# Patient Record
Sex: FEMALE | Race: WHITE | NOT HISPANIC OR LATINO | Employment: OTHER
[De-identification: names, ages, dates, MRNs, and addresses within clinical notes are randomized per-mention and may not be internally consistent; named-entity substitution may affect disease eponyms.]

---

## 2017-03-03 ENCOUNTER — RECORDS - HEALTHEAST (OUTPATIENT)
Dept: ADMINISTRATIVE | Facility: OTHER | Age: 77
End: 2017-03-03

## 2017-03-04 ENCOUNTER — RECORDS - HEALTHEAST (OUTPATIENT)
Dept: ADMINISTRATIVE | Facility: OTHER | Age: 77
End: 2017-03-04

## 2017-03-05 ENCOUNTER — RECORDS - HEALTHEAST (OUTPATIENT)
Dept: ADMINISTRATIVE | Facility: OTHER | Age: 77
End: 2017-03-05

## 2017-03-10 ENCOUNTER — COMMUNICATION - HEALTHEAST (OUTPATIENT)
Dept: CARDIOLOGY | Facility: CLINIC | Age: 77
End: 2017-03-10

## 2017-03-15 ENCOUNTER — OFFICE VISIT - HEALTHEAST (OUTPATIENT)
Dept: CARDIOLOGY | Facility: CLINIC | Age: 77
End: 2017-03-15

## 2017-03-15 ENCOUNTER — AMBULATORY - HEALTHEAST (OUTPATIENT)
Dept: CARDIOLOGY | Facility: CLINIC | Age: 77
End: 2017-03-15

## 2017-03-15 DIAGNOSIS — R06.09 EXERTIONAL DYSPNEA: ICD-10-CM

## 2017-03-15 DIAGNOSIS — I95.1 ORTHOSTATIC HYPOTENSION: ICD-10-CM

## 2017-03-15 DIAGNOSIS — I51.7 ASYMMETRIC SEPTAL HYPERTROPHY: ICD-10-CM

## 2017-03-15 ASSESSMENT — MIFFLIN-ST. JEOR: SCORE: 1240.63

## 2017-03-21 ENCOUNTER — AMBULATORY - HEALTHEAST (OUTPATIENT)
Dept: HEALTH INFORMATION MANAGEMENT | Facility: CLINIC | Age: 77
End: 2017-03-21

## 2017-03-24 ENCOUNTER — COMMUNICATION - HEALTHEAST (OUTPATIENT)
Dept: FAMILY MEDICINE | Facility: CLINIC | Age: 77
End: 2017-03-24

## 2017-03-24 ENCOUNTER — RECORDS - HEALTHEAST (OUTPATIENT)
Dept: ADMINISTRATIVE | Facility: OTHER | Age: 77
End: 2017-03-24

## 2017-03-24 DIAGNOSIS — F41.9 ANXIETY: ICD-10-CM

## 2017-03-28 ENCOUNTER — OFFICE VISIT - HEALTHEAST (OUTPATIENT)
Dept: FAMILY MEDICINE | Facility: CLINIC | Age: 77
End: 2017-03-28

## 2017-03-28 DIAGNOSIS — J01.90 ACUTE SINUSITIS: ICD-10-CM

## 2017-03-28 DIAGNOSIS — E23.6 PITUITARY MASS (H): ICD-10-CM

## 2017-03-28 DIAGNOSIS — E03.9 HYPOTHYROIDISM, UNSPECIFIED TYPE: ICD-10-CM

## 2017-03-28 DIAGNOSIS — I10 ESSENTIAL HYPERTENSION WITH GOAL BLOOD PRESSURE LESS THAN 140/90: ICD-10-CM

## 2017-03-28 DIAGNOSIS — I95.1 ORTHOSTATIC HYPOTENSION: ICD-10-CM

## 2017-03-28 DIAGNOSIS — G47.33 OBSTRUCTIVE SLEEP APNEA (ADULT) (PEDIATRIC): ICD-10-CM

## 2017-03-28 ASSESSMENT — MIFFLIN-ST. JEOR: SCORE: 1232.47

## 2017-03-31 ENCOUNTER — COMMUNICATION - HEALTHEAST (OUTPATIENT)
Dept: CARDIOLOGY | Facility: CLINIC | Age: 77
End: 2017-03-31

## 2017-04-03 ENCOUNTER — OFFICE VISIT - HEALTHEAST (OUTPATIENT)
Dept: CARDIOLOGY | Facility: CLINIC | Age: 77
End: 2017-04-03

## 2017-04-03 DIAGNOSIS — I95.1 ORTHOSTATIC HYPOTENSION: ICD-10-CM

## 2017-04-03 DIAGNOSIS — I51.7 ASYMMETRIC SEPTAL HYPERTROPHY: ICD-10-CM

## 2017-04-03 DIAGNOSIS — R06.09 EXERTIONAL DYSPNEA: ICD-10-CM

## 2017-04-03 ASSESSMENT — MIFFLIN-ST. JEOR: SCORE: 1235.19

## 2017-04-07 ENCOUNTER — RECORDS - HEALTHEAST (OUTPATIENT)
Dept: ADMINISTRATIVE | Facility: OTHER | Age: 77
End: 2017-04-07

## 2017-04-10 ENCOUNTER — RECORDS - HEALTHEAST (OUTPATIENT)
Dept: ADMINISTRATIVE | Facility: OTHER | Age: 77
End: 2017-04-10

## 2017-04-20 ENCOUNTER — HOSPITAL ENCOUNTER (OUTPATIENT)
Dept: MRI IMAGING | Facility: CLINIC | Age: 77
Discharge: HOME | End: 2017-04-20
Attending: SURGERY

## 2017-04-20 ENCOUNTER — HOSPITAL ENCOUNTER (OUTPATIENT)
Dept: CT IMAGING | Facility: CLINIC | Age: 77
Discharge: HOME | End: 2017-04-20
Attending: NEUROLOGICAL SURGERY

## 2017-04-20 ENCOUNTER — HOSPITAL ENCOUNTER (OUTPATIENT)
Dept: MRI IMAGING | Facility: CLINIC | Age: 77
Discharge: HOME | End: 2017-04-20
Attending: NEUROLOGICAL SURGERY

## 2017-04-20 DIAGNOSIS — D49.7 PITUITARY TUMOR: ICD-10-CM

## 2017-04-20 DIAGNOSIS — D48.19 NEOPLASM OF UNCERTAIN BEHAVIOR OF CONNECTIVE AND OTHER SOFT TISSUE: ICD-10-CM

## 2017-04-24 ENCOUNTER — COMMUNICATION - HEALTHEAST (OUTPATIENT)
Dept: FAMILY MEDICINE | Facility: CLINIC | Age: 77
End: 2017-04-24

## 2017-05-06 ENCOUNTER — COMMUNICATION - HEALTHEAST (OUTPATIENT)
Dept: FAMILY MEDICINE | Facility: CLINIC | Age: 77
End: 2017-05-06

## 2017-05-09 ENCOUNTER — OFFICE VISIT - HEALTHEAST (OUTPATIENT)
Dept: CARDIOLOGY | Facility: CLINIC | Age: 77
End: 2017-05-09

## 2017-05-09 DIAGNOSIS — R55 SYNCOPE, UNSPECIFIED SYNCOPE TYPE: ICD-10-CM

## 2017-05-09 DIAGNOSIS — I95.1 ORTHOSTATIC HYPOTENSION: ICD-10-CM

## 2017-05-09 DIAGNOSIS — Q24.8 LEFT VENTRICULAR OUTFLOW TRACT OBSTRUCTION: ICD-10-CM

## 2017-05-09 DIAGNOSIS — I51.7 ASYMMETRIC SEPTAL HYPERTROPHY: ICD-10-CM

## 2017-05-09 ASSESSMENT — MIFFLIN-ST. JEOR: SCORE: 1219.77

## 2017-05-10 ENCOUNTER — COMMUNICATION - HEALTHEAST (OUTPATIENT)
Dept: FAMILY MEDICINE | Facility: CLINIC | Age: 77
End: 2017-05-10

## 2017-05-12 ENCOUNTER — RECORDS - HEALTHEAST (OUTPATIENT)
Dept: ADMINISTRATIVE | Facility: OTHER | Age: 77
End: 2017-05-12

## 2017-05-19 ENCOUNTER — COMMUNICATION - HEALTHEAST (OUTPATIENT)
Dept: FAMILY MEDICINE | Facility: CLINIC | Age: 77
End: 2017-05-19

## 2017-05-30 ENCOUNTER — COMMUNICATION - HEALTHEAST (OUTPATIENT)
Dept: FAMILY MEDICINE | Facility: CLINIC | Age: 77
End: 2017-05-30

## 2017-06-12 ENCOUNTER — COMMUNICATION - HEALTHEAST (OUTPATIENT)
Dept: FAMILY MEDICINE | Facility: CLINIC | Age: 77
End: 2017-06-12

## 2017-06-12 DIAGNOSIS — E78.5 HYPERLIPIDEMIA: ICD-10-CM

## 2017-06-17 ENCOUNTER — COMMUNICATION - HEALTHEAST (OUTPATIENT)
Dept: FAMILY MEDICINE | Facility: CLINIC | Age: 77
End: 2017-06-17

## 2017-06-17 DIAGNOSIS — G47.00 INSOMNIA, UNSPECIFIED: ICD-10-CM

## 2017-06-17 DIAGNOSIS — E78.5 HYPERLIPIDEMIA: ICD-10-CM

## 2017-06-20 ENCOUNTER — COMMUNICATION - HEALTHEAST (OUTPATIENT)
Dept: FAMILY MEDICINE | Facility: CLINIC | Age: 77
End: 2017-06-20

## 2017-06-20 DIAGNOSIS — G47.00 INSOMNIA, UNSPECIFIED: ICD-10-CM

## 2017-06-21 ENCOUNTER — COMMUNICATION - HEALTHEAST (OUTPATIENT)
Dept: FAMILY MEDICINE | Facility: CLINIC | Age: 77
End: 2017-06-21

## 2017-06-21 ENCOUNTER — COMMUNICATION - HEALTHEAST (OUTPATIENT)
Dept: CARDIOLOGY | Facility: CLINIC | Age: 77
End: 2017-06-21

## 2017-06-26 ENCOUNTER — COMMUNICATION - HEALTHEAST (OUTPATIENT)
Dept: CARDIOLOGY | Facility: CLINIC | Age: 77
End: 2017-06-26

## 2017-06-26 ENCOUNTER — AMBULATORY - HEALTHEAST (OUTPATIENT)
Dept: CARDIOLOGY | Facility: CLINIC | Age: 77
End: 2017-06-26

## 2017-06-26 DIAGNOSIS — R00.0 TACHYCARDIA: ICD-10-CM

## 2017-06-27 ENCOUNTER — HOSPITAL ENCOUNTER (OUTPATIENT)
Dept: CARDIOLOGY | Facility: CLINIC | Age: 77
Discharge: HOME | End: 2017-06-27
Attending: INTERNAL MEDICINE

## 2017-06-27 DIAGNOSIS — R00.0 TACHYCARDIA: ICD-10-CM

## 2017-06-29 ENCOUNTER — COMMUNICATION - HEALTHEAST (OUTPATIENT)
Dept: CARDIOLOGY | Facility: CLINIC | Age: 77
End: 2017-06-29

## 2017-06-29 ENCOUNTER — OFFICE VISIT - HEALTHEAST (OUTPATIENT)
Dept: CARDIOLOGY | Facility: CLINIC | Age: 77
End: 2017-06-29

## 2017-06-29 DIAGNOSIS — I10 ESSENTIAL HYPERTENSION WITH GOAL BLOOD PRESSURE LESS THAN 140/90: ICD-10-CM

## 2017-06-29 DIAGNOSIS — R07.2 PRECORDIAL PAIN: ICD-10-CM

## 2017-06-29 DIAGNOSIS — R00.2 RAPID PALPITATIONS: ICD-10-CM

## 2017-06-29 DIAGNOSIS — G47.33 OBSTRUCTIVE SLEEP APNEA (ADULT) (PEDIATRIC): ICD-10-CM

## 2017-06-29 LAB
ATRIAL RATE - MUSE: 68 BPM
DIASTOLIC BLOOD PRESSURE - MUSE: NORMAL MMHG
INTERPRETATION ECG - MUSE: NORMAL
P AXIS - MUSE: 27 DEGREES
PR INTERVAL - MUSE: 162 MS
QRS DURATION - MUSE: 96 MS
QT - MUSE: 382 MS
QTC - MUSE: 406 MS
R AXIS - MUSE: 7 DEGREES
SYSTOLIC BLOOD PRESSURE - MUSE: NORMAL MMHG
T AXIS - MUSE: 20 DEGREES
VENTRICULAR RATE- MUSE: 68 BPM

## 2017-06-29 ASSESSMENT — MIFFLIN-ST. JEOR: SCORE: 1248.8

## 2017-07-03 ENCOUNTER — COMMUNICATION - HEALTHEAST (OUTPATIENT)
Dept: FAMILY MEDICINE | Facility: CLINIC | Age: 77
End: 2017-07-03

## 2017-07-03 DIAGNOSIS — E03.9 HYPOTHYROID: ICD-10-CM

## 2017-07-06 ENCOUNTER — HOSPITAL ENCOUNTER (OUTPATIENT)
Dept: CARDIOLOGY | Facility: HOSPITAL | Age: 77
Discharge: HOME | End: 2017-07-06
Attending: INTERNAL MEDICINE

## 2017-07-06 ENCOUNTER — HOSPITAL ENCOUNTER (OUTPATIENT)
Dept: NUCLEAR MEDICINE | Facility: HOSPITAL | Age: 77
Discharge: HOME | End: 2017-07-06
Attending: INTERNAL MEDICINE

## 2017-07-06 DIAGNOSIS — R00.2 RAPID PALPITATIONS: ICD-10-CM

## 2017-07-06 DIAGNOSIS — R07.2 PRECORDIAL PAIN: ICD-10-CM

## 2017-07-06 LAB
CV STRESS CURRENT BP HE: NORMAL
CV STRESS CURRENT HR HE: 104
CV STRESS CURRENT HR HE: 105
CV STRESS CURRENT HR HE: 108
CV STRESS CURRENT HR HE: 112
CV STRESS CURRENT HR HE: 118
CV STRESS CURRENT HR HE: 120
CV STRESS CURRENT HR HE: 121
CV STRESS CURRENT HR HE: 121
CV STRESS CURRENT HR HE: 122
CV STRESS CURRENT HR HE: 70
CV STRESS CURRENT HR HE: 70
CV STRESS CURRENT HR HE: 71
CV STRESS CURRENT HR HE: 72
CV STRESS CURRENT HR HE: 74
CV STRESS CURRENT HR HE: 76
CV STRESS CURRENT HR HE: 77
CV STRESS CURRENT HR HE: 77
CV STRESS CURRENT HR HE: 79
CV STRESS CURRENT HR HE: 82
CV STRESS CURRENT HR HE: 96
CV STRESS DEVIATION TIME HE: NORMAL
CV STRESS ECHO PERCENT HR HE: NORMAL
CV STRESS EXERCISE STAGE HE: NORMAL
CV STRESS EXERCISE STAGE REACHED HE: NORMAL
CV STRESS FINAL RESTING BP HE: NORMAL
CV STRESS FINAL RESTING HR HE: 70
CV STRESS MAX HR HE: 122
CV STRESS MAX TREADMILL GRADE HE: 14
CV STRESS MAX TREADMILL SPEED HE: 3.4
CV STRESS PEAK DIA BP HE: NORMAL
CV STRESS PEAK SYS BP HE: NORMAL
CV STRESS PHASE HE: NORMAL
CV STRESS PROTOCOL HE: NORMAL
CV STRESS RESTING PT POSITION HE: NORMAL
CV STRESS ST DEVIATION AMOUNT HE: NORMAL
CV STRESS ST DEVIATION ELEVATION HE: NORMAL
CV STRESS ST EVELATION AMOUNT HE: NORMAL
CV STRESS TEST TYPE HE: NORMAL
CV STRESS TOTAL STAGE TIME MIN 1 HE: NORMAL
NUC STRESS EJECTION FRACTION: 74 %
STRESS ECHO BASELINE BP: NORMAL
STRESS ECHO BASELINE HR: 66
STRESS ECHO CALCULATED PERCENT HR: 85 %
STRESS ECHO LAST STRESS BP: NORMAL
STRESS ECHO LAST STRESS HR: 122
STRESS ECHO POST ESTIMATED WORKLOAD: 7.3
STRESS ECHO POST EXERCISE DUR MIN: 6
STRESS ECHO POST EXERCISE DUR SEC: 0
STRESS ECHO TARGET HR: 122

## 2017-08-03 ENCOUNTER — COMMUNICATION - HEALTHEAST (OUTPATIENT)
Dept: FAMILY MEDICINE | Facility: CLINIC | Age: 77
End: 2017-08-03

## 2017-08-09 ENCOUNTER — COMMUNICATION - HEALTHEAST (OUTPATIENT)
Dept: FAMILY MEDICINE | Facility: CLINIC | Age: 77
End: 2017-08-09

## 2017-08-13 ENCOUNTER — COMMUNICATION - HEALTHEAST (OUTPATIENT)
Dept: FAMILY MEDICINE | Facility: CLINIC | Age: 77
End: 2017-08-13

## 2017-08-13 DIAGNOSIS — R23.2 HOT FLASHES: ICD-10-CM

## 2017-08-13 DIAGNOSIS — K21.9 ESOPHAGEAL REFLUX: ICD-10-CM

## 2017-08-16 ENCOUNTER — OFFICE VISIT - HEALTHEAST (OUTPATIENT)
Dept: FAMILY MEDICINE | Facility: CLINIC | Age: 77
End: 2017-08-16

## 2017-08-16 DIAGNOSIS — F41.1 GENERALIZED ANXIETY DISORDER: ICD-10-CM

## 2017-08-16 DIAGNOSIS — F33.9 MAJOR DEPRESSION, RECURRENT (H): ICD-10-CM

## 2017-08-16 DIAGNOSIS — T14.8XXA HEMATOMA: ICD-10-CM

## 2017-08-16 DIAGNOSIS — E23.6 PITUITARY MASS (H): ICD-10-CM

## 2017-08-17 ENCOUNTER — OFFICE VISIT - HEALTHEAST (OUTPATIENT)
Dept: CARDIOLOGY | Facility: CLINIC | Age: 77
End: 2017-08-17

## 2017-08-17 DIAGNOSIS — I10 ESSENTIAL HYPERTENSION WITH GOAL BLOOD PRESSURE LESS THAN 140/90: ICD-10-CM

## 2017-08-17 DIAGNOSIS — R00.2 RAPID PALPITATIONS: ICD-10-CM

## 2017-08-17 DIAGNOSIS — I95.1 SYNCOPE DUE TO ORTHOSTATIC HYPOTENSION: ICD-10-CM

## 2017-08-17 ASSESSMENT — MIFFLIN-ST. JEOR: SCORE: 1257.87

## 2017-09-14 ENCOUNTER — COMMUNICATION - HEALTHEAST (OUTPATIENT)
Dept: FAMILY MEDICINE | Facility: CLINIC | Age: 77
End: 2017-09-14

## 2017-09-19 ENCOUNTER — OFFICE VISIT - HEALTHEAST (OUTPATIENT)
Dept: FAMILY MEDICINE | Facility: CLINIC | Age: 77
End: 2017-09-19

## 2017-09-19 DIAGNOSIS — R42 DIZZINESS: ICD-10-CM

## 2017-09-19 DIAGNOSIS — R29.6 FREQUENT FALLS: ICD-10-CM

## 2017-09-19 DIAGNOSIS — D49.7 PITUITARY TUMOR: ICD-10-CM

## 2017-09-21 ENCOUNTER — COMMUNICATION - HEALTHEAST (OUTPATIENT)
Dept: CARDIOLOGY | Facility: CLINIC | Age: 77
End: 2017-09-21

## 2017-09-21 DIAGNOSIS — R06.09 EXERTIONAL DYSPNEA: ICD-10-CM

## 2017-09-21 DIAGNOSIS — I95.1 ORTHOSTATIC HYPOTENSION: ICD-10-CM

## 2017-09-21 DIAGNOSIS — I51.7 ASYMMETRIC SEPTAL HYPERTROPHY: ICD-10-CM

## 2017-09-25 ENCOUNTER — RECORDS - HEALTHEAST (OUTPATIENT)
Dept: ADMINISTRATIVE | Facility: OTHER | Age: 77
End: 2017-09-25

## 2017-09-25 ENCOUNTER — COMMUNICATION - HEALTHEAST (OUTPATIENT)
Dept: CARDIOLOGY | Facility: CLINIC | Age: 77
End: 2017-09-25

## 2017-09-25 DIAGNOSIS — R06.09 EXERTIONAL DYSPNEA: ICD-10-CM

## 2017-09-25 DIAGNOSIS — I95.1 ORTHOSTATIC HYPOTENSION: ICD-10-CM

## 2017-09-25 DIAGNOSIS — I51.7 ASYMMETRIC SEPTAL HYPERTROPHY: ICD-10-CM

## 2017-10-03 ENCOUNTER — COMMUNICATION - HEALTHEAST (OUTPATIENT)
Dept: CARDIOLOGY | Facility: CLINIC | Age: 77
End: 2017-10-03

## 2017-10-03 DIAGNOSIS — R00.2 RAPID PALPITATIONS: ICD-10-CM

## 2017-10-09 ENCOUNTER — RECORDS - HEALTHEAST (OUTPATIENT)
Dept: ADMINISTRATIVE | Facility: OTHER | Age: 77
End: 2017-10-09

## 2017-11-08 ENCOUNTER — COMMUNICATION - HEALTHEAST (OUTPATIENT)
Dept: FAMILY MEDICINE | Facility: CLINIC | Age: 77
End: 2017-11-08

## 2017-11-08 DIAGNOSIS — R23.2 HOT FLASHES: ICD-10-CM

## 2017-11-13 ENCOUNTER — COMMUNICATION - HEALTHEAST (OUTPATIENT)
Dept: FAMILY MEDICINE | Facility: CLINIC | Age: 77
End: 2017-11-13

## 2017-11-21 ENCOUNTER — COMMUNICATION - HEALTHEAST (OUTPATIENT)
Dept: SCHEDULING | Facility: CLINIC | Age: 77
End: 2017-11-21

## 2017-11-21 ENCOUNTER — OFFICE VISIT - HEALTHEAST (OUTPATIENT)
Dept: FAMILY MEDICINE | Facility: CLINIC | Age: 77
End: 2017-11-21

## 2017-11-21 DIAGNOSIS — R42 DIZZINESS: ICD-10-CM

## 2017-11-21 DIAGNOSIS — T14.8XXA HEMATOMA: ICD-10-CM

## 2017-11-21 DIAGNOSIS — M54.5 CHRONIC LOW BACK PAIN, UNSPECIFIED BACK PAIN LATERALITY, WITH SCIATICA PRESENCE UNSPECIFIED: ICD-10-CM

## 2017-11-21 DIAGNOSIS — F33.1 MODERATE EPISODE OF RECURRENT MAJOR DEPRESSIVE DISORDER (H): ICD-10-CM

## 2017-11-21 DIAGNOSIS — G89.29 CHRONIC LOW BACK PAIN, UNSPECIFIED BACK PAIN LATERALITY, WITH SCIATICA PRESENCE UNSPECIFIED: ICD-10-CM

## 2017-11-21 DIAGNOSIS — R29.6 FREQUENT FALLS: ICD-10-CM

## 2017-11-24 ENCOUNTER — HOSPITAL ENCOUNTER (OUTPATIENT)
Dept: PHYSICAL MEDICINE AND REHAB | Facility: CLINIC | Age: 77
Discharge: HOME | End: 2017-11-24
Attending: FAMILY MEDICINE

## 2017-11-24 DIAGNOSIS — M54.50 LEFT LOW BACK PAIN: ICD-10-CM

## 2017-11-24 DIAGNOSIS — T88.8XXA FLUID COLLECTION AT SURGICAL SITE: ICD-10-CM

## 2017-11-24 DIAGNOSIS — Z98.1 S/P LUMBAR FUSION: ICD-10-CM

## 2017-11-24 DIAGNOSIS — M79.18 MYOFASCIAL PAIN: ICD-10-CM

## 2017-11-24 ASSESSMENT — MIFFLIN-ST. JEOR: SCORE: 1271.48

## 2017-11-27 ENCOUNTER — RECORDS - HEALTHEAST (OUTPATIENT)
Dept: ADMINISTRATIVE | Facility: OTHER | Age: 77
End: 2017-11-27

## 2017-11-28 ENCOUNTER — COMMUNICATION - HEALTHEAST (OUTPATIENT)
Dept: PHYSICAL MEDICINE AND REHAB | Facility: CLINIC | Age: 77
End: 2017-11-28

## 2017-12-09 ENCOUNTER — COMMUNICATION - HEALTHEAST (OUTPATIENT)
Dept: FAMILY MEDICINE | Facility: CLINIC | Age: 77
End: 2017-12-09

## 2017-12-09 DIAGNOSIS — G47.00 INSOMNIA: ICD-10-CM

## 2017-12-22 ENCOUNTER — COMMUNICATION - HEALTHEAST (OUTPATIENT)
Dept: ADMINISTRATIVE | Facility: CLINIC | Age: 77
End: 2017-12-22

## 2018-01-02 ASSESSMENT — MIFFLIN-ST. JEOR: SCORE: 1307.76

## 2018-01-03 ENCOUNTER — OFFICE VISIT - HEALTHEAST (OUTPATIENT)
Dept: FAMILY MEDICINE | Facility: CLINIC | Age: 78
End: 2018-01-03

## 2018-01-03 DIAGNOSIS — I10 ESSENTIAL HYPERTENSION WITH GOAL BLOOD PRESSURE LESS THAN 140/90: ICD-10-CM

## 2018-01-03 DIAGNOSIS — T14.8XXA HEMATOMA: ICD-10-CM

## 2018-01-03 DIAGNOSIS — R29.6 FREQUENT FALLS: ICD-10-CM

## 2018-01-03 DIAGNOSIS — G89.29 CHRONIC LOW BACK PAIN, UNSPECIFIED BACK PAIN LATERALITY, WITH SCIATICA PRESENCE UNSPECIFIED: ICD-10-CM

## 2018-01-03 DIAGNOSIS — R42 DIZZINESS: ICD-10-CM

## 2018-01-03 DIAGNOSIS — M54.5 CHRONIC LOW BACK PAIN, UNSPECIFIED BACK PAIN LATERALITY, WITH SCIATICA PRESENCE UNSPECIFIED: ICD-10-CM

## 2018-01-03 DIAGNOSIS — F33.1 MODERATE EPISODE OF RECURRENT MAJOR DEPRESSIVE DISORDER (H): ICD-10-CM

## 2018-01-03 DIAGNOSIS — Z01.810 PREOP CARDIOVASCULAR EXAM: ICD-10-CM

## 2018-01-03 LAB
ANION GAP SERPL CALCULATED.3IONS-SCNC: 8 MMOL/L (ref 5–18)
BUN SERPL-MCNC: 19 MG/DL (ref 8–28)
CALCIUM SERPL-MCNC: 9.1 MG/DL (ref 8.5–10.5)
CHLORIDE BLD-SCNC: 104 MMOL/L (ref 98–107)
CO2 SERPL-SCNC: 28 MMOL/L (ref 22–31)
CREAT SERPL-MCNC: 0.86 MG/DL (ref 0.6–1.1)
ERYTHROCYTE [DISTWIDTH] IN BLOOD BY AUTOMATED COUNT: 13.7 % (ref 11–14.5)
GFR SERPL CREATININE-BSD FRML MDRD: >60 ML/MIN/1.73M2
GLUCOSE BLD-MCNC: 80 MG/DL (ref 70–125)
HCT VFR BLD AUTO: 38.6 % (ref 35–47)
HGB BLD-MCNC: 12.7 G/DL (ref 12–16)
MCH RBC QN AUTO: 30.3 PG (ref 27–34)
MCHC RBC AUTO-ENTMCNC: 32.9 G/DL (ref 32–36)
MCV RBC AUTO: 92 FL (ref 80–100)
PLATELET # BLD AUTO: 226 THOU/UL (ref 140–440)
PMV BLD AUTO: 7.7 FL (ref 7–10)
POTASSIUM BLD-SCNC: 5.2 MMOL/L (ref 3.5–5)
RBC # BLD AUTO: 4.19 MILL/UL (ref 3.8–5.4)
SODIUM SERPL-SCNC: 140 MMOL/L (ref 136–145)
WBC: 7 THOU/UL (ref 4–11)

## 2018-01-05 ENCOUNTER — SURGERY - HEALTHEAST (OUTPATIENT)
Dept: SURGERY | Facility: CLINIC | Age: 78
End: 2018-01-05

## 2018-01-05 ENCOUNTER — ANESTHESIA - HEALTHEAST (OUTPATIENT)
Dept: SURGERY | Facility: CLINIC | Age: 78
End: 2018-01-05

## 2018-01-06 ENCOUNTER — COMMUNICATION - HEALTHEAST (OUTPATIENT)
Dept: FAMILY MEDICINE | Facility: CLINIC | Age: 78
End: 2018-01-06

## 2018-01-06 DIAGNOSIS — F33.1 MODERATE EPISODE OF RECURRENT MAJOR DEPRESSIVE DISORDER (H): ICD-10-CM

## 2018-01-06 DIAGNOSIS — K21.9 ESOPHAGEAL REFLUX: ICD-10-CM

## 2018-01-06 DIAGNOSIS — E03.9 HYPOTHYROID: ICD-10-CM

## 2018-01-06 DIAGNOSIS — R23.2 HOT FLASHES: ICD-10-CM

## 2018-01-10 ENCOUNTER — COMMUNICATION - HEALTHEAST (OUTPATIENT)
Dept: FAMILY MEDICINE | Facility: CLINIC | Age: 78
End: 2018-01-10

## 2018-01-11 ENCOUNTER — RECORDS - HEALTHEAST (OUTPATIENT)
Dept: ADMINISTRATIVE | Facility: OTHER | Age: 78
End: 2018-01-11

## 2018-01-24 ENCOUNTER — OFFICE VISIT - HEALTHEAST (OUTPATIENT)
Dept: CARDIOLOGY | Facility: CLINIC | Age: 78
End: 2018-01-24

## 2018-01-24 DIAGNOSIS — R00.2 RAPID PALPITATIONS: ICD-10-CM

## 2018-01-24 DIAGNOSIS — R06.09 EXERTIONAL DYSPNEA: ICD-10-CM

## 2018-01-24 DIAGNOSIS — I95.1 ORTHOSTATIC HYPOTENSION: ICD-10-CM

## 2018-01-24 ASSESSMENT — MIFFLIN-ST. JEOR: SCORE: 1307.76

## 2018-02-06 ENCOUNTER — COMMUNICATION - HEALTHEAST (OUTPATIENT)
Dept: FAMILY MEDICINE | Facility: CLINIC | Age: 78
End: 2018-02-06

## 2018-02-06 DIAGNOSIS — K21.9 ESOPHAGEAL REFLUX: ICD-10-CM

## 2018-03-12 ENCOUNTER — COMMUNICATION - HEALTHEAST (OUTPATIENT)
Dept: FAMILY MEDICINE | Facility: CLINIC | Age: 78
End: 2018-03-12

## 2018-03-13 ENCOUNTER — AMBULATORY - HEALTHEAST (OUTPATIENT)
Dept: FAMILY MEDICINE | Facility: CLINIC | Age: 78
End: 2018-03-13

## 2018-03-13 DIAGNOSIS — F41.1 GENERALIZED ANXIETY DISORDER: ICD-10-CM

## 2018-03-13 DIAGNOSIS — F33.9 RECURRENT MAJOR DEPRESSIVE DISORDER, REMISSION STATUS UNSPECIFIED (H): ICD-10-CM

## 2018-03-13 DIAGNOSIS — R45.89 DEPRESSED MOOD: ICD-10-CM

## 2018-03-30 ENCOUNTER — COMMUNICATION - HEALTHEAST (OUTPATIENT)
Dept: FAMILY MEDICINE | Facility: CLINIC | Age: 78
End: 2018-03-30

## 2018-03-30 DIAGNOSIS — E03.9 HYPOTHYROID: ICD-10-CM

## 2018-04-02 ENCOUNTER — COMMUNICATION - HEALTHEAST (OUTPATIENT)
Dept: FAMILY MEDICINE | Facility: CLINIC | Age: 78
End: 2018-04-02

## 2018-04-02 ENCOUNTER — RECORDS - HEALTHEAST (OUTPATIENT)
Dept: ADMINISTRATIVE | Facility: OTHER | Age: 78
End: 2018-04-02

## 2018-04-03 ENCOUNTER — COMMUNICATION - HEALTHEAST (OUTPATIENT)
Dept: FAMILY MEDICINE | Facility: CLINIC | Age: 78
End: 2018-04-03

## 2018-04-03 DIAGNOSIS — R23.2 HOT FLASHES: ICD-10-CM

## 2018-04-04 ENCOUNTER — RECORDS - HEALTHEAST (OUTPATIENT)
Dept: ADMINISTRATIVE | Facility: OTHER | Age: 78
End: 2018-04-04

## 2018-04-05 ENCOUNTER — COMMUNICATION - HEALTHEAST (OUTPATIENT)
Dept: FAMILY MEDICINE | Facility: CLINIC | Age: 78
End: 2018-04-05

## 2018-04-05 DIAGNOSIS — F33.1 MODERATE EPISODE OF RECURRENT MAJOR DEPRESSIVE DISORDER (H): ICD-10-CM

## 2018-04-12 ENCOUNTER — OFFICE VISIT - HEALTHEAST (OUTPATIENT)
Dept: FAMILY MEDICINE | Facility: CLINIC | Age: 78
End: 2018-04-12

## 2018-04-12 DIAGNOSIS — R32 URINARY INCONTINENCE: ICD-10-CM

## 2018-04-12 DIAGNOSIS — I73.9 CLAUDICATION (H): ICD-10-CM

## 2018-04-12 DIAGNOSIS — R23.2 HOT FLASHES: ICD-10-CM

## 2018-04-12 DIAGNOSIS — E78.5 HYPERLIPIDEMIA: ICD-10-CM

## 2018-04-12 DIAGNOSIS — J30.9 AR (ALLERGIC RHINITIS): ICD-10-CM

## 2018-04-12 DIAGNOSIS — R29.898 BILATERAL LEG WEAKNESS: ICD-10-CM

## 2018-04-12 DIAGNOSIS — H69.92 EUSTACHIAN TUBE DYSFUNCTION, LEFT: ICD-10-CM

## 2018-04-12 LAB
ANION GAP SERPL CALCULATED.3IONS-SCNC: 10 MMOL/L (ref 5–18)
BUN SERPL-MCNC: 21 MG/DL (ref 8–28)
CALCIUM SERPL-MCNC: 9.2 MG/DL (ref 8.5–10.5)
CHLORIDE BLD-SCNC: 103 MMOL/L (ref 98–107)
CK SERPL-CCNC: 87 U/L (ref 30–190)
CO2 SERPL-SCNC: 27 MMOL/L (ref 22–31)
CREAT SERPL-MCNC: 0.8 MG/DL (ref 0.6–1.1)
GFR SERPL CREATININE-BSD FRML MDRD: >60 ML/MIN/1.73M2
GLUCOSE BLD-MCNC: 77 MG/DL (ref 70–125)
POTASSIUM BLD-SCNC: 4.8 MMOL/L (ref 3.5–5)
SODIUM SERPL-SCNC: 140 MMOL/L (ref 136–145)

## 2018-04-13 ENCOUNTER — HOSPITAL ENCOUNTER (OUTPATIENT)
Dept: ULTRASOUND IMAGING | Facility: CLINIC | Age: 78
Discharge: HOME | End: 2018-04-13
Attending: FAMILY MEDICINE

## 2018-04-13 DIAGNOSIS — I73.9 CLAUDICATION (H): ICD-10-CM

## 2018-04-16 ENCOUNTER — OFFICE VISIT - HEALTHEAST (OUTPATIENT)
Dept: VASCULAR SURGERY | Facility: CLINIC | Age: 78
End: 2018-04-16

## 2018-04-16 DIAGNOSIS — R29.898 BILATERAL LEG WEAKNESS: ICD-10-CM

## 2018-04-16 DIAGNOSIS — R60.9 LIPOEDEMA: ICD-10-CM

## 2018-04-16 DIAGNOSIS — I73.9 PAD (PERIPHERAL ARTERY DISEASE) (H): ICD-10-CM

## 2018-04-18 ENCOUNTER — OFFICE VISIT - HEALTHEAST (OUTPATIENT)
Dept: BEHAVIORAL HEALTH | Facility: CLINIC | Age: 78
End: 2018-04-18

## 2018-04-18 DIAGNOSIS — F33.41 RECURRENT MAJOR DEPRESSIVE DISORDER, IN PARTIAL REMISSION (H): ICD-10-CM

## 2018-04-23 ENCOUNTER — OFFICE VISIT - HEALTHEAST (OUTPATIENT)
Dept: FAMILY MEDICINE | Facility: CLINIC | Age: 78
End: 2018-04-23

## 2018-04-23 DIAGNOSIS — N39.0 UTI (URINARY TRACT INFECTION): ICD-10-CM

## 2018-04-23 DIAGNOSIS — R32 URINARY INCONTINENCE: ICD-10-CM

## 2018-04-23 DIAGNOSIS — N20.0 LEFT NEPHROLITHIASIS: ICD-10-CM

## 2018-04-23 LAB
ALBUMIN UR-MCNC: ABNORMAL MG/DL
APPEARANCE UR: ABNORMAL
BACTERIA #/AREA URNS HPF: ABNORMAL HPF
BILIRUB UR QL STRIP: ABNORMAL
COLOR UR AUTO: YELLOW
GLUCOSE UR STRIP-MCNC: NEGATIVE MG/DL
HGB UR QL STRIP: ABNORMAL
KETONES UR STRIP-MCNC: ABNORMAL MG/DL
LEUKOCYTE ESTERASE UR QL STRIP: ABNORMAL
NITRATE UR QL: NEGATIVE
PH UR STRIP: 6 [PH] (ref 5–8)
RBC #/AREA URNS AUTO: ABNORMAL HPF
SP GR UR STRIP: 1.01 (ref 1–1.03)
SQUAMOUS #/AREA URNS AUTO: ABNORMAL LPF
UROBILINOGEN UR STRIP-ACNC: ABNORMAL
WBC #/AREA URNS AUTO: ABNORMAL HPF

## 2018-04-23 ASSESSMENT — MIFFLIN-ST. JEOR: SCORE: 1285.08

## 2018-04-25 ENCOUNTER — COMMUNICATION - HEALTHEAST (OUTPATIENT)
Dept: ADMINISTRATIVE | Facility: CLINIC | Age: 78
End: 2018-04-25

## 2018-04-25 LAB — BACTERIA SPEC CULT: ABNORMAL

## 2018-05-15 ENCOUNTER — COMMUNICATION - HEALTHEAST (OUTPATIENT)
Dept: FAMILY MEDICINE | Facility: CLINIC | Age: 78
End: 2018-05-15

## 2018-05-17 ENCOUNTER — OFFICE VISIT - HEALTHEAST (OUTPATIENT)
Dept: VASCULAR SURGERY | Facility: CLINIC | Age: 78
End: 2018-05-17

## 2018-05-17 DIAGNOSIS — E55.9 VITAMIN D DEFICIENCY: ICD-10-CM

## 2018-05-17 DIAGNOSIS — E65 FAT DEPOSITS: ICD-10-CM

## 2018-05-17 DIAGNOSIS — M79.89 LEG SWELLING: ICD-10-CM

## 2018-05-17 DIAGNOSIS — I87.303 VENOUS HYPERTENSION OF BOTH LOWER EXTREMITIES: ICD-10-CM

## 2018-05-17 DIAGNOSIS — R29.898 BILATERAL LEG WEAKNESS: ICD-10-CM

## 2018-05-17 DIAGNOSIS — M79.605 PAIN IN BOTH LOWER EXTREMITIES: ICD-10-CM

## 2018-05-17 DIAGNOSIS — M79.604 PAIN IN BOTH LOWER EXTREMITIES: ICD-10-CM

## 2018-05-17 DIAGNOSIS — E66.811: ICD-10-CM

## 2018-05-17 DIAGNOSIS — I87.2 VENOUS INSUFFICIENCY OF BOTH LOWER EXTREMITIES: ICD-10-CM

## 2018-05-17 LAB
C REACTIVE PROTEIN LHE: 0.2 MG/DL (ref 0–0.8)
ERYTHROCYTE [SEDIMENTATION RATE] IN BLOOD BY WESTERGREN METHOD: 26 MM/HR (ref 0–20)

## 2018-05-18 LAB — 25(OH)D3 SERPL-MCNC: 45.2 NG/ML (ref 30–80)

## 2018-05-21 ENCOUNTER — COMMUNICATION - HEALTHEAST (OUTPATIENT)
Dept: VASCULAR SURGERY | Facility: CLINIC | Age: 78
End: 2018-05-21

## 2018-05-24 ENCOUNTER — RECORDS - HEALTHEAST (OUTPATIENT)
Dept: VASCULAR ULTRASOUND | Facility: CLINIC | Age: 78
End: 2018-05-24

## 2018-05-24 DIAGNOSIS — E65 LOCALIZED ADIPOSITY: ICD-10-CM

## 2018-05-24 DIAGNOSIS — R29.898 OTHER SYMPTOMS AND SIGNS INVOLVING THE MUSCULOSKELETAL SYSTEM: ICD-10-CM

## 2018-05-24 DIAGNOSIS — M79.605 PAIN IN LEFT LEG: ICD-10-CM

## 2018-05-24 DIAGNOSIS — E55.9 VITAMIN D DEFICIENCY, UNSPECIFIED: ICD-10-CM

## 2018-05-24 DIAGNOSIS — I87.2 VENOUS INSUFFICIENCY (CHRONIC) (PERIPHERAL): ICD-10-CM

## 2018-05-24 DIAGNOSIS — M79.89 OTHER SPECIFIED SOFT TISSUE DISORDERS: ICD-10-CM

## 2018-05-24 DIAGNOSIS — E66.9 OBESITY, UNSPECIFIED: ICD-10-CM

## 2018-05-24 DIAGNOSIS — M79.604 PAIN IN RIGHT LEG: ICD-10-CM

## 2018-05-24 DIAGNOSIS — I87.303 CHRONIC VENOUS HYPERTENSION (IDIOPATHIC) WITHOUT COMPLICATIONS OF BILATERAL LOWER EXTREMITY: ICD-10-CM

## 2018-05-25 ENCOUNTER — OFFICE VISIT - HEALTHEAST (OUTPATIENT)
Dept: CARDIOLOGY | Facility: CLINIC | Age: 78
End: 2018-05-25

## 2018-05-25 ENCOUNTER — COMMUNICATION - HEALTHEAST (OUTPATIENT)
Dept: VASCULAR SURGERY | Facility: CLINIC | Age: 78
End: 2018-05-25

## 2018-05-25 DIAGNOSIS — E03.9 HYPOTHYROIDISM, UNSPECIFIED TYPE: ICD-10-CM

## 2018-05-25 DIAGNOSIS — R06.09 EXERTIONAL DYSPNEA: ICD-10-CM

## 2018-05-25 DIAGNOSIS — I10 ESSENTIAL HYPERTENSION: ICD-10-CM

## 2018-05-25 DIAGNOSIS — M79.89 LEG SWELLING: ICD-10-CM

## 2018-05-25 ASSESSMENT — MIFFLIN-ST. JEOR: SCORE: 1294.16

## 2018-05-29 ENCOUNTER — COMMUNICATION - HEALTHEAST (OUTPATIENT)
Dept: FAMILY MEDICINE | Facility: CLINIC | Age: 78
End: 2018-05-29

## 2018-05-29 ENCOUNTER — OFFICE VISIT - HEALTHEAST (OUTPATIENT)
Dept: PHYSICAL THERAPY | Facility: REHABILITATION | Age: 78
End: 2018-05-29

## 2018-05-29 DIAGNOSIS — M62.81 MUSCLE WEAKNESS: ICD-10-CM

## 2018-05-29 DIAGNOSIS — I87.2 VENOUS INSUFFICIENCY OF BOTH LOWER EXTREMITIES: ICD-10-CM

## 2018-05-29 DIAGNOSIS — I89.0 LYMPHEDEMA: ICD-10-CM

## 2018-05-29 DIAGNOSIS — R29.898 BILATERAL LEG WEAKNESS: ICD-10-CM

## 2018-05-29 DIAGNOSIS — I87.303 VENOUS HYPERTENSION OF BOTH LOWER EXTREMITIES: ICD-10-CM

## 2018-05-29 DIAGNOSIS — M79.89 LEG SWELLING: ICD-10-CM

## 2018-05-30 ENCOUNTER — AMBULATORY - HEALTHEAST (OUTPATIENT)
Dept: CARDIOLOGY | Facility: CLINIC | Age: 78
End: 2018-05-30

## 2018-05-30 ENCOUNTER — AMBULATORY - HEALTHEAST (OUTPATIENT)
Dept: VASCULAR SURGERY | Facility: CLINIC | Age: 78
End: 2018-05-30

## 2018-05-30 ENCOUNTER — HOSPITAL ENCOUNTER (OUTPATIENT)
Dept: CARDIOLOGY | Facility: HOSPITAL | Age: 78
Discharge: HOME | End: 2018-05-30
Attending: INTERNAL MEDICINE

## 2018-05-30 DIAGNOSIS — M79.89 LEG SWELLING: ICD-10-CM

## 2018-05-30 DIAGNOSIS — I87.303 VENOUS HYPERTENSION OF BOTH LOWER EXTREMITIES: ICD-10-CM

## 2018-05-30 DIAGNOSIS — I87.2 VENOUS INSUFFICIENCY OF BOTH LOWER EXTREMITIES: ICD-10-CM

## 2018-05-30 DIAGNOSIS — E55.9 VITAMIN D DEFICIENCY: ICD-10-CM

## 2018-05-30 DIAGNOSIS — R29.898 BILATERAL LEG WEAKNESS: ICD-10-CM

## 2018-05-30 DIAGNOSIS — E65 FAT DEPOSITS: ICD-10-CM

## 2018-05-30 DIAGNOSIS — R06.09 OTHER FORMS OF DYSPNEA: ICD-10-CM

## 2018-05-30 DIAGNOSIS — I10 ESSENTIAL HYPERTENSION: ICD-10-CM

## 2018-05-30 DIAGNOSIS — E03.9 HYPOTHYROIDISM, UNSPECIFIED TYPE: ICD-10-CM

## 2018-05-30 DIAGNOSIS — R06.00 DYSPNEA: ICD-10-CM

## 2018-05-30 DIAGNOSIS — I51.7 RIGHT ATRIAL ENLARGEMENT: ICD-10-CM

## 2018-05-30 DIAGNOSIS — R06.09 EXERTIONAL DYSPNEA: ICD-10-CM

## 2018-05-30 DIAGNOSIS — E66.811: ICD-10-CM

## 2018-05-30 DIAGNOSIS — M79.604 PAIN IN BOTH LOWER EXTREMITIES: ICD-10-CM

## 2018-05-30 DIAGNOSIS — M79.605 PAIN IN BOTH LOWER EXTREMITIES: ICD-10-CM

## 2018-05-30 LAB
AORTIC VALVE MEAN VELOCITY: 122 CM/S
AV DIMENSIONLESS INDEX VTI: 0.7
AV MEAN GRADIENT: 7 MMHG
AV PEAK GRADIENT: 13.4 MMHG
AV VALVE AREA: 2.4 CM2
BSA FOR ECHO PROCEDURE: 1.95 M2
CV BLOOD PRESSURE: NORMAL MMHG
CV ECHO HEIGHT: 62 IN
CV ECHO WEIGHT: 192 LBS
DOP CALC AO PEAK VEL: 183 CM/S
DOP CALC AO VTI: 43.6 CM
DOP CALC LVOT AREA: 3.46 CM2
DOP CALC LVOT DIAMETER: 2.1 CM
DOP CALC LVOT STROKE VOLUME: 105.6 CM3
DOP CALCLVOT PEAK VEL VTI: 30.5 CM
EJECTION FRACTION: 55 % (ref 55–75)
FRACTIONAL SHORTENING: 26.3 % (ref 28–44)
INTERVENTRICULAR SEPTUM IN END DIASTOLE: 0.8 CM (ref 0.6–0.9)
IVS/PW RATIO: 0.9
LA AREA 1: 15.2 CM2
LA AREA 2: 16.2 CM2
LEFT ATRIUM LENGTH: 4.81 CM
LEFT ATRIUM SIZE: 3.6 CM
LEFT ATRIUM VOLUME INDEX: 22.3 ML/M2
LEFT ATRIUM VOLUME: 43.5 ML
LEFT VENTRICLE DIASTOLIC VOLUME INDEX: 28.7 CM3/M2 (ref 34–74)
LEFT VENTRICLE DIASTOLIC VOLUME: 56 CM3 (ref 46–106)
LEFT VENTRICLE MASS INDEX: 47.9 G/M2
LEFT VENTRICLE SYSTOLIC VOLUME INDEX: 12.8 CM3/M2 (ref 11–31)
LEFT VENTRICLE SYSTOLIC VOLUME: 25 CM3 (ref 14–42)
LEFT VENTRICULAR INTERNAL DIMENSION IN DIASTOLE: 3.8 CM (ref 3.8–5.2)
LEFT VENTRICULAR INTERNAL DIMENSION IN SYSTOLE: 2.8 CM (ref 2.2–3.5)
LEFT VENTRICULAR MASS: 93.4 G
LEFT VENTRICULAR OUTFLOW TRACT MEAN GRADIENT: 3 MMHG
LEFT VENTRICULAR OUTFLOW TRACT MEAN VELOCITY: 88.8 CM/S
LEFT VENTRICULAR POSTERIOR WALL IN END DIASTOLE: 0.9 CM (ref 0.6–0.9)
LV STROKE VOLUME INDEX: 54.1 ML/M2
MITRAL VALVE E/A RATIO: 0.8
MV AVERAGE E/E' RATIO: 14.9 CM/S
MV DECELERATION TIME: 264 MS
MV E'TISSUE VEL-LAT: 5.26 CM/S
MV E'TISSUE VEL-MED: 6.24 CM/S
MV LATERAL E/E' RATIO: 16.3
MV MEDIAL E/E' RATIO: 13.8
MV PEAK A VELOCITY: 102 CM/S
MV PEAK E VELOCITY: 85.9 CM/S
NUC REST DIASTOLIC VOLUME INDEX: 3072 LBS
NUC REST SYSTOLIC VOLUME INDEX: 62 IN
PR MAX PG: 4 MMHG
PR PEAK VELOCITY: 103 CM/S
TRICUSPID REGURGITATION PEAK PRESSURE GRADIENT: 28.1 MMHG
TRICUSPID VALVE ANULAR PLANE SYSTOLIC EXCURSION: 2.3 CM
TRICUSPID VALVE PEAK REGURGITANT VELOCITY: 265 CM/S

## 2018-05-30 ASSESSMENT — MIFFLIN-ST. JEOR: SCORE: 1294.16

## 2018-05-31 ENCOUNTER — OFFICE VISIT - HEALTHEAST (OUTPATIENT)
Dept: PHYSICAL THERAPY | Facility: REHABILITATION | Age: 78
End: 2018-05-31

## 2018-05-31 ENCOUNTER — AMBULATORY - HEALTHEAST (OUTPATIENT)
Dept: LAB | Facility: CLINIC | Age: 78
End: 2018-05-31

## 2018-05-31 DIAGNOSIS — R29.898 BILATERAL LEG WEAKNESS: ICD-10-CM

## 2018-05-31 DIAGNOSIS — M79.89 LEG SWELLING: ICD-10-CM

## 2018-05-31 DIAGNOSIS — I89.0 LYMPHEDEMA: ICD-10-CM

## 2018-05-31 DIAGNOSIS — I87.303 VENOUS HYPERTENSION OF BOTH LOWER EXTREMITIES: ICD-10-CM

## 2018-05-31 DIAGNOSIS — I87.2 VENOUS INSUFFICIENCY OF BOTH LOWER EXTREMITIES: ICD-10-CM

## 2018-05-31 DIAGNOSIS — M62.81 MUSCLE WEAKNESS: ICD-10-CM

## 2018-05-31 DIAGNOSIS — R06.00 DYSPNEA: ICD-10-CM

## 2018-05-31 DIAGNOSIS — I51.7 RIGHT ATRIAL ENLARGEMENT: ICD-10-CM

## 2018-05-31 LAB
ALBUMIN SERPL-MCNC: 3.9 G/DL (ref 3.5–5)
ALP SERPL-CCNC: 81 U/L (ref 45–120)
ALT SERPL W P-5'-P-CCNC: 20 U/L (ref 0–45)
ANION GAP SERPL CALCULATED.3IONS-SCNC: 12 MMOL/L (ref 5–18)
AST SERPL W P-5'-P-CCNC: 24 U/L (ref 0–40)
BILIRUB SERPL-MCNC: 0.3 MG/DL (ref 0–1)
BUN SERPL-MCNC: 18 MG/DL (ref 8–28)
CALCIUM SERPL-MCNC: 9.5 MG/DL (ref 8.5–10.5)
CHLORIDE BLD-SCNC: 104 MMOL/L (ref 98–107)
CO2 SERPL-SCNC: 24 MMOL/L (ref 22–31)
CREAT SERPL-MCNC: 0.91 MG/DL (ref 0.6–1.1)
GFR SERPL CREATININE-BSD FRML MDRD: 60 ML/MIN/1.73M2
GLUCOSE BLD-MCNC: 92 MG/DL (ref 70–125)
POTASSIUM BLD-SCNC: 4.4 MMOL/L (ref 3.5–5)
PROT SERPL-MCNC: 6.8 G/DL (ref 6–8)
SODIUM SERPL-SCNC: 140 MMOL/L (ref 136–145)

## 2018-06-01 ENCOUNTER — COMMUNICATION - HEALTHEAST (OUTPATIENT)
Dept: VASCULAR SURGERY | Facility: CLINIC | Age: 78
End: 2018-06-01

## 2018-06-01 ENCOUNTER — AMBULATORY - HEALTHEAST (OUTPATIENT)
Dept: CARDIOLOGY | Facility: CLINIC | Age: 78
End: 2018-06-01

## 2018-06-01 DIAGNOSIS — R06.00 DYSPNEA: ICD-10-CM

## 2018-06-01 DIAGNOSIS — I51.7 RIGHT ATRIAL ENLARGEMENT: ICD-10-CM

## 2018-06-04 ENCOUNTER — OFFICE VISIT - HEALTHEAST (OUTPATIENT)
Dept: PHYSICAL THERAPY | Facility: REHABILITATION | Age: 78
End: 2018-06-04

## 2018-06-04 DIAGNOSIS — M79.89 LEG SWELLING: ICD-10-CM

## 2018-06-04 DIAGNOSIS — I87.2 VENOUS INSUFFICIENCY OF BOTH LOWER EXTREMITIES: ICD-10-CM

## 2018-06-04 DIAGNOSIS — I89.0 LYMPHEDEMA: ICD-10-CM

## 2018-06-04 DIAGNOSIS — I87.303 VENOUS HYPERTENSION OF BOTH LOWER EXTREMITIES: ICD-10-CM

## 2018-06-04 DIAGNOSIS — R29.898 BILATERAL LEG WEAKNESS: ICD-10-CM

## 2018-06-07 ENCOUNTER — OFFICE VISIT - HEALTHEAST (OUTPATIENT)
Dept: FAMILY MEDICINE | Facility: CLINIC | Age: 78
End: 2018-06-07

## 2018-06-07 ENCOUNTER — OFFICE VISIT - HEALTHEAST (OUTPATIENT)
Dept: PHYSICAL THERAPY | Facility: REHABILITATION | Age: 78
End: 2018-06-07

## 2018-06-07 DIAGNOSIS — H02.409 PTOSIS: ICD-10-CM

## 2018-06-07 DIAGNOSIS — R13.10 DYSPHAGIA: ICD-10-CM

## 2018-06-07 DIAGNOSIS — M54.2 NECK PAIN: ICD-10-CM

## 2018-06-07 DIAGNOSIS — R06.02 SOB (SHORTNESS OF BREATH): ICD-10-CM

## 2018-06-07 DIAGNOSIS — I87.303 VENOUS HYPERTENSION OF BOTH LOWER EXTREMITIES: ICD-10-CM

## 2018-06-07 DIAGNOSIS — I89.0 LYMPHEDEMA: ICD-10-CM

## 2018-06-07 DIAGNOSIS — M79.89 LEG SWELLING: ICD-10-CM

## 2018-06-07 DIAGNOSIS — K52.839 MICROSCOPIC COLITIS: ICD-10-CM

## 2018-06-07 DIAGNOSIS — R29.898 LEG WEAKNESS, BILATERAL: ICD-10-CM

## 2018-06-07 DIAGNOSIS — R29.898 BILATERAL LEG WEAKNESS: ICD-10-CM

## 2018-06-07 DIAGNOSIS — E78.5 HYPERLIPIDEMIA: ICD-10-CM

## 2018-06-07 DIAGNOSIS — E27.40 ADRENAL INSUFFICIENCY (H): ICD-10-CM

## 2018-06-07 DIAGNOSIS — I87.2 VENOUS INSUFFICIENCY OF BOTH LOWER EXTREMITIES: ICD-10-CM

## 2018-06-07 LAB
ANION GAP SERPL CALCULATED.3IONS-SCNC: 9 MMOL/L (ref 5–18)
BNP SERPL-MCNC: 116 PG/ML (ref 0–144)
BUN SERPL-MCNC: 22 MG/DL (ref 8–28)
CALCIUM SERPL-MCNC: 9.6 MG/DL (ref 8.5–10.5)
CHLORIDE BLD-SCNC: 103 MMOL/L (ref 98–107)
CO2 SERPL-SCNC: 26 MMOL/L (ref 22–31)
CORTIS SERPL-MCNC: 4.1 UG/DL
CREAT SERPL-MCNC: 0.83 MG/DL (ref 0.6–1.1)
ERYTHROCYTE [DISTWIDTH] IN BLOOD BY AUTOMATED COUNT: 13.6 % (ref 11–14.5)
GFR SERPL CREATININE-BSD FRML MDRD: >60 ML/MIN/1.73M2
GLUCOSE BLD-MCNC: 89 MG/DL (ref 70–125)
HCT VFR BLD AUTO: 36.7 % (ref 35–47)
HGB BLD-MCNC: 12.5 G/DL (ref 12–16)
MCH RBC QN AUTO: 31 PG (ref 27–34)
MCHC RBC AUTO-ENTMCNC: 34.2 G/DL (ref 32–36)
MCV RBC AUTO: 91 FL (ref 80–100)
PLATELET # BLD AUTO: 227 THOU/UL (ref 140–440)
PMV BLD AUTO: 7.7 FL (ref 7–10)
POTASSIUM BLD-SCNC: 4.9 MMOL/L (ref 3.5–5)
RBC # BLD AUTO: 4.04 MILL/UL (ref 3.8–5.4)
SODIUM SERPL-SCNC: 138 MMOL/L (ref 136–145)
WBC: 8.1 THOU/UL (ref 4–11)

## 2018-06-08 ENCOUNTER — COMMUNICATION - HEALTHEAST (OUTPATIENT)
Dept: ADMINISTRATIVE | Facility: CLINIC | Age: 78
End: 2018-06-08

## 2018-06-08 ENCOUNTER — COMMUNICATION - HEALTHEAST (OUTPATIENT)
Dept: FAMILY MEDICINE | Facility: CLINIC | Age: 78
End: 2018-06-08

## 2018-06-08 DIAGNOSIS — E27.40 ADRENAL INSUFFICIENCY (H): ICD-10-CM

## 2018-06-11 ENCOUNTER — OFFICE VISIT - HEALTHEAST (OUTPATIENT)
Dept: PHYSICAL THERAPY | Facility: REHABILITATION | Age: 78
End: 2018-06-11

## 2018-06-11 ENCOUNTER — AMBULATORY - HEALTHEAST (OUTPATIENT)
Dept: LAB | Facility: CLINIC | Age: 78
End: 2018-06-11

## 2018-06-11 DIAGNOSIS — R29.898 BILATERAL LEG WEAKNESS: ICD-10-CM

## 2018-06-11 DIAGNOSIS — R06.00 DYSPNEA: ICD-10-CM

## 2018-06-11 DIAGNOSIS — I89.0 LYMPHEDEMA: ICD-10-CM

## 2018-06-11 DIAGNOSIS — M79.89 LEG SWELLING: ICD-10-CM

## 2018-06-11 DIAGNOSIS — I87.2 VENOUS INSUFFICIENCY OF BOTH LOWER EXTREMITIES: ICD-10-CM

## 2018-06-11 DIAGNOSIS — I87.303 VENOUS HYPERTENSION OF BOTH LOWER EXTREMITIES: ICD-10-CM

## 2018-06-11 DIAGNOSIS — E78.5 HYPERLIPIDEMIA: ICD-10-CM

## 2018-06-11 DIAGNOSIS — I51.7 RIGHT ATRIAL ENLARGEMENT: ICD-10-CM

## 2018-06-11 LAB
ALBUMIN SERPL-MCNC: 3.9 G/DL (ref 3.5–5)
ALP SERPL-CCNC: 78 U/L (ref 45–120)
ALT SERPL W P-5'-P-CCNC: 21 U/L (ref 0–45)
ANION GAP SERPL CALCULATED.3IONS-SCNC: 10 MMOL/L (ref 5–18)
AST SERPL W P-5'-P-CCNC: 26 U/L (ref 0–40)
BILIRUB SERPL-MCNC: 0.3 MG/DL (ref 0–1)
BUN SERPL-MCNC: 24 MG/DL (ref 8–28)
CALCIUM SERPL-MCNC: 9.4 MG/DL (ref 8.5–10.5)
CHLORIDE BLD-SCNC: 104 MMOL/L (ref 98–107)
CHOLEST SERPL-MCNC: 307 MG/DL
CO2 SERPL-SCNC: 24 MMOL/L (ref 22–31)
CREAT SERPL-MCNC: 0.88 MG/DL (ref 0.6–1.1)
FASTING STATUS PATIENT QL REPORTED: NO
GFR SERPL CREATININE-BSD FRML MDRD: >60 ML/MIN/1.73M2
GLUCOSE BLD-MCNC: 121 MG/DL (ref 70–125)
HDLC SERPL-MCNC: 63 MG/DL
LDLC SERPL CALC-MCNC: 195 MG/DL
POTASSIUM BLD-SCNC: 4.5 MMOL/L (ref 3.5–5)
PROT SERPL-MCNC: 6.6 G/DL (ref 6–8)
SODIUM SERPL-SCNC: 138 MMOL/L (ref 136–145)
TRIGL SERPL-MCNC: 246 MG/DL

## 2018-06-12 ENCOUNTER — RECORDS - HEALTHEAST (OUTPATIENT)
Dept: ADMINISTRATIVE | Facility: OTHER | Age: 78
End: 2018-06-12

## 2018-06-12 LAB — ACHR BIND IGG+IGM SER IA-SCNC: NORMAL NMOL/L

## 2018-06-14 ENCOUNTER — OFFICE VISIT - HEALTHEAST (OUTPATIENT)
Dept: PHYSICAL THERAPY | Facility: REHABILITATION | Age: 78
End: 2018-06-14

## 2018-06-14 DIAGNOSIS — I87.303 VENOUS HYPERTENSION OF BOTH LOWER EXTREMITIES: ICD-10-CM

## 2018-06-14 DIAGNOSIS — I89.0 LYMPHEDEMA: ICD-10-CM

## 2018-06-14 DIAGNOSIS — R29.898 BILATERAL LEG WEAKNESS: ICD-10-CM

## 2018-06-14 DIAGNOSIS — M79.89 LEG SWELLING: ICD-10-CM

## 2018-06-14 DIAGNOSIS — I87.2 VENOUS INSUFFICIENCY OF BOTH LOWER EXTREMITIES: ICD-10-CM

## 2018-06-15 ENCOUNTER — AMBULATORY - HEALTHEAST (OUTPATIENT)
Dept: CARDIOLOGY | Facility: CLINIC | Age: 78
End: 2018-06-15

## 2018-06-15 DIAGNOSIS — E78.5 HLD (HYPERLIPIDEMIA): ICD-10-CM

## 2018-06-18 ENCOUNTER — RECORDS - HEALTHEAST (OUTPATIENT)
Dept: ADMINISTRATIVE | Facility: OTHER | Age: 78
End: 2018-06-18

## 2018-06-21 ENCOUNTER — COMMUNICATION - HEALTHEAST (OUTPATIENT)
Dept: CARE COORDINATION | Facility: CLINIC | Age: 78
End: 2018-06-21

## 2018-06-25 ENCOUNTER — OFFICE VISIT - HEALTHEAST (OUTPATIENT)
Dept: FAMILY MEDICINE | Facility: CLINIC | Age: 78
End: 2018-06-25

## 2018-06-25 ENCOUNTER — AMBULATORY - HEALTHEAST (OUTPATIENT)
Dept: LAB | Facility: CLINIC | Age: 78
End: 2018-06-25

## 2018-06-25 ENCOUNTER — AMBULATORY - HEALTHEAST (OUTPATIENT)
Dept: ENDOCRINOLOGY | Facility: CLINIC | Age: 78
End: 2018-06-25

## 2018-06-25 DIAGNOSIS — E03.9 HYPOTHYROIDISM, UNSPECIFIED TYPE: ICD-10-CM

## 2018-06-25 DIAGNOSIS — I89.0 LYMPHEDEMA: ICD-10-CM

## 2018-06-25 DIAGNOSIS — E27.40 ADRENAL INSUFFICIENCY (H): ICD-10-CM

## 2018-06-25 DIAGNOSIS — H02.409 PTOSIS: ICD-10-CM

## 2018-06-25 DIAGNOSIS — R13.10 DYSPHAGIA: ICD-10-CM

## 2018-06-25 DIAGNOSIS — R29.898 LEG WEAKNESS, BILATERAL: ICD-10-CM

## 2018-06-25 LAB
CORTICOSTER 1H P 250 UG ACTH SERPL-SCNC: 25.2 UG/DL
CORTICOSTER 30M P 250 UG ACTH SERPL-SCNC: 22.3 UG/DL
CORTISOL, PRE-DOSE - HISTORICAL: 15.6 UG/DL
TIME OF COSYNTROPIN INJECTION: NORMAL

## 2018-06-29 ENCOUNTER — COMMUNICATION - HEALTHEAST (OUTPATIENT)
Dept: FAMILY MEDICINE | Facility: CLINIC | Age: 78
End: 2018-06-29

## 2018-06-29 DIAGNOSIS — E78.5 HYPERLIPIDEMIA: ICD-10-CM

## 2018-07-02 ENCOUNTER — OFFICE VISIT - HEALTHEAST (OUTPATIENT)
Dept: PHYSICAL THERAPY | Facility: REHABILITATION | Age: 78
End: 2018-07-02

## 2018-07-02 DIAGNOSIS — I87.2 VENOUS INSUFFICIENCY OF BOTH LOWER EXTREMITIES: ICD-10-CM

## 2018-07-02 DIAGNOSIS — I89.0 LYMPHEDEMA: ICD-10-CM

## 2018-07-02 DIAGNOSIS — R29.898 LEG WEAKNESS, BILATERAL: ICD-10-CM

## 2018-07-02 DIAGNOSIS — I87.303 VENOUS HYPERTENSION OF BOTH LOWER EXTREMITIES: ICD-10-CM

## 2018-07-02 DIAGNOSIS — M79.89 LEG SWELLING: ICD-10-CM

## 2018-07-03 ENCOUNTER — RECORDS - HEALTHEAST (OUTPATIENT)
Dept: ADMINISTRATIVE | Facility: OTHER | Age: 78
End: 2018-07-03

## 2018-07-06 ENCOUNTER — RECORDS - HEALTHEAST (OUTPATIENT)
Dept: ADMINISTRATIVE | Facility: OTHER | Age: 78
End: 2018-07-06

## 2018-07-09 ENCOUNTER — AMBULATORY - HEALTHEAST (OUTPATIENT)
Dept: ADMINISTRATIVE | Facility: REHABILITATION | Age: 78
End: 2018-07-09

## 2018-07-09 DIAGNOSIS — I89.0 LYMPHEDEMA: ICD-10-CM

## 2018-07-09 DIAGNOSIS — M54.2 NECK PAIN: ICD-10-CM

## 2018-07-10 ENCOUNTER — RECORDS - HEALTHEAST (OUTPATIENT)
Dept: ADMINISTRATIVE | Facility: OTHER | Age: 78
End: 2018-07-10

## 2018-07-11 ENCOUNTER — OFFICE VISIT - HEALTHEAST (OUTPATIENT)
Dept: BEHAVIORAL HEALTH | Facility: CLINIC | Age: 78
End: 2018-07-11

## 2018-07-11 DIAGNOSIS — F43.23 ADJUSTMENT DISORDER WITH MIXED ANXIETY AND DEPRESSED MOOD: ICD-10-CM

## 2018-07-11 ASSESSMENT — MIFFLIN-ST. JEOR: SCORE: 1303.23

## 2018-07-13 ENCOUNTER — OFFICE VISIT - HEALTHEAST (OUTPATIENT)
Dept: PHYSICAL THERAPY | Facility: REHABILITATION | Age: 78
End: 2018-07-13

## 2018-07-13 DIAGNOSIS — R26.2 DIFFICULTY WALKING: ICD-10-CM

## 2018-07-13 DIAGNOSIS — R29.898 LEG WEAKNESS, BILATERAL: ICD-10-CM

## 2018-07-13 DIAGNOSIS — I89.0 LYMPHEDEMA: ICD-10-CM

## 2018-07-17 ENCOUNTER — RECORDS - HEALTHEAST (OUTPATIENT)
Dept: ADMINISTRATIVE | Facility: OTHER | Age: 78
End: 2018-07-17

## 2018-07-18 ENCOUNTER — OFFICE VISIT - HEALTHEAST (OUTPATIENT)
Dept: PHYSICAL THERAPY | Facility: REHABILITATION | Age: 78
End: 2018-07-18

## 2018-07-18 ENCOUNTER — COMMUNICATION - HEALTHEAST (OUTPATIENT)
Dept: CARDIOLOGY | Facility: CLINIC | Age: 78
End: 2018-07-18

## 2018-07-18 DIAGNOSIS — R29.898 LEG WEAKNESS, BILATERAL: ICD-10-CM

## 2018-07-18 DIAGNOSIS — R26.2 DIFFICULTY WALKING: ICD-10-CM

## 2018-07-18 DIAGNOSIS — I89.0 LYMPHEDEMA: ICD-10-CM

## 2018-07-18 DIAGNOSIS — R00.2 RAPID PALPITATIONS: ICD-10-CM

## 2018-07-19 ENCOUNTER — OFFICE VISIT - HEALTHEAST (OUTPATIENT)
Dept: VASCULAR SURGERY | Facility: CLINIC | Age: 78
End: 2018-07-19

## 2018-07-19 DIAGNOSIS — I87.2 VENOUS INSUFFICIENCY OF BOTH LOWER EXTREMITIES: ICD-10-CM

## 2018-07-19 DIAGNOSIS — I87.303 VENOUS HYPERTENSION OF BOTH LOWER EXTREMITIES: ICD-10-CM

## 2018-07-19 DIAGNOSIS — M79.605 PAIN IN BOTH LOWER EXTREMITIES: ICD-10-CM

## 2018-07-19 DIAGNOSIS — E66.09 CLASS 2 OBESITY DUE TO EXCESS CALORIES IN ADULT: ICD-10-CM

## 2018-07-19 DIAGNOSIS — I89.0 LYMPHEDEMA: ICD-10-CM

## 2018-07-19 DIAGNOSIS — M79.89 LEG SWELLING: ICD-10-CM

## 2018-07-19 DIAGNOSIS — E66.812 CLASS 2 OBESITY DUE TO EXCESS CALORIES IN ADULT: ICD-10-CM

## 2018-07-19 DIAGNOSIS — M79.604 PAIN IN BOTH LOWER EXTREMITIES: ICD-10-CM

## 2018-07-19 ASSESSMENT — MIFFLIN-ST. JEOR: SCORE: 1316.84

## 2018-07-24 ENCOUNTER — COMMUNICATION - HEALTHEAST (OUTPATIENT)
Dept: SURGERY | Facility: CLINIC | Age: 78
End: 2018-07-24

## 2018-07-25 ENCOUNTER — OFFICE VISIT - HEALTHEAST (OUTPATIENT)
Dept: PHYSICAL THERAPY | Facility: REHABILITATION | Age: 78
End: 2018-07-25

## 2018-07-25 DIAGNOSIS — R26.2 DIFFICULTY WALKING: ICD-10-CM

## 2018-07-25 DIAGNOSIS — R29.898 LEG WEAKNESS, BILATERAL: ICD-10-CM

## 2018-07-30 ENCOUNTER — OFFICE VISIT - HEALTHEAST (OUTPATIENT)
Dept: PHYSICAL THERAPY | Facility: REHABILITATION | Age: 78
End: 2018-07-30

## 2018-07-30 DIAGNOSIS — R29.898 LEG WEAKNESS, BILATERAL: ICD-10-CM

## 2018-07-30 DIAGNOSIS — R26.2 DIFFICULTY WALKING: ICD-10-CM

## 2018-08-07 ENCOUNTER — OFFICE VISIT - HEALTHEAST (OUTPATIENT)
Dept: PHYSICAL THERAPY | Facility: REHABILITATION | Age: 78
End: 2018-08-07

## 2018-08-07 DIAGNOSIS — R26.2 DIFFICULTY WALKING: ICD-10-CM

## 2018-08-07 DIAGNOSIS — R29.898 LEG WEAKNESS, BILATERAL: ICD-10-CM

## 2018-08-14 ENCOUNTER — RECORDS - HEALTHEAST (OUTPATIENT)
Dept: ADMINISTRATIVE | Facility: OTHER | Age: 78
End: 2018-08-14

## 2018-08-14 ENCOUNTER — OFFICE VISIT - HEALTHEAST (OUTPATIENT)
Dept: PHYSICAL THERAPY | Facility: REHABILITATION | Age: 78
End: 2018-08-14

## 2018-08-14 DIAGNOSIS — R29.898 LEG WEAKNESS, BILATERAL: ICD-10-CM

## 2018-08-14 DIAGNOSIS — R26.2 DIFFICULTY WALKING: ICD-10-CM

## 2018-08-21 ENCOUNTER — RECORDS - HEALTHEAST (OUTPATIENT)
Dept: ADMINISTRATIVE | Facility: OTHER | Age: 78
End: 2018-08-21

## 2018-08-28 ENCOUNTER — COMMUNICATION - HEALTHEAST (OUTPATIENT)
Dept: PHYSICAL THERAPY | Facility: REHABILITATION | Age: 78
End: 2018-08-28

## 2018-08-30 ENCOUNTER — OFFICE VISIT - HEALTHEAST (OUTPATIENT)
Dept: CARDIOLOGY | Facility: CLINIC | Age: 78
End: 2018-08-30

## 2018-08-30 DIAGNOSIS — E66.01 MORBID OBESITY (H): ICD-10-CM

## 2018-08-30 DIAGNOSIS — I47.19 ATRIAL TACHYCARDIA (H): ICD-10-CM

## 2018-08-30 DIAGNOSIS — I10 ESSENTIAL HYPERTENSION: ICD-10-CM

## 2018-08-30 DIAGNOSIS — I11.9 HYPERTENSIVE LEFT VENTRICULAR HYPERTROPHY, WITHOUT HEART FAILURE: ICD-10-CM

## 2018-08-30 DIAGNOSIS — E78.2 MIXED HYPERLIPIDEMIA: ICD-10-CM

## 2018-08-30 DIAGNOSIS — Q24.8 LEFT VENTRICULAR OUTFLOW OBSTRUCTION: ICD-10-CM

## 2018-08-30 ASSESSMENT — MIFFLIN-ST. JEOR: SCORE: 1321.37

## 2018-09-14 ENCOUNTER — OFFICE VISIT - HEALTHEAST (OUTPATIENT)
Dept: SURGERY | Facility: CLINIC | Age: 78
End: 2018-09-14

## 2018-09-14 DIAGNOSIS — R26.89 IMBALANCE: ICD-10-CM

## 2018-09-14 DIAGNOSIS — R73.02 GLUCOSE INTOLERANCE (IMPAIRED GLUCOSE TOLERANCE): ICD-10-CM

## 2018-09-14 DIAGNOSIS — E03.9 HYPOTHYROIDISM: ICD-10-CM

## 2018-09-14 DIAGNOSIS — E78.2 MIXED HYPERLIPIDEMIA: ICD-10-CM

## 2018-09-14 DIAGNOSIS — I10 HTN (HYPERTENSION): ICD-10-CM

## 2018-09-14 DIAGNOSIS — R79.89 LOW VITAMIN D LEVEL: ICD-10-CM

## 2018-09-14 ASSESSMENT — MIFFLIN-ST. JEOR: SCORE: 1315.7

## 2018-09-20 ENCOUNTER — OFFICE VISIT - HEALTHEAST (OUTPATIENT)
Dept: ENDOCRINOLOGY | Facility: CLINIC | Age: 78
End: 2018-09-20

## 2018-09-20 DIAGNOSIS — E56.9 VITAMIN DEFICIENCY: ICD-10-CM

## 2018-09-20 DIAGNOSIS — E03.9 HYPOTHYROIDISM: ICD-10-CM

## 2018-09-20 DIAGNOSIS — R73.02 GLUCOSE INTOLERANCE (IMPAIRED GLUCOSE TOLERANCE): ICD-10-CM

## 2018-09-20 DIAGNOSIS — E03.9 HYPOTHYROIDISM, UNSPECIFIED TYPE: ICD-10-CM

## 2018-09-20 DIAGNOSIS — E55.9 VITAMIN D DEFICIENCY: ICD-10-CM

## 2018-09-20 LAB
ALBUMIN SERPL-MCNC: 4 G/DL (ref 3.5–5)
ALP SERPL-CCNC: 77 U/L (ref 45–120)
ALT SERPL W P-5'-P-CCNC: 22 U/L (ref 0–45)
ANION GAP SERPL CALCULATED.3IONS-SCNC: 9 MMOL/L (ref 5–18)
AST SERPL W P-5'-P-CCNC: 28 U/L (ref 0–40)
BILIRUB SERPL-MCNC: 0.4 MG/DL (ref 0–1)
BUN SERPL-MCNC: 29 MG/DL (ref 8–28)
CALCIUM SERPL-MCNC: 9.8 MG/DL (ref 8.5–10.5)
CHLORIDE BLD-SCNC: 106 MMOL/L (ref 98–107)
CO2 SERPL-SCNC: 25 MMOL/L (ref 22–31)
CREAT SERPL-MCNC: 0.77 MG/DL (ref 0.6–1.1)
FSH SERPL-ACNC: 35 MIU/ML
GFR SERPL CREATININE-BSD FRML MDRD: >60 ML/MIN/1.73M2
GLUCOSE BLD-MCNC: 92 MG/DL (ref 70–125)
HBA1C MFR BLD: 5.4 % (ref 3.5–6)
LH SERPL-ACNC: 18.8 MIU/ML
POTASSIUM BLD-SCNC: 4.8 MMOL/L (ref 3.5–5)
PROLACTIN SERPL-MCNC: 11.2 NG/ML (ref 0–20)
PROT SERPL-MCNC: 6.8 G/DL (ref 6–8)
PTH-INTACT SERPL-MCNC: 52 PG/ML (ref 10–86)
SODIUM SERPL-SCNC: 140 MMOL/L (ref 136–145)
T3 SERPL-MCNC: 51 NG/DL (ref 45–175)
T4 FREE SERPL-MCNC: 0.9 NG/DL (ref 0.7–1.8)
TSH SERPL DL<=0.005 MIU/L-ACNC: 1.93 UIU/ML (ref 0.3–5)
VIT B12 SERPL-MCNC: 816 PG/ML (ref 213–816)

## 2018-09-20 ASSESSMENT — MIFFLIN-ST. JEOR: SCORE: 1315.7

## 2018-09-21 LAB
25(OH)D3 SERPL-MCNC: 43.1 NG/ML (ref 30–80)
25(OH)D3 SERPL-MCNC: 43.1 NG/ML (ref 30–80)
ACTH PLAS-MCNC: 47 PG/ML

## 2018-09-23 ENCOUNTER — COMMUNICATION - HEALTHEAST (OUTPATIENT)
Dept: SURGERY | Facility: CLINIC | Age: 78
End: 2018-09-23

## 2018-09-25 LAB — IGF-I BLD-MCNC: 86 NG/ML (ref 20–214)

## 2018-09-26 ENCOUNTER — COMMUNICATION - HEALTHEAST (OUTPATIENT)
Dept: ENDOCRINOLOGY | Facility: CLINIC | Age: 78
End: 2018-09-26

## 2018-10-01 ENCOUNTER — OFFICE VISIT - HEALTHEAST (OUTPATIENT)
Dept: PHYSICAL THERAPY | Facility: REHABILITATION | Age: 78
End: 2018-10-01

## 2018-10-01 DIAGNOSIS — E66.01 MORBID OBESITY (H): ICD-10-CM

## 2018-10-01 DIAGNOSIS — R29.898 LEG WEAKNESS, BILATERAL: ICD-10-CM

## 2018-10-01 DIAGNOSIS — R26.2 DIFFICULTY WALKING: ICD-10-CM

## 2018-10-02 ENCOUNTER — OFFICE VISIT - HEALTHEAST (OUTPATIENT)
Dept: FAMILY MEDICINE | Facility: CLINIC | Age: 78
End: 2018-10-02

## 2018-10-02 DIAGNOSIS — R25.2 LEG CRAMPING: ICD-10-CM

## 2018-10-02 DIAGNOSIS — M89.9 DISORDER OF BONE: ICD-10-CM

## 2018-10-02 DIAGNOSIS — F33.9 RECURRENT MAJOR DEPRESSIVE DISORDER, REMISSION STATUS UNSPECIFIED (H): ICD-10-CM

## 2018-10-02 DIAGNOSIS — I89.0 LYMPHEDEMA: ICD-10-CM

## 2018-10-02 DIAGNOSIS — R29.898 LEG WEAKNESS, BILATERAL: ICD-10-CM

## 2018-10-02 DIAGNOSIS — E66.811 CLASS 1 OBESITY DUE TO EXCESS CALORIES WITHOUT SERIOUS COMORBIDITY WITH BODY MASS INDEX (BMI) OF 34.0 TO 34.9 IN ADULT: ICD-10-CM

## 2018-10-02 DIAGNOSIS — N39.41 URGE INCONTINENCE OF URINE: ICD-10-CM

## 2018-10-02 DIAGNOSIS — E66.09 CLASS 1 OBESITY DUE TO EXCESS CALORIES WITHOUT SERIOUS COMORBIDITY WITH BODY MASS INDEX (BMI) OF 34.0 TO 34.9 IN ADULT: ICD-10-CM

## 2018-10-02 DIAGNOSIS — Z12.31 ENCOUNTER FOR SCREENING MAMMOGRAM FOR BREAST CANCER: ICD-10-CM

## 2018-10-02 DIAGNOSIS — R23.2 HOT FLASHES: ICD-10-CM

## 2018-10-02 DIAGNOSIS — N95.1 MENOPAUSAL SYNDROME (HOT FLASHES): ICD-10-CM

## 2018-10-05 ENCOUNTER — OFFICE VISIT - HEALTHEAST (OUTPATIENT)
Dept: PHYSICAL THERAPY | Facility: REHABILITATION | Age: 78
End: 2018-10-05

## 2018-10-05 DIAGNOSIS — R29.898 LEG WEAKNESS, BILATERAL: ICD-10-CM

## 2018-10-05 DIAGNOSIS — E66.01 MORBID OBESITY (H): ICD-10-CM

## 2018-10-05 DIAGNOSIS — R26.2 DIFFICULTY WALKING: ICD-10-CM

## 2018-10-09 ENCOUNTER — OFFICE VISIT - HEALTHEAST (OUTPATIENT)
Dept: PHYSICAL THERAPY | Facility: REHABILITATION | Age: 78
End: 2018-10-09

## 2018-10-09 DIAGNOSIS — R26.2 DIFFICULTY WALKING: ICD-10-CM

## 2018-10-09 DIAGNOSIS — R29.898 LEG WEAKNESS, BILATERAL: ICD-10-CM

## 2018-10-09 DIAGNOSIS — E66.01 MORBID OBESITY (H): ICD-10-CM

## 2018-10-12 ENCOUNTER — OFFICE VISIT - HEALTHEAST (OUTPATIENT)
Dept: PHYSICAL THERAPY | Facility: REHABILITATION | Age: 78
End: 2018-10-12

## 2018-10-12 DIAGNOSIS — R29.898 LEG WEAKNESS, BILATERAL: ICD-10-CM

## 2018-10-12 DIAGNOSIS — E66.01 MORBID OBESITY (H): ICD-10-CM

## 2018-10-12 DIAGNOSIS — R26.2 DIFFICULTY WALKING: ICD-10-CM

## 2018-10-15 ENCOUNTER — OFFICE VISIT - HEALTHEAST (OUTPATIENT)
Dept: SURGERY | Facility: CLINIC | Age: 78
End: 2018-10-15

## 2018-10-15 ENCOUNTER — AMBULATORY - HEALTHEAST (OUTPATIENT)
Dept: SURGERY | Facility: CLINIC | Age: 78
End: 2018-10-15

## 2018-10-15 DIAGNOSIS — R29.6 RECURRENT FALLS: ICD-10-CM

## 2018-10-15 DIAGNOSIS — Z71.3 DIETARY COUNSELING: ICD-10-CM

## 2018-10-15 DIAGNOSIS — E55.9 VITAMIN D DEFICIENCY: ICD-10-CM

## 2018-10-15 DIAGNOSIS — R26.89 IMBALANCE: ICD-10-CM

## 2018-10-15 DIAGNOSIS — E66.9 OBESITY (BMI 30-39.9): ICD-10-CM

## 2018-10-15 DIAGNOSIS — G47.33 OBSTRUCTIVE SLEEP APNEA (ADULT) (PEDIATRIC): ICD-10-CM

## 2018-10-15 DIAGNOSIS — E78.5 HYPERLIPIDEMIA, UNSPECIFIED HYPERLIPIDEMIA TYPE: ICD-10-CM

## 2018-10-15 ASSESSMENT — MIFFLIN-ST. JEOR: SCORE: 1293.02

## 2018-10-16 ENCOUNTER — RECORDS - HEALTHEAST (OUTPATIENT)
Dept: BONE DENSITY | Facility: CLINIC | Age: 78
End: 2018-10-16

## 2018-10-16 ENCOUNTER — OFFICE VISIT - HEALTHEAST (OUTPATIENT)
Dept: PHYSICAL THERAPY | Facility: REHABILITATION | Age: 78
End: 2018-10-16

## 2018-10-16 ENCOUNTER — HOSPITAL ENCOUNTER (OUTPATIENT)
Dept: MAMMOGRAPHY | Facility: CLINIC | Age: 78
Discharge: HOME | End: 2018-10-16
Attending: FAMILY MEDICINE

## 2018-10-16 ENCOUNTER — RECORDS - HEALTHEAST (OUTPATIENT)
Dept: ADMINISTRATIVE | Facility: OTHER | Age: 78
End: 2018-10-16

## 2018-10-16 DIAGNOSIS — R29.898 LEG WEAKNESS, BILATERAL: ICD-10-CM

## 2018-10-16 DIAGNOSIS — E66.01 MORBID OBESITY (H): ICD-10-CM

## 2018-10-16 DIAGNOSIS — Z12.31 ENCOUNTER FOR SCREENING MAMMOGRAM FOR BREAST CANCER: ICD-10-CM

## 2018-10-16 DIAGNOSIS — M89.9 DISORDER OF BONE, UNSPECIFIED: ICD-10-CM

## 2018-10-16 DIAGNOSIS — R26.2 DIFFICULTY WALKING: ICD-10-CM

## 2018-11-05 ENCOUNTER — COMMUNICATION - HEALTHEAST (OUTPATIENT)
Dept: SURGERY | Facility: CLINIC | Age: 78
End: 2018-11-05

## 2018-11-13 ENCOUNTER — COMMUNICATION - HEALTHEAST (OUTPATIENT)
Dept: PHYSICAL THERAPY | Facility: REHABILITATION | Age: 78
End: 2018-11-13

## 2018-11-19 ENCOUNTER — COMMUNICATION - HEALTHEAST (OUTPATIENT)
Dept: FAMILY MEDICINE | Facility: CLINIC | Age: 78
End: 2018-11-19

## 2018-11-24 ENCOUNTER — RECORDS - HEALTHEAST (OUTPATIENT)
Dept: ADMINISTRATIVE | Facility: OTHER | Age: 78
End: 2018-11-24

## 2018-11-26 ENCOUNTER — OFFICE VISIT - HEALTHEAST (OUTPATIENT)
Dept: VASCULAR SURGERY | Facility: CLINIC | Age: 78
End: 2018-11-26

## 2018-11-26 ENCOUNTER — COMMUNICATION - HEALTHEAST (OUTPATIENT)
Dept: VASCULAR SURGERY | Facility: CLINIC | Age: 78
End: 2018-11-26

## 2018-11-26 DIAGNOSIS — E66.01 MORBID OBESITY (H): ICD-10-CM

## 2018-11-26 DIAGNOSIS — M79.604 PAIN IN BOTH LOWER EXTREMITIES: ICD-10-CM

## 2018-11-26 DIAGNOSIS — E66.09 CLASS 2 OBESITY DUE TO EXCESS CALORIES WITHOUT SERIOUS COMORBIDITY IN ADULT, UNSPECIFIED BMI: ICD-10-CM

## 2018-11-26 DIAGNOSIS — M79.7 FIBROMYALGIA: ICD-10-CM

## 2018-11-26 DIAGNOSIS — M79.89 LEG SWELLING: ICD-10-CM

## 2018-11-26 DIAGNOSIS — M79.605 PAIN IN BOTH LOWER EXTREMITIES: ICD-10-CM

## 2018-11-26 DIAGNOSIS — E66.812 CLASS 2 OBESITY DUE TO EXCESS CALORIES WITHOUT SERIOUS COMORBIDITY IN ADULT, UNSPECIFIED BMI: ICD-10-CM

## 2018-11-26 ASSESSMENT — MIFFLIN-ST. JEOR: SCORE: 1266.49

## 2018-11-29 ENCOUNTER — COMMUNICATION - HEALTHEAST (OUTPATIENT)
Dept: SURGERY | Facility: CLINIC | Age: 78
End: 2018-11-29

## 2018-12-03 ENCOUNTER — RECORDS - HEALTHEAST (OUTPATIENT)
Dept: ADMINISTRATIVE | Facility: OTHER | Age: 78
End: 2018-12-03

## 2018-12-04 ENCOUNTER — RECORDS - HEALTHEAST (OUTPATIENT)
Dept: ADMINISTRATIVE | Facility: OTHER | Age: 78
End: 2018-12-04

## 2018-12-04 ENCOUNTER — OFFICE VISIT - HEALTHEAST (OUTPATIENT)
Dept: FAMILY MEDICINE | Facility: CLINIC | Age: 78
End: 2018-12-04

## 2018-12-04 DIAGNOSIS — N39.41 URGE INCONTINENCE OF URINE: ICD-10-CM

## 2018-12-04 DIAGNOSIS — R23.2 HOT FLASHES: ICD-10-CM

## 2018-12-04 DIAGNOSIS — S65.514D: ICD-10-CM

## 2018-12-05 ENCOUNTER — RECORDS - HEALTHEAST (OUTPATIENT)
Dept: ADMINISTRATIVE | Facility: OTHER | Age: 78
End: 2018-12-05

## 2018-12-10 ENCOUNTER — COMMUNICATION - HEALTHEAST (OUTPATIENT)
Dept: VASCULAR SURGERY | Facility: CLINIC | Age: 78
End: 2018-12-10

## 2018-12-10 ENCOUNTER — COMMUNICATION - HEALTHEAST (OUTPATIENT)
Dept: FAMILY MEDICINE | Facility: CLINIC | Age: 78
End: 2018-12-10

## 2018-12-10 DIAGNOSIS — G47.00 INSOMNIA: ICD-10-CM

## 2018-12-12 ENCOUNTER — COMMUNICATION - HEALTHEAST (OUTPATIENT)
Dept: CARDIOLOGY | Facility: CLINIC | Age: 78
End: 2018-12-12

## 2018-12-12 DIAGNOSIS — R06.00 DYSPNEA: ICD-10-CM

## 2018-12-12 DIAGNOSIS — I51.7 RIGHT ATRIAL ENLARGEMENT: ICD-10-CM

## 2018-12-17 ENCOUNTER — COMMUNICATION - HEALTHEAST (OUTPATIENT)
Dept: CARDIOLOGY | Facility: CLINIC | Age: 78
End: 2018-12-17

## 2018-12-18 ENCOUNTER — RECORDS - HEALTHEAST (OUTPATIENT)
Dept: ADMINISTRATIVE | Facility: OTHER | Age: 78
End: 2018-12-18

## 2018-12-21 ENCOUNTER — RECORDS - HEALTHEAST (OUTPATIENT)
Dept: ADMINISTRATIVE | Facility: OTHER | Age: 78
End: 2018-12-21

## 2018-12-27 ENCOUNTER — COMMUNICATION - HEALTHEAST (OUTPATIENT)
Dept: ADMINISTRATIVE | Facility: CLINIC | Age: 78
End: 2018-12-27

## 2018-12-30 ENCOUNTER — COMMUNICATION - HEALTHEAST (OUTPATIENT)
Dept: FAMILY MEDICINE | Facility: CLINIC | Age: 78
End: 2018-12-30

## 2018-12-30 DIAGNOSIS — R09.02 OXYGEN DESATURATION: ICD-10-CM

## 2018-12-31 ENCOUNTER — COMMUNICATION - HEALTHEAST (OUTPATIENT)
Dept: FAMILY MEDICINE | Facility: CLINIC | Age: 78
End: 2018-12-31

## 2018-12-31 ENCOUNTER — SURGERY - HEALTHEAST (OUTPATIENT)
Dept: CARDIOLOGY | Facility: CLINIC | Age: 78
End: 2018-12-31

## 2018-12-31 ENCOUNTER — COMMUNICATION - HEALTHEAST (OUTPATIENT)
Dept: CARDIOLOGY | Facility: CLINIC | Age: 78
End: 2018-12-31

## 2018-12-31 ENCOUNTER — COMMUNICATION - HEALTHEAST (OUTPATIENT)
Dept: SCHEDULING | Facility: CLINIC | Age: 78
End: 2018-12-31

## 2019-01-02 ENCOUNTER — COMMUNICATION - HEALTHEAST (OUTPATIENT)
Dept: CARE COORDINATION | Facility: CLINIC | Age: 79
End: 2019-01-02

## 2019-01-03 ENCOUNTER — COMMUNICATION - HEALTHEAST (OUTPATIENT)
Dept: SLEEP MEDICINE | Facility: CLINIC | Age: 79
End: 2019-01-03

## 2019-01-03 ENCOUNTER — OFFICE VISIT - HEALTHEAST (OUTPATIENT)
Dept: SLEEP MEDICINE | Facility: CLINIC | Age: 79
End: 2019-01-03

## 2019-01-03 DIAGNOSIS — G47.8 SLEEP DYSFUNCTION WITH SLEEP STAGE DISTURBANCE: ICD-10-CM

## 2019-01-03 DIAGNOSIS — G47.33 OSA (OBSTRUCTIVE SLEEP APNEA): ICD-10-CM

## 2019-01-03 DIAGNOSIS — R09.02 HYPOXIA: ICD-10-CM

## 2019-01-03 ASSESSMENT — MIFFLIN-ST. JEOR: SCORE: 1271.48

## 2019-01-04 ENCOUNTER — RECORDS - HEALTHEAST (OUTPATIENT)
Dept: ADMINISTRATIVE | Facility: OTHER | Age: 79
End: 2019-01-04

## 2019-01-04 ENCOUNTER — RECORDS - HEALTHEAST (OUTPATIENT)
Dept: SLEEP MEDICINE | Facility: CLINIC | Age: 79
End: 2019-01-04

## 2019-01-04 ENCOUNTER — OFFICE VISIT - HEALTHEAST (OUTPATIENT)
Dept: FAMILY MEDICINE | Facility: CLINIC | Age: 79
End: 2019-01-04

## 2019-01-04 DIAGNOSIS — G47.33 OBSTRUCTIVE SLEEP APNEA (ADULT) (PEDIATRIC): ICD-10-CM

## 2019-01-04 DIAGNOSIS — K52.839 MICROSCOPIC COLITIS, UNSPECIFIED MICROSCOPIC COLITIS TYPE: ICD-10-CM

## 2019-01-04 DIAGNOSIS — N30.00 ACUTE CYSTITIS WITHOUT HEMATURIA: ICD-10-CM

## 2019-01-04 DIAGNOSIS — R09.02 HYPOXEMIA: ICD-10-CM

## 2019-01-04 DIAGNOSIS — R06.83 SNORING: ICD-10-CM

## 2019-01-04 DIAGNOSIS — D64.9 ANEMIA, UNSPECIFIED: ICD-10-CM

## 2019-01-04 DIAGNOSIS — K92.2 LOWER GI BLEED: ICD-10-CM

## 2019-01-04 DIAGNOSIS — F33.9 RECURRENT MAJOR DEPRESSIVE DISORDER, REMISSION STATUS UNSPECIFIED (H): ICD-10-CM

## 2019-01-04 DIAGNOSIS — G47.33 OSA ON CPAP: ICD-10-CM

## 2019-01-04 LAB
ERYTHROCYTE [DISTWIDTH] IN BLOOD BY AUTOMATED COUNT: 13.7 % (ref 11–14.5)
HCT VFR BLD AUTO: 23.8 % (ref 35–47)
HGB BLD-MCNC: 8.1 G/DL (ref 12–16)
MCH RBC QN AUTO: 30.5 PG (ref 27–34)
MCHC RBC AUTO-ENTMCNC: 34.2 G/DL (ref 32–36)
MCV RBC AUTO: 89 FL (ref 80–100)
PLATELET # BLD AUTO: 288 THOU/UL (ref 140–440)
PMV BLD AUTO: 7.4 FL (ref 7–10)
RBC # BLD AUTO: 2.67 MILL/UL (ref 3.8–5.4)
WBC: 6.1 THOU/UL (ref 4–11)

## 2019-01-07 ENCOUNTER — COMMUNICATION - HEALTHEAST (OUTPATIENT)
Dept: SLEEP MEDICINE | Facility: CLINIC | Age: 79
End: 2019-01-07

## 2019-01-08 ENCOUNTER — COMMUNICATION - HEALTHEAST (OUTPATIENT)
Dept: SLEEP MEDICINE | Facility: CLINIC | Age: 79
End: 2019-01-08

## 2019-01-09 ENCOUNTER — COMMUNICATION - HEALTHEAST (OUTPATIENT)
Dept: CARDIOLOGY | Facility: CLINIC | Age: 79
End: 2019-01-09

## 2019-01-10 ENCOUNTER — AMBULATORY - HEALTHEAST (OUTPATIENT)
Dept: SLEEP MEDICINE | Facility: CLINIC | Age: 79
End: 2019-01-10

## 2019-01-10 ENCOUNTER — COMMUNICATION - HEALTHEAST (OUTPATIENT)
Dept: SLEEP MEDICINE | Facility: CLINIC | Age: 79
End: 2019-01-10

## 2019-01-10 DIAGNOSIS — G47.33 OSA (OBSTRUCTIVE SLEEP APNEA): ICD-10-CM

## 2019-01-16 ENCOUNTER — AMBULATORY - HEALTHEAST (OUTPATIENT)
Dept: SLEEP MEDICINE | Facility: CLINIC | Age: 79
End: 2019-01-16

## 2019-01-18 ENCOUNTER — COMMUNICATION - HEALTHEAST (OUTPATIENT)
Dept: ADMINISTRATIVE | Facility: CLINIC | Age: 79
End: 2019-01-18

## 2019-02-25 ENCOUNTER — COMMUNICATION - HEALTHEAST (OUTPATIENT)
Dept: FAMILY MEDICINE | Facility: CLINIC | Age: 79
End: 2019-02-25

## 2019-02-28 ENCOUNTER — AMBULATORY - HEALTHEAST (OUTPATIENT)
Dept: FAMILY MEDICINE | Facility: CLINIC | Age: 79
End: 2019-02-28

## 2019-02-28 DIAGNOSIS — M25.50 ARTHRALGIA OF MULTIPLE JOINTS: ICD-10-CM

## 2019-03-19 ENCOUNTER — COMMUNICATION - HEALTHEAST (OUTPATIENT)
Dept: CARDIOLOGY | Facility: CLINIC | Age: 79
End: 2019-03-19

## 2019-04-03 ENCOUNTER — COMMUNICATION - HEALTHEAST (OUTPATIENT)
Dept: FAMILY MEDICINE | Facility: CLINIC | Age: 79
End: 2019-04-03

## 2019-04-03 DIAGNOSIS — K21.9 ESOPHAGEAL REFLUX: ICD-10-CM

## 2019-04-04 ENCOUNTER — AMBULATORY - HEALTHEAST (OUTPATIENT)
Dept: ENDOCRINOLOGY | Facility: CLINIC | Age: 79
End: 2019-04-04

## 2019-04-04 DIAGNOSIS — E03.9 ACQUIRED HYPOTHYROIDISM: ICD-10-CM

## 2019-04-13 ENCOUNTER — COMMUNICATION - HEALTHEAST (OUTPATIENT)
Dept: FAMILY MEDICINE | Facility: CLINIC | Age: 79
End: 2019-04-13

## 2019-04-13 DIAGNOSIS — K21.9 ESOPHAGEAL REFLUX: ICD-10-CM

## 2019-04-16 ENCOUNTER — COMMUNICATION - HEALTHEAST (OUTPATIENT)
Dept: FAMILY MEDICINE | Facility: CLINIC | Age: 79
End: 2019-04-16

## 2019-04-16 ENCOUNTER — AMBULATORY - HEALTHEAST (OUTPATIENT)
Dept: ADMINISTRATIVE | Facility: REHABILITATION | Age: 79
End: 2019-04-16

## 2019-04-16 ENCOUNTER — COMMUNICATION - HEALTHEAST (OUTPATIENT)
Dept: SCHEDULING | Facility: CLINIC | Age: 79
End: 2019-04-16

## 2019-04-16 DIAGNOSIS — R51.9 HA (HEADACHE): ICD-10-CM

## 2019-04-16 DIAGNOSIS — E03.9 ACQUIRED HYPOTHYROIDISM: ICD-10-CM

## 2019-04-16 DIAGNOSIS — N39.41 URGE INCONTINENCE OF URINE: ICD-10-CM

## 2019-04-16 DIAGNOSIS — E03.9 HYPOTHYROIDISM, UNSPECIFIED TYPE: ICD-10-CM

## 2019-04-16 DIAGNOSIS — K21.9 ESOPHAGEAL REFLUX: ICD-10-CM

## 2019-04-25 ENCOUNTER — COMMUNICATION - HEALTHEAST (OUTPATIENT)
Dept: SLEEP MEDICINE | Facility: CLINIC | Age: 79
End: 2019-04-25

## 2019-04-25 DIAGNOSIS — G47.33 OSA (OBSTRUCTIVE SLEEP APNEA): ICD-10-CM

## 2019-04-29 ENCOUNTER — OFFICE VISIT - HEALTHEAST (OUTPATIENT)
Dept: PHYSICAL THERAPY | Facility: REHABILITATION | Age: 79
End: 2019-04-29

## 2019-04-29 DIAGNOSIS — G44.86 CERVICOGENIC HEADACHE: ICD-10-CM

## 2019-04-29 DIAGNOSIS — M54.2 CERVICALGIA: ICD-10-CM

## 2019-05-03 ENCOUNTER — OFFICE VISIT - HEALTHEAST (OUTPATIENT)
Dept: FAMILY MEDICINE | Facility: CLINIC | Age: 79
End: 2019-05-03

## 2019-05-03 ENCOUNTER — OFFICE VISIT - HEALTHEAST (OUTPATIENT)
Dept: PHYSICAL THERAPY | Facility: REHABILITATION | Age: 79
End: 2019-05-03

## 2019-05-03 DIAGNOSIS — D64.9 ANEMIA, UNSPECIFIED TYPE: ICD-10-CM

## 2019-05-03 DIAGNOSIS — K92.2 LOWER GI BLEED: ICD-10-CM

## 2019-05-03 DIAGNOSIS — G44.86 CERVICOGENIC HEADACHE: ICD-10-CM

## 2019-05-03 DIAGNOSIS — Z87.440 HISTORY OF CYSTITIS: ICD-10-CM

## 2019-05-03 DIAGNOSIS — R41.3 MEMORY LOSS: ICD-10-CM

## 2019-05-03 DIAGNOSIS — E03.9 HYPOTHYROIDISM, UNSPECIFIED TYPE: ICD-10-CM

## 2019-05-03 DIAGNOSIS — M54.2 CERVICALGIA: ICD-10-CM

## 2019-05-03 LAB
ALBUMIN SERPL-MCNC: 4 G/DL (ref 3.5–5)
ALP SERPL-CCNC: 79 U/L (ref 45–120)
ALT SERPL W P-5'-P-CCNC: 14 U/L (ref 0–45)
ANION GAP SERPL CALCULATED.3IONS-SCNC: 11 MMOL/L (ref 5–18)
AST SERPL W P-5'-P-CCNC: 21 U/L (ref 0–40)
BILIRUB SERPL-MCNC: 0.3 MG/DL (ref 0–1)
BUN SERPL-MCNC: 23 MG/DL (ref 8–28)
CALCIUM SERPL-MCNC: 9.6 MG/DL (ref 8.5–10.5)
CHLORIDE BLD-SCNC: 104 MMOL/L (ref 98–107)
CO2 SERPL-SCNC: 25 MMOL/L (ref 22–31)
CREAT SERPL-MCNC: 0.81 MG/DL (ref 0.6–1.1)
ERYTHROCYTE [DISTWIDTH] IN BLOOD BY AUTOMATED COUNT: 13 % (ref 11–14.5)
GFR SERPL CREATININE-BSD FRML MDRD: >60 ML/MIN/1.73M2
GLUCOSE BLD-MCNC: 86 MG/DL (ref 70–125)
HCT VFR BLD AUTO: 41 % (ref 35–47)
HGB BLD-MCNC: 13.3 G/DL (ref 12–16)
MCH RBC QN AUTO: 30.1 PG (ref 27–34)
MCHC RBC AUTO-ENTMCNC: 32.5 G/DL (ref 32–36)
MCV RBC AUTO: 93 FL (ref 80–100)
PLATELET # BLD AUTO: 220 THOU/UL (ref 140–440)
PMV BLD AUTO: 7.4 FL (ref 7–10)
POTASSIUM BLD-SCNC: 4.1 MMOL/L (ref 3.5–5)
PROT SERPL-MCNC: 6.9 G/DL (ref 6–8)
RBC # BLD AUTO: 4.42 MILL/UL (ref 3.8–5.4)
SODIUM SERPL-SCNC: 140 MMOL/L (ref 136–145)
TSH SERPL DL<=0.005 MIU/L-ACNC: 1.69 UIU/ML (ref 0.3–5)
VIT B12 SERPL-MCNC: 640 PG/ML (ref 213–816)
WBC: 6.1 THOU/UL (ref 4–11)

## 2019-05-04 LAB — T PALLIDUM AB SER QL: NEGATIVE

## 2019-05-06 ENCOUNTER — COMMUNICATION - HEALTHEAST (OUTPATIENT)
Dept: FAMILY MEDICINE | Facility: CLINIC | Age: 79
End: 2019-05-06

## 2019-05-06 ENCOUNTER — COMMUNICATION - HEALTHEAST (OUTPATIENT)
Dept: SLEEP MEDICINE | Facility: CLINIC | Age: 79
End: 2019-05-06

## 2019-05-06 LAB
ALBUMIN UR-MCNC: NEGATIVE MG/DL
APPEARANCE UR: CLEAR
BILIRUB UR QL STRIP: NEGATIVE
COLOR UR AUTO: YELLOW
GLUCOSE UR STRIP-MCNC: NEGATIVE MG/DL
HGB UR QL STRIP: NEGATIVE
KETONES UR STRIP-MCNC: NEGATIVE MG/DL
LEUKOCYTE ESTERASE UR QL STRIP: NEGATIVE
NITRATE UR QL: NEGATIVE
PH UR STRIP: 6 [PH] (ref 5–8)
SP GR UR STRIP: 1.01 (ref 1–1.03)
UROBILINOGEN UR STRIP-ACNC: NORMAL

## 2019-05-07 ENCOUNTER — OFFICE VISIT - HEALTHEAST (OUTPATIENT)
Dept: PHYSICAL THERAPY | Facility: REHABILITATION | Age: 79
End: 2019-05-07

## 2019-05-07 DIAGNOSIS — G44.86 CERVICOGENIC HEADACHE: ICD-10-CM

## 2019-05-07 DIAGNOSIS — M54.2 CERVICALGIA: ICD-10-CM

## 2019-05-08 ENCOUNTER — AMBULATORY - HEALTHEAST (OUTPATIENT)
Dept: SLEEP MEDICINE | Facility: CLINIC | Age: 79
End: 2019-05-08

## 2019-05-09 ENCOUNTER — OFFICE VISIT - HEALTHEAST (OUTPATIENT)
Dept: PHYSICAL THERAPY | Facility: REHABILITATION | Age: 79
End: 2019-05-09

## 2019-05-09 DIAGNOSIS — G44.86 CERVICOGENIC HEADACHE: ICD-10-CM

## 2019-05-09 DIAGNOSIS — M54.2 CERVICALGIA: ICD-10-CM

## 2019-05-14 ENCOUNTER — OFFICE VISIT - HEALTHEAST (OUTPATIENT)
Dept: PHYSICAL THERAPY | Facility: REHABILITATION | Age: 79
End: 2019-05-14

## 2019-05-14 DIAGNOSIS — G44.86 CERVICOGENIC HEADACHE: ICD-10-CM

## 2019-05-14 DIAGNOSIS — M54.2 CERVICALGIA: ICD-10-CM

## 2019-05-16 ENCOUNTER — COMMUNICATION - HEALTHEAST (OUTPATIENT)
Dept: FAMILY MEDICINE | Facility: CLINIC | Age: 79
End: 2019-05-16

## 2019-05-16 ENCOUNTER — RECORDS - HEALTHEAST (OUTPATIENT)
Dept: ADMINISTRATIVE | Facility: OTHER | Age: 79
End: 2019-05-16

## 2019-05-16 ENCOUNTER — OFFICE VISIT - HEALTHEAST (OUTPATIENT)
Dept: PHYSICAL THERAPY | Facility: REHABILITATION | Age: 79
End: 2019-05-16

## 2019-05-16 DIAGNOSIS — G44.86 CERVICOGENIC HEADACHE: ICD-10-CM

## 2019-05-16 DIAGNOSIS — M54.2 CERVICALGIA: ICD-10-CM

## 2019-05-16 DIAGNOSIS — F33.1 MODERATE EPISODE OF RECURRENT MAJOR DEPRESSIVE DISORDER (H): ICD-10-CM

## 2019-05-16 DIAGNOSIS — J30.9 AR (ALLERGIC RHINITIS): ICD-10-CM

## 2019-05-21 ENCOUNTER — OFFICE VISIT - HEALTHEAST (OUTPATIENT)
Dept: PHYSICAL THERAPY | Facility: REHABILITATION | Age: 79
End: 2019-05-21

## 2019-05-21 DIAGNOSIS — M54.2 CERVICALGIA: ICD-10-CM

## 2019-05-21 DIAGNOSIS — G44.86 CERVICOGENIC HEADACHE: ICD-10-CM

## 2019-05-23 ENCOUNTER — OFFICE VISIT - HEALTHEAST (OUTPATIENT)
Dept: PHYSICAL THERAPY | Facility: REHABILITATION | Age: 79
End: 2019-05-23

## 2019-05-23 DIAGNOSIS — G44.86 CERVICOGENIC HEADACHE: ICD-10-CM

## 2019-05-23 DIAGNOSIS — M54.2 CERVICALGIA: ICD-10-CM

## 2019-05-30 ENCOUNTER — OFFICE VISIT - HEALTHEAST (OUTPATIENT)
Dept: PHYSICAL THERAPY | Facility: REHABILITATION | Age: 79
End: 2019-05-30

## 2019-05-30 DIAGNOSIS — M54.2 CERVICALGIA: ICD-10-CM

## 2019-05-30 DIAGNOSIS — G44.86 CERVICOGENIC HEADACHE: ICD-10-CM

## 2019-06-06 ENCOUNTER — OFFICE VISIT - HEALTHEAST (OUTPATIENT)
Dept: PHYSICAL THERAPY | Facility: REHABILITATION | Age: 79
End: 2019-06-06

## 2019-06-06 DIAGNOSIS — M54.2 CERVICALGIA: ICD-10-CM

## 2019-06-06 DIAGNOSIS — G44.86 CERVICOGENIC HEADACHE: ICD-10-CM

## 2019-06-07 ENCOUNTER — RECORDS - HEALTHEAST (OUTPATIENT)
Dept: ADMINISTRATIVE | Facility: OTHER | Age: 79
End: 2019-06-07

## 2019-06-13 ENCOUNTER — RECORDS - HEALTHEAST (OUTPATIENT)
Dept: ADMINISTRATIVE | Facility: OTHER | Age: 79
End: 2019-06-13

## 2019-06-13 ENCOUNTER — OFFICE VISIT - HEALTHEAST (OUTPATIENT)
Dept: PHYSICAL THERAPY | Facility: REHABILITATION | Age: 79
End: 2019-06-13

## 2019-06-13 DIAGNOSIS — G44.86 CERVICOGENIC HEADACHE: ICD-10-CM

## 2019-06-13 DIAGNOSIS — M54.2 CERVICALGIA: ICD-10-CM

## 2019-06-17 ENCOUNTER — COMMUNICATION - HEALTHEAST (OUTPATIENT)
Dept: FAMILY MEDICINE | Facility: CLINIC | Age: 79
End: 2019-06-17

## 2019-06-18 ENCOUNTER — AMBULATORY - HEALTHEAST (OUTPATIENT)
Dept: NEUROLOGY | Facility: CLINIC | Age: 79
End: 2019-06-18

## 2019-06-18 DIAGNOSIS — R41.3 MEMORY IMPAIRMENT: ICD-10-CM

## 2019-06-19 ENCOUNTER — AMBULATORY - HEALTHEAST (OUTPATIENT)
Dept: FAMILY MEDICINE | Facility: CLINIC | Age: 79
End: 2019-06-19

## 2019-06-19 DIAGNOSIS — M25.50 ARTHRALGIA OF MULTIPLE JOINTS: ICD-10-CM

## 2019-06-20 ENCOUNTER — COMMUNICATION - HEALTHEAST (OUTPATIENT)
Dept: FAMILY MEDICINE | Facility: CLINIC | Age: 79
End: 2019-06-20

## 2019-06-21 ENCOUNTER — AMBULATORY - HEALTHEAST (OUTPATIENT)
Dept: FAMILY MEDICINE | Facility: CLINIC | Age: 79
End: 2019-06-21

## 2019-06-21 DIAGNOSIS — M19.90 OSTEOARTHRITIS, UNSPECIFIED OSTEOARTHRITIS TYPE, UNSPECIFIED SITE: ICD-10-CM

## 2019-06-25 ENCOUNTER — COMMUNICATION - HEALTHEAST (OUTPATIENT)
Dept: FAMILY MEDICINE | Facility: CLINIC | Age: 79
End: 2019-06-25

## 2019-06-26 ENCOUNTER — OFFICE VISIT - HEALTHEAST (OUTPATIENT)
Dept: CARDIOLOGY | Facility: CLINIC | Age: 79
End: 2019-06-26

## 2019-06-26 DIAGNOSIS — R06.09 EXERTIONAL DYSPNEA: ICD-10-CM

## 2019-06-26 DIAGNOSIS — I11.9 HYPERTENSIVE LEFT VENTRICULAR HYPERTROPHY, WITHOUT HEART FAILURE: ICD-10-CM

## 2019-06-26 DIAGNOSIS — R00.2 RAPID PALPITATIONS: ICD-10-CM

## 2019-06-26 DIAGNOSIS — Q24.8 LEFT VENTRICULAR OUTFLOW OBSTRUCTION: ICD-10-CM

## 2019-06-26 ASSESSMENT — MIFFLIN-ST. JEOR: SCORE: 1307.76

## 2019-07-11 ENCOUNTER — COMMUNICATION - HEALTHEAST (OUTPATIENT)
Dept: FAMILY MEDICINE | Facility: CLINIC | Age: 79
End: 2019-07-11

## 2019-07-11 DIAGNOSIS — I51.7 RIGHT ATRIAL ENLARGEMENT: ICD-10-CM

## 2019-07-11 DIAGNOSIS — R06.00 DYSPNEA: ICD-10-CM

## 2019-07-11 DIAGNOSIS — E03.9 ACQUIRED HYPOTHYROIDISM: ICD-10-CM

## 2019-07-15 ENCOUNTER — RECORDS - HEALTHEAST (OUTPATIENT)
Dept: ADMINISTRATIVE | Facility: OTHER | Age: 79
End: 2019-07-15

## 2019-07-18 ENCOUNTER — COMMUNICATION - HEALTHEAST (OUTPATIENT)
Dept: FAMILY MEDICINE | Facility: CLINIC | Age: 79
End: 2019-07-18

## 2019-07-18 ENCOUNTER — HOSPITAL ENCOUNTER (OUTPATIENT)
Dept: NEUROLOGY | Facility: CLINIC | Age: 79
Setting detail: THERAPIES SERIES
Discharge: STILL ADMITTED OR ADMINISTRATIVE | End: 2019-07-18
Attending: PSYCHOLOGIST

## 2019-07-18 DIAGNOSIS — G47.33 OSA ON CPAP: ICD-10-CM

## 2019-07-18 DIAGNOSIS — M79.7 FIBROMYALGIA: ICD-10-CM

## 2019-07-18 DIAGNOSIS — E78.5 HYPERLIPIDEMIA, UNSPECIFIED HYPERLIPIDEMIA TYPE: ICD-10-CM

## 2019-07-18 DIAGNOSIS — F41.1 GAD (GENERALIZED ANXIETY DISORDER): ICD-10-CM

## 2019-07-18 DIAGNOSIS — G47.00 INSOMNIA, UNSPECIFIED TYPE: ICD-10-CM

## 2019-07-18 DIAGNOSIS — F33.9 RECURRENT MAJOR DEPRESSIVE DISORDER, REMISSION STATUS UNSPECIFIED (H): ICD-10-CM

## 2019-07-18 DIAGNOSIS — E66.01 MORBID OBESITY (H): ICD-10-CM

## 2019-07-18 DIAGNOSIS — F33.9 EPISODE OF RECURRENT MAJOR DEPRESSIVE DISORDER, UNSPECIFIED DEPRESSION EPISODE SEVERITY (H): ICD-10-CM

## 2019-07-18 DIAGNOSIS — E55.9 VITAMIN D DEFICIENCY: ICD-10-CM

## 2019-07-18 DIAGNOSIS — F41.1 GENERALIZED ANXIETY DISORDER: ICD-10-CM

## 2019-07-18 DIAGNOSIS — R41.3 MEMORY IMPAIRMENT: ICD-10-CM

## 2019-07-19 ENCOUNTER — COMMUNICATION - HEALTHEAST (OUTPATIENT)
Dept: FAMILY MEDICINE | Facility: CLINIC | Age: 79
End: 2019-07-19

## 2019-07-19 DIAGNOSIS — M25.50 ARTHRALGIA OF MULTIPLE JOINTS: ICD-10-CM

## 2019-07-22 ENCOUNTER — COMMUNICATION - HEALTHEAST (OUTPATIENT)
Dept: FAMILY MEDICINE | Facility: CLINIC | Age: 79
End: 2019-07-22

## 2019-08-09 ENCOUNTER — HOSPITAL ENCOUNTER (OUTPATIENT)
Dept: NEUROLOGY | Facility: CLINIC | Age: 79
Setting detail: THERAPIES SERIES
Discharge: STILL ADMITTED OR ADMINISTRATIVE | End: 2019-08-09
Attending: PSYCHOLOGIST

## 2019-08-09 DIAGNOSIS — F41.1 GENERALIZED ANXIETY DISORDER: ICD-10-CM

## 2019-08-09 DIAGNOSIS — G31.84 MILD COGNITIVE IMPAIRMENT, SO STATED: ICD-10-CM

## 2019-08-09 DIAGNOSIS — G47.33 OSA ON CPAP: ICD-10-CM

## 2019-08-09 DIAGNOSIS — E66.01 MORBID OBESITY (H): ICD-10-CM

## 2019-08-09 DIAGNOSIS — I10 ESSENTIAL HYPERTENSION: ICD-10-CM

## 2019-08-09 DIAGNOSIS — M79.7 FIBROMYALGIA: ICD-10-CM

## 2019-08-09 DIAGNOSIS — F33.9 EPISODE OF RECURRENT MAJOR DEPRESSIVE DISORDER, UNSPECIFIED DEPRESSION EPISODE SEVERITY (H): ICD-10-CM

## 2019-08-09 DIAGNOSIS — F41.1 GAD (GENERALIZED ANXIETY DISORDER): ICD-10-CM

## 2019-09-02 ENCOUNTER — COMMUNICATION - HEALTHEAST (OUTPATIENT)
Dept: FAMILY MEDICINE | Facility: CLINIC | Age: 79
End: 2019-09-02

## 2019-09-02 DIAGNOSIS — E78.5 HYPERLIPIDEMIA: ICD-10-CM

## 2019-09-13 ENCOUNTER — OFFICE VISIT - HEALTHEAST (OUTPATIENT)
Dept: FAMILY MEDICINE | Facility: CLINIC | Age: 79
End: 2019-09-13

## 2019-09-13 DIAGNOSIS — E03.9 HYPOTHYROIDISM, UNSPECIFIED TYPE: ICD-10-CM

## 2019-09-13 DIAGNOSIS — F33.9 RECURRENT MAJOR DEPRESSIVE DISORDER, REMISSION STATUS UNSPECIFIED (H): ICD-10-CM

## 2019-09-13 DIAGNOSIS — Z00.01 ENCOUNTER FOR GENERAL ADULT MEDICAL EXAMINATION WITH ABNORMAL FINDINGS: ICD-10-CM

## 2019-09-13 DIAGNOSIS — G60.9 HEREDITARY AND IDIOPATHIC PERIPHERAL NEUROPATHY: ICD-10-CM

## 2019-09-13 DIAGNOSIS — B35.1 ONYCHOMYCOSIS: ICD-10-CM

## 2019-09-13 DIAGNOSIS — E66.812 CLASS 2 SEVERE OBESITY DUE TO EXCESS CALORIES WITH SERIOUS COMORBIDITY AND BODY MASS INDEX (BMI) OF 36.0 TO 36.9 IN ADULT (H): ICD-10-CM

## 2019-09-13 DIAGNOSIS — E78.5 HYPERLIPIDEMIA, UNSPECIFIED HYPERLIPIDEMIA TYPE: ICD-10-CM

## 2019-09-13 DIAGNOSIS — E66.01 CLASS 2 SEVERE OBESITY DUE TO EXCESS CALORIES WITH SERIOUS COMORBIDITY AND BODY MASS INDEX (BMI) OF 36.0 TO 36.9 IN ADULT (H): ICD-10-CM

## 2019-09-13 DIAGNOSIS — I10 ESSENTIAL HYPERTENSION WITH GOAL BLOOD PRESSURE LESS THAN 140/90: ICD-10-CM

## 2019-09-13 DIAGNOSIS — R26.9 ABNORMALITY OF GAIT: ICD-10-CM

## 2019-09-13 DIAGNOSIS — I89.0 LYMPHEDEMA: ICD-10-CM

## 2019-09-13 ASSESSMENT — ANXIETY QUESTIONNAIRES
6. BECOMING EASILY ANNOYED OR IRRITABLE: SEVERAL DAYS
7. FEELING AFRAID AS IF SOMETHING AWFUL MIGHT HAPPEN: NOT AT ALL
2. NOT BEING ABLE TO STOP OR CONTROL WORRYING: NOT AT ALL
IF YOU CHECKED OFF ANY PROBLEMS ON THIS QUESTIONNAIRE, HOW DIFFICULT HAVE THESE PROBLEMS MADE IT FOR YOU TO DO YOUR WORK, TAKE CARE OF THINGS AT HOME, OR GET ALONG WITH OTHER PEOPLE: SOMEWHAT DIFFICULT
1. FEELING NERVOUS, ANXIOUS, OR ON EDGE: NOT AT ALL
5. BEING SO RESTLESS THAT IT IS HARD TO SIT STILL: NOT AT ALL
4. TROUBLE RELAXING: NOT AT ALL
3. WORRYING TOO MUCH ABOUT DIFFERENT THINGS: NOT AT ALL
GAD7 TOTAL SCORE: 1

## 2019-09-13 ASSESSMENT — MIFFLIN-ST. JEOR: SCORE: 1317.97

## 2019-09-13 ASSESSMENT — PATIENT HEALTH QUESTIONNAIRE - PHQ9: SUM OF ALL RESPONSES TO PHQ QUESTIONS 1-9: 5

## 2019-09-17 ENCOUNTER — AMBULATORY - HEALTHEAST (OUTPATIENT)
Dept: LAB | Facility: CLINIC | Age: 79
End: 2019-09-17

## 2019-09-17 DIAGNOSIS — I10 ESSENTIAL HYPERTENSION WITH GOAL BLOOD PRESSURE LESS THAN 140/90: ICD-10-CM

## 2019-09-17 DIAGNOSIS — E78.5 HYPERLIPIDEMIA, UNSPECIFIED HYPERLIPIDEMIA TYPE: ICD-10-CM

## 2019-09-17 DIAGNOSIS — E03.9 HYPOTHYROIDISM, UNSPECIFIED TYPE: ICD-10-CM

## 2019-09-17 LAB
ANION GAP SERPL CALCULATED.3IONS-SCNC: 8 MMOL/L (ref 5–18)
BUN SERPL-MCNC: 18 MG/DL (ref 8–28)
CALCIUM SERPL-MCNC: 9.7 MG/DL (ref 8.5–10.5)
CHLORIDE BLD-SCNC: 106 MMOL/L (ref 98–107)
CHOLEST SERPL-MCNC: 223 MG/DL
CO2 SERPL-SCNC: 29 MMOL/L (ref 22–31)
CREAT SERPL-MCNC: 0.76 MG/DL (ref 0.6–1.1)
FASTING STATUS PATIENT QL REPORTED: YES
GFR SERPL CREATININE-BSD FRML MDRD: >60 ML/MIN/1.73M2
GLUCOSE BLD-MCNC: 76 MG/DL (ref 70–125)
HDLC SERPL-MCNC: 85 MG/DL
LDLC SERPL CALC-MCNC: 118 MG/DL
POTASSIUM BLD-SCNC: 4.6 MMOL/L (ref 3.5–5)
SODIUM SERPL-SCNC: 143 MMOL/L (ref 136–145)
TRIGL SERPL-MCNC: 99 MG/DL
TSH SERPL DL<=0.005 MIU/L-ACNC: 2.92 UIU/ML (ref 0.3–5)

## 2019-09-18 ENCOUNTER — AMBULATORY - HEALTHEAST (OUTPATIENT)
Dept: FAMILY MEDICINE | Facility: CLINIC | Age: 79
End: 2019-09-18

## 2019-09-19 ENCOUNTER — COMMUNICATION - HEALTHEAST (OUTPATIENT)
Dept: FAMILY MEDICINE | Facility: CLINIC | Age: 79
End: 2019-09-19

## 2019-09-20 ENCOUNTER — RECORDS - HEALTHEAST (OUTPATIENT)
Dept: ADMINISTRATIVE | Facility: OTHER | Age: 79
End: 2019-09-20

## 2019-09-30 ENCOUNTER — COMMUNICATION - HEALTHEAST (OUTPATIENT)
Dept: SCHEDULING | Facility: CLINIC | Age: 79
End: 2019-09-30

## 2019-09-30 ENCOUNTER — OFFICE VISIT - HEALTHEAST (OUTPATIENT)
Dept: FAMILY MEDICINE | Facility: CLINIC | Age: 79
End: 2019-09-30

## 2019-09-30 ENCOUNTER — HOSPITAL ENCOUNTER (OUTPATIENT)
Dept: ULTRASOUND IMAGING | Facility: CLINIC | Age: 79
Discharge: HOME | End: 2019-09-30

## 2019-09-30 DIAGNOSIS — M79.662 PAIN OF LEFT LOWER LEG: ICD-10-CM

## 2019-09-30 DIAGNOSIS — S80.12XA TRAUMATIC HEMATOMA OF LEFT LOWER LEG, INITIAL ENCOUNTER: ICD-10-CM

## 2019-10-03 ENCOUNTER — COMMUNICATION - HEALTHEAST (OUTPATIENT)
Dept: FAMILY MEDICINE | Facility: CLINIC | Age: 79
End: 2019-10-03

## 2019-10-04 ENCOUNTER — COMMUNICATION - HEALTHEAST (OUTPATIENT)
Dept: FAMILY MEDICINE | Facility: CLINIC | Age: 79
End: 2019-10-04

## 2019-10-08 ENCOUNTER — OFFICE VISIT - HEALTHEAST (OUTPATIENT)
Dept: FAMILY MEDICINE | Facility: CLINIC | Age: 79
End: 2019-10-08

## 2019-10-08 DIAGNOSIS — R05.9 COUGH: ICD-10-CM

## 2019-10-08 DIAGNOSIS — F33.9 RECURRENT MAJOR DEPRESSIVE DISORDER, REMISSION STATUS UNSPECIFIED (H): ICD-10-CM

## 2019-10-08 DIAGNOSIS — J01.00 ACUTE NON-RECURRENT MAXILLARY SINUSITIS: ICD-10-CM

## 2019-10-08 DIAGNOSIS — R23.2 HOT FLASHES: ICD-10-CM

## 2019-10-11 ENCOUNTER — COMMUNICATION - HEALTHEAST (OUTPATIENT)
Dept: SCHEDULING | Facility: CLINIC | Age: 79
End: 2019-10-11

## 2019-10-11 DIAGNOSIS — R05.9 COUGH: ICD-10-CM

## 2019-11-22 ENCOUNTER — RECORDS - HEALTHEAST (OUTPATIENT)
Dept: ADMINISTRATIVE | Facility: OTHER | Age: 79
End: 2019-11-22

## 2019-12-03 ENCOUNTER — OFFICE VISIT - HEALTHEAST (OUTPATIENT)
Dept: FAMILY MEDICINE | Facility: CLINIC | Age: 79
End: 2019-12-03

## 2019-12-03 DIAGNOSIS — R23.2 HOT FLASHES: ICD-10-CM

## 2019-12-03 DIAGNOSIS — F41.9 ANXIETY: ICD-10-CM

## 2019-12-03 DIAGNOSIS — F33.9 RECURRENT MAJOR DEPRESSIVE DISORDER, REMISSION STATUS UNSPECIFIED (H): ICD-10-CM

## 2019-12-31 ENCOUNTER — COMMUNICATION - HEALTHEAST (OUTPATIENT)
Dept: CARDIOLOGY | Facility: CLINIC | Age: 79
End: 2019-12-31

## 2019-12-31 DIAGNOSIS — R06.09 EXERTIONAL DYSPNEA: ICD-10-CM

## 2019-12-31 DIAGNOSIS — I11.9 HYPERTENSIVE LEFT VENTRICULAR HYPERTROPHY, WITHOUT HEART FAILURE: ICD-10-CM

## 2019-12-31 DIAGNOSIS — Q24.8 LEFT VENTRICULAR OUTFLOW OBSTRUCTION: ICD-10-CM

## 2019-12-31 DIAGNOSIS — R00.2 RAPID PALPITATIONS: ICD-10-CM

## 2020-01-06 ENCOUNTER — COMMUNICATION - HEALTHEAST (OUTPATIENT)
Dept: FAMILY MEDICINE | Facility: CLINIC | Age: 80
End: 2020-01-06

## 2020-01-06 DIAGNOSIS — M25.50 ARTHRALGIA OF MULTIPLE JOINTS: ICD-10-CM

## 2020-01-08 ENCOUNTER — COMMUNICATION - HEALTHEAST (OUTPATIENT)
Dept: FAMILY MEDICINE | Facility: CLINIC | Age: 80
End: 2020-01-08

## 2020-01-08 DIAGNOSIS — K21.9 ESOPHAGEAL REFLUX: ICD-10-CM

## 2020-04-14 ENCOUNTER — OFFICE VISIT - HEALTHEAST (OUTPATIENT)
Dept: CARDIOLOGY | Facility: CLINIC | Age: 80
End: 2020-04-14

## 2020-04-14 DIAGNOSIS — E78.2 MIXED HYPERLIPIDEMIA: ICD-10-CM

## 2020-04-14 DIAGNOSIS — R00.2 PALPITATIONS: ICD-10-CM

## 2020-04-14 DIAGNOSIS — Q24.8 LEFT VENTRICULAR OUTFLOW TRACT OBSTRUCTION: ICD-10-CM

## 2020-04-14 DIAGNOSIS — I10 ESSENTIAL HYPERTENSION: ICD-10-CM

## 2020-04-14 DIAGNOSIS — I49.1 PREMATURE ATRIAL CONTRACTIONS: ICD-10-CM

## 2020-04-24 ENCOUNTER — COMMUNICATION - HEALTHEAST (OUTPATIENT)
Dept: SLEEP MEDICINE | Facility: CLINIC | Age: 80
End: 2020-04-24

## 2020-04-24 DIAGNOSIS — G47.33 OSA (OBSTRUCTIVE SLEEP APNEA): ICD-10-CM

## 2020-05-11 ENCOUNTER — COMMUNICATION - HEALTHEAST (OUTPATIENT)
Dept: FAMILY MEDICINE | Facility: CLINIC | Age: 80
End: 2020-05-11

## 2020-05-13 ENCOUNTER — AMBULATORY - HEALTHEAST (OUTPATIENT)
Dept: FAMILY MEDICINE | Facility: CLINIC | Age: 80
End: 2020-05-13

## 2020-05-13 DIAGNOSIS — R20.2 FACIAL PARESTHESIA: ICD-10-CM

## 2020-05-16 ENCOUNTER — VIRTUAL VISIT (OUTPATIENT)
Dept: FAMILY MEDICINE | Facility: OTHER | Age: 80
End: 2020-05-16

## 2020-05-16 ENCOUNTER — AMBULATORY - HEALTHEAST (OUTPATIENT)
Dept: FAMILY MEDICINE | Facility: CLINIC | Age: 80
End: 2020-05-16

## 2020-05-16 DIAGNOSIS — Z20.822 SUSPECTED COVID-19 VIRUS INFECTION: ICD-10-CM

## 2020-05-16 NOTE — PROGRESS NOTES
"Date: 2020 11:50:36  Clinician: Michelle Sanchez  Clinician NPI: [id number]  Patient: Dyana Nicole  Patient : 1940  Patient Address: [facility] [place] [address]  Patient Phone: [phone]  Visit Protocol: URI  Patient Summary:  Dyana is a 80 year old ( : 1940 ) female who initiated a Visit for COVID-19 (Coronavirus) evaluation and screening. When asked the question \"Please sign me up to receive news, health information and promotions. \", Dyana responded \"No\".    Dyana states her symptoms started gradually 5-6 days ago. After her symptoms started, they improved and then got worse again.   Her symptoms consist of nausea, diarrhea, facial pain or pressure, a cough, nasal congestion, rhinitis, ear pain, a headache, and malaise. She is experiencing mild difficulty breathing with activities but can speak normally in full sentences.   Symptom details     Nasal secretions: The color of her mucus is clear.    Cough: Dyana coughs a few times an hour and her cough is more bothersome at night. Phlegm comes into her throat when she coughs. She does not believe her cough is caused by post-nasal drip. The color of the phlegm is clear and blood-tinged.     Facial pain or pressure: The facial pain or pressure does not feel worse when bending or leaning forward.     Headache: She states the headache is moderate (4-6 on a 10 point pain scale).      Dyana denies having teeth pain, ageusia, chills, sore throat, wheezing, fever, vomiting, myalgias, and anosmia. She also denies having recent facial or sinus surgery in the past 60 days, taking antibiotic medication for the symptoms, and having a sinus infection within the past year.   Precipitating events  She has not recently been exposed to someone with influenza. Dyana has been in close contact with the following high risk individuals: people with asthma, heart disease or diabetes, adults 65 or older, and immunocompromised people.   " Pertinent COVID-19 (Coronavirus) information  In the past 14 days, Dyana has not worked in a congregate living setting.   She does not work or volunteer as healthcare worker or a  and does not work or volunteer in a healthcare facility.   Dyana has lived in a congregate living setting in the past 14 days. She does not live with a healthcare worker.   Dyana has not had a close contact with a laboratory-confirmed COVID-19 patient within 14 days of symptom onset.   Pertinent medical history  Dyana does not get yeast infections when she takes antibiotics.   Dyana does not need a return to work/school note.   Weight: 182 lbs   Dyana does not smoke or use smokeless tobacco.   Weight: 182 lbs    MEDICATIONS: Effexor XR oral, celecoxib oral, fluticasone-emollient combination no.65 topical, furosemide oral, levothyroxine oral, metoprolol tartrate-hydrochlorothiazide oral, omeprazole-sodium bicarbonate oral, Premarin oral, ezetimibe-simvastatin oral, trazodone oral, ALLERGIES: Penicillins, oxycodone  Clinician Response:  Dear Dyana,   Dear Dyana  Your symptoms show that you may have coronavirus (COVID-19). This illness can cause fever, cough and trouble breathing. Many people get a mild case and get better on their own. Some people can get very sick.  What should I do?  We would like to test you for this virus. This will be a curbside test done outside the clinic.  Please call [phone] to schedule your visit. Explain that you were referred by [facility] to have a COVID-19 test. Be ready to share your [facility] visit ID number.  Starting now:  Stay at least 6 feet away from others. (If someone will drive you to your test, stay in the backseat, as far away from the  as you can.)   Don't go to work, school or anywhere else. When it's time for your test, go straight to the testing site. Don't make any stops on the way there or back.   Wash your hands and face often. Use soap and water.   Cover  "your mouth and nose with a mask, tissue or washcloth.   Don't touch anyone. No hugging, kissing or handshakes.  While at home   Stay home and away from others (self-isolate) until:  You've had no fever---and no medicine that reduces fever---for 3 full days (72 hours). And...  Your other symptoms have gotten better. For example, your cough or breathing has improved. And...  At least 10 days have passed since your symptoms started.  During this time:  Stay in your own room (and use your own bathroom), if you can.  Don't go to work, school or anywhere else.  Stay away from others in your home. No hugging, kissing or shaking hands.  Don't let anyone visit.  Cover your mouth and nose with a mask, tissue or washcloth to avoid spreading germs.  Clean \"high touch\" surfaces often (doorknobs, counters, handles, etc.). Use a household cleaning spray or wipes.  Wash your hands and face often. Use soap and water.  How can I take care of myself?  1. Get lots of rest. Drink extra fluids (unless your doctor has told you not to).  2. Take Tylenol (acetaminophen) for fever or pain. If you have liver or kidney problems, ask your family doctor if it's okay to take Tylenol.  Adults can take either:   650 mg (two 325 mg pills) every 4 to 6 hours, or...  1,000 mg (two 500 mg pills) every 8 hours as needed.   Note: Don't take more than 3,000 mg in one day.   Acetaminophen is found in many medicines (both prescribed and over-the-counter medicines). Read all labels to be sure you don't take too much.   For children, check the Tylenol bottle for the right dose. The dose is based on the child's age or weight.  3. If you have other health problems (like cancer, heart failure, an organ transplant or severe kidney disease): Call your specialty clinic if you don't feel better in the next 2 days.  4. Know when to call 911: If your breathing is so bad that it keeps you from doing normal activities, call 911 or go to the emergency room. Tell them " that you've been staying home and may have COVID-19.  5. Sign up for [employer]. We know it's scary to hear that you might have COVID-19. We want to track your symptoms to make sure you're okay over the next 2 weeks. Please look for an email from [employer]---this is a free, online program that we'll use to keep in touch. To sign up, follow the link in the email. Learn more at [email or web address].  6. The following will serve as your written order for this Covid Test ordered by me for the indication of suspected Covid [Z20.828]: The test will be ordered in [facility], our electronic health record after you are scheduled and will show as ordered and authorized by Marcus Mcfarland MD   Order: Covid-19 (Coronavirus) PCR [facility]TOMATIC [facility]  Where can I get more information?  To learn more about COVID-19 and how to care for yourself at home, please visit the CDC website at https://www.cdc.gov/coronavirus/2019-ncov/about/steps-when-sick.html.  For more about your care at [facility], please visit [email or web address]  If you'd like to be part of a COVID-19 clinical trial (research study) at the [facility], go to [email or web address] for details.    Diagnosis: Cough  Diagnosis ICD: R05

## 2020-05-17 ENCOUNTER — OFFICE VISIT - HEALTHEAST (OUTPATIENT)
Dept: FAMILY MEDICINE | Facility: CLINIC | Age: 80
End: 2020-05-17

## 2020-05-17 DIAGNOSIS — Z20.822 SUSPECTED COVID-19 VIRUS INFECTION: ICD-10-CM

## 2020-05-18 ENCOUNTER — COMMUNICATION - HEALTHEAST (OUTPATIENT)
Dept: FAMILY MEDICINE | Facility: CLINIC | Age: 80
End: 2020-05-18

## 2020-05-18 LAB
SARS-COV-2 PCR COMMENT: NORMAL
SARS-COV-2 RNA SPEC QL NAA+PROBE: NEGATIVE
SARS-COV-2 VIRUS SPECIMEN SOURCE: NORMAL

## 2020-05-19 ENCOUNTER — OFFICE VISIT - HEALTHEAST (OUTPATIENT)
Dept: FAMILY MEDICINE | Facility: CLINIC | Age: 80
End: 2020-05-19

## 2020-05-19 DIAGNOSIS — E23.6 PITUITARY MASS (H): ICD-10-CM

## 2020-05-19 DIAGNOSIS — I10 ESSENTIAL HYPERTENSION WITH GOAL BLOOD PRESSURE LESS THAN 140/90: ICD-10-CM

## 2020-05-19 DIAGNOSIS — R51.9 NONINTRACTABLE EPISODIC HEADACHE, UNSPECIFIED HEADACHE TYPE: ICD-10-CM

## 2020-05-19 DIAGNOSIS — R42 LIGHTHEADEDNESS: ICD-10-CM

## 2020-05-19 RX ORDER — VENLAFAXINE 75 MG/1
75 TABLET ORAL DAILY
Status: SHIPPED | COMMUNITY
Start: 2020-05-19 | End: 2021-10-06 | Stop reason: DRUGHIGH

## 2020-05-29 ENCOUNTER — RECORDS - HEALTHEAST (OUTPATIENT)
Dept: ADMINISTRATIVE | Facility: OTHER | Age: 80
End: 2020-05-29

## 2020-06-01 ENCOUNTER — COMMUNICATION - HEALTHEAST (OUTPATIENT)
Dept: FAMILY MEDICINE | Facility: CLINIC | Age: 80
End: 2020-06-01

## 2020-06-06 ENCOUNTER — COMMUNICATION - HEALTHEAST (OUTPATIENT)
Dept: FAMILY MEDICINE | Facility: CLINIC | Age: 80
End: 2020-06-06

## 2020-06-06 DIAGNOSIS — E78.5 HYPERLIPIDEMIA: ICD-10-CM

## 2020-06-10 ENCOUNTER — RECORDS - HEALTHEAST (OUTPATIENT)
Dept: ADMINISTRATIVE | Facility: OTHER | Age: 80
End: 2020-06-10

## 2020-06-10 ENCOUNTER — HOSPITAL ENCOUNTER (OUTPATIENT)
Dept: NEUROLOGY | Facility: CLINIC | Age: 80
Setting detail: THERAPIES SERIES
Discharge: STILL ADMITTED OR ADMINISTRATIVE | End: 2020-06-10
Attending: FAMILY MEDICINE

## 2020-06-10 DIAGNOSIS — H83.3X3 SOUND SENSITIVITY IN BOTH EARS: ICD-10-CM

## 2020-06-10 DIAGNOSIS — R45.4 IRRITABILITY: ICD-10-CM

## 2020-06-10 DIAGNOSIS — G47.00 INSOMNIA, UNSPECIFIED TYPE: ICD-10-CM

## 2020-06-10 DIAGNOSIS — R11.0 NAUSEA: ICD-10-CM

## 2020-06-10 DIAGNOSIS — R41.840 ATTENTION AND CONCENTRATION DEFICIT: ICD-10-CM

## 2020-06-10 DIAGNOSIS — G44.309 POST-CONCUSSION HEADACHE: ICD-10-CM

## 2020-06-10 DIAGNOSIS — R68.89 SENSITIVITY TO LIGHT: ICD-10-CM

## 2020-06-10 DIAGNOSIS — R51.9 NONINTRACTABLE EPISODIC HEADACHE, UNSPECIFIED HEADACHE TYPE: ICD-10-CM

## 2020-06-10 DIAGNOSIS — R42 LIGHTHEADEDNESS: ICD-10-CM

## 2020-06-10 DIAGNOSIS — R53.83 FATIGUE, UNSPECIFIED TYPE: ICD-10-CM

## 2020-06-10 DIAGNOSIS — R41.3 MEMORY DIFFICULTIES: ICD-10-CM

## 2020-06-10 DIAGNOSIS — F06.4 ANXIETY DISORDER DUE TO MEDICAL CONDITION: ICD-10-CM

## 2020-06-10 DIAGNOSIS — F07.81 POSTCONCUSSIVE SYNDROME: ICD-10-CM

## 2020-06-10 DIAGNOSIS — R42 DIZZINESS: ICD-10-CM

## 2020-06-17 ENCOUNTER — OFFICE VISIT - HEALTHEAST (OUTPATIENT)
Dept: OTOLARYNGOLOGY | Facility: CLINIC | Age: 80
End: 2020-06-17

## 2020-06-17 DIAGNOSIS — H61.22 IMPACTED CERUMEN OF LEFT EAR: ICD-10-CM

## 2020-06-17 DIAGNOSIS — M26.629 TMJ PAIN DYSFUNCTION SYNDROME: ICD-10-CM

## 2020-07-04 ENCOUNTER — COMMUNICATION - HEALTHEAST (OUTPATIENT)
Dept: FAMILY MEDICINE | Facility: CLINIC | Age: 80
End: 2020-07-04

## 2020-07-04 DIAGNOSIS — E03.9 ACQUIRED HYPOTHYROIDISM: ICD-10-CM

## 2020-07-04 DIAGNOSIS — K21.9 ESOPHAGEAL REFLUX: ICD-10-CM

## 2020-07-08 ENCOUNTER — HOSPITAL ENCOUNTER (OUTPATIENT)
Dept: NEUROLOGY | Facility: CLINIC | Age: 80
Setting detail: THERAPIES SERIES
Discharge: STILL ADMITTED OR ADMINISTRATIVE | End: 2020-07-08
Attending: FAMILY MEDICINE

## 2020-07-08 DIAGNOSIS — M54.50 LOW BACK PAIN, UNSPECIFIED BACK PAIN LATERALITY, UNSPECIFIED CHRONICITY, UNSPECIFIED WHETHER SCIATICA PRESENT: ICD-10-CM

## 2020-07-08 DIAGNOSIS — H83.3X3 SOUND SENSITIVITY IN BOTH EARS: ICD-10-CM

## 2020-07-08 DIAGNOSIS — F07.81 POSTCONCUSSION SYNDROME: ICD-10-CM

## 2020-07-08 DIAGNOSIS — R42 DIZZINESS: ICD-10-CM

## 2020-07-08 DIAGNOSIS — R68.89 SENSITIVITY TO LIGHT: ICD-10-CM

## 2020-07-08 DIAGNOSIS — R41.840 ATTENTION AND CONCENTRATION DEFICIT: ICD-10-CM

## 2020-07-08 DIAGNOSIS — R41.3 MEMORY DIFFICULTIES: ICD-10-CM

## 2020-07-08 DIAGNOSIS — G44.309 POST-CONCUSSION HEADACHE: ICD-10-CM

## 2020-07-08 DIAGNOSIS — F06.4 ANXIETY DISORDER DUE TO MEDICAL CONDITION: ICD-10-CM

## 2020-07-08 DIAGNOSIS — R53.83 FATIGUE, UNSPECIFIED TYPE: ICD-10-CM

## 2020-07-08 DIAGNOSIS — G47.00 INSOMNIA, UNSPECIFIED TYPE: ICD-10-CM

## 2020-07-08 DIAGNOSIS — F07.81 POST CONCUSSION SYNDROME: ICD-10-CM

## 2020-07-08 DIAGNOSIS — R45.4 IRRITABILITY: ICD-10-CM

## 2020-07-08 DIAGNOSIS — M54.2 NECK PAIN: ICD-10-CM

## 2020-07-21 ENCOUNTER — OFFICE VISIT - HEALTHEAST (OUTPATIENT)
Dept: PHYSICAL THERAPY | Facility: REHABILITATION | Age: 80
End: 2020-07-21

## 2020-07-21 DIAGNOSIS — M54.2 CERVICALGIA: ICD-10-CM

## 2020-07-28 ENCOUNTER — OFFICE VISIT - HEALTHEAST (OUTPATIENT)
Dept: PHYSICAL THERAPY | Facility: REHABILITATION | Age: 80
End: 2020-07-28

## 2020-07-28 DIAGNOSIS — G44.86 CERVICOGENIC HEADACHE: ICD-10-CM

## 2020-07-28 DIAGNOSIS — M54.50 LOW BACK PAIN, UNSPECIFIED BACK PAIN LATERALITY, UNSPECIFIED CHRONICITY, UNSPECIFIED WHETHER SCIATICA PRESENT: ICD-10-CM

## 2020-07-28 DIAGNOSIS — F07.81 POST CONCUSSION SYNDROME: ICD-10-CM

## 2020-07-28 DIAGNOSIS — M54.2 CERVICALGIA: ICD-10-CM

## 2020-07-28 DIAGNOSIS — M54.2 NECK PAIN: ICD-10-CM

## 2020-07-30 ENCOUNTER — OFFICE VISIT - HEALTHEAST (OUTPATIENT)
Dept: PHYSICAL THERAPY | Facility: REHABILITATION | Age: 80
End: 2020-07-30

## 2020-07-30 DIAGNOSIS — M54.2 CERVICALGIA: ICD-10-CM

## 2020-07-30 DIAGNOSIS — M47.812 CERVICAL SPONDYLOSIS WITHOUT MYELOPATHY: ICD-10-CM

## 2020-07-30 DIAGNOSIS — F07.81 POST CONCUSSION SYNDROME: ICD-10-CM

## 2020-07-30 DIAGNOSIS — G44.86 CERVICOGENIC HEADACHE: ICD-10-CM

## 2020-07-30 DIAGNOSIS — M54.2 NECK PAIN: ICD-10-CM

## 2020-08-01 ENCOUNTER — COMMUNICATION - HEALTHEAST (OUTPATIENT)
Dept: FAMILY MEDICINE | Facility: CLINIC | Age: 80
End: 2020-08-01

## 2020-08-01 DIAGNOSIS — M25.50 ARTHRALGIA OF MULTIPLE JOINTS: ICD-10-CM

## 2020-08-02 RX ORDER — CELECOXIB 200 MG/1
CAPSULE ORAL
Qty: 90 CAPSULE | Refills: 3 | Status: SHIPPED | OUTPATIENT
Start: 2020-08-02 | End: 2021-08-19

## 2020-08-03 ENCOUNTER — COMMUNICATION - HEALTHEAST (OUTPATIENT)
Dept: CARDIOLOGY | Facility: CLINIC | Age: 80
End: 2020-08-03

## 2020-08-03 DIAGNOSIS — Q24.8 LEFT VENTRICULAR OUTFLOW OBSTRUCTION: ICD-10-CM

## 2020-08-03 DIAGNOSIS — I11.9 HYPERTENSIVE LEFT VENTRICULAR HYPERTROPHY, WITHOUT HEART FAILURE: ICD-10-CM

## 2020-08-03 DIAGNOSIS — R00.2 RAPID PALPITATIONS: ICD-10-CM

## 2020-08-03 DIAGNOSIS — R06.09 EXERTIONAL DYSPNEA: ICD-10-CM

## 2020-08-05 ENCOUNTER — OFFICE VISIT - HEALTHEAST (OUTPATIENT)
Dept: PHYSICAL THERAPY | Facility: REHABILITATION | Age: 80
End: 2020-08-05

## 2020-08-05 DIAGNOSIS — G44.86 CERVICOGENIC HEADACHE: ICD-10-CM

## 2020-08-05 DIAGNOSIS — M54.2 NECK PAIN: ICD-10-CM

## 2020-08-05 DIAGNOSIS — M54.2 CERVICALGIA: ICD-10-CM

## 2020-08-05 DIAGNOSIS — F07.81 POST CONCUSSION SYNDROME: ICD-10-CM

## 2020-08-05 DIAGNOSIS — M47.812 CERVICAL SPONDYLOSIS WITHOUT MYELOPATHY: ICD-10-CM

## 2020-08-07 ENCOUNTER — OFFICE VISIT - HEALTHEAST (OUTPATIENT)
Dept: PHYSICAL THERAPY | Facility: REHABILITATION | Age: 80
End: 2020-08-07

## 2020-08-07 DIAGNOSIS — M54.2 NECK PAIN: ICD-10-CM

## 2020-08-07 DIAGNOSIS — F07.81 POST CONCUSSION SYNDROME: ICD-10-CM

## 2020-08-07 DIAGNOSIS — M47.812 CERVICAL SPONDYLOSIS WITHOUT MYELOPATHY: ICD-10-CM

## 2020-08-07 DIAGNOSIS — M54.2 CERVICALGIA: ICD-10-CM

## 2020-08-07 DIAGNOSIS — G44.86 CERVICOGENIC HEADACHE: ICD-10-CM

## 2020-08-12 ENCOUNTER — OFFICE VISIT - HEALTHEAST (OUTPATIENT)
Dept: PHYSICAL THERAPY | Facility: REHABILITATION | Age: 80
End: 2020-08-12

## 2020-08-12 DIAGNOSIS — M54.2 CERVICALGIA: ICD-10-CM

## 2020-08-12 DIAGNOSIS — M47.812 CERVICAL SPONDYLOSIS WITHOUT MYELOPATHY: ICD-10-CM

## 2020-08-12 DIAGNOSIS — F07.81 POST CONCUSSION SYNDROME: ICD-10-CM

## 2020-08-12 DIAGNOSIS — M54.50 LOW BACK PAIN, UNSPECIFIED BACK PAIN LATERALITY, UNSPECIFIED CHRONICITY, UNSPECIFIED WHETHER SCIATICA PRESENT: ICD-10-CM

## 2020-08-12 DIAGNOSIS — M54.2 NECK PAIN: ICD-10-CM

## 2020-08-12 DIAGNOSIS — G44.86 CERVICOGENIC HEADACHE: ICD-10-CM

## 2020-08-12 DIAGNOSIS — R29.898 LEG WEAKNESS, BILATERAL: ICD-10-CM

## 2020-08-18 ENCOUNTER — OFFICE VISIT - HEALTHEAST (OUTPATIENT)
Dept: PHYSICAL THERAPY | Facility: REHABILITATION | Age: 80
End: 2020-08-18

## 2020-08-18 DIAGNOSIS — G44.86 CERVICOGENIC HEADACHE: ICD-10-CM

## 2020-08-18 DIAGNOSIS — M54.2 CERVICALGIA: ICD-10-CM

## 2020-08-18 DIAGNOSIS — R29.898 LEG WEAKNESS, BILATERAL: ICD-10-CM

## 2020-08-18 DIAGNOSIS — M54.2 NECK PAIN: ICD-10-CM

## 2020-08-18 DIAGNOSIS — M54.50 LOW BACK PAIN, UNSPECIFIED BACK PAIN LATERALITY, UNSPECIFIED CHRONICITY, UNSPECIFIED WHETHER SCIATICA PRESENT: ICD-10-CM

## 2020-08-18 DIAGNOSIS — F07.81 POST CONCUSSION SYNDROME: ICD-10-CM

## 2020-08-18 DIAGNOSIS — M47.812 CERVICAL SPONDYLOSIS WITHOUT MYELOPATHY: ICD-10-CM

## 2020-08-21 ENCOUNTER — OFFICE VISIT - HEALTHEAST (OUTPATIENT)
Dept: PHYSICAL THERAPY | Facility: REHABILITATION | Age: 80
End: 2020-08-21

## 2020-08-21 DIAGNOSIS — M54.2 CERVICALGIA: ICD-10-CM

## 2020-08-21 DIAGNOSIS — M54.2 NECK PAIN: ICD-10-CM

## 2020-08-21 DIAGNOSIS — F07.81 POST CONCUSSION SYNDROME: ICD-10-CM

## 2020-08-21 DIAGNOSIS — M47.812 CERVICAL SPONDYLOSIS WITHOUT MYELOPATHY: ICD-10-CM

## 2020-08-21 DIAGNOSIS — G44.86 CERVICOGENIC HEADACHE: ICD-10-CM

## 2020-08-25 ENCOUNTER — OFFICE VISIT - HEALTHEAST (OUTPATIENT)
Dept: PHYSICAL THERAPY | Facility: REHABILITATION | Age: 80
End: 2020-08-25

## 2020-08-25 DIAGNOSIS — M54.2 CERVICALGIA: ICD-10-CM

## 2020-08-25 DIAGNOSIS — F07.81 POST CONCUSSION SYNDROME: ICD-10-CM

## 2020-08-25 DIAGNOSIS — M54.2 NECK PAIN: ICD-10-CM

## 2020-09-01 ENCOUNTER — OFFICE VISIT - HEALTHEAST (OUTPATIENT)
Dept: PHYSICAL THERAPY | Facility: REHABILITATION | Age: 80
End: 2020-09-01

## 2020-09-01 DIAGNOSIS — G44.86 CERVICOGENIC HEADACHE: ICD-10-CM

## 2020-09-01 DIAGNOSIS — M47.812 CERVICAL SPONDYLOSIS WITHOUT MYELOPATHY: ICD-10-CM

## 2020-09-01 DIAGNOSIS — M54.2 CERVICALGIA: ICD-10-CM

## 2020-09-01 DIAGNOSIS — M54.2 NECK PAIN: ICD-10-CM

## 2020-09-01 DIAGNOSIS — F07.81 POST CONCUSSION SYNDROME: ICD-10-CM

## 2020-09-02 ENCOUNTER — HOSPITAL ENCOUNTER (OUTPATIENT)
Dept: NEUROLOGY | Facility: CLINIC | Age: 80
Setting detail: THERAPIES SERIES
Discharge: STILL ADMITTED OR ADMINISTRATIVE | End: 2020-09-02
Attending: FAMILY MEDICINE

## 2020-09-02 DIAGNOSIS — G44.309 POST-CONCUSSION HEADACHE: ICD-10-CM

## 2020-09-02 DIAGNOSIS — R68.89 SENSITIVITY TO LIGHT: ICD-10-CM

## 2020-09-02 DIAGNOSIS — R41.3 MEMORY DIFFICULTIES: ICD-10-CM

## 2020-09-02 DIAGNOSIS — R41.840 ATTENTION AND CONCENTRATION DEFICIT: ICD-10-CM

## 2020-09-02 DIAGNOSIS — R42 DIZZINESS: ICD-10-CM

## 2020-09-02 DIAGNOSIS — F06.4 ANXIETY DISORDER DUE TO MEDICAL CONDITION: ICD-10-CM

## 2020-09-02 DIAGNOSIS — R45.4 IRRITABILITY: ICD-10-CM

## 2020-09-02 DIAGNOSIS — H83.3X3 SOUND SENSITIVITY IN BOTH EARS: ICD-10-CM

## 2020-09-02 DIAGNOSIS — G47.00 INSOMNIA, UNSPECIFIED TYPE: ICD-10-CM

## 2020-09-02 DIAGNOSIS — R53.83 FATIGUE, UNSPECIFIED TYPE: ICD-10-CM

## 2020-09-02 DIAGNOSIS — F07.81 POSTCONCUSSION SYNDROME: ICD-10-CM

## 2020-09-08 ENCOUNTER — OFFICE VISIT - HEALTHEAST (OUTPATIENT)
Dept: PHYSICAL THERAPY | Facility: REHABILITATION | Age: 80
End: 2020-09-08

## 2020-09-08 DIAGNOSIS — M47.812 CERVICAL SPONDYLOSIS WITHOUT MYELOPATHY: ICD-10-CM

## 2020-09-08 DIAGNOSIS — R29.898 LEG WEAKNESS, BILATERAL: ICD-10-CM

## 2020-09-08 DIAGNOSIS — M54.2 CERVICALGIA: ICD-10-CM

## 2020-09-08 DIAGNOSIS — M54.2 NECK PAIN: ICD-10-CM

## 2020-09-08 DIAGNOSIS — M54.50 LOW BACK PAIN, UNSPECIFIED BACK PAIN LATERALITY, UNSPECIFIED CHRONICITY, UNSPECIFIED WHETHER SCIATICA PRESENT: ICD-10-CM

## 2020-09-08 DIAGNOSIS — F07.81 POST CONCUSSION SYNDROME: ICD-10-CM

## 2020-09-08 DIAGNOSIS — G44.86 CERVICOGENIC HEADACHE: ICD-10-CM

## 2020-09-10 ENCOUNTER — COMMUNICATION - HEALTHEAST (OUTPATIENT)
Dept: FAMILY MEDICINE | Facility: CLINIC | Age: 80
End: 2020-09-10

## 2020-09-15 ENCOUNTER — OFFICE VISIT - HEALTHEAST (OUTPATIENT)
Dept: PHYSICAL THERAPY | Facility: REHABILITATION | Age: 80
End: 2020-09-15

## 2020-09-15 DIAGNOSIS — G44.86 CERVICOGENIC HEADACHE: ICD-10-CM

## 2020-09-15 DIAGNOSIS — M54.2 NECK PAIN: ICD-10-CM

## 2020-09-15 DIAGNOSIS — M54.2 CERVICALGIA: ICD-10-CM

## 2020-09-15 DIAGNOSIS — M47.812 CERVICAL SPONDYLOSIS WITHOUT MYELOPATHY: ICD-10-CM

## 2020-09-15 DIAGNOSIS — F07.81 POST CONCUSSION SYNDROME: ICD-10-CM

## 2020-09-16 ENCOUNTER — OFFICE VISIT - HEALTHEAST (OUTPATIENT)
Dept: FAMILY MEDICINE | Facility: CLINIC | Age: 80
End: 2020-09-16

## 2020-09-16 ENCOUNTER — COMMUNICATION - HEALTHEAST (OUTPATIENT)
Dept: FAMILY MEDICINE | Facility: CLINIC | Age: 80
End: 2020-09-16

## 2020-09-16 DIAGNOSIS — I11.9 HYPERTENSIVE LEFT VENTRICULAR HYPERTROPHY, WITHOUT HEART FAILURE: ICD-10-CM

## 2020-09-16 DIAGNOSIS — R06.09 EXERTIONAL DYSPNEA: ICD-10-CM

## 2020-09-16 DIAGNOSIS — R06.00 DYSPNEA: ICD-10-CM

## 2020-09-16 DIAGNOSIS — M25.511 ACUTE PAIN OF RIGHT SHOULDER: ICD-10-CM

## 2020-09-16 DIAGNOSIS — I51.7 RIGHT ATRIAL ENLARGEMENT: ICD-10-CM

## 2020-09-16 DIAGNOSIS — I10 ESSENTIAL HYPERTENSION WITH GOAL BLOOD PRESSURE LESS THAN 140/90: ICD-10-CM

## 2020-09-16 DIAGNOSIS — E03.9 HYPOTHYROIDISM, UNSPECIFIED TYPE: ICD-10-CM

## 2020-09-16 DIAGNOSIS — Q24.8 LEFT VENTRICULAR OUTFLOW OBSTRUCTION: ICD-10-CM

## 2020-09-16 DIAGNOSIS — Z23 ENCOUNTER FOR IMMUNIZATION: ICD-10-CM

## 2020-09-16 DIAGNOSIS — R00.2 RAPID PALPITATIONS: ICD-10-CM

## 2020-09-16 LAB
ANION GAP SERPL CALCULATED.3IONS-SCNC: 10 MMOL/L (ref 5–18)
BUN SERPL-MCNC: 21 MG/DL (ref 8–28)
CALCIUM SERPL-MCNC: 9.2 MG/DL (ref 8.5–10.5)
CHLORIDE BLD-SCNC: 101 MMOL/L (ref 98–107)
CO2 SERPL-SCNC: 29 MMOL/L (ref 22–31)
CREAT SERPL-MCNC: 0.81 MG/DL (ref 0.6–1.1)
GFR SERPL CREATININE-BSD FRML MDRD: >60 ML/MIN/1.73M2
GLUCOSE BLD-MCNC: 85 MG/DL (ref 70–125)
POTASSIUM BLD-SCNC: 4.7 MMOL/L (ref 3.5–5)
SODIUM SERPL-SCNC: 140 MMOL/L (ref 136–145)
TSH SERPL DL<=0.005 MIU/L-ACNC: 2.18 UIU/ML (ref 0.3–5)

## 2020-09-16 ASSESSMENT — ANXIETY QUESTIONNAIRES
6. BECOMING EASILY ANNOYED OR IRRITABLE: SEVERAL DAYS
1. FEELING NERVOUS, ANXIOUS, OR ON EDGE: NOT AT ALL
4. TROUBLE RELAXING: MORE THAN HALF THE DAYS
5. BEING SO RESTLESS THAT IT IS HARD TO SIT STILL: SEVERAL DAYS
2. NOT BEING ABLE TO STOP OR CONTROL WORRYING: SEVERAL DAYS
GAD7 TOTAL SCORE: 6
7. FEELING AFRAID AS IF SOMETHING AWFUL MIGHT HAPPEN: NOT AT ALL
IF YOU CHECKED OFF ANY PROBLEMS ON THIS QUESTIONNAIRE, HOW DIFFICULT HAVE THESE PROBLEMS MADE IT FOR YOU TO DO YOUR WORK, TAKE CARE OF THINGS AT HOME, OR GET ALONG WITH OTHER PEOPLE: VERY DIFFICULT
3. WORRYING TOO MUCH ABOUT DIFFERENT THINGS: SEVERAL DAYS

## 2020-09-16 ASSESSMENT — PATIENT HEALTH QUESTIONNAIRE - PHQ9: SUM OF ALL RESPONSES TO PHQ QUESTIONS 1-9: 9

## 2020-09-17 RX ORDER — FUROSEMIDE 20 MG
TABLET ORAL
Qty: 90 TABLET | Refills: 3 | Status: SHIPPED | OUTPATIENT
Start: 2020-09-17 | End: 2021-09-24

## 2020-09-22 ENCOUNTER — OFFICE VISIT - HEALTHEAST (OUTPATIENT)
Dept: PHYSICAL THERAPY | Facility: REHABILITATION | Age: 80
End: 2020-09-22

## 2020-09-22 DIAGNOSIS — G44.86 CERVICOGENIC HEADACHE: ICD-10-CM

## 2020-09-22 DIAGNOSIS — M47.812 CERVICAL SPONDYLOSIS WITHOUT MYELOPATHY: ICD-10-CM

## 2020-09-22 DIAGNOSIS — M54.2 NECK PAIN: ICD-10-CM

## 2020-09-22 DIAGNOSIS — F07.81 POST CONCUSSION SYNDROME: ICD-10-CM

## 2020-09-22 DIAGNOSIS — M54.2 CERVICALGIA: ICD-10-CM

## 2020-09-29 ENCOUNTER — OFFICE VISIT - HEALTHEAST (OUTPATIENT)
Dept: PHYSICAL THERAPY | Facility: REHABILITATION | Age: 80
End: 2020-09-29

## 2020-09-29 DIAGNOSIS — M47.812 CERVICAL SPONDYLOSIS WITHOUT MYELOPATHY: ICD-10-CM

## 2020-09-29 DIAGNOSIS — M54.50 LOW BACK PAIN, UNSPECIFIED BACK PAIN LATERALITY, UNSPECIFIED CHRONICITY, UNSPECIFIED WHETHER SCIATICA PRESENT: ICD-10-CM

## 2020-09-29 DIAGNOSIS — M54.2 NECK PAIN: ICD-10-CM

## 2020-09-29 DIAGNOSIS — G44.86 CERVICOGENIC HEADACHE: ICD-10-CM

## 2020-09-29 DIAGNOSIS — M54.2 CERVICALGIA: ICD-10-CM

## 2020-09-29 DIAGNOSIS — F07.81 POST CONCUSSION SYNDROME: ICD-10-CM

## 2020-09-30 ENCOUNTER — HOSPITAL ENCOUNTER (OUTPATIENT)
Dept: NEUROLOGY | Facility: CLINIC | Age: 80
Setting detail: THERAPIES SERIES
Discharge: STILL ADMITTED OR ADMINISTRATIVE | End: 2020-09-30
Attending: FAMILY MEDICINE

## 2020-09-30 DIAGNOSIS — R45.4 IRRITABILITY: ICD-10-CM

## 2020-09-30 DIAGNOSIS — F07.81 POSTCONCUSSION SYNDROME: ICD-10-CM

## 2020-09-30 DIAGNOSIS — G44.309 POST-CONCUSSION HEADACHE: ICD-10-CM

## 2020-09-30 DIAGNOSIS — R41.3 MEMORY DIFFICULTIES: ICD-10-CM

## 2020-09-30 DIAGNOSIS — R53.83 FATIGUE, UNSPECIFIED TYPE: ICD-10-CM

## 2020-09-30 DIAGNOSIS — G47.00 INSOMNIA, UNSPECIFIED TYPE: ICD-10-CM

## 2020-09-30 DIAGNOSIS — R42 DIZZINESS: ICD-10-CM

## 2020-09-30 DIAGNOSIS — F06.4 ANXIETY DISORDER DUE TO MEDICAL CONDITION: ICD-10-CM

## 2020-09-30 DIAGNOSIS — R68.89 SENSITIVITY TO LIGHT: ICD-10-CM

## 2020-09-30 DIAGNOSIS — H83.3X3 SOUND SENSITIVITY IN BOTH EARS: ICD-10-CM

## 2020-10-02 ENCOUNTER — COMMUNICATION - HEALTHEAST (OUTPATIENT)
Dept: FAMILY MEDICINE | Facility: CLINIC | Age: 80
End: 2020-10-02

## 2020-10-02 ENCOUNTER — AMBULATORY - HEALTHEAST (OUTPATIENT)
Dept: FAMILY MEDICINE | Facility: CLINIC | Age: 80
End: 2020-10-02

## 2020-10-02 DIAGNOSIS — E03.9 ACQUIRED HYPOTHYROIDISM: ICD-10-CM

## 2020-10-02 DIAGNOSIS — M79.603 PAIN OF UPPER EXTREMITY, UNSPECIFIED LATERALITY: ICD-10-CM

## 2020-10-05 RX ORDER — LEVOTHYROXINE SODIUM 50 UG/1
TABLET ORAL
Qty: 90 TABLET | Refills: 3 | Status: SHIPPED | OUTPATIENT
Start: 2020-10-05 | End: 2021-09-24

## 2020-10-07 ENCOUNTER — COMMUNICATION - HEALTHEAST (OUTPATIENT)
Dept: FAMILY MEDICINE | Facility: CLINIC | Age: 80
End: 2020-10-07

## 2020-10-07 DIAGNOSIS — K21.9 ESOPHAGEAL REFLUX: ICD-10-CM

## 2020-10-09 ENCOUNTER — RECORDS - HEALTHEAST (OUTPATIENT)
Dept: ADMINISTRATIVE | Facility: OTHER | Age: 80
End: 2020-10-09

## 2020-10-13 ENCOUNTER — OFFICE VISIT - HEALTHEAST (OUTPATIENT)
Dept: PHYSICAL THERAPY | Facility: REHABILITATION | Age: 80
End: 2020-10-13

## 2020-10-13 DIAGNOSIS — G44.86 CERVICOGENIC HEADACHE: ICD-10-CM

## 2020-10-13 DIAGNOSIS — M47.812 CERVICAL SPONDYLOSIS WITHOUT MYELOPATHY: ICD-10-CM

## 2020-10-13 DIAGNOSIS — M54.2 CERVICALGIA: ICD-10-CM

## 2020-10-13 DIAGNOSIS — M54.2 NECK PAIN: ICD-10-CM

## 2020-10-13 DIAGNOSIS — F07.81 POST CONCUSSION SYNDROME: ICD-10-CM

## 2020-10-25 ENCOUNTER — COMMUNICATION - HEALTHEAST (OUTPATIENT)
Dept: CARDIOLOGY | Facility: CLINIC | Age: 80
End: 2020-10-25

## 2020-10-27 ENCOUNTER — OFFICE VISIT - HEALTHEAST (OUTPATIENT)
Dept: PHYSICAL THERAPY | Facility: REHABILITATION | Age: 80
End: 2020-10-27

## 2020-10-27 DIAGNOSIS — F07.81 POST CONCUSSION SYNDROME: ICD-10-CM

## 2020-10-27 DIAGNOSIS — M47.812 CERVICAL SPONDYLOSIS WITHOUT MYELOPATHY: ICD-10-CM

## 2020-10-27 DIAGNOSIS — M54.2 CERVICALGIA: ICD-10-CM

## 2020-10-27 DIAGNOSIS — M54.2 NECK PAIN: ICD-10-CM

## 2020-10-27 DIAGNOSIS — M54.50 LOW BACK PAIN, UNSPECIFIED BACK PAIN LATERALITY, UNSPECIFIED CHRONICITY, UNSPECIFIED WHETHER SCIATICA PRESENT: ICD-10-CM

## 2020-10-27 DIAGNOSIS — G44.86 CERVICOGENIC HEADACHE: ICD-10-CM

## 2020-10-28 ENCOUNTER — COMMUNICATION - HEALTHEAST (OUTPATIENT)
Dept: CARDIOLOGY | Facility: CLINIC | Age: 80
End: 2020-10-28

## 2020-10-28 DIAGNOSIS — R00.2 PALPITATIONS: ICD-10-CM

## 2020-11-02 ENCOUNTER — HOSPITAL ENCOUNTER (OUTPATIENT)
Dept: NEUROLOGY | Facility: CLINIC | Age: 80
Setting detail: THERAPIES SERIES
Discharge: STILL ADMITTED OR ADMINISTRATIVE | End: 2020-11-02
Attending: FAMILY MEDICINE

## 2020-11-02 DIAGNOSIS — H83.3X3 SOUND SENSITIVITY IN BOTH EARS: ICD-10-CM

## 2020-11-02 DIAGNOSIS — F06.4 ANXIETY DISORDER DUE TO MEDICAL CONDITION: ICD-10-CM

## 2020-11-02 DIAGNOSIS — G47.00 INSOMNIA, UNSPECIFIED TYPE: ICD-10-CM

## 2020-11-02 DIAGNOSIS — R41.3 MEMORY DIFFICULTIES: ICD-10-CM

## 2020-11-02 DIAGNOSIS — R53.83 FATIGUE, UNSPECIFIED TYPE: ICD-10-CM

## 2020-11-02 DIAGNOSIS — G44.309 POST-CONCUSSION HEADACHE: ICD-10-CM

## 2020-11-02 DIAGNOSIS — R41.840 ATTENTION AND CONCENTRATION DEFICIT: ICD-10-CM

## 2020-11-02 DIAGNOSIS — R68.89 SENSITIVITY TO LIGHT: ICD-10-CM

## 2020-11-02 DIAGNOSIS — F07.81 POSTCONCUSSION SYNDROME: ICD-10-CM

## 2020-11-02 DIAGNOSIS — R45.4 IRRITABILITY: ICD-10-CM

## 2020-11-02 ASSESSMENT — MIFFLIN-ST. JEOR: SCORE: 1276.01

## 2020-11-03 ENCOUNTER — HOSPITAL ENCOUNTER (OUTPATIENT)
Dept: CARDIOLOGY | Facility: CLINIC | Age: 80
Discharge: HOME | End: 2020-11-03
Attending: INTERNAL MEDICINE

## 2020-11-03 DIAGNOSIS — R00.2 PALPITATIONS: ICD-10-CM

## 2020-11-05 ENCOUNTER — RECORDS - HEALTHEAST (OUTPATIENT)
Dept: ADMINISTRATIVE | Facility: OTHER | Age: 80
End: 2020-11-05

## 2020-11-09 ENCOUNTER — COMMUNICATION - HEALTHEAST (OUTPATIENT)
Dept: FAMILY MEDICINE | Facility: CLINIC | Age: 80
End: 2020-11-09

## 2020-11-11 ENCOUNTER — OFFICE VISIT - HEALTHEAST (OUTPATIENT)
Dept: FAMILY MEDICINE | Facility: CLINIC | Age: 80
End: 2020-11-11

## 2020-11-11 DIAGNOSIS — I10 ESSENTIAL HYPERTENSION WITH GOAL BLOOD PRESSURE LESS THAN 140/90: ICD-10-CM

## 2020-11-11 DIAGNOSIS — G93.89 SUPRASELLAR MASS: ICD-10-CM

## 2020-11-11 DIAGNOSIS — R42 DIZZINESS: ICD-10-CM

## 2020-11-11 DIAGNOSIS — F07.81 POSTCONCUSSION SYNDROME: ICD-10-CM

## 2020-11-11 DIAGNOSIS — E78.5 HYPERLIPIDEMIA, UNSPECIFIED HYPERLIPIDEMIA TYPE: ICD-10-CM

## 2020-11-17 ENCOUNTER — AMBULATORY - HEALTHEAST (OUTPATIENT)
Dept: LAB | Facility: CLINIC | Age: 80
End: 2020-11-17

## 2020-11-17 DIAGNOSIS — R42 DIZZINESS: ICD-10-CM

## 2020-11-17 DIAGNOSIS — E78.5 HYPERLIPIDEMIA, UNSPECIFIED HYPERLIPIDEMIA TYPE: ICD-10-CM

## 2020-11-17 LAB
ANION GAP SERPL CALCULATED.3IONS-SCNC: 7 MMOL/L (ref 5–18)
BUN SERPL-MCNC: 19 MG/DL (ref 8–28)
CALCIUM SERPL-MCNC: 9.2 MG/DL (ref 8.5–10.5)
CHLORIDE BLD-SCNC: 104 MMOL/L (ref 98–107)
CHOLEST SERPL-MCNC: 220 MG/DL
CO2 SERPL-SCNC: 31 MMOL/L (ref 22–31)
CREAT SERPL-MCNC: 0.76 MG/DL (ref 0.6–1.1)
FASTING STATUS PATIENT QL REPORTED: YES
GFR SERPL CREATININE-BSD FRML MDRD: >60 ML/MIN/1.73M2
GLUCOSE BLD-MCNC: 80 MG/DL (ref 70–125)
HDLC SERPL-MCNC: 78 MG/DL
LDLC SERPL CALC-MCNC: 121 MG/DL
MAGNESIUM SERPL-MCNC: 2.3 MG/DL (ref 1.8–2.6)
POTASSIUM BLD-SCNC: 4.5 MMOL/L (ref 3.5–5)
SODIUM SERPL-SCNC: 142 MMOL/L (ref 136–145)
TRIGL SERPL-MCNC: 105 MG/DL

## 2020-11-18 ENCOUNTER — HOSPITAL ENCOUNTER (OUTPATIENT)
Dept: CARDIOLOGY | Facility: CLINIC | Age: 80
Discharge: HOME | End: 2020-11-18
Attending: FAMILY MEDICINE

## 2020-11-18 DIAGNOSIS — R42 DIZZINESS: ICD-10-CM

## 2020-11-21 ENCOUNTER — RECORDS - HEALTHEAST (OUTPATIENT)
Dept: ADMINISTRATIVE | Facility: OTHER | Age: 80
End: 2020-11-21

## 2020-11-30 ENCOUNTER — HOSPITAL ENCOUNTER (OUTPATIENT)
Dept: NEUROLOGY | Facility: CLINIC | Age: 80
Setting detail: THERAPIES SERIES
Discharge: STILL ADMITTED OR ADMINISTRATIVE | End: 2020-11-30
Attending: FAMILY MEDICINE

## 2020-11-30 DIAGNOSIS — G47.00 INSOMNIA, UNSPECIFIED TYPE: ICD-10-CM

## 2020-11-30 DIAGNOSIS — R45.4 IRRITABILITY: ICD-10-CM

## 2020-11-30 DIAGNOSIS — R53.83 FATIGUE, UNSPECIFIED TYPE: ICD-10-CM

## 2020-11-30 DIAGNOSIS — G44.309 POST-CONCUSSION HEADACHE: ICD-10-CM

## 2020-11-30 DIAGNOSIS — R41.840 ATTENTION AND CONCENTRATION DEFICIT: ICD-10-CM

## 2020-11-30 DIAGNOSIS — R68.89 SENSITIVITY TO LIGHT: ICD-10-CM

## 2020-11-30 DIAGNOSIS — R41.3 MEMORY DIFFICULTIES: ICD-10-CM

## 2020-11-30 DIAGNOSIS — F06.4 ANXIETY DISORDER DUE TO MEDICAL CONDITION: ICD-10-CM

## 2020-11-30 DIAGNOSIS — R42 DIZZINESS: ICD-10-CM

## 2020-11-30 DIAGNOSIS — H83.3X3 SOUND SENSITIVITY IN BOTH EARS: ICD-10-CM

## 2020-11-30 DIAGNOSIS — F07.81 POSTCONCUSSION SYNDROME: ICD-10-CM

## 2020-11-30 ASSESSMENT — MIFFLIN-ST. JEOR: SCORE: 1262.4

## 2020-12-04 ENCOUNTER — COMMUNICATION - HEALTHEAST (OUTPATIENT)
Dept: FAMILY MEDICINE | Facility: CLINIC | Age: 80
End: 2020-12-04

## 2020-12-04 ENCOUNTER — OFFICE VISIT - HEALTHEAST (OUTPATIENT)
Dept: FAMILY MEDICINE | Facility: CLINIC | Age: 80
End: 2020-12-04

## 2020-12-04 DIAGNOSIS — E78.5 HYPERLIPIDEMIA, UNSPECIFIED HYPERLIPIDEMIA TYPE: ICD-10-CM

## 2020-12-04 DIAGNOSIS — R42 LIGHTHEADEDNESS: ICD-10-CM

## 2020-12-04 DIAGNOSIS — I10 ESSENTIAL HYPERTENSION WITH GOAL BLOOD PRESSURE LESS THAN 140/90: ICD-10-CM

## 2020-12-04 DIAGNOSIS — R00.2 PALPITATIONS: ICD-10-CM

## 2020-12-04 DIAGNOSIS — E03.9 HYPOTHYROIDISM, UNSPECIFIED TYPE: ICD-10-CM

## 2020-12-04 DIAGNOSIS — R06.02 SHORTNESS OF BREATH: ICD-10-CM

## 2020-12-15 ENCOUNTER — COMMUNICATION - HEALTHEAST (OUTPATIENT)
Dept: FAMILY MEDICINE | Facility: CLINIC | Age: 80
End: 2020-12-15

## 2020-12-15 DIAGNOSIS — R23.2 HOT FLASHES: ICD-10-CM

## 2020-12-19 ENCOUNTER — AMBULATORY - HEALTHEAST (OUTPATIENT)
Dept: FAMILY MEDICINE | Facility: CLINIC | Age: 80
End: 2020-12-19

## 2020-12-19 DIAGNOSIS — I47.10 SUPRAVENTRICULAR TACHYCARDIA (H): ICD-10-CM

## 2020-12-28 ENCOUNTER — COMMUNICATION - HEALTHEAST (OUTPATIENT)
Dept: FAMILY MEDICINE | Facility: CLINIC | Age: 80
End: 2020-12-28

## 2020-12-28 DIAGNOSIS — R23.2 HOT FLASHES: ICD-10-CM

## 2021-04-13 ENCOUNTER — OFFICE VISIT - HEALTHEAST (OUTPATIENT)
Dept: FAMILY MEDICINE | Facility: CLINIC | Age: 81
End: 2021-04-13

## 2021-04-13 DIAGNOSIS — M54.16 LEFT LUMBAR RADICULOPATHY: ICD-10-CM

## 2021-04-13 DIAGNOSIS — M70.72 BURSITIS OF LEFT HIP, UNSPECIFIED BURSA: ICD-10-CM

## 2021-04-16 ENCOUNTER — HOSPITAL ENCOUNTER (OUTPATIENT)
Dept: PHYSICAL MEDICINE AND REHAB | Facility: CLINIC | Age: 81
Discharge: HOME | End: 2021-04-16
Attending: FAMILY MEDICINE

## 2021-04-16 DIAGNOSIS — M54.42 ACUTE LEFT-SIDED LOW BACK PAIN WITH LEFT-SIDED SCIATICA: ICD-10-CM

## 2021-04-16 DIAGNOSIS — M16.12 PRIMARY OSTEOARTHRITIS OF LEFT HIP: ICD-10-CM

## 2021-04-16 DIAGNOSIS — Z98.1 HISTORY OF LUMBAR FUSION: ICD-10-CM

## 2021-04-16 DIAGNOSIS — R29.898 LEFT LEG WEAKNESS: ICD-10-CM

## 2021-04-16 ASSESSMENT — MIFFLIN-ST. JEOR: SCORE: 1285.08

## 2021-04-21 ENCOUNTER — COMMUNICATION - HEALTHEAST (OUTPATIENT)
Dept: PHYSICAL MEDICINE AND REHAB | Facility: CLINIC | Age: 81
End: 2021-04-21

## 2021-04-24 ENCOUNTER — HOSPITAL ENCOUNTER (OUTPATIENT)
Dept: MRI IMAGING | Facility: CLINIC | Age: 81
Discharge: HOME | End: 2021-04-24
Attending: PHYSICAL MEDICINE & REHABILITATION
Payer: MEDICARE

## 2021-04-24 ENCOUNTER — HOSPITAL ENCOUNTER (OUTPATIENT)
Dept: RADIOLOGY | Facility: CLINIC | Age: 81
Discharge: HOME | End: 2021-04-24
Attending: PHYSICAL MEDICINE & REHABILITATION
Payer: MEDICARE

## 2021-04-24 DIAGNOSIS — M54.42 ACUTE LEFT-SIDED LOW BACK PAIN WITH LEFT-SIDED SCIATICA: ICD-10-CM

## 2021-04-24 DIAGNOSIS — R29.898 LEFT LEG WEAKNESS: ICD-10-CM

## 2021-04-24 DIAGNOSIS — M16.12 PRIMARY OSTEOARTHRITIS OF LEFT HIP: ICD-10-CM

## 2021-04-24 DIAGNOSIS — Z98.1 HISTORY OF LUMBAR FUSION: ICD-10-CM

## 2021-04-24 LAB
CREAT BLD-MCNC: 0.9 MG/DL (ref 0.6–1.1)
GFR SERPL CREATININE-BSD FRML MDRD: 60 ML/MIN/1.73M2

## 2021-04-29 ENCOUNTER — OFFICE VISIT - HEALTHEAST (OUTPATIENT)
Dept: CARDIOLOGY | Facility: CLINIC | Age: 81
End: 2021-04-29

## 2021-04-29 DIAGNOSIS — R00.2 PALPITATIONS: ICD-10-CM

## 2021-04-29 DIAGNOSIS — R06.00 DYSPNEA: ICD-10-CM

## 2021-04-29 DIAGNOSIS — I47.10 SVT (SUPRAVENTRICULAR TACHYCARDIA) (H): ICD-10-CM

## 2021-04-29 DIAGNOSIS — I47.19 ATRIAL TACHYCARDIA (H): ICD-10-CM

## 2021-04-29 DIAGNOSIS — I10 ESSENTIAL HYPERTENSION WITH GOAL BLOOD PRESSURE LESS THAN 140/90: ICD-10-CM

## 2021-04-29 RX ORDER — METOPROLOL TARTRATE 50 MG
TABLET ORAL
Qty: 225 TABLET | Refills: 3 | Status: SHIPPED | OUTPATIENT
Start: 2021-04-29 | End: 2022-05-11

## 2021-04-29 ASSESSMENT — MIFFLIN-ST. JEOR: SCORE: 1294.16

## 2021-05-04 ENCOUNTER — HOSPITAL ENCOUNTER (OUTPATIENT)
Dept: PHYSICAL MEDICINE AND REHAB | Facility: CLINIC | Age: 81
Discharge: HOME | End: 2021-05-04
Attending: PHYSICAL MEDICINE & REHABILITATION
Payer: MEDICARE

## 2021-05-04 DIAGNOSIS — M79.18 LEFT BUTTOCK PAIN: ICD-10-CM

## 2021-05-04 DIAGNOSIS — G89.29 CHRONIC LEFT SI JOINT PAIN: ICD-10-CM

## 2021-05-04 DIAGNOSIS — Z98.1 HISTORY OF LUMBAR FUSION: ICD-10-CM

## 2021-05-04 DIAGNOSIS — M16.12 PRIMARY OSTEOARTHRITIS OF LEFT HIP: ICD-10-CM

## 2021-05-04 DIAGNOSIS — M53.3 CHRONIC LEFT SI JOINT PAIN: ICD-10-CM

## 2021-05-05 ENCOUNTER — AMBULATORY - HEALTHEAST (OUTPATIENT)
Dept: PHYSICAL MEDICINE AND REHAB | Facility: CLINIC | Age: 81
End: 2021-05-05

## 2021-05-05 ENCOUNTER — COMMUNICATION - HEALTHEAST (OUTPATIENT)
Dept: PHYSICAL MEDICINE AND REHAB | Facility: CLINIC | Age: 81
End: 2021-05-05

## 2021-05-05 DIAGNOSIS — M25.552 HIP PAIN, LEFT: ICD-10-CM

## 2021-05-07 ENCOUNTER — COMMUNICATION - HEALTHEAST (OUTPATIENT)
Dept: PHYSICAL MEDICINE AND REHAB | Facility: CLINIC | Age: 81
End: 2021-05-07

## 2021-05-13 ENCOUNTER — HOSPITAL ENCOUNTER (OUTPATIENT)
Dept: PHYSICAL MEDICINE AND REHAB | Facility: CLINIC | Age: 81
Discharge: HOME | End: 2021-05-13
Attending: PHYSICAL MEDICINE & REHABILITATION
Payer: MEDICARE

## 2021-05-13 DIAGNOSIS — M25.552 HIP PAIN, LEFT: ICD-10-CM

## 2021-05-21 ENCOUNTER — RECORDS - HEALTHEAST (OUTPATIENT)
Dept: ADMINISTRATIVE | Facility: OTHER | Age: 81
End: 2021-05-21

## 2021-05-26 ENCOUNTER — HOSPITAL ENCOUNTER (OUTPATIENT)
Dept: CARDIOLOGY | Facility: CLINIC | Age: 81
Discharge: HOME | End: 2021-05-26
Attending: INTERNAL MEDICINE
Payer: MEDICARE

## 2021-05-26 DIAGNOSIS — I47.10 SVT (SUPRAVENTRICULAR TACHYCARDIA) (H): ICD-10-CM

## 2021-05-26 DIAGNOSIS — R06.00 DYSPNEA: ICD-10-CM

## 2021-05-26 DIAGNOSIS — R00.2 PALPITATIONS: ICD-10-CM

## 2021-05-26 DIAGNOSIS — I10 ESSENTIAL HYPERTENSION WITH GOAL BLOOD PRESSURE LESS THAN 140/90: ICD-10-CM

## 2021-05-26 LAB
AORTIC ROOT: 3 CM
AORTIC VALVE MEAN VELOCITY: 136 CM/S
ASCENDING AORTA: 3 CM
AV DIMENSIONLESS INDEX VTI: 0.7
AV MEAN GRADIENT: 8 MMHG
AV PEAK GRADIENT: 12.1 MMHG
AV VALVE AREA: 2 CM2
AV VELOCITY RATIO: 0.7
BSA FOR ECHO PROCEDURE: 1.95 M2
CV BLOOD PRESSURE: NORMAL MMHG
CV ECHO HEIGHT: 62 IN
CV ECHO WEIGHT: 192 LBS
DOP CALC AO PEAK VEL: 174 CM/S
DOP CALC AO VTI: 43.4 CM
DOP CALC LVOT AREA: 2.83 CM2
DOP CALC LVOT DIAMETER: 1.9 CM
DOP CALC LVOT PEAK VEL: 123 CM/S
DOP CALC LVOT STROKE VOLUME: 87.6 CM3
DOP CALCLVOT PEAK VEL VTI: 30.9 CM
EJECTION FRACTION: 67 % (ref 55–75)
FRACTIONAL SHORTENING: 32.1 % (ref 28–44)
INTERVENTRICULAR SEPTUM IN END DIASTOLE: 0.81 CM (ref 0.6–0.9)
IVS/PW RATIO: 0.9
LA AREA 1: 17 CM2
LA AREA 2: 18 CM2
LEFT ATRIUM LENGTH: 4.8 CM
LEFT ATRIUM SIZE: 3.4 CM
LEFT ATRIUM VOLUME INDEX: 27.8 ML/M2
LEFT ATRIUM VOLUME: 54.2 ML
LEFT VENTRICLE CARDIAC INDEX: 3.1 L/MIN/M2
LEFT VENTRICLE CARDIAC OUTPUT: 6 L/MIN
LEFT VENTRICLE DIASTOLIC VOLUME INDEX: 36.8 CM3/M2 (ref 29–61)
LEFT VENTRICLE DIASTOLIC VOLUME: 71.8 CM3 (ref 46–106)
LEFT VENTRICLE HEART RATE: 69 BPM
LEFT VENTRICLE MASS INDEX: 60.6 G/M2
LEFT VENTRICLE SYSTOLIC VOLUME INDEX: 12 CM3/M2 (ref 8–24)
LEFT VENTRICLE SYSTOLIC VOLUME: 23.4 CM3 (ref 14–42)
LEFT VENTRICULAR INTERNAL DIMENSION IN DIASTOLE: 4.39 CM (ref 3.8–5.2)
LEFT VENTRICULAR INTERNAL DIMENSION IN SYSTOLE: 2.98 CM (ref 2.2–3.5)
LEFT VENTRICULAR MASS: 118.1 G
LEFT VENTRICULAR OUTFLOW TRACT MEAN GRADIENT: 3 MMHG
LEFT VENTRICULAR OUTFLOW TRACT MEAN VELOCITY: 79.1 CM/S
LEFT VENTRICULAR OUTFLOW TRACT PEAK GRADIENT: 6 MMHG
LEFT VENTRICULAR POSTERIOR WALL IN END DIASTOLE: 0.89 CM (ref 0.6–0.9)
LV STROKE VOLUME INDEX: 44.9 ML/M2
MITRAL VALVE DECELERATION SLOPE: 3440 MM/S2
MITRAL VALVE E/A RATIO: 0.8
MITRAL VALVE PRESSURE HALF-TIME: 78 MS
MV AVERAGE E/E' RATIO: 12.3 CM/S
MV DECELERATION TIME: 268 MS
MV E'TISSUE VEL-LAT: 7.83 CM/S
MV E'TISSUE VEL-MED: 7.16 CM/S
MV LATERAL E/E' RATIO: 11.8
MV MEDIAL E/E' RATIO: 12.9
MV PEAK A VELOCITY: 115 CM/S
MV PEAK E VELOCITY: 92.2 CM/S
MV VALVE AREA PRESSURE 1/2 METHOD: 2.8 CM2
NUC REST DIASTOLIC VOLUME INDEX: 3072 LBS
NUC REST SYSTOLIC VOLUME INDEX: 62 IN
PR MAX PG: 4 MMHG
PR PEAK VELOCITY: 100 CM/S
PV ACCELERATION TIME: 201 MS
TRICUSPID REGURGITATION PEAK PRESSURE GRADIENT: 41 MMHG
TRICUSPID VALVE ANULAR PLANE SYSTOLIC EXCURSION: 2.3 CM
TRICUSPID VALVE PEAK REGURGITANT VELOCITY: 320 CM/S

## 2021-05-26 ASSESSMENT — PATIENT HEALTH QUESTIONNAIRE - PHQ9
SUM OF ALL RESPONSES TO PHQ QUESTIONS 1-9: 5
SUM OF ALL RESPONSES TO PHQ QUESTIONS 1-9: 9

## 2021-05-26 ASSESSMENT — MIFFLIN-ST. JEOR: SCORE: 1294.16

## 2021-05-27 NOTE — TELEPHONE ENCOUNTER
Pt calling.  She is going to [place] tomorrow and requesting Rx sent to [facility] in [place] today.  Requesting a refill for omeprazole and levothyroxine.  Pt states Rx for omeprazole was sent to [facility] in [place] - she is in [place] and never picked up prescription.    Writer will send Rx for omeprazole to [facility] in [place].  We do need PCP signature for levothyroxine d/t it being signed by a historical Provider.  Sunita Stewart, RN, [facility] RN Triage/Med Refills

## 2021-05-27 NOTE — TELEPHONE ENCOUNTER
RN cannot approve Refill Request    RN can NOT refill this medication patient is requesting prescription to be sent to an out-of-state pharmacy     Lg Peña, [facility] Triage/Med Refill 4/4/2019    Requested Prescriptions   Pending Prescriptions Disp Refills     omeprazole (PRILOSEC) 20 MG capsule 180 capsule 2     Sig: Take 1 capsule (20 mg total) by mouth 2 (two) times a day.    GI Medications Refill Protocol Passed - 4/3/2019  2:39 PM       Passed - PCP or prescribing provider visit in last 12 or next 3 months.    Last office visit with prescriber/PCP: 1/4/2019 True Pugh MD OR same dept: 1/4/2019 True Pugh MD OR same specialty: 1/4/2019 True Pugh MD  Last physical: 10/2/2018 Last MTM visit: Visit date not found   Next visit within 3 mo: Visit date not found  Next physical within 3 mo: Visit date not found  Prescriber OR PCP: True Pugh MD  Last diagnosis associated with med order: 1. Esophageal reflux  - omeprazole (PRILOSEC) 20 MG capsule; Take 1 capsule (20 mg total) by mouth 2 (two) times a day.  Dispense: 180 capsule; Refill: 2    If protocol passes may refill for 12 months if within 3 months of last provider visit (or a total of 15 months).

## 2021-05-27 NOTE — TELEPHONE ENCOUNTER
RN cannot approve Refill Request    RN can NOT refill this medication historical medication requested.       Kandis Conway, [facility] Triage/Med Refill 4/16/2019    Requested Prescriptions   Pending Prescriptions Disp Refills     levothyroxine (SYNTHROID, LEVOTHROID) 50 MCG tablet 90 tablet 0     Sig: Take 1 tablet (50 mcg total) by mouth Daily at 6:00 am.       Thyroid Hormones Protocol Passed - 4/16/2019  2:55 PM        Passed - Provider visit in past 12 months or next 3 months     Last office visit with prescriber/PCP: Visit date not found OR same dept: Visit date not found OR same specialty: Visit date not found  Last physical: Visit date not found Last MTM visit: Visit date not found   Next visit within 3 mo: Visit date not found  Next physical within 3 mo: Visit date not found  Prescriber OR PCP: Sunita Stewart RN  Last diagnosis associated with med order: 1. Esophageal reflux  - omeprazole (PRILOSEC) 20 MG capsule; Take 1 capsule (20 mg total) by mouth 2 (two) times a day.  Dispense: 180 capsule; Refill: 2    If protocol passes may refill for 12 months if within 3 months of last provider visit (or a total of 15 months).             Passed - TSH on file in past 12 months for patient age 12 & older     TSH   Date Value Ref Range Status   09/20/2018 1.93 0.30 - 5.00 uIU/mL Final                 Signed Prescriptions Disp Refills    omeprazole (PRILOSEC) 20 MG capsule 180 capsule 2     Sig: Take 1 capsule (20 mg total) by mouth 2 (two) times a day.       There is no refill protocol information for this order

## 2021-05-27 NOTE — TELEPHONE ENCOUNTER
Medication Request  Medication name: Vesicare 5 mg - 1 tab by mouth daily at bedtime    Medication name: levothyroxine 50 mcg tab - take 1 tab by mouth daily in the morning    Pharmacy Name and Location: [facility]  Reason for request: Refill. Patient is transferring care to True Pguh MD and needs these refilled.  When did you use medication last?:  Today  Patient offered appointment:  n/a  Okay to leave a detailed message: yes  [phone]

## 2021-05-28 ASSESSMENT — ANXIETY QUESTIONNAIRES
GAD7 TOTAL SCORE: 6
GAD7 TOTAL SCORE: 1

## 2021-05-28 NOTE — PROGRESS NOTES
"[facility] Daily Progress     Patient Name: Dyana Nicole  Date: 5/7/2019  Visit #: 3/12  Authorization dates:  4/29/19-7/28/19  Referral Diagnosis: HA (headache)  Referring provider: Anderson Sutherland MD  Visit Diagnosis:     ICD-10-CM    1. Cervicalgia M54.2    2. Cervicogenic headache R51        No data recorded     Assessment:     Response to Intervention:  Tolerated treatment well with report of decreased pain post manual therapy.    Symptoms are consistent with:  Headache.  Patient is appropriate to continue with skilled physical therapy intervention, as indicated by initial plan of care.    Goal Status:  Pt. will demonstrate/verbalize independence in self-management of condition in : 12 weeks  Pt. will be independent with home exercise program in : 12 weeks  Pt. will report decreased intensity, frequency of : Pain;Headache;in 6 weeks  Pt. will have improved quality of sleep: with less pain;waking less times/night;in 6 weeks  Pt. will improve posture : and demonstrate posture with minimal to no cuing;in standing;in sitting;in walking;in 6 weeks  Patient Turn Head: for driving;for conversation;with less pain;with less difficulty;in 6 weeks  Patient will look up / down: with less pain;with less difficulty;in 6 weeks  Patient will : yawn;with no pain;in 6 weeks    No data recorded  Other functional progress:           Plan / Patient Education:     Continue with initial plan of care.  Progress with home program as tolerated.       Subjective:     Pain Rating:  Resting 4  Activity:  5    Response to last treatment: sore for 1 day as expected.  HEP- Frequency: 1x/day, Questions or difficulties:  \"my life is too busy right now\".    Patient reports:      She is having fewer HA.    The HA are less severe.      Objective:             Treatment Today   Manual Therapy  Manual therapy:  Neck    MFR layers 1-3 bilateral:  Suboccipitals, cervical extensors, cervical paraspinal rotators, scalenes.    Manual " therapy:  Cervical longitudinal mobilization    Rate/grade Target  Direction  Relative movement Location in range Patient position   2 Cervical vertebrae Superior Distraction of facet joints Head in neutral Supine          Exercises:  Exercise #1:  cervical retraction  Comment #1: 10  Exercise #2: scapular depression and retraction  Comment #2: 10  Exercise #3: cervical sidebending  Comment #3: 10  Exercise #4: cervical rotation  Comment #4: 10   Exercise #5: patient instruction on effective use of hot pack/cold pack            TREATMENT MINUTES COMMENTS   Evaluation     Self-care/ Home management     Manual therapy 25 See above.   Neuromuscular Re-education     Therapeutic Activity     Therapeutic Exercises  See exercise flow-sheet for details.    Gait training     Modality__________________                Total 25    Blank areas are intentional and mean the treatment did not include these items.       Lg Jarrett, PT  5/7/2019

## 2021-05-28 NOTE — TELEPHONE ENCOUNTER
Dr. Noonan    Please fax this patient's CPAP Rx to the DME below:  DME: [facility]    Thank you.

## 2021-05-28 NOTE — TELEPHONE ENCOUNTER
Refill Approved    Rx renewed per Medication Renewal Policy. Medication was last renewed on 4/7/18.    Kandis Conway, [facility] Triage/Med Refill 5/16/2019     Requested Prescriptions   Pending Prescriptions Disp Refills     fluticasone propionate (FLONASE) 50 mcg/actuation nasal spray [Pharmacy Med Name: FLUTICASONE 50 MCG SPY 16 GM] 16 g 11     Sig: INSTILL 2 SPRAYS IN EACH NOSTRIL DAILY AS NEEDED       Nasal Steroid Refill Protocol Passed - 5/16/2019  9:44 AM        Passed - Patient has had office visit/physical in last 2 years     Last office visit with prescriber/PCP: 5/3/2019 OR same dept: 5/3/2019 True Pugh MD OR same specialty: 5/3/2019 True Pugh MD Last physical: 10/2/2018 Last MTM visit: Visit date not found    Next appt within 3 mo: Visit date not found  Next physical within 3 mo: Visit date not found  Prescriber OR PCP: True Pugh MD  Last diagnosis associated with med order: 1. AR (allergic rhinitis)  - fluticasone propionate (FLONASE) 50 mcg/actuation nasal spray [Pharmacy Med Name: FLUTICASONE 50 MCG SPY 16 GM]; INSTILL 2 SPRAYS IN EACH NOSTRIL DAILY AS NEEDED  Dispense: 16 g; Refill: 11    2. Moderate episode of recurrent major depressive disorder (H)  - sertraline (ZOLOFT) 100 MG tablet [Pharmacy Med Name: SERTRALINE HCL 100MG TABS]; TAKE ONE TABLET BY MOUTH EVERY DAY  Dispense: 90 tablet; Refill: 3     If protocol passes may refill for 12 months if within 3 months of last provider visit (or a total of 15 months).              sertraline (ZOLOFT) 100 MG tablet [Pharmacy Med Name: SERTRALINE HCL 100MG TABS] 90 tablet 3     Sig: TAKE ONE TABLET BY MOUTH EVERY DAY       [facility] Refill Protocol  Failed - 5/16/2019  9:44 AM        Failed - Age 21 and younger route to prescribing provider     Last office visit with prescriber/PCP: 5/3/2019 True Pugh MD OR same dept: 5/3/2019 True Pugh MD OR same specialty: 5/3/2019 True Pugh MD  Last physical: 10/2/2018  Last MTM visit: Visit date not found   Next visit within 3 mo: Visit date not found  Next physical within 3 mo: Visit date not found  Prescriber OR PCP: True Pugh MD  Last diagnosis associated with med order: 1. AR (allergic rhinitis)  - fluticasone propionate (FLONASE) 50 mcg/actuation nasal spray [Pharmacy Med Name: FLUTICASONE 50 MCG SPY 16 GM]; INSTILL 2 SPRAYS IN EACH NOSTRIL DAILY AS NEEDED  Dispense: 16 g; Refill: 11    2. Moderate episode of recurrent major depressive disorder (H)  - sertraline (ZOLOFT) 100 MG tablet [Pharmacy Med Name: SERTRALINE HCL 100MG TABS]; TAKE ONE TABLET BY MOUTH EVERY DAY  Dispense: 90 tablet; Refill: 3    If protocol passes may refill for 12 months if within 3 months of last provider visit (or a total of 15 months).             Passed - PCP or prescribing provider visit in last year     Last office visit with prescriber/PCP: 5/3/2019 True Pugh MD OR same dept: 5/3/2019 True Pugh MD OR same specialty: 5/3/2019 True Pugh MD  Last physical: 10/2/2018 Last MTM visit: Visit date not found   Next visit within 3 mo: Visit date not found  Next physical within 3 mo: Visit date not found  Prescriber OR PCP: True Pugh MD  Last diagnosis associated with med order: 1. AR (allergic rhinitis)  - fluticasone propionate (FLONASE) 50 mcg/actuation nasal spray [Pharmacy Med Name: FLUTICASONE 50 MCG SPY 16 GM]; INSTILL 2 SPRAYS IN EACH NOSTRIL DAILY AS NEEDED  Dispense: 16 g; Refill: 11    2. Moderate episode of recurrent major depressive disorder (H)  - sertraline (ZOLOFT) 100 MG tablet [Pharmacy Med Name: SERTRALINE HCL 100MG TABS]; TAKE ONE TABLET BY MOUTH EVERY DAY  Dispense: 90 tablet; Refill: 3    If protocol passes may refill for 12 months if within 3 months of last provider visit (or a total of 15 months).

## 2021-05-28 NOTE — PROGRESS NOTES
Assessment/Plan:    1. Memory loss  Poor memory states worse since hospitalization December for rectal bleeding with noted anemia.  Question anoxic injury versus other.  Had been noted to have Klebsiella pneumoniae UTI at that time treated with levofloxacin.  Repeat urinalysis today.  Check vitamin B12, CBC, comprehensive metabolic panel and TSH.  Referred to neurology.  Recent MRI through [facility] April 2019 without obvious concern for CVA.  This was a follow-up for stable suprasellar lesion question Rathke's cleft cyst versus craniopharyngioma etc.  - Comprehensive Metabolic Panel  - Vitamin B12  - Syphilis Screen, Todd  - Ambulatory referral to Neurology    2. Lower GI bleed  History of lower GI bleed.  States has had post hospitalization follow-up labs in [place] with hemoglobin improvement from 8.1 up to 11.0 however has not had a check since that time.  Repeat CBC today.    3. Anemia, unspecified type  As above, no episode of recurrent rectal bleed.  - HM2(CBC w/o Differential)    4. History of cystitis  History of cystitis.  Urinalysis to be obtained and repeated to ensure no evidence for recurrent UTI with history of Klebsiella pneumonia UTI December 2018 treated with levofloxacin.  - Urinalysis-UC if Indicated    5. Hypothyroidism, unspecified type  Check TSH and ensure stable replacement with levofloxacin 50 mcg daily.  - Thyroid Stimulating Hormone (TSH)          Subjective:    Dyana Nicole is seen today for evaluation of poor memory.  Worse since December.  Hospitalization as noted below following rectal bleeding December 26, 2018.  Hemoglobin cristel 7.8 with improvement to 8.4 at time of discharge.  Hemoglobin is 8.1 at hospital follow-up appointment on January 4, 2019.  Has had a checked twice while in [place] with most recent hemoglobin 11.0 apparently.  No dysuria urgency or frequency.  Did have UTI with Klebsiella pneumonia noted.  Hypothyroidism continue levothyroxine  50 mcg daily replacement.  No fevers or chills.  Somewhat poor energy.  History of hyperlipidemia.  Prior A1c of 5.4% September 20, 2018.  YEE 7 questionnaire 1 out of 21 with PHQ 9 questionnaire 9 out of 27 today.  Past medical social family history reviewed and updated as noted below.      Reviewed office note from 1/4/19:  Dyana Nicole is seen today for hospital follow-up.  Transitional care management.  Medication reconciliation as noted below.  Noted rectal bleeding December 26, 2018.  Painless.  No history of similar concern.  Had 2 significant bloody bowel movements with presyncopal type symptoms associated with this.  Presented to [facility] emergency department December 26, 2018 for further evaluation.  Lower GI bleed.  Question of diverticular etiology.  Subsequently transferred to [facility] December 27, 2018 through December 30, 2018 where she completed tagged red blood cell study..  Described vasovagal syncopal episode while in bathroom at [facility] before transfer to [facility].  Prior hemoglobin closer to 12 with hemoglobin cristel 7.8 on December 29, 2018 then 8.4 at time of discharge December 30, 2018.  take red blood cell study did not show any evidence for active bleeding.  Patient had recent colonoscopy December 18, 2018 with described small polyp biopsy x2 without evidence for ongoing microscopic colitis.  Since hospital discharge has not had any recurrent bright red blood per rectum.  States bowel movement this morning was normal and color and consistency.  No longer on budesonide since hospitalization.  Celebrex was held initially due to gastrointestinal bleed.  Has been using Tylenol arthritis recently.  This seems to be helping manage pain involving knees, hands and hip.  Does not feel that she will need to restart Celebrex.  Was noted also to have urinary tract infection with Klebsiella pneumoniae.  Treated with Levaquin.  Antibiotic sensitivities confirm appropriate  antibiotic choice.  No dysuria urgency or frequency.  No back pain.  No abdominal distention.  Due to desaturations during hospitalization was recommended to have follow-up sleep study.  History of obstructive sleep apnea with prior CPAP but due to mild symptoms discontinued subsequently.  Did have consult yesterday and has sleep study scheduled for this evening.  Not using iron supplement at this time.       - Ray  3 daughters - Genesis ( at [facility])  Smoke - quit in 60s after 1 year  EtOH: sober x 33 years  Surgeries: gallbladder, appy, hysterectomy and ? 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; left CTS release; bladder lift; right TKA; left rotator cuff  Hospitalizations:      Past Surgical History:   Procedure Laterality Date     Bladder lift       CARPAL TUNNEL RELEASE       CATARACT EXTRACTION, BILATERAL       CHOLECYSTECTOMY       HYSTERECTOMY       JOINT REPLACEMENT Bilateral     knees     MOHS SURGERY      Nose     OOPHORECTOMY Left     1 removed, 1 remains     GA ARTHROPLASTY TIBIAL PLATEAU      Description: Knee Replacement;  Recorded: 05/16/2013;     GA INJECT NERV BLCK,OTHR PERIPH NERV      Description: Peripheral Nerve Block Wrist Median Right;  Recorded: 05/16/2013;     REPLACEMENT TOTAL KNEE      L     REPLACEMENT TOTAL KNEE BILATERAL       ROTATOR CUFF REPAIR       TRUNK SKIN LESION EXCISIONAL BIOPSY Left 1/5/2018    Procedure: LEFT BUTTOCK EXPLORATION, EXCISION BUTTOCK MASS;  Surgeon: Lg Jameson MD;  Location: [facility] Main OR;  Service:         Family History   Problem Relation Age of Onset     Aneurysm Father         AAA     Cancer Mother         pancrease        Past Medical History:   Diagnosis Date     Anemia     Created by Conversion      Arrhythmia      Cervical Spondylosis     Created by Conversion      Depression      Disease of thyroid gland     hypo     Fibromyalgia     Created by  Conversion      GERD (gastroesophageal reflux disease)      High cholesterol      History of transfusion      Hyperlipidemia     Created by Conversion      Hypertension     Created by Conversion      Near syncope      Peripheral Neuropathy     Created by Conversion      Skin cancer, basal cell      Sleep apnea     mild     Tachycardia         Social History     Tobacco Use     Smoking status: Former Smoker     Packs/day: 0.25     Years: 1.00     Pack years: 0.25     Last attempt to quit: 1961     Years since quittin.3     Smokeless tobacco: Never Used   Substance Use Topics     Alcohol use: No     Drug use: No        Current Outpatient Medications   Medication Sig Dispense Refill     aspirin 81 MG EC tablet Take 81 mg by mouth at bedtime.        biotin 5 mg Tab Take 5,000 mcg by mouth daily.       calcium carbonate-vitamin D3 (CALCIUM 600 + D,3,) 600 mg calcium- 200 unit cap Take 1 tablet by mouth 2 (two) times a day.              estrogens, conjugated, (PREMARIN) 0.3 MG tablet Take 1 tablet (0.3 mg total) by mouth daily. 90 tablet 3     fluticasone (FLONASE) 50 mcg/actuation nasal spray 2 sprays into each nostril daily as needed. 16 g 11     furosemide (LASIX) 20 MG tablet Take 1 tablet (20 mg total) by mouth daily. 90 tablet 2     KRILL OIL ORAL Take 300 mg by mouth at bedtime.              LACTOBAC CMB #3/FOS/PANTETHINE (PROBIOTIC & ACIDOPHILUS ORAL) Take 1 capsule by mouth once daily. 1.5 billion units             levothyroxine (SYNTHROID, LEVOTHROID) 50 MCG tablet Take 1 tablet (50 mcg total) by mouth Daily at 6:00 am. 90 tablet 0     metoprolol tartrate (LOPRESSOR) 50 MG tablet Take 1 tablet (50 mg total) by mouth 2 (two) times a day. 180 tablet 1     multivitamin with minerals (THERA-M) 9 mg iron-400 mcg Tab tablet Take 1 tablet by mouth every evening.       omeprazole (PRILOSEC) 20 MG capsule Take 1 capsule (20 mg total) by mouth 2 (two) times a day. 180 capsule 2     sertraline (ZOLOFT) 100 MG  tablet Take 1 tablet (100 mg total) by mouth daily. 90 tablet 3     simvastatin (ZOCOR) 20 MG tablet TAKE ONE TABLET BY MOUTH AT BEDTIME 90 tablet 3     solifenacin (VESICARE) 5 MG tablet Take 1 tablet (5 mg total) by mouth every evening. 7PM 90 tablet 0     traMADol (ULTRAM) 50 mg tablet Take 1 tablet (50 mg total) by mouth 3 (three) times a day as needed for pain. 60 tablet 0     traZODone (DESYREL) 50 MG tablet Take 25 mg by mouth at bedtime.       levothyroxine (SYNTHROID, LEVOTHROID) 50 MCG tablet Take 1 tablet (50 mcg total) by mouth Daily at 6:00 am. 90 tablet 0     No current facility-administered medications for this visit.           Objective:    Vitals:    05/03/19 1342   BP: 110/60   Pulse: 73   SpO2: 94%   Weight: 189 lb (85.7 kg)      Body mass index is 34.57 kg/m .    Alert.  No apparent distress.  Quiet affect.  Pleasant.  Forthcoming and cooperative.  No significant memory deficit demonstrated.  No other focal neurologic concern.      This note has been dictated using voice recognition software and as a result may contain minor grammatical errors and unintended word substitutions.

## 2021-05-28 NOTE — TELEPHONE ENCOUNTER
I spoke with Park today 5/3/2019. She would like to use [facility] in [place] as her DME. I let her know that I will fax it over. , can you place order?

## 2021-05-28 NOTE — PROGRESS NOTES
[facility] Certification Request    April 29, 2019      Patient: Dyana Nicole  MR Number: [id number]  YOB: 1940  Date of Visit: 4/29/2019      Dear Anderson Schaefer MD:    Thank you for this referral.   We are seeing Dyana Nicole in Physical Therapy for HA (headache).    Medicare and/or Medicaid requires physician review and approval of the treatment plan. Please review the plan of care and verify that you agree with the therapy plan of care by co-signing this note.      Plan of Care  Authorization / Certification Start Date: 04/29/19  Authorization / Certification End Date: 07/28/19  Authorization / Certification Number of Visits: 12  Communication with: Referral Source  Patient Related Instruction: Nature of Condition;Treatment plan and rationale;Basis of treatment;Self Care instruction;Body mechanics;Expected outcome;Next steps;Precautions;Posture  Times per Week: 2-1/week, to 1x/2 weeks  Number of Weeks: 12  Number of Visits: 12  Discharge Planning: to include HEP and self care.  Therapeutic Exercise: ROM;Stretching;Strengthening  Neuromuscular Reeducation: posture;core;balance/proprioception  Manual Therapy: soft tissue mobilization;myofascial release;joint mobilization  Modalities: cold pack;hot pack      Goals:  Pt. will demonstrate/verbalize independence in self-management of condition in : 12 weeks  Pt. will be independent with home exercise program in : 12 weeks  Pt. will report decreased intensity, frequency of : Pain;Headache;in 6 weeks  Pt. will have improved quality of sleep: with less pain;waking less times/night;in 6 weeks  Pt. will improve posture : and demonstrate posture with minimal to no cuing;in standing;in sitting;in walking;in 6 weeks  Patient Turn Head: for driving;for conversation;with less pain;with less difficulty;in 6 weeks  Patient will look up / down: with less pain;with less difficulty;in 6 weeks  Patient will : yawn;with no pain;in 6 weeks    No  data recorded      If you have any questions or concerns, please don't hesitate to call.    Sincerely,      Lg Jarrett, PT      Physician:    For Medicare/MA patients:      Physician recommendation:     ___ Follow therapist's recommendation        ___ Modify therapy    ________________________________________________  *Physician co-signature indicates they certify the need for these services furnished within this plan and while under their care.         [facility]   Cervical Thoracic Initial Evaluation    Patient Name: Dyana Nicole  Date of evaluation: 4/29/2019  Referral Diagnosis: HA (headache)  Referring provider: Anderson Sutherland MD  Visit Diagnosis:     ICD-10-CM    1. Cervicalgia M54.2    2. Cervicogenic headache R51        No data recorded     Assessment:      Impairments in  pain, posture, ROM, joint mobility, strength, gait/locomotion and balance  Patient's signs and symptoms are consistent with cervicogenic headache with limited cervical AROM and severe neck pain.  Patient responded well to manual therapy.  Prognosis to achieve goals is  good   Pt. is appropriate for skilled PT intervention as outlined in the Plan of Care (POC).    Goals:  Pt. will demonstrate/verbalize independence in self-management of condition in : 12 weeks  Pt. will be independent with home exercise program in : 12 weeks  Pt. will report decreased intensity, frequency of : Pain;Headache;in 6 weeks  Pt. will have improved quality of sleep: with less pain;waking less times/night;in 6 weeks  Pt. will improve posture : and demonstrate posture with minimal to no cuing;in standing;in sitting;in walking;in 6 weeks  Patient Turn Head: for driving;for conversation;with less pain;with less difficulty;in 6 weeks  Patient will look up / down: with less pain;with less difficulty;in 6 weeks  Patient will : yawn;with no pain;in 6 weeks    No data recorded    Patient's expectations/goals are realistic.    Barriers to  Learning or Achieving Goals:  No Barriers.       Plan / Patient Instructions:        Plan of Care:   Authorization / Certification Start Date: 19  Authorization / Certification End Date: 19  Authorization / Certification Number of Visits: 12  Communication with: Referral Source  Patient Related Instruction: Nature of Condition;Treatment plan and rationale;Basis of treatment;Self Care instruction;Body mechanics;Expected outcome;Next steps;Precautions;Posture  Times per Week: 2-1/week, to 1x/2 weeks  Number of Weeks: 12  Number of Visits: 12  Discharge Planning: to include HEP and self care.  Therapeutic Exercise: ROM;Stretching;Strengthening  Neuromuscular Reeducation: posture;core;balance/proprioception  Manual Therapy: soft tissue mobilization;myofascial release;joint mobilization  Modalities: cold pack;hot pack      POC and pathology of condition were reviewed with patient.  Pt. is in agreement with the Plan of Care  A Home Exercise Program (HEP) was initiated today.  Pt. was instructed in exercises by PT and patient was given a handout with detailed instructions.    Plan for next visit: review HEP, continue Manual therapy     Subjective:           History of Present Illness:    Dyana is a 79 y.o. female who presents to therapy today with complaints of neck pain and HA. Date of onset:  2018. Onset was gradual. Symptoms are constant. She denies history of similar symptoms. She describes their previous level of function as not limited.    Pain Ratin  Pain rating at best: 6  Pain rating at worst: 1  Pain description: aching, pain, sharp, shooting and soreness    Functional limitations are described as occurring with:   balance  lifting  looking up, looking down and turning head         Objective:      Note: Items left blank indicates the item was not performed or not indicated at the time of the evaluation.    Patient Outcome Measures :    No data recorded   Scores range from 0-100%, where a  score of 0% represents minimal pain and maximal function. The minmal clinically important difference is a score reduction of 10%.    Cervical Thoracic Examination  1. Cervicalgia     2. Cervicogenic headache       No data recorded   Involved side: Bilateral and left>right  Posture Observation:      Cervical:  Severe forward head  Shoulder/Thoracic complex: Severe bilateral scapular protraction     Cervical ROM:    Date: 04/29/19      *Indicate scale AROM AROM AROM   Cervical Flexion Moderate motion loss with significant.     Cervical Extension Severe motion loss with significant pain      Right Left Right Left Right Left   Cervical Sidebending 15 10       Cervical Rotation 30 20       Cervical Protraction nil     Cervical Retraction Moderate movement loss     Thoracic Flexion      Thoracic Extension      Thoracic Sidebending         Thoracic Rotation           Strength     Date: 04/29/19      Cervical Myotomes/5 Right Left Right Left Right Left   Cervical Flexion (C1-2) NT NT       Cervical Sidebending (C3) NT NT       Shoulder Elevation (C4) 5 5       Shoulder Abduction (C5) 5 5       Elbow Flexion (C6) 5 5       Elbow Extension (C7) 5 5       Wrist Flexion (C7) 5 5       Wrist Extension (C6) 5 5       Thumb abduction (C8) 5 5       Finger Abduction (T1) 5 5         Sensation   NT      Reflex Testing  NT  Cervical Dermatomes Right Left UE Reflexes Right Left   Back of the Head (C2)   Biceps (C5-6)     Supraclavicular Fossa (C3)   Brachioradialis (C5-6)     AC Joint (C4)   Triceps (C7-8)     Lateral Biceps (C5)   Emilia s test     Palmar Thumb (C6)   LE Reflexes     Palmar 3rd Finger (C7)   Patellar (L3-4)     Palmar 5th Finger (C8)   Achilles (S1-2)     Ulnar Forearm (T1)   Babinski Response         Flexibility & Palpation:  Scalenes, suboccipitals, SCM    Passive Mobility-Joint Integrity: Hypomobile.    Cervical Special Tests      Cervical Special Tests Right Left UE Nerve Mobility Right Left   Cervical  compression   Median nerve     Cervical distraction - - Ulnar nerve     Spurling s test   Radial nerve     Shoulder abduction sign - - Thoracic outlet     Deep neck flexor endurance test   Edith     Upper cervical rotation   Adson s     Sharper-Evin   Cervical rotation lateral flexion     Alar ligament test   Other:     Other:   Other:            Treatment Today     Therapeutic Exercises:  Exercise #1:  cervical retraction  Comment #1: 10  Exercise #2: scapular depression and retraction  Comment #2: 10  Exercise #3: cervical sidebending  Comment #3: 10  Exercise #4: cervical rotation  Comment #4: 10   Exercise #5: patient instruction on effective use of hot pack/cold pack       Manual therapy:  Manual therapy:  Neck    MFR layers 1-3 bilateral:  Suboccipitals, cervical extensors, cervical paraspinal rotators, scalenes.    Manual therapy:  Cervical longitudinal mobilization    Rate/grade Target  Direction  Relative movement Location in range Patient position   2 Cervical vertebrae Superior Distraction of facet joints Head in neutral Supine        Grade 1-2 cervical rotation mobilization supine bilateral           TREATMENT MINUTES COMMENTS   Evaluation 20    Self-care/ Home management     Manual therapy 30    Neuromuscular Re-education     Therapeutic Activity     Therapeutic Exercises 10    Gait training     Modality__________________                Total 60    Blank areas are intentional and mean the treatment did not include these items.     PT Evaluation Code: (Please list factors)  Patient History/Comorbidities:   Patient Active Problem List   Diagnosis     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     Insomnia     Heartburn     Arthritis     Cervical Spondylosis     Generalized Osteoarthritis Of The Hand     Lumbar Spondylosis     Trochanteric Bursitis     Lower Back Pain     Osteopenia     Microscopic colitis, unspecified microscopic colitis type     Fibromyalgia     Anemia     Hypertension      Hyperlipidemia     Peripheral Neuropathy     Walk Is Wobbly Or Unsteady (Ataxia)     Hypothyroidism     JANICE on CPAP     Arthralgia     Generalized anxiety disorder     Recurrent major depressive disorder, remission status unspecified (H)     Exertional dyspnea     Pituitary mass (H)     Essential hypertension with goal blood pressure less than 140/90     Pain in both lower extremities     Venous insufficiency of both lower extremities     Venous hypertension of both lower extremities     Fat deposits     Class 2 obesity due to excess calories in adult     Vitamin D deficiency     Fatigue due to excessive exertion     Adrenal insufficiency (H)     Leg weakness, bilateral     Lymphedema     Obesity (BMI 35.0-39.9) with comorbidity (H)     Atrial tachycardia (H)     Left ventricular outflow obstruction     Hypertensive left ventricular hypertrophy, without heart failure     Lower GI bleed     BRBPR (bright red blood per rectum)     Anemia due to blood loss, acute     Acute cystitis without hematuria     Snoring      Past Medical History:   Diagnosis Date     Anemia     Created by Conversion      Arrhythmia      Cervical Spondylosis     Created by Conversion      Depression      Disease of thyroid gland     hypo     Fibromyalgia     Created by Conversion      GERD (gastroesophageal reflux disease)      High cholesterol      History of transfusion      Hyperlipidemia     Created by Conversion      Hypertension     Created by Conversion      Near syncope      Peripheral Neuropathy     Created by Conversion      Skin cancer, basal cell      Sleep apnea     mild     Tachycardia       Examination: as above  Clinical Presentation: stable  Clinical Decision Making: low complexity        Patient History/  Comorbidities Examination  (body structures and functions, activity limitations, and/or participation restrictions) Clinical Presentation Clinical Decision Making (Complexity)   No documented Comorbidities or personal factors  1-2 Elements Stable and/or uncomplicated Low   1-2 documented comorbidities or personal factor 3 Elements Evolving clinical presentation with changing characteristics Moderate   3-4 documented comorbidities or personal factors 4 or more Unstable and unpredictable High               Lg Jarrett, PT  4/29/2019  2:18 PM

## 2021-05-28 NOTE — PROGRESS NOTES
"[facility] Daily Progress     Patient Name: Dyana Nicole  Date: 5/16/2019  Visit #: 6/12  Authorization dates:  4/29/19-7/28/19  Referral Diagnosis: HA (headache)  Referring provider: Anderson Sutherland MD  Visit Diagnosis:     ICD-10-CM    1. Cervicalgia M54.2    2. Cervicogenic headache R51        No data recorded     Assessment:     Response to Intervention:  Tolerated treatment well with report of decreased pain post manual therapy.    Symptoms are consistent with:  Headache.  Patient is appropriate to continue with skilled physical therapy intervention, as indicated by initial plan of care.    Goal Status:  Pt. will demonstrate/verbalize independence in self-management of condition in : 12 weeks  Pt. will be independent with home exercise program in : 12 weeks  Pt. will report decreased intensity, frequency of : Pain;Headache;in 6 weeks  Pt. will have improved quality of sleep: with less pain;waking less times/night;in 6 weeks  Pt. will improve posture : and demonstrate posture with minimal to no cuing;in standing;in sitting;in walking;in 6 weeks  Patient Turn Head: for driving;for conversation;with less pain;with less difficulty;in 6 weeks  Patient will look up / down: with less pain;with less difficulty;in 6 weeks  Patient will : yawn;with no pain;in 6 weeks    No data recorded  Other functional progress:           Plan / Patient Education:     Continue with initial plan of care.  Progress with home program as tolerated.       Subjective:     Pain Rating:  Resting 0-1  Activity:  2    Response to last treatment: sore for 1 day as expected.  HEP- Frequency: 1x/day, Questions or difficulties:  \"my life is too busy right now\".    Patient reports:    Increased neck pain and HA since sitting for a prolonged period of time on a stool.    Objective:             Treatment Today   Manual Therapy  Manual therapy:  Neck    MFR layers 1-3 bilateral:  Suboccipitals, cervical extensors, cervical paraspinal " landon titus.    Manual therapy:  Cervical longitudinal mobilization    Rate/grade Target  Direction  Relative movement Location in range Patient position   2 Cervical vertebrae Superior Distraction of facet joints Head in neutral Supine          Exercises:  Exercise #1:  cervical retraction  Comment #1: 10  Exercise #2: scapular depression and retraction  Comment #2: 10  Exercise #3: cervical sidebending  Comment #3: 10  Exercise #4: cervical rotation  Comment #4: 10   Exercise #5: patient instruction on effective use of hot pack/cold pack            TREATMENT MINUTES COMMENTS   Evaluation     Self-care/ Home management     Manual therapy 25 See above.   Neuromuscular Re-education     Therapeutic Activity     Therapeutic Exercises  See exercise flow-sheet for details.    Gait training     Modality__________________                Total 25    Blank areas are intentional and mean the treatment did not include these items.       Lg Jarrett, PT  5/16/2019

## 2021-05-28 NOTE — TELEPHONE ENCOUNTER
I spoke to [facility] today: I wanted to let her know that  is out of the office until Monday 4/29/19 and that we will make sure to send order at this time. She then stated that she would like to use a DME in [place]. She is going to call her insurance to find out which DME in [place] is covered under her insurance. [facility], [facility], or [facility].

## 2021-05-28 NOTE — PROGRESS NOTES
Order for Durable Medical Equipment was processed and equipment ordered.     DME provider: [facility]    Date Faxed: 5/8/2019    Ordering Provider: Markos Noonan DO    PAP Order Type: New Device Order    Fax Number: [phone]

## 2021-05-28 NOTE — PROGRESS NOTES
"[facility] Daily Progress     Patient Name: Dyana Nicole  Date: 5/14/2019  Visit #: 5/12  Authorization dates:  4/29/19-7/28/19  Referral Diagnosis: HA (headache)  Referring provider: Anderson Sutherland MD  Visit Diagnosis:     ICD-10-CM    1. Cervicalgia M54.2    2. Cervicogenic headache R51        No data recorded     Assessment:     Response to Intervention:  Tolerated treatment well with report of decreased pain post manual therapy.    Symptoms are consistent with:  Headache.  Patient is appropriate to continue with skilled physical therapy intervention, as indicated by initial plan of care.    Goal Status:  Pt. will demonstrate/verbalize independence in self-management of condition in : 12 weeks  Pt. will be independent with home exercise program in : 12 weeks  Pt. will report decreased intensity, frequency of : Pain;Headache;in 6 weeks  Pt. will have improved quality of sleep: with less pain;waking less times/night;in 6 weeks  Pt. will improve posture : and demonstrate posture with minimal to no cuing;in standing;in sitting;in walking;in 6 weeks  Patient Turn Head: for driving;for conversation;with less pain;with less difficulty;in 6 weeks  Patient will look up / down: with less pain;with less difficulty;in 6 weeks  Patient will : yawn;with no pain;in 6 weeks    No data recorded  Other functional progress:           Plan / Patient Education:     Continue with initial plan of care.  Progress with home program as tolerated.       Subjective:     Pain Rating:  Resting 0-1  Activity:  2    Response to last treatment: sore for 1 day as expected.  HEP- Frequency: 1x/day, Questions or difficulties:  \"my life is too busy right now\".    Patient reports:    She has not had any headaches since last visit.  Neck discomfort with cervical rotation    Objective:             Treatment Today   Manual Therapy  Manual therapy:  Neck    MFR layers 1-3 bilateral:  Suboccipitals, cervical extensors, cervical " paraspinal rotators, scalenes.    Manual therapy:  Cervical longitudinal mobilization    Rate/grade Target  Direction  Relative movement Location in range Patient position   2 Cervical vertebrae Superior Distraction of facet joints Head in neutral Supine          Exercises:  Exercise #1:  cervical retraction  Comment #1: 10  Exercise #2: scapular depression and retraction  Comment #2: 10  Exercise #3: cervical sidebending  Comment #3: 10  Exercise #4: cervical rotation  Comment #4: 10   Exercise #5: patient instruction on effective use of hot pack/cold pack            TREATMENT MINUTES COMMENTS   Evaluation     Self-care/ Home management     Manual therapy 25 See above.   Neuromuscular Re-education     Therapeutic Activity     Therapeutic Exercises  See exercise flow-sheet for details.    Gait training     Modality__________________                Total 25    Blank areas are intentional and mean the treatment did not include these items.       Lg Jarrett, PT  5/14/2019

## 2021-05-28 NOTE — TELEPHONE ENCOUNTER
Dr Noonan,  Patient in network for CPAP is [facility].  Patient would like call back from [facility] prior to ordering.    Thank you

## 2021-05-28 NOTE — PROGRESS NOTES
"[facility] Daily Progress     Patient Name: Dyana Nicole  Date: 5/3/2019  Visit #: 2/12  Authorization dates:  4/29/19-7/28/19  Referral Diagnosis: HA (headache)  Referring provider: Anderson Sutherland MD  Visit Diagnosis:     ICD-10-CM    1. Cervicalgia M54.2    2. Cervicogenic headache R51        No data recorded     Assessment:     Response to Intervention:  Tolerated treatment well with report of decreased pain post manual therapy.    Symptoms are consistent with:  Headache.  Patient is appropriate to continue with skilled physical therapy intervention, as indicated by initial plan of care.    Goal Status:  Pt. will demonstrate/verbalize independence in self-management of condition in : 12 weeks  Pt. will be independent with home exercise program in : 12 weeks  Pt. will report decreased intensity, frequency of : Pain;Headache;in 6 weeks  Pt. will have improved quality of sleep: with less pain;waking less times/night;in 6 weeks  Pt. will improve posture : and demonstrate posture with minimal to no cuing;in standing;in sitting;in walking;in 6 weeks  Patient Turn Head: for driving;for conversation;with less pain;with less difficulty;in 6 weeks  Patient will look up / down: with less pain;with less difficulty;in 6 weeks  Patient will : yawn;with no pain;in 6 weeks    No data recorded  Other functional progress:           Plan / Patient Education:     Continue with initial plan of care.  Progress with home program as tolerated.       Subjective:     Pain Rating:  Resting 4  Activity:  5    Response to last treatment: sore for 1 day as expected.  HEP- Frequency: 1x/day, Questions or difficulties:  \"my life is too busy right now\".    Patient reports:      GRIFFIN seemingly all the time.  Mostly in the forehead.  The ones shooting up the back of the head have been better.    Her  has macular degeneration and beginning signs of dementia and she spends much of her time taking care of her.      Objective: "             Treatment Today   Manual Therapy  Manual therapy:  Neck    MFR layers 1-3 bilateral:  Suboccipitals, cervical extensors, cervical paraspinal rotators, scalenes.    Manual therapy:  Cervical longitudinal mobilization    Rate/grade Target  Direction  Relative movement Location in range Patient position   2 Cervical vertebrae Superior Distraction of facet joints Head in neutral Supine          Exercises:  Exercise #1:  cervical retraction  Comment #1: 10  Exercise #2: scapular depression and retraction  Comment #2: 10  Exercise #3: cervical sidebending  Comment #3: 10  Exercise #4: cervical rotation  Comment #4: 10   Exercise #5: patient instruction on effective use of hot pack/cold pack            TREATMENT MINUTES COMMENTS   Evaluation     Self-care/ Home management     Manual therapy 25 See above.   Neuromuscular Re-education     Therapeutic Activity     Therapeutic Exercises  See exercise flow-sheet for details.    Gait training     Modality__________________                Total 25    Blank areas are intentional and mean the treatment did not include these items.       Lg Jarrett, PT  5/3/2019

## 2021-05-28 NOTE — PROGRESS NOTES
"[facility] Daily Progress     Patient Name: Dyana Nicole  Date: 5/9/2019  Visit #: 4/12  Authorization dates:  4/29/19-7/28/19  Referral Diagnosis: HA (headache)  Referring provider: Anderson Sutherland MD  Visit Diagnosis:     ICD-10-CM    1. Cervicalgia M54.2    2. Cervicogenic headache R51        No data recorded     Assessment:     Response to Intervention:  Tolerated treatment well with report of decreased pain post manual therapy.    Symptoms are consistent with:  Headache.  Patient is appropriate to continue with skilled physical therapy intervention, as indicated by initial plan of care.    Goal Status:  Pt. will demonstrate/verbalize independence in self-management of condition in : 12 weeks  Pt. will be independent with home exercise program in : 12 weeks  Pt. will report decreased intensity, frequency of : Pain;Headache;in 6 weeks  Pt. will have improved quality of sleep: with less pain;waking less times/night;in 6 weeks  Pt. will improve posture : and demonstrate posture with minimal to no cuing;in standing;in sitting;in walking;in 6 weeks  Patient Turn Head: for driving;for conversation;with less pain;with less difficulty;in 6 weeks  Patient will look up / down: with less pain;with less difficulty;in 6 weeks  Patient will : yawn;with no pain;in 6 weeks    No data recorded  Other functional progress:           Plan / Patient Education:     Continue with initial plan of care.  Progress with home program as tolerated.       Subjective:     Pain Rating:  Resting 4  Activity:  5    Response to last treatment: sore for 1 day as expected.  HEP- Frequency: 1x/day, Questions or difficulties:  \"my life is too busy right now\".    Patient reports:      She continues to improve.    Less neck pain and fewer HAs.      Objective:             Treatment Today   Manual Therapy  Manual therapy:  Neck    MFR layers 1-3 bilateral:  Suboccipitals, cervical extensors, cervical paraspinal rotators, " landon.    Manual therapy:  Cervical longitudinal mobilization    Rate/grade Target  Direction  Relative movement Location in range Patient position   2 Cervical vertebrae Superior Distraction of facet joints Head in neutral Supine          Exercises:  Exercise #1:  cervical retraction  Comment #1: 10  Exercise #2: scapular depression and retraction  Comment #2: 10  Exercise #3: cervical sidebending  Comment #3: 10  Exercise #4: cervical rotation  Comment #4: 10   Exercise #5: patient instruction on effective use of hot pack/cold pack            TREATMENT MINUTES COMMENTS   Evaluation     Self-care/ Home management     Manual therapy 25 See above.   Neuromuscular Re-education     Therapeutic Activity     Therapeutic Exercises  See exercise flow-sheet for details.    Gait training     Modality__________________                Total 25    Blank areas are intentional and mean the treatment did not include these items.       Lg Jarrett, PT  5/9/2019

## 2021-05-29 NOTE — TELEPHONE ENCOUNTER
Fax received from [facility], they have started the Prior Authorization Process via Cover My Meds    CoverMyMeds Key: [id number]    Medication Name:   celecoxib (CELEBREX) 200 MG capsule 90 capsule 1 6/19/2019     Sig - Route: Take 1 capsule (200 mg total) by mouth daily. - Oral    Sent to pharmacy as: celecoxib (CELEBREX) 200 MG capsule    E-Prescribing Status: Receipt confirmed by pharmacy (6/19/2019  6:05 PM CDT)          Insurance Plan:   PBM: [employer]  Patient ID: not provided on fax    Please complete the PA process

## 2021-05-29 NOTE — PROGRESS NOTES
"[facility] Daily Progress     Patient Name: Dyana Nicole  Date: 5/30/2019  Visit #: 8/12  Authorization dates:  4/29/19-7/28/19  Referral Diagnosis: HA (headache)  Referring provider: Anderson Sutherland MD  Visit Diagnosis:     ICD-10-CM    1. Cervicalgia M54.2    2. Cervicogenic headache R51        No data recorded     Assessment:     Response to Intervention:  Tolerated treatment well with report of decreased pain post manual therapy.    Symptoms are consistent with:  Headache.  Patient is appropriate to continue with skilled physical therapy intervention, as indicated by initial plan of care.    Goal Status:  Pt. will demonstrate/verbalize independence in self-management of condition in : 12 weeks  Pt. will be independent with home exercise program in : 12 weeks  Pt. will report decreased intensity, frequency of : Pain;Headache;in 6 weeks  Pt. will have improved quality of sleep: with less pain;waking less times/night;in 6 weeks  Pt. will improve posture : and demonstrate posture with minimal to no cuing;in standing;in sitting;in walking;in 6 weeks  Patient Turn Head: for driving;for conversation;with less pain;with less difficulty;in 6 weeks  Patient will look up / down: with less pain;with less difficulty;in 6 weeks  Patient will : yawn;with no pain;in 6 weeks    No data recorded  Other functional progress:           Plan / Patient Education:     Continue with initial plan of care.  Progress with home program as tolerated.       Subjective:     Pain Rating:  Resting 0-1  Activity:  2    Response to last treatment: sore for 1 day as expected.  HEP- Frequency: 1x/day, Questions or difficulties:  \"my life is too busy right now\".    Patient reports:      Less pain.    Decreased frequency HA.    Objective:             Treatment Today   Manual Therapy  Manual therapy:  Neck    MFR layers 1-3 bilateral:  Suboccipitals, cervical extensors, cervical paraspinal rotators, scalenes.    Manual therapy:  " Cervical longitudinal mobilization    Rate/grade Target  Direction  Relative movement Location in range Patient position   2 Cervical vertebrae Superior Distraction of facet joints Head in neutral Supine          Exercises:  Exercise #1:  cervical retraction  Comment #1: 10  Exercise #2: scapular depression and retraction  Comment #2: 10  Exercise #3: cervical sidebending  Comment #3: 10  Exercise #4: cervical rotation  Comment #4: 10   Exercise #5: patient instruction on effective use of hot pack/cold pack            TREATMENT MINUTES COMMENTS   Evaluation     Self-care/ Home management     Manual therapy 25 See above.   Neuromuscular Re-education     Therapeutic Activity     Therapeutic Exercises  See exercise flow-sheet for details.    Gait training     Modality__________________                Total 25    Blank areas are intentional and mean the treatment did not include these items.       Lg Jarrett, PT  5/30/2019

## 2021-05-29 NOTE — TELEPHONE ENCOUNTER
Central PA team  [phone]  Pool: SH[facility] PA MED ([id number])          PA has been initiated.       PA form completed and faxed insurance via Cover My Meds     Bond:  RIKKI GUEVARA Bond: [id number] - PA Case ID: [id number] - Rx #: [id number]       Medication:  Celecoxib 200MG capsules      Insurance:  [employer] Medicare Part D          Response will be received via fax and may take up to 5-10 business days depending on plan

## 2021-05-29 NOTE — PROGRESS NOTES
"[facility] Daily Progress     Patient Name: Dyana Nicole  Date: 5/21/2019  Visit #: 7/12  Authorization dates:  4/29/19-7/28/19  Referral Diagnosis: HA (headache)  Referring provider: Anderson Sutherland MD  Visit Diagnosis:     ICD-10-CM    1. Cervicalgia M54.2    2. Cervicogenic headache R51        No data recorded     Assessment:     Response to Intervention:  Tolerated treatment well with report of decreased pain post manual therapy.    Symptoms are consistent with:  Headache.  Patient is appropriate to continue with skilled physical therapy intervention, as indicated by initial plan of care.    Goal Status:  Pt. will demonstrate/verbalize independence in self-management of condition in : 12 weeks  Pt. will be independent with home exercise program in : 12 weeks  Pt. will report decreased intensity, frequency of : Pain;Headache;in 6 weeks  Pt. will have improved quality of sleep: with less pain;waking less times/night;in 6 weeks  Pt. will improve posture : and demonstrate posture with minimal to no cuing;in standing;in sitting;in walking;in 6 weeks  Patient Turn Head: for driving;for conversation;with less pain;with less difficulty;in 6 weeks  Patient will look up / down: with less pain;with less difficulty;in 6 weeks  Patient will : yawn;with no pain;in 6 weeks    No data recorded  Other functional progress:           Plan / Patient Education:     Continue with initial plan of care.  Progress with home program as tolerated.       Subjective:     Pain Rating:  Resting 0-1  Activity:  2    Response to last treatment: sore for 1 day as expected.  HEP- Frequency: 1x/day, Questions or difficulties:  \"my life is too busy right now\".    Patient reports:      She is trying to use her CPAP.    It feels like it plugs her ears.    The mask is also leaking and she is not sleeping well.    She is having more difficulty tipping her head to the side.    Objective:             Treatment Today   Manual " Therapy  Manual therapy:  Neck    MFR layers 1-3 bilateral:  Suboccipitals, cervical extensors, cervical paraspinal rotators, scalenes.    Manual therapy:  Cervical longitudinal mobilization    Rate/grade Target  Direction  Relative movement Location in range Patient position   2 Cervical vertebrae Superior Distraction of facet joints Head in neutral Supine          Exercises:  Exercise #1:  cervical retraction  Comment #1: 10  Exercise #2: scapular depression and retraction  Comment #2: 10  Exercise #3: cervical sidebending  Comment #3: 10  Exercise #4: cervical rotation  Comment #4: 10   Exercise #5: patient instruction on effective use of hot pack/cold pack            TREATMENT MINUTES COMMENTS   Evaluation     Self-care/ Home management     Manual therapy 30 See above.   Neuromuscular Re-education     Therapeutic Activity     Therapeutic Exercises  See exercise flow-sheet for details.    Gait training     Modality__________________                Total 30    Blank areas are intentional and mean the treatment did not include these items.       Lg Jarrett, PT  5/21/2019

## 2021-05-29 NOTE — PROGRESS NOTES
"[facility] Daily Progress     Patient Name: Dyana Nicole  Date: 6/6/2019  Visit #: 9/12  Authorization dates:  4/29/19-7/28/19  Referral Diagnosis: HA (headache)  Referring provider: Anderson Sutherland MD  Visit Diagnosis:     ICD-10-CM    1. Cervicalgia M54.2    2. Cervicogenic headache R51        No data recorded     Assessment:     Response to Intervention:  Tolerated treatment well with report of decreased pain post manual therapy.    Symptoms are consistent with:  Headache.  Patient is appropriate to continue with skilled physical therapy intervention, as indicated by initial plan of care.    Goal Status:  Pt. will demonstrate/verbalize independence in self-management of condition in : 12 weeks  Pt. will be independent with home exercise program in : 12 weeks  Pt. will report decreased intensity, frequency of : Pain;Headache;in 6 weeks  Pt. will have improved quality of sleep: with less pain;waking less times/night;in 6 weeks  Pt. will improve posture : and demonstrate posture with minimal to no cuing;in standing;in sitting;in walking;in 6 weeks  Patient Turn Head: for driving;for conversation;with less pain;with less difficulty;in 6 weeks  Patient will look up / down: with less pain;with less difficulty;in 6 weeks  Patient will : yawn;with no pain;in 6 weeks    No data recorded  Other functional progress:           Plan / Patient Education:     Continue with initial plan of care.  Progress with home program as tolerated.       Subjective:     Pain Rating:  Resting 0-1  Activity:  2    Response to last treatment: sore for 1 day as expected.  HEP- Frequency: 1x/day, Questions or difficulties:  \"my life is too busy right now\".    Patient reports:      Chair exercises.  Therapist advised not to do head circles.  Also advised not to hold neck stretches for more than 1-2 seconds.    GRIFFIN with playing [employer].       Objective:             Treatment Today   Manual Therapy  Manual therapy:  Neck    MFR " layers 1-3 bilateral:  Suboccipitals, cervical extensors, cervical paraspinal rotators, scalenes.    Manual therapy:  Cervical longitudinal mobilization    Rate/grade Target  Direction  Relative movement Location in range Patient position   2 Cervical vertebrae Superior Distraction of facet joints Head in neutral Supine          Exercises:  Exercise #1:  cervical retraction  Comment #1: 10  Exercise #2: scapular depression and retraction  Comment #2: 10  Exercise #3: cervical sidebending  Comment #3: 10  Exercise #4: cervical rotation  Comment #4: 10   Exercise #5: patient instruction on effective use of hot pack/cold pack            TREATMENT MINUTES COMMENTS   Evaluation     Self-care/ Home management     Manual therapy 45 See above.   Neuromuscular Re-education     Therapeutic Activity     Therapeutic Exercises  See exercise flow-sheet for details.    Gait training     Modality__________________                Total 45    Blank areas are intentional and mean the treatment did not include these items.       Lg Jarrett, PT  6/6/2019

## 2021-05-29 NOTE — PROGRESS NOTES
"[facility] Daily Progress     Patient Name: Dyana Nicole  Date: 5/23/2019  Visit #: 8/12  Authorization dates:  4/29/19-7/28/19  Referral Diagnosis: HA (headache)  Referring provider: Anderson Sutherland MD  Visit Diagnosis:     ICD-10-CM    1. Cervicalgia M54.2    2. Cervicogenic headache R51        No data recorded     Assessment:     Response to Intervention:  Tolerated treatment well with report of decreased pain post manual therapy.    Symptoms are consistent with:  Headache.  Patient is appropriate to continue with skilled physical therapy intervention, as indicated by initial plan of care.    Goal Status:  Pt. will demonstrate/verbalize independence in self-management of condition in : 12 weeks  Pt. will be independent with home exercise program in : 12 weeks  Pt. will report decreased intensity, frequency of : Pain;Headache;in 6 weeks  Pt. will have improved quality of sleep: with less pain;waking less times/night;in 6 weeks  Pt. will improve posture : and demonstrate posture with minimal to no cuing;in standing;in sitting;in walking;in 6 weeks  Patient Turn Head: for driving;for conversation;with less pain;with less difficulty;in 6 weeks  Patient will look up / down: with less pain;with less difficulty;in 6 weeks  Patient will : yawn;with no pain;in 6 weeks    No data recorded  Other functional progress:           Plan / Patient Education:     Continue with initial plan of care.  Progress with home program as tolerated.       Subjective:     Pain Rating:  Resting 0-1  Activity:  2    Response to last treatment: sore for 1 day as expected.  HEP- Frequency: 1x/day, Questions or difficulties:  \"my life is too busy right now\".    Patient reports:      She is trying to use her CPAP.    It feels like it plugs her ears.    The mask is also leaking and she is not sleeping well.    She is having more difficulty tipping her head to the side.    Objective:             Treatment Today   Manual " Therapy  Manual therapy:  Neck    MFR layers 1-3 bilateral:  Suboccipitals, cervical extensors, cervical paraspinal rotators, scalenes.    Manual therapy:  Cervical longitudinal mobilization    Rate/grade Target  Direction  Relative movement Location in range Patient position   2 Cervical vertebrae Superior Distraction of facet joints Head in neutral Supine          Exercises:  Exercise #1:  cervical retraction  Comment #1: 10  Exercise #2: scapular depression and retraction  Comment #2: 10  Exercise #3: cervical sidebending  Comment #3: 10  Exercise #4: cervical rotation  Comment #4: 10   Exercise #5: patient instruction on effective use of hot pack/cold pack            TREATMENT MINUTES COMMENTS   Evaluation     Self-care/ Home management     Manual therapy 30 See above.   Neuromuscular Re-education     Therapeutic Activity     Therapeutic Exercises  See exercise flow-sheet for details.    Gait training     Modality__________________                Total 30    Blank areas are intentional and mean the treatment did not include these items.       Lg Jarrett, PT  5/23/2019

## 2021-05-29 NOTE — PROGRESS NOTES
[facility] Discharge Summary  Patient Name: Dyana Nicole  Date: 2/4/2020  Referral Diagnosis: HA (headache)  Referring provider: Anderson Sutherland MD  Visit Diagnosis:   1. Cervicalgia     2. Cervicogenic headache         Goals:  No data recorded  No data recorded    Patient was seen for 11 visits ending on 6/13/19.  A trial of independent self management was initiated.  The patient did not return to continue any physical therapy.  Please see attached note for patient status.    Therapy will be discontinued at this time.     Thank you for your referral.  Lg Jarrett  2/4/2020  12:19 PM   [facility] Daily Progress     Patient Name: Dyana Nicole  Date: 6/13/2019  Visit #: 11/12  Authorization dates:  4/29/19-7/28/19  Referral Diagnosis: HA (headache)  Referring provider: Anderson Sutherland MD  Visit Diagnosis:     ICD-10-CM    1. Cervicalgia M54.2    2. Cervicogenic headache R51        No data recorded     Assessment:     Response to Intervention:  Tolerated treatment well with report of decreased pain post manual therapy.    Symptoms are consistent with:  Headache.  Patient is appropriate to continue with skilled physical therapy intervention, as indicated by initial plan of care.    Goal Status:  Pt. will demonstrate/verbalize independence in self-management of condition in : 12 weeks  Pt. will be independent with home exercise program in : 12 weeks  Pt. will report decreased intensity, frequency of : Pain;Headache;in 6 weeks  Pt. will have improved quality of sleep: with less pain;waking less times/night;in 6 weeks  Pt. will improve posture : and demonstrate posture with minimal to no cuing;in standing;in sitting;in walking;in 6 weeks  Patient Turn Head: for driving;for conversation;with less pain;with less difficulty;in 6 weeks  Patient will look up / down: with less pain;with less difficulty;in 6 weeks  Patient will : yawn;with no pain;in 6 weeks    No data recorded  Other  functional progress:           Plan / Patient Education:     Trial of independent self-management of condition initiated.  Patient to contact PT by phone or schedule an appointment as needed if symptoms increase or progress stops.  If patient has not returned to continue therapy in 30 days then physical therapy will be discharged.     Subjective:     Pain Rating:  Resting 0-1  Activity:  2    Response to last treatment: sore for 1 day as expected.  HEP- Frequency: 1x/day, Questions or difficulties:  none    Patient reports:      80% overall improvement since starting physical therapy.       Objective:             Treatment Today   Manual Therapy  Manual therapy:  Neck    MFR layers 1-3 bilateral:  Suboccipitals, cervical extensors, cervical paraspinal rotators, scalenes.    Manual therapy:  Cervical longitudinal mobilization    Rate/grade Target  Direction  Relative movement Location in range Patient position   2 Cervical vertebrae Superior Distraction of facet joints Head in neutral Supine          Exercises:  Exercise #1:  cervical retraction  Comment #1: 10  Exercise #2: scapular depression and retraction  Comment #2: 10  Exercise #3: cervical sidebending  Comment #3: 10  Exercise #4: cervical rotation  Comment #4: 10   Exercise #5: patient instruction on effective use of hot pack/cold pack            TREATMENT MINUTES COMMENTS   Evaluation     Self-care/ Home management     Manual therapy 25 See above.   Neuromuscular Re-education     Therapeutic Activity     Therapeutic Exercises  See exercise flow-sheet for details.    Gait training     Modality__________________                Total 25    Blank areas are intentional and mean the treatment did not include these items.       Lg Jarrett, PT  6/13/2019

## 2021-05-30 VITALS — WEIGHT: 175.6 LBS | BODY MASS INDEX: 32.31 KG/M2 | HEIGHT: 62 IN

## 2021-05-30 VITALS — HEIGHT: 62 IN | BODY MASS INDEX: 32.83 KG/M2 | WEIGHT: 178.4 LBS

## 2021-05-30 VITALS — BODY MASS INDEX: 32.94 KG/M2 | HEIGHT: 62 IN | WEIGHT: 179 LBS

## 2021-05-30 VITALS — BODY MASS INDEX: 33.16 KG/M2 | WEIGHT: 180.2 LBS | HEIGHT: 62 IN

## 2021-05-30 NOTE — TELEPHONE ENCOUNTER
Fax received from [facility], they have started the Prior Authorization Process via Cover My Meds    CoverMyMeds Key: [id number]    Medication Name:   celecoxib (CELEBREX) 200 MG capsule 90 capsule 1 7/19/2019     Sig - Route: Take 1 capsule (200 mg total) by mouth daily. - Oral    Sent to pharmacy as: celecoxib (CELEBREX) 200 MG capsule    E-Prescribing Status: Receipt confirmed by pharmacy (7/19/2019  1:22 PM CDT)          Insurance Plan: Medicare Part D  PBM: [employer]  Patient ID: not provided on fax    Please complete the PA process

## 2021-05-30 NOTE — TELEPHONE ENCOUNTER
Left message to call back for: medication problem  Information to relay to patient:  Below message

## 2021-05-30 NOTE — TELEPHONE ENCOUNTER
Medication Request  Medication name:   - Levothyroxine 50 mcg  - Furosemide 20 mg  Pharmacy Name and Location: [facility]  Reason for request: Pharmacy comments:  Patient is new to our pharmacy.  When did you use medication last?:  Information not provided on fax from the pharmacy.  Patient offered appointment:  N/a  Okay to leave a detailed message: no

## 2021-05-30 NOTE — PROGRESS NOTES
The patient was seen for a neuropsychological evaluation for the purposes of diagnostic clarification and treatment planning. 123 minutes of face-to-face testing were provided by this writer. An additional 60 minutes were spent scoring and compiling test results.The patient was cooperative with testing. No concerns were brought to my attention. Please see Dr. Shay's report for a detailed description of the charges and interpretation and integration of the findings.    This note was written by psychometrist, Sharon Bateman, but had to be completed/closed when the report was finished on 7/30/19 so was finalized/signed by the supervising psychologist.

## 2021-05-30 NOTE — PROGRESS NOTES
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 may reflect the effects of her cerebrovascular risk factors (i.e., hypertension, hyperlipidemia, sleep apnea, etc.) and associated small vessel ischemic disease.     RECOMMENDATIONS:  1) It will be important for Ms. Nicole to continue to focus on the management of her depression and anxiety, both pharmacologically and through participation in outpatient counseling. She continues to attend regular AA meetings, but otherwise has been feeling more withdrawn and less participatory in social gatherings. She is encouraged to consider returning to outpatient therapy, either with the provider who she worked with last year or additional referrals (which I will offer her during the feedback session).    2)  Despite her concerns, Ms. Nicole exhibits average cognition and intact memory abilities.  Slight inattentive errors that she experiences in daily life may be contributing to her sense that she is more \"forgetful\" and she is encouraged to continue to use a calendar, as well as lists and routine to facilitate her recall of important information. Using a filing system for important paperwork and documents should also prove useful.      3) Wellness programming: Given her cerebrovascular risk factors an their potential effects on her " cognition and functioning in daily life, Ms. Nicole is encouraged to adopt a wellness program focused on good dietary choices, engaging in regular exercise, and closely monitoring her cerebrovascular risk factors (including CPAP compliance). Implementing good sleep hygiene, which will be discussed during feedback, may also prove useful in improving her overall health and wellness.     Ms. Nicole has requested to receive the results of this evaluation via a formal feedback appointment with me which will be scheduled at the patient's convenience, typically within two weeks of today's date.     Thank you for allowing me to participate in Ms. Nicole's care.  Please contact me with any questions regarding the content of this report.      Janet Shay, PhD, LP, ABPP  Clinical Neuropsychologist, LP#[id number]  Board Certified in Clinical Neuropsychology    [facility]  Neuropsychology Section   Phone:  [phone]

## 2021-05-30 NOTE — TELEPHONE ENCOUNTER
My chart message requesting for a refill on this medication. States that naproxen is not helping her pain. Please refill if ok.   Thanks

## 2021-05-30 NOTE — TELEPHONE ENCOUNTER
Naproxen 500 mg twice daily was sent to the pharmacy to use in place of Celebrex as the PA was denied     Please notify patient of this upon return phone call

## 2021-05-30 NOTE — TELEPHONE ENCOUNTER
Who is calling:  Patient   Reason for Call:  Calling back to state she obtained the medication CELEBREX Through [facility] .   Date of last appointment with primary care: na   Okay to leave a detailed message: Yes

## 2021-05-31 VITALS — BODY MASS INDEX: 34.41 KG/M2 | WEIGHT: 187 LBS | HEIGHT: 62 IN

## 2021-05-31 VITALS — BODY MASS INDEX: 33.86 KG/M2 | WEIGHT: 184 LBS | HEIGHT: 62 IN

## 2021-05-31 VITALS — BODY MASS INDEX: 34.2 KG/M2 | WEIGHT: 187 LBS

## 2021-05-31 VITALS — HEIGHT: 62 IN | BODY MASS INDEX: 35.88 KG/M2 | WEIGHT: 195 LBS

## 2021-05-31 VITALS — BODY MASS INDEX: 36.21 KG/M2 | WEIGHT: 198 LBS

## 2021-05-31 VITALS — BODY MASS INDEX: 33.65 KG/M2 | WEIGHT: 184 LBS

## 2021-05-31 VITALS — HEIGHT: 62 IN | BODY MASS INDEX: 33.49 KG/M2 | WEIGHT: 182 LBS

## 2021-05-31 VITALS — WEIGHT: 187 LBS | BODY MASS INDEX: 34.2 KG/M2

## 2021-05-31 VITALS — HEIGHT: 62 IN | WEIGHT: 195 LBS | BODY MASS INDEX: 35.88 KG/M2

## 2021-05-31 NOTE — PROGRESS NOTES
" NEUROPSYCHOLOGY FEEDBACK NOTE    NAME: Dyana Nicole  YOB: 1940     DATE OF EVALUATION: 8/9/2019      SUMMARY OF SESSION:  Ms. Turpin \"Park\" Corey is a 79 y.o., right-handed,  female with a history of depression, sleep apnea, hypertension and hyperlipidemia, who was referred for a neurocognitive evaluation by Kandi Jesus MD ([facility]) to assist with differential diagnosis and care planning.  Ms. Nicole arrived on time and accompanied by her , Nguyễn, and daughter, Genesis. We began the session by discussing her experience during the neuropsychometric evaluation and her family member's perception of her cognitive difficulties.  Her spouse indicated that while the patient has been worried about her memory, and developing Alzheimer's disease (like her father), family members haven't appreciated a significant change in the last year.  I provided Ms. Nicole with detailed feedback regarding her performance on cognitive testing and her pattern of cognitive strengths and weaknesses.  I discussed my overall impressions and recommendations and provided the opportunity for Ms. Nicole to ask any questions that she had about the evaluation.  We discussed the role of her depression and anxiety on her cognition, as well as her chronic head/neck pain (she discontinued the injections for occipital neuralgia recently).  We also discussed her 'multifactorial' issues with dizziness, balance, and gait stability and she was encouraged to consider a return to PT.  We reviewed my other recommendations in detail and I provided some education about how cerebrovascular disease can contribute to cognitive inefficiencies and reinforced a wellness program and compliance with her CPAP machine. At the end of the session, she indicated that she understood the results and that I had answered all of her questions.  She was provided with my contact information, should any further questions or concerns " arise in the future.    Please contact me with any questions regarding the content of this note.     Janet Shay, PhD, LP, ABPP  Board Certified in [place] [address]  Phone: [phone]    For diagnostic and coding purposes, Dyana Nicole has a history of  Depression, sleep apnea, cerebrovascular disease and was referred for an evaluation of mild neurocognitive disorder, unspecified.  Today's session consisted of a total of 67 minutes of interactive feedback and related activities  (1 x 96133 ) charged on  today's date.

## 2021-05-31 NOTE — TELEPHONE ENCOUNTER
Refill Approved    Rx renewed per Medication Renewal Policy. Medication was last renewed on 6/30/18, last OV 5/30/19.    Afua Goodman, [facility] Triage/Med Refill 9/2/2019     Requested Prescriptions   Pending Prescriptions Disp Refills     simvastatin (ZOCOR) 20 MG tablet 90 tablet 3     Sig: Take 1 tablet (20 mg total) by mouth at bedtime.       Statins Refill Protocol (Hmg CoA Reductase Inhibitors) Passed - 9/2/2019 10:16 AM        Passed - PCP or prescribing provider visit in past 12 months      Last office visit with prescriber/PCP: 5/3/2019 True Pugh MD OR same dept: 5/3/2019 True Pugh MD OR same specialty: 5/3/2019 True Pugh MD  Last physical: 10/2/2018 Last MTM visit: Visit date not found   Next visit within 3 mo: Visit date not found  Next physical within 3 mo: Visit date not found  Prescriber OR PCP: True Pugh MD  Last diagnosis associated with med order: 1. Hyperlipidemia  - simvastatin (ZOCOR) 20 MG tablet; Take 1 tablet (20 mg total) by mouth at bedtime.  Dispense: 90 tablet; Refill: 3    If protocol passes may refill for 12 months if within 3 months of last provider visit (or a total of 15 months).

## 2021-06-01 ENCOUNTER — OFFICE VISIT - HEALTHEAST (OUTPATIENT)
Dept: FAMILY MEDICINE | Facility: CLINIC | Age: 81
End: 2021-06-01

## 2021-06-01 ENCOUNTER — HOSPITAL ENCOUNTER (OUTPATIENT)
Dept: PHYSICAL MEDICINE AND REHAB | Facility: CLINIC | Age: 81
Discharge: HOME | End: 2021-06-01
Attending: PHYSICAL MEDICINE & REHABILITATION
Payer: MEDICARE

## 2021-06-01 VITALS — HEIGHT: 62 IN | BODY MASS INDEX: 35.7 KG/M2 | WEIGHT: 194 LBS

## 2021-06-01 VITALS — WEIGHT: 197 LBS | BODY MASS INDEX: 36.25 KG/M2 | HEIGHT: 62 IN

## 2021-06-01 VITALS — BODY MASS INDEX: 35.85 KG/M2 | WEIGHT: 196 LBS

## 2021-06-01 VITALS — WEIGHT: 190 LBS | HEIGHT: 62 IN | BODY MASS INDEX: 34.96 KG/M2

## 2021-06-01 VITALS — WEIGHT: 194 LBS | BODY MASS INDEX: 35.48 KG/M2

## 2021-06-01 VITALS — WEIGHT: 198 LBS | HEIGHT: 62 IN | BODY MASS INDEX: 36.44 KG/M2

## 2021-06-01 VITALS — BODY MASS INDEX: 36 KG/M2 | WEIGHT: 196.8 LBS

## 2021-06-01 VITALS — BODY MASS INDEX: 35.33 KG/M2 | HEIGHT: 62 IN | WEIGHT: 192 LBS

## 2021-06-01 VITALS — WEIGHT: 192 LBS | HEIGHT: 62 IN | BODY MASS INDEX: 35.33 KG/M2

## 2021-06-01 VITALS — WEIGHT: 195 LBS | BODY MASS INDEX: 35.67 KG/M2

## 2021-06-01 VITALS — WEIGHT: 190.2 LBS | BODY MASS INDEX: 34.79 KG/M2

## 2021-06-01 DIAGNOSIS — M16.12 PRIMARY OSTEOARTHRITIS OF LEFT HIP: ICD-10-CM

## 2021-06-01 DIAGNOSIS — G89.29 CHRONIC LEFT SI JOINT PAIN: ICD-10-CM

## 2021-06-01 DIAGNOSIS — M70.72 BURSITIS OF LEFT HIP, UNSPECIFIED BURSA: ICD-10-CM

## 2021-06-01 DIAGNOSIS — E03.9 HYPOTHYROIDISM, UNSPECIFIED TYPE: ICD-10-CM

## 2021-06-01 DIAGNOSIS — M53.3 CHRONIC LEFT SI JOINT PAIN: ICD-10-CM

## 2021-06-01 DIAGNOSIS — I10 ESSENTIAL HYPERTENSION WITH GOAL BLOOD PRESSURE LESS THAN 140/90: ICD-10-CM

## 2021-06-01 DIAGNOSIS — M54.16 LEFT LUMBAR RADICULOPATHY: ICD-10-CM

## 2021-06-01 DIAGNOSIS — E78.5 HYPERLIPIDEMIA, UNSPECIFIED HYPERLIPIDEMIA TYPE: ICD-10-CM

## 2021-06-01 DIAGNOSIS — M79.18 LEFT BUTTOCK PAIN: ICD-10-CM

## 2021-06-01 DIAGNOSIS — Z98.1 HISTORY OF LUMBAR FUSION: ICD-10-CM

## 2021-06-01 NOTE — TELEPHONE ENCOUNTER
Patient Returning Call  Reason for call:  Patient is returning call  Information relayed to patient:  Message below from True Pugh MD regarding recommendation of decreasing the Premarin by half for 2-4 weeks and then discontinuing the medication was relayed to the patient.    Patient stated she agreed with True Pugh MD recommendation and stated she will try to do this.    Patient was encouraged to call back at any time with questions or concerns.  Patient has additional questions:  No  If YES, what are your questions/concerns:  N/a  Okay to leave a detailed message?: No call back needed     ----- Message from True Pugh MD sent at 9/18/2019  5:49 AM CDT -----  Regarding: contact patient  Can you please see if patient would be willing to decrease Premarin 0.3 mg daily down to 0.15 mg daily (by cutting tablet in half) and use new dose x 2-4 weeks, then try to discontinue Premarin completely due to increased risk for breast cancer, coronary heart disease, stroke, venous thromboembolism, and dementia.  Thank you.

## 2021-06-01 NOTE — TELEPHONE ENCOUNTER
Please relay message below           ----- Message from True Pugh MD sent at 9/18/2019  5:49 AM CDT -----  Regarding: contact patient  Can you please see if patient would be willing to decrease Premarin 0.3 mg daily down to 0.15 mg daily (by cutting tablet in half) and use new dose x 2-4 weeks, then try to discontinue Premarin completely due to increased risk for breast cancer, coronary heart disease, stroke, venous thromboembolism, and dementia.  Thank you.

## 2021-06-01 NOTE — TELEPHONE ENCOUNTER
RN Triage:    Spoke with 79 yr old Park who c/o:    Hit her L calf when opening a car door 7-10 days ago.    Large bruise initially which is lightening but now is more painful.    tender to the touch.    Small lumps felt under the skin.    Hurts down in ankle when walking for the past few days.    Rates pain a 7-8 when touched.    Scratch and small scab present.    Denies redness, warmth, or drainage.    Denies cough or difficulty breathing.    PLAN:  Advised OV today per protocol.  Pt intends to go to [facility] today.    Carlita Schmitz RN   [facility] RN Triage      Reason for Disposition    Patient wants to be seen    Protocols used: LEG INJURY-A-OH

## 2021-06-01 NOTE — PROGRESS NOTES
Assessment and Plan:       1. Encounter for general adult medical examination with abnormal findings  Annual wellness visit completed.  Risks associated with suboptimal diet, need for assistance with activities of daily living and urinary leakage.  High-dose flu shot provided otherwise immunizations up-to-date.  Prior colonoscopy December 18, 2018.  - Influenza High Dose, Seasonal 65+ yrs    2. Hypothyroidism, unspecified type  Hypothyroidism.  Continues levothyroxine 50 mcg daily.  - Thyroid Stimulating Hormone (TSH); Future    3. Recurrent major depressive disorder, remission status unspecified (H)  Due to concerns with weight gain with sertraline 100 mg daily will wean from medication and initiate bupropion extended release 150 mg daily in place.  Anticipate reassessment in 3 months while returning from [place] for [facility].  PHQ 9 questionnaire 5 out of 27 with YEE 7 questionnaire 1 out of 21.  - buPROPion (WELLBUTRIN XL) 150 MG 24 hr tablet; Take 1 tablet (150 mg total) by mouth daily.  Dispense: 30 tablet; Refill: 5    4. Class 2 severe obesity due to excess calories with serious comorbidity and body mass index (BMI) of 36.0 to 36.9 in adult (H)  Dietary and exercise modification for weight goal less than 190 pounds initially, less than 180 pounds ideally through 10% calorie restriction and limitation of calories preferably to 6 hours daily.    5. Walk Is Wobbly Or Unsteady (Ataxia)  Ataxic gait with history of peripheral neuropathy described.  Mild neuropathy on monofilament testing today.  Stable.    6. Peripheral Neuropathy  As above.    7. Hyperlipidemia, unspecified hyperlipidemia type  Hyperlipidemia.  Check lipid cascade.  Simvastatin 20 mg at bedtime has been resumed recently.  - Lipid Cascade; Future    8. Lymphedema  Lymphedema appears relatively stable.  Compression stockings recommended to continue.    9. Essential hypertension with goal blood pressure less than 140/90  Metoprolol tartrate  50 mg using it twice daily.  Ensure stable renal function.  - Basic Metabolic Panel; Future    10. Onychomycosis  Mild onychomycosis of great toe nail right foot.  Will avoid systemic treatment options at this time.        The patient's current medical problems were reviewed.    I have had an Advance Directives discussion with the patient.  The following high BMI interventions were performed this visit: encouragement to exercise, weight monitoring, weight loss from baseline weight and lifestyle education regarding diet.  Ensure ongoing efforts to achieve weight goal < 190 pounds initially, < 180 pounds ideally.     The following health maintenance schedule was reviewed with the patient and provided in printed form in the after visit summary:   Health Maintenance   Topic Date Due     MEDICARE ANNUAL WELLNESS VISIT  03/06/2006     INFLUENZA VACCINE RULE BASED (1) 08/01/2019     ZOSTER VACCINES (3 of 3) 11/23/2018     FALL RISK ASSESSMENT  10/02/2019     ADVANCE CARE PLANNING  11/03/2019     DEPRESSION FOLLOW UP  03/13/2020     DXA SCAN  10/16/2020     TD 18+ HE  11/24/2028     PNEUMOCOCCAL POLYSACCHARIDE VACCINE AGE 65 AND OVER  Completed     PNEUMOCOCCAL CONJUGATE VACCINE FOR ADULTS (PCV13 OR PREVNAR)  Completed        Subjective:     Chief Complaint: Dyana Nicole is an 79 y.o. female here for an Annual Wellness visit.     HPI: Has been somewhat tearful more recently.  Using sertraline 100 mg daily.  This is caused perhaps increased weight gain.  Associated sweet tooth and the more she consumes the more she wants.  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  Does attend AA meetings on a regular basis in [place] however not consistent around here.  Is not been having concerns however with alcohol desire per se.  Has had some ataxia associate with peripheral neuropathy.  Needs help with activities of daily living.  Patient's  as well as their daughter Ivy are with today.  Ivy talks about consideration of  palliative treatment options if not interested in ongoing aggressive treatment for healthcare issues.  Remains on metoprolol tartrate 50 mg twice daily.  Resume simvastatin 20 mg at bedtime for lipid management.  Reviewed labs from May 3, 2019 including urinalysis, TSH, comprehensive metabolic panel, CBC etc. that appeared normal.    Review of Systems:  Please see above.  The rest of the review of systems are negative for all systems.     - Ray  3 daughters - Genesis ( at [facility])  Smoke - quit in 60s after 1 year  EtOH: sober x 36 years  Surgeries: gallbladder, appy, hysterectomy and ? 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; left CTS release; bladder lift; right TKA; left rotator cuff  Hospitalizations:    Patient Care Team:  True Pugh MD as PCP - General (Family Medicine)  True Pugh MD as Assigned PCP     Patient Active Problem List   Diagnosis     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     Insomnia     Heartburn     Arthritis     Cervical Spondylosis     Generalized Osteoarthritis Of The Hand     Lumbar Spondylosis     Trochanteric Bursitis     Lower Back Pain     Osteopenia     Microscopic colitis, unspecified microscopic colitis type     Fibromyalgia     Anemia     Hypertension     Hyperlipidemia     Peripheral Neuropathy     Walk Is Wobbly Or Unsteady (Ataxia)     Hypothyroidism     JANICE on CPAP     Arthralgia     Generalized anxiety disorder     Recurrent major depressive disorder, remission status unspecified (H)     Exertional dyspnea     Pituitary mass (H)     Essential hypertension with goal blood pressure less than 140/90     Pain in both lower extremities     Venous insufficiency of both lower extremities     Venous hypertension of both lower extremities     Fat deposits     Class 2 obesity due to excess calories in adult     Vitamin D deficiency     Fatigue due to excessive exertion      Adrenal insufficiency (H)     Leg weakness, bilateral     Lymphedema     Obesity (BMI 35.0-39.9) with comorbidity (H)     Atrial tachycardia (H)     Left ventricular outflow obstruction     Hypertensive left ventricular hypertrophy, without heart failure     Lower GI bleed     BRBPR (bright red blood per rectum)     Anemia due to blood loss, acute     Acute cystitis without hematuria     Snoring     Past Medical History:   Diagnosis Date     Anemia     Created by Conversion      Arrhythmia      Cervical Spondylosis     Created by Conversion      Depression      Disease of thyroid gland     hypo     Fibromyalgia     Created by Conversion      GERD (gastroesophageal reflux disease)      High cholesterol      History of transfusion      Hyperlipidemia     Created by Conversion      Hypertension     Created by Conversion      Near syncope      Peripheral Neuropathy     Created by Conversion      Skin cancer, basal cell      Sleep apnea     mild     Tachycardia       Past Surgical History:   Procedure Laterality Date     Bladder lift       CARPAL TUNNEL RELEASE       CATARACT EXTRACTION, BILATERAL       CHOLECYSTECTOMY       HYSTERECTOMY       JOINT REPLACEMENT Bilateral     knees     MOHS SURGERY      Nose     OOPHORECTOMY Left     1 removed, 1 remains     [place] ARTHROPLASTY TIBIAL PLATEAU      Description: Knee Replacement;  Recorded: 05/16/2013;     [place] INJECT NERV NOLVIACK,JOE PERIPH NERV      Description: Peripheral Nerve Block Wrist Median Right;  Recorded: 05/16/2013;     REPLACEMENT TOTAL KNEE      L     REPLACEMENT TOTAL KNEE BILATERAL       ROTATOR CUFF REPAIR       TRUNK SKIN LESION EXCISIONAL BIOPSY Left 1/5/2018    Procedure: LEFT BUTTOCK EXPLORATION, EXCISION BUTTOCK MASS;  Surgeon: Lg Jameson MD;  Location: [facility];  Service:       Family History   Problem Relation Age of Onset     Aneurysm Father         AAA     Cancer Mother         pancrease      Social History     Socioeconomic History      Marital status:      Spouse name: Not on file     Number of children: Not on file     Years of education: Not on file     Highest education level: Not on file   Occupational History     Not on file   Social Needs     Financial resource strain: Not on file     Food insecurity:     Worry: Not on file     Inability: Not on file     Transportation needs:     Medical: Not on file     Non-medical: Not on file   Tobacco Use     Smoking status: Former Smoker     Packs/day: 0.25     Years: 1.00     Pack years: 0.25     Last attempt to quit: 1961     Years since quittin.7     Smokeless tobacco: Never Used   Substance and Sexual Activity     Alcohol use: No     Drug use: No     Sexual activity: Never   Lifestyle     Physical activity:     Days per week: Not on file     Minutes per session: Not on file     Stress: Not on file   Relationships     Social connections:     Talks on phone: Not on file     Gets together: Not on file     Attends L[employer]-day service: Not on file     Active member of club or organization: Not on file     Attends meetings of clubs or organizations: Not on file     Relationship status: Not on file     Intimate partner violence:     Fear of current or ex partner: Not on file     Emotionally abused: Not on file     Physically abused: Not on file     Forced sexual activity: Not on file   Other Topics Concern     Not on file   Social History Narrative    Lives in senior living with  of over 50 years. 3 daughters      Current Outpatient Medications   Medication Sig Dispense Refill     aspirin 81 MG EC tablet Take 81 mg by mouth at bedtime.        biotin 5 mg Tab Take 5,000 mcg by mouth daily.       calcium carbonate-vitamin D3 (CALCIUM 600 + D,3,) 600 mg calcium- 200 unit cap Take 1 tablet by mouth 2 (two) times a day.              celecoxib (CELEBREX) 200 MG capsule Take 1 capsule (200 mg total) by mouth daily. 90 capsule 1     estrogens, conjugated, (PREMARIN) 0.3 MG tablet Take 1 tablet  "(0.3 mg total) by mouth daily. 90 tablet 3     fluticasone propionate (FLONASE) 50 mcg/actuation nasal spray INSTILL 2 SPRAYS IN EACH NOSTRIL DAILY AS NEEDED 48 g 3     furosemide (LASIX) 20 MG tablet Take 1 tablet (20 mg total) by mouth daily. 90 tablet 3     KRILL OIL ORAL Take 300 mg by mouth at bedtime.              LACTOBAC CMB #3/FOS/PANTETHINE (PROBIOTIC & ACIDOPHILUS ORAL) Take 1 capsule by mouth once daily. 1.5 billion units             levothyroxine (SYNTHROID, LEVOTHROID) 50 MCG tablet Take 1 tablet (50 mcg total) by mouth Daily at 6:00 am. 90 tablet 3     metoprolol tartrate (LOPRESSOR) 50 MG tablet Take 1 tablet (50 mg total) by mouth 2 (two) times a day. 180 tablet 1     multivitamin with minerals (THERA-M) 9 mg iron-400 mcg Tab tablet Take 1 tablet by mouth every evening.       naproxen (NAPROSYN) 500 MG tablet Take 1 tablet (500 mg total) by mouth 2 (two) times a day with meals. 60 tablet 2     omeprazole (PRILOSEC) 20 MG capsule Take 1 capsule (20 mg total) by mouth 2 (two) times a day. 180 capsule 2     sertraline (ZOLOFT) 100 MG tablet TAKE ONE TABLET BY MOUTH EVERY DAY 90 tablet 3     simvastatin (ZOCOR) 20 MG tablet Take 1 tablet (20 mg total) by mouth at bedtime. 90 tablet 2     traZODone (DESYREL) 50 MG tablet Take 25 mg by mouth at bedtime.       buPROPion (WELLBUTRIN XL) 150 MG 24 hr tablet Take 1 tablet (150 mg total) by mouth daily. 30 tablet 5     solifenacin (VESICARE) 5 MG tablet Take 1 tablet (5 mg total) by mouth every evening. 7PM 90 tablet 0     traMADol (ULTRAM) 50 mg tablet Take 1 tablet (50 mg total) by mouth 3 (three) times a day as needed for pain. 60 tablet 0     No current facility-administered medications for this visit.       Objective:   Vital Signs:   Visit Vitals  /60   Pulse (!) 59   Ht 5' 1.5\" (1.562 m)   Wt 199 lb (90.3 kg)   SpO2 98%   BMI 36.99 kg/m         VisionScreening:  No exam data present     PHYSICAL EXAM  General Appearance: Alert, pleasant, appears " stated age.  Obesity with BMI - 36.99.  Head: Normocephalic, without obvious abnormality  Eyes: PERRL, conjunctiva/corneas clear, EOM's intact  Ears: Normal TM's and external ear canals, both ears  Nose: Nares normal, septum midline,mucosa normal, no drainage  Throat: Lips, mucosa, and tongue normal; teeth and gums normal; oropharynx is clear  Neck: Supple,without lymphadenopathy or thyromegally  Lungs: Clear to auscultation bilaterally, respirations unlabored  Breasts: deferred  Heart: Regular rate and rhythm, no murmur   Abdomen: Soft, non-tender, no masses, no organomegaly  Pelvic:deferred  Extremities: Extremities with strong and symmetric pulses, no cyanosis or edema  Skin: Skin color, texture normal, no rashes or lesions  Neurologic: Normal      Assessment Results 9/13/2019   Activities of Daily Living 1 - Full function   Instrumental Activities of Daily Living 1 - Full function   Get Up and Go Score Less than 12 seconds   Mini Cog Total Score 3   Some recent data might be hidden     A Mini-Cog score of 0-2 suggests the possibility of dementia, score of 3-5 suggests no dementia    Identified Health Risks:     The patient was counseled and encouraged to consider modifying their diet and eating habits. She was provided with information on recommended healthy diet options.  The patient reports that she has difficulty with activities of daily living. I have asked that the patient make a follow up appointment in 12 weeks where this issue will be further evaluated and addressed.  The patient reports that she has difficulty with instrumental activities of daily living.  She was provided with a list of local organizations that provide support services and advised to make a follow up appointment in 12 weeks to address this further.   Information on urinary incontinence and treatment options given to patient.  The patient's PHQ-9 score is consistent with mild depression. She was provided with information regarding  depression and was advised to schedule a follow up appointment in 12 weeks to further address this issue.  Patient's advanced directive was discussed and I am comfortable with the patient's wishes.

## 2021-06-02 VITALS — HEIGHT: 63 IN | WEIGHT: 195 LBS | BODY MASS INDEX: 34.55 KG/M2

## 2021-06-02 VITALS — HEIGHT: 62 IN | WEIGHT: 185.9 LBS | BODY MASS INDEX: 34.21 KG/M2

## 2021-06-02 VITALS — BODY MASS INDEX: 34.38 KG/M2 | WEIGHT: 191 LBS

## 2021-06-02 VITALS — WEIGHT: 185 LBS | BODY MASS INDEX: 33.84 KG/M2

## 2021-06-02 VITALS — BODY MASS INDEX: 34.02 KG/M2 | WEIGHT: 186 LBS

## 2021-06-02 VITALS — BODY MASS INDEX: 33.66 KG/M2 | WEIGHT: 190 LBS | HEIGHT: 63 IN

## 2021-06-02 VITALS — BODY MASS INDEX: 34.41 KG/M2 | HEIGHT: 62 IN | WEIGHT: 187 LBS

## 2021-06-02 VITALS — WEIGHT: 195 LBS | BODY MASS INDEX: 34.55 KG/M2 | HEIGHT: 63 IN

## 2021-06-02 NOTE — TELEPHONE ENCOUNTER
Who is calling:  DOMITILA Whipple from [facility]  Reason for Call:  Caller stated that she is concerned about the patient. Caller stated that the patient is having major side effects due to medication changes in September. Caller stated that she want True Pugh MD to be aware that the patient is emotional, sleep greatly disruptive, mood is dramatic and bladder symptoms has increased. Caller stated that much has to do with removal of hormones primarily. Caller stated that has True Pugh MD consider other progesterone hormone for the patient such as estrace cream. Caller stated that likely an oral Estradial. Caller stated that if True Pugh MD have considered a second opinion for of estrogen. Caller stated that she would appreciate if True Pugh MD can call her back to further discuss this.  Date of last appointment with primary care: 9/13/19  Okay to leave a detailed message: Yes  [phone]

## 2021-06-02 NOTE — TELEPHONE ENCOUNTER
Codeine-guaifenesin pended for approval if appropriate  Looks like patient has used this in the past for a cough.

## 2021-06-02 NOTE — TELEPHONE ENCOUNTER
"RN Triage:     Patient was in on 10/8/19 for cough. Leaving for [place] on Tuesday. She was given antibiotic for sinusitis and taking the doxycycline.   She stated that she is coughing so hard she started gagging last night. She is taking the mucinex every 4 hours and stated \"this is not helping\". She was given home care suggestions per RN protocol.     Patient asking for a prescription for her cough. Please advise.     Priscila Shaver RN, BSN [facility] Triage Nurse    Reason for Disposition    Continuous (nonstop) coughing interferes with work or school and no improvement using cough treatment per Care Advice    Protocols used: COUGH-A-OH      "

## 2021-06-02 NOTE — PROGRESS NOTES
Assessment/Plan:    1. Acute non-recurrent maxillary sinusitis  Acute maxillary sinusitis.  Doxycycline 100 mg twice daily x10 days.  Notify of persistent concerns.  Associated left otalgia with anticipated improvement with antibiotic therapy.  - doxycycline (MONODOX) 100 MG capsule; Take 1 capsule (100 mg total) by mouth 2 (two) times a day for 10 days.  Dispense: 20 capsule; Refill: 0    2. Cough  Using Mucinex OTC with benefits.  Lungs otherwise clear currently without respiratory distress, hypoxia etc.  Notify of worsening.    3. Hot flashes  History of hot flashes with hormone replacement therapy.  Has decreased estradiol to 0.5 mg taking half tablet daily currently and will further wean once symptomatic stabilization noted.  - estradiol (ESTRACE) 0.5 MG tablet; use 1/2 tablet by mouth every other day x 7 days, then every 3rd day x 7 days then stop  Dispense: 90 tablet; Refill: 3    4. Recurrent major depressive disorder, remission status unspecified (H)  Mood lability.  Switch from sertraline to bupropion extended release 150 mg a prior office visit September 13, 2019.  Will increase to 300 mg daily dose and reassess at follow-up in early December 2019 once returns from [place].  - buPROPion (WELLBUTRIN XL) 300 MG 24 hr tablet; Take 1 tablet (300 mg total) by mouth daily.  Dispense: 90 tablet; Refill: 3      I have had an Advance Directives discussion with the patient.  The following high BMI interventions were performed this visit: encouragement to exercise, weight monitoring, weight loss from baseline weight and lifestyle education regarding diet .  Ensure ongoing efforts to achieve weight goal < 190 pounds initially, < 180 pounds ideally.         Subjective:    Dyana Nicole is seen today for several concerns.  Recent illness.  Symptoms of a cold since Saturday.  Using Mucinex for cough with benefit.  Left facial discomfort and left ear pain.  No ear drainage.  Had been switched from sertraline to  bupropion due to complaints of weight gain with sertraline.  Using bupropion extended release 150 mg daily.  Has had disruptive mood as well as more emotional.  Was told also to wean from estradiol 0.5 mg and wondering if correlation with mood lability with decreased dosing.  Continues metoprolol tartrate 50 mg twice daily for hypertension, levothyroxine 50 mcg daily for thyroid replacement and simvastatin 20 mg at bedtime for lipid treatment.  Comprehensive review of systems as above otherwise all negative.  No chest pain.  One episode this morning of described palpitation short-lived nonsustained without presyncopal symptoms etc.  No peripheral edema.     - Ray  3 daughters - Genesis ( at [facility])  Smoke - quit in 60s after 1 year  EtOH: sober x 36 years  Surgeries: gallbladder, appy, hysterectomy and ? 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; left CTS release; bladder lift; right TKA; left rotator cuff  Hospitalizations:    Past Surgical History:   Procedure Laterality Date     Bladder lift       CARPAL TUNNEL RELEASE       CATARACT EXTRACTION, BILATERAL       CHOLECYSTECTOMY       HYSTERECTOMY       JOINT REPLACEMENT Bilateral     knees     MOHS SURGERY      Nose     OOPHORECTOMY Left     1 removed, 1 remains     FL ARTHROPLASTY TIBIAL PLATEAU      Description: Knee Replacement;  Recorded: 05/16/2013;     FL INJECT NERV BLCK,OTHR PERIPH NERV      Description: Peripheral Nerve Block Wrist Median Right;  Recorded: 05/16/2013;     REPLACEMENT TOTAL KNEE      L     REPLACEMENT TOTAL KNEE BILATERAL       ROTATOR CUFF REPAIR       TRUNK SKIN LESION EXCISIONAL BIOPSY Left 1/5/2018    Procedure: LEFT BUTTOCK EXPLORATION, EXCISION BUTTOCK MASS;  Surgeon: Lg Jameson MD;  Location: [facility] Main OR;  Service:         Family History   Problem Relation Age of Onset     Aneurysm Father         AAA     Cancer Mother          pancrease        Past Medical History:   Diagnosis Date     Anemia     Created by Conversion      Arrhythmia      Cervical Spondylosis     Created by Conversion      Depression      Disease of thyroid gland     hypo     Fibromyalgia     Created by Conversion      GERD (gastroesophageal reflux disease)      High cholesterol      History of transfusion      Hyperlipidemia     Created by Conversion      Hypertension     Created by Conversion      Near syncope      Peripheral Neuropathy     Created by Conversion      Skin cancer, basal cell      Sleep apnea     mild     Tachycardia         Social History     Tobacco Use     Smoking status: Former Smoker     Packs/day: 0.25     Years: 1.00     Pack years: 0.25     Last attempt to quit: 1961     Years since quittin.8     Smokeless tobacco: Never Used   Substance Use Topics     Alcohol use: No     Drug use: No        Current Outpatient Medications   Medication Sig Dispense Refill     aspirin 81 MG EC tablet Take 81 mg by mouth at bedtime.        biotin 5 mg Tab Take 5,000 mcg by mouth daily.       buPROPion (WELLBUTRIN XL) 300 MG 24 hr tablet Take 1 tablet (300 mg total) by mouth daily. 90 tablet 3     calcium carbonate-vitamin D3 (CALCIUM 600 + D,3,) 600 mg calcium- 200 unit cap Take 1 tablet by mouth 2 (two) times a day.              celecoxib (CELEBREX) 200 MG capsule Take 1 capsule (200 mg total) by mouth daily. 90 capsule 1     fluticasone propionate (FLONASE) 50 mcg/actuation nasal spray INSTILL 2 SPRAYS IN EACH NOSTRIL DAILY AS NEEDED 48 g 3     furosemide (LASIX) 20 MG tablet Take 1 tablet (20 mg total) by mouth daily. 90 tablet 3     KRILL OIL ORAL Take 300 mg by mouth at bedtime.              LACTOBAC CMB #3/FOS/PANTETHINE (PROBIOTIC & ACIDOPHILUS ORAL) Take 1 capsule by mouth once daily. 1.5 billion units             levothyroxine (SYNTHROID, LEVOTHROID) 50 MCG tablet Take 1 tablet (50 mcg total) by mouth Daily at 6:00 am. 90 tablet 3     metoprolol  tartrate (LOPRESSOR) 50 MG tablet Take 1 tablet (50 mg total) by mouth 2 (two) times a day. 180 tablet 1     multivitamin with minerals (THERA-M) 9 mg iron-400 mcg Tab tablet Take 1 tablet by mouth every evening.       naproxen (NAPROSYN) 500 MG tablet Take 1 tablet (500 mg total) by mouth 2 (two) times a day with meals. 60 tablet 2     omeprazole (PRILOSEC) 20 MG capsule Take 1 capsule (20 mg total) by mouth 2 (two) times a day. 180 capsule 2     simvastatin (ZOCOR) 20 MG tablet Take 1 tablet (20 mg total) by mouth at bedtime. 90 tablet 2     traZODone (DESYREL) 50 MG tablet Take 25 mg by mouth at bedtime.       doxycycline (MONODOX) 100 MG capsule Take 1 capsule (100 mg total) by mouth 2 (two) times a day for 10 days. 20 capsule 0     estradiol (ESTRACE) 0.5 MG tablet use 1/2 tablet by mouth every other day x 7 days, then every 3rd day x 7 days then stop 90 tablet 3     No current facility-administered medications for this visit.           Objective:    Vitals:    10/08/19 0902   BP: 110/60   Pulse: 69   Temp: 98.2  F (36.8  C)   SpO2: 95%   Weight: 196 lb (88.9 kg)      Body mass index is 36.43 kg/m .    Alert.  Hoarse voice.  HEENT exam with TMs normal bilaterally.  Mild nasal congestion.  Oropharynx with scant postnasal drainage otherwise moist mucous membranes.  Neck supple.  Chest appears clear currently without inspiratory crackle or expiratory wheeze.  Symmetric expansion.  No tachypnea.  Cardiac exam regular without cardiac ectopy or murmur.  Extremities warm and dry.      This note has been dictated using voice recognition software and as a result may contain minor grammatical errors and unintended word substitutions.

## 2021-06-03 ENCOUNTER — RECORDS - HEALTHEAST (OUTPATIENT)
Dept: ADMINISTRATIVE | Facility: CLINIC | Age: 81
End: 2021-06-03

## 2021-06-03 VITALS
BODY MASS INDEX: 36.28 KG/M2 | WEIGHT: 195.19 LBS | SYSTOLIC BLOOD PRESSURE: 122 MMHG | RESPIRATION RATE: 20 BRPM | OXYGEN SATURATION: 93 % | TEMPERATURE: 97.7 F | DIASTOLIC BLOOD PRESSURE: 80 MMHG | HEART RATE: 67 BPM

## 2021-06-03 VITALS — BODY MASS INDEX: 35.88 KG/M2 | HEIGHT: 62 IN | WEIGHT: 195 LBS

## 2021-06-03 VITALS
HEART RATE: 59 BPM | BODY MASS INDEX: 36.62 KG/M2 | WEIGHT: 199 LBS | SYSTOLIC BLOOD PRESSURE: 110 MMHG | DIASTOLIC BLOOD PRESSURE: 60 MMHG | OXYGEN SATURATION: 98 % | HEIGHT: 62 IN

## 2021-06-03 VITALS — WEIGHT: 189 LBS | BODY MASS INDEX: 34.57 KG/M2

## 2021-06-03 VITALS
WEIGHT: 196 LBS | HEART RATE: 69 BPM | OXYGEN SATURATION: 95 % | SYSTOLIC BLOOD PRESSURE: 110 MMHG | DIASTOLIC BLOOD PRESSURE: 60 MMHG | BODY MASS INDEX: 36.43 KG/M2 | TEMPERATURE: 98.2 F

## 2021-06-03 NOTE — PROGRESS NOTES
Assessment/Plan:    1. Recurrent major depressive disorder, remission status unspecified (H)  PHQ 9 questionnaire 12 out of 27 with YEE 7 questionnaire 7 out of 21.  Will continue bupropion extended release 300 mg daily.  Has appointment with psychiatrist at [facility] on December 17, 2019.  Anticipate improvement as well with resumption of Premarin 0.3 mg daily.    2. Hot flashes  Hormone replacement.  Has done very poorly off estrogen and wants to resume.  Hot flashes.  Emotional lability etc.  Did restart Premarin 0.3 mg daily per patient request and will follow closely with risks associate with long-term hormone replacement reviewed.  - estrogens, conjugated, (PREMARIN) 0.3 MG tablet; Take 1 tablet (0.3 mg total) by mouth daily.  Dispense: 90 tablet; Refill: 3    3. Anxiety  YEE questionnaire 7 out of 21.  Continue bupropion extended release 300 mg daily.  Reassess at follow-up 6 months once returns from [place].  Leaves December 27, 2019 and will return sometime in April 2020.            Subjective:    Dyana Nicole is seen today for follow-up evaluation.  Worsening depression and anxiety.  Hormone replacement previously with Premarin 0.3 mg daily.  Had been on this for perhaps 30 years.  Wondering why she had to come off it.  Wants to resume.  Worsening hot flashes.  Emotional lability.  Anger in her hard to times.  Frustrated often in her marriage.  Has a difficult relationship with 1 of her daughters.  Feels that she loses her temper more easily and also has had memory issues along with her .  Cries easily.  Leaving for [place] December 27, 2019 until April 2020.  Scheduled to see a psychiatrist through [facility] December 17, 2019.  Continues levothyroxine 50 mcg daily for thyroid replacement.  Comprehensive review of systems as above otherwise all negative.    Past Surgical History:   Procedure Laterality Date     Bladder lift       CARPAL TUNNEL RELEASE       CATARACT  EXTRACTION, BILATERAL       CHOLECYSTECTOMY       HYSTERECTOMY       JOINT REPLACEMENT Bilateral     knees     MOHS SURGERY      Nose     OOPHORECTOMY Left     1 removed, 1 remains     FL ARTHROPLASTY TIBIAL PLATEAU      Description: Knee Replacement;  Recorded: 2013;     FL INJECT NERV NOLVIACK,JOE PERIPH NERV      Description: Peripheral Nerve Block Wrist Median Right;  Recorded: 2013;     REPLACEMENT TOTAL KNEE      L     REPLACEMENT TOTAL KNEE BILATERAL       ROTATOR CUFF REPAIR       TRUNK SKIN LESION EXCISIONAL BIOPSY Left 2018    Procedure: LEFT BUTTOCK EXPLORATION, EXCISION BUTTOCK MASS;  Surgeon: Lg Jameson MD;  Location: [facility] OR;  Service:         Family History   Problem Relation Age of Onset     Aneurysm Father         AAA     Cancer Mother         pancrease        Past Medical History:   Diagnosis Date     Anemia     Created by Conversion      Arrhythmia      Cervical Spondylosis     Created by Conversion      Depression      Disease of thyroid gland     hypo     Fibromyalgia     Created by Conversion      GERD (gastroesophageal reflux disease)      High cholesterol      History of transfusion      Hyperlipidemia     Created by Conversion      Hypertension     Created by Conversion      Near syncope      Peripheral Neuropathy     Created by Conversion      Skin cancer, basal cell      Sleep apnea     mild     Tachycardia         Social History     Tobacco Use     Smoking status: Former Smoker     Packs/day: 0.25     Years: 1.00     Pack years: 0.25     Last attempt to quit: 1961     Years since quittin.9     Smokeless tobacco: Never Used   Substance Use Topics     Alcohol use: No     Drug use: No        Current Outpatient Medications   Medication Sig Dispense Refill     aspirin 81 MG EC tablet Take 81 mg by mouth at bedtime.        biotin 5 mg Tab Take 5,000 mcg by mouth daily.       budesonide (ENTOCORT EC) 3 mg 24 hr capsule        buPROPion (WELLBUTRIN XL) 300  MG 24 hr tablet Take 1 tablet (300 mg total) by mouth daily. 90 tablet 3     calcium carbonate-vitamin D3 (CALCIUM 600 + D,3,) 600 mg calcium- 200 unit cap Take 1 tablet by mouth 2 (two) times a day.              celecoxib (CELEBREX) 200 MG capsule Take 1 capsule (200 mg total) by mouth daily. 90 capsule 1     fluticasone propionate (FLONASE) 50 mcg/actuation nasal spray INSTILL 2 SPRAYS IN EACH NOSTRIL DAILY AS NEEDED 48 g 3     furosemide (LASIX) 20 MG tablet Take 1 tablet (20 mg total) by mouth daily. 90 tablet 3     LACTOBAC CMB #3/FOS/PANTETHINE (PROBIOTIC & ACIDOPHILUS ORAL) Take 1 capsule by mouth once daily. 1.5 billion units             levothyroxine (SYNTHROID, LEVOTHROID) 50 MCG tablet Take 1 tablet (50 mcg total) by mouth Daily at 6:00 am. 90 tablet 3     metoprolol tartrate (LOPRESSOR) 50 MG tablet Take 1 tablet (50 mg total) by mouth 2 (two) times a day. 180 tablet 1     multivitamin with minerals (THERA-M) 9 mg iron-400 mcg Tab tablet Take 1 tablet by mouth every evening.       omeprazole (PRILOSEC) 20 MG capsule Take 1 capsule (20 mg total) by mouth 2 (two) times a day. 180 capsule 2     simvastatin (ZOCOR) 20 MG tablet Take 1 tablet (20 mg total) by mouth at bedtime. 90 tablet 2     traZODone (DESYREL) 50 MG tablet Take 25 mg by mouth at bedtime.       estradiol (ESTRACE) 0.5 MG tablet use 1/2 tablet by mouth every other day x 7 days, then every 3rd day x 7 days then stop 90 tablet 3     estrogens, conjugated, (PREMARIN) 0.3 MG tablet Take 1 tablet (0.3 mg total) by mouth daily. 90 tablet 3     No current facility-administered medications for this visit.           Objective:    Vitals:    12/03/19 1035   BP: 120/80   Pulse: 69   SpO2: 97%   Weight: 195 lb (88.5 kg)      Body mass index is 36.25 kg/m .    Alert.  No apparent distress.  Tearful.  Transfers slowly.  Mild psychomotor retardation.  No active psychoses.  Otherwise cooperative and forthcoming.      This note has been dictated using voice  recognition software and as a result may contain minor grammatical errors and unintended word substitutions.

## 2021-06-04 VITALS
WEIGHT: 195 LBS | SYSTOLIC BLOOD PRESSURE: 120 MMHG | DIASTOLIC BLOOD PRESSURE: 80 MMHG | HEART RATE: 69 BPM | OXYGEN SATURATION: 97 % | BODY MASS INDEX: 36.25 KG/M2

## 2021-06-05 VITALS
DIASTOLIC BLOOD PRESSURE: 70 MMHG | WEIGHT: 191 LBS | OXYGEN SATURATION: 97 % | BODY MASS INDEX: 34.93 KG/M2 | HEART RATE: 70 BPM | SYSTOLIC BLOOD PRESSURE: 120 MMHG

## 2021-06-05 VITALS
BODY MASS INDEX: 35.33 KG/M2 | RESPIRATION RATE: 16 BRPM | HEART RATE: 68 BPM | HEIGHT: 62 IN | SYSTOLIC BLOOD PRESSURE: 120 MMHG | WEIGHT: 192 LBS | DIASTOLIC BLOOD PRESSURE: 76 MMHG

## 2021-06-05 VITALS — WEIGHT: 185 LBS | HEIGHT: 62 IN | BODY MASS INDEX: 34.04 KG/M2

## 2021-06-05 VITALS
DIASTOLIC BLOOD PRESSURE: 70 MMHG | TEMPERATURE: 98.4 F | WEIGHT: 190 LBS | OXYGEN SATURATION: 97 % | SYSTOLIC BLOOD PRESSURE: 120 MMHG | BODY MASS INDEX: 34.75 KG/M2 | HEART RATE: 77 BPM

## 2021-06-05 VITALS
HEART RATE: 85 BPM | SYSTOLIC BLOOD PRESSURE: 134 MMHG | DIASTOLIC BLOOD PRESSURE: 80 MMHG | OXYGEN SATURATION: 96 % | TEMPERATURE: 98.4 F

## 2021-06-05 VITALS — BODY MASS INDEX: 34.6 KG/M2 | HEIGHT: 62 IN | WEIGHT: 188 LBS

## 2021-06-05 VITALS — WEIGHT: 190 LBS | BODY MASS INDEX: 34.96 KG/M2 | HEIGHT: 62 IN

## 2021-06-05 NOTE — TELEPHONE ENCOUNTER
Refill Approved    Rx renewed per Medication Renewal Policy. Medication was last renewed on 12.3.19.    Vanessa Gamino, [facility] Triage/Med Refill 1/8/2020     Requested Prescriptions   Pending Prescriptions Disp Refills     omeprazole (PRILOSEC) 20 MG capsule 180 capsule 2     Sig: Take 1 capsule (20 mg total) by mouth 2 (two) times a day.       GI Medications Refill Protocol Passed - 1/8/2020 11:20 AM        Passed - PCP or prescribing provider visit in last 12 or next 3 months.     Last office visit with prescriber/PCP: 12/3/2019 True Pugh MD OR same dept: 12/3/2019 True Pugh MD OR same specialty: 12/3/2019 True Pugh MD  Last physical: 9/13/2019 Last MTM visit: Visit date not found   Next visit within 3 mo: Visit date not found  Next physical within 3 mo: Visit date not found  Prescriber OR PCP: True Pugh MD  Last diagnosis associated with med order: 1. Esophageal reflux  - omeprazole (PRILOSEC) 20 MG capsule; Take 1 capsule (20 mg total) by mouth 2 (two) times a day.  Dispense: 180 capsule; Refill: 2    If protocol passes may refill for 12 months if within 3 months of last provider visit (or a total of 15 months).

## 2021-06-05 NOTE — TELEPHONE ENCOUNTER
RN cannot approve Refill Request    RN can NOT refill this medication med is not covered by policy/route to provider. Last office visit: 12/3/2019 True Pugh MD Last Physical: 9/13/2019 Last MTM visit: Visit date not found Last visit same specialty: 12/3/2019 True Pugh MD.  Next visit within 3 mo: Visit date not found  Next physical within 3 mo: Visit date not found      Michelle Cabral, [facility] Triage/Med Refill 1/6/2020    Requested Prescriptions   Pending Prescriptions Disp Refills     celecoxib (CELEBREX) 200 MG capsule 90 capsule 1     Sig: Take 1 capsule (200 mg total) by mouth daily.       There is no refill protocol information for this order

## 2021-06-07 NOTE — TELEPHONE ENCOUNTER
"Patient call to request that a new Rx for the following CPAP supplies be sent to [facility]:    Per patient use \"code 99\" when completing the Rx     Full Face Mask  Tubing  Water Chamber  Filters    Per patient all supplies are to be for \"continuous pressure\"    Fax to [phone] - [facility]     Attn Joann   "

## 2021-06-08 ENCOUNTER — COMMUNICATION - HEALTHEAST (OUTPATIENT)
Dept: FAMILY MEDICINE | Facility: CLINIC | Age: 81
End: 2021-06-08

## 2021-06-08 DIAGNOSIS — M54.16 LEFT LUMBAR RADICULOPATHY: ICD-10-CM

## 2021-06-08 DIAGNOSIS — E78.5 HYPERLIPIDEMIA: ICD-10-CM

## 2021-06-08 RX ORDER — GABAPENTIN 100 MG/1
200 CAPSULE ORAL 3 TIMES DAILY
Qty: 180 CAPSULE | Refills: 2 | Status: SHIPPED | OUTPATIENT
Start: 2021-06-08 | End: 2021-07-28 | Stop reason: SINTOL

## 2021-06-08 NOTE — TELEPHONE ENCOUNTER
Refill Approved    Rx renewed per Medication Renewal Policy. Medication was last renewed on 9/2/19.    Kandis Conway, [facility] Triage/Med Refill 6/9/2020     Requested Prescriptions   Pending Prescriptions Disp Refills     simvastatin (ZOCOR) 20 MG tablet [Pharmacy Med Name: Simvastatin Oral Tablet 20 MG] 90 tablet 0     Sig: TAKE 1 TABLET BY MOUTH AT BEDTIME       Statins Refill Protocol (Hmg CoA Reductase Inhibitors) Passed - 6/6/2020 12:17 PM        Passed - PCP or prescribing provider visit in past 12 months      Last office visit with prescriber/PCP: 12/3/2019 True Pugh MD OR same dept: 12/3/2019 True Pugh MD OR same specialty: 12/3/2019 True Pugh MD  Last physical: 9/13/2019 Last MTM visit: Visit date not found   Next visit within 3 mo: Visit date not found  Next physical within 3 mo: Visit date not found  Prescriber OR PCP: True Pugh MD  Last diagnosis associated with med order: 1. Hyperlipidemia  - simvastatin (ZOCOR) 20 MG tablet [Pharmacy Med Name: Simvastatin Oral Tablet 20 MG]; TAKE 1 TABLET BY MOUTH AT BEDTIME  Dispense: 90 tablet; Refill: 0    If protocol passes may refill for 12 months if within 3 months of last provider visit (or a total of 15 months).
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
## 2021-06-08 NOTE — PROGRESS NOTES
HISTORY OF PRESENT ILLNESS  Asked to see by Dr. Pugh for evaluation of ear fullness. Patient reports that she has plugging of the left ear on occasion. She also gets tingly feeling around the outside of the ear that radiates to her head. She also gets an earache that radiates down the neck. Symptoms going on for a few months. No problems with swallowing or chewing. Patient reports that she does have arthritis of the spine.     REVIEW OF SYSTEMS  Review of Systems: a 10-system review was performed. Pertinent positives are noted in the HPI and on a separate scanned document in the chart.    PMH, PSH, FH and SH has documented in the EHR.      EXAM    CONSTITUTIONAL  General Appearance:  Normal, well developed, well nourished, no obvious distress  Ability to Communicate:  communicates appropriately.    HEAD AND FACE  Appearance and Symmetry:  Normal, no scalp or facial scarring or suspicious lesions.  TMJ: Pain to palpation of the left TMJ that is similar to the pain that she describes. She also has pain in the region of the temporalis and masseter muscles.    EARS  Clinical speech reception threshold:  Normal, able to hear normal speech.  Auricle:  Normal, Auricles without scars, lesions, masses.  External auditory canal:  Cerumen impaction left ear debrided with forceps.  Tympanic membrane:  Normal, Tympanic membranes normal without swelling or erythema.    NOSE (speculum or scope)  Architecture:  Normal, Grossly normal external nasal architecture with no masses or lesions.  Mucosa:  Normal mucosa, No polyps or masses.  Septum:  Normal, Septum non-obstructing.  Turbinates:  Normal, No turbinate abnormalities    ORAL CAVITY AND OROPHARYNX  Lips:  Normal.  Dental and gingiva:  Normal, No obvious dental or gingival disease.  Mucosa:  Normal, Moist mucous membranes.  Tongue:  Normal, Tongue mobile with no mucosal abnormalities  Hard and soft palate:  Normal, Hard and soft palate without cleft or mucosal lesions.  Oral  pharynx:  Normal, Posterior pharynx without lesions or remarkable asymmetry.  Saliva:  Normal, Clear saliva.  Masses:  Normal, No palpable masses or pathologically enlarged lymph nodes.    NECK  Masses/lymph nodes:  Normal, No worrisome neck masses or lymph nodes.  Salivary glands:  Normal, Parotid and submandibular glands.  Trachea and larynx position:  Normal, Trachea and larynx midline.  Thyroid:  Normal, No thyroid abnormality.  Tenderness:  Normal, No cervical tenderness.  Suppleness:  Normal, Neck supple    NEUROLOGICAL  Speech pattern:  Normal, Proasaic    RESPIRATORY  Symmetry and Respiratory effort:  Normal, Symmetric chest movement and expansion with no increased intercostal retractions or use of accessory muscles.     IMPRESSION  Cerumen impaction.  TMJ pain arthralgia with pain radiation to left temple and jaw.     RECOMMENDATION  I discussed conservative management using heat, massage and NSAIDS. Patient expressed understanding. Follow up as needed.    Julian Wynn MD

## 2021-06-08 NOTE — PROGRESS NOTES
"Dyana Nicole is a 80 y.o. female who is being evaluated via a billable telephone visit.      The patient has been notified of following:     \"This telephone visit will be conducted via a call between you and your physician/provider. We have found that certain health care needs can be provided without the need for a physical exam.  This service lets us provide the care you need with a short phone conversation.  If a prescription is necessary we can send it directly to your pharmacy.  If lab work is needed we can place an order for that and you can then stop by our lab to have the test done at a later time.    Telephone visits are billed at different rates depending on your insurance coverage. During this emergency period, for some insurers they may be billed the same as an in-person visit.  Please reach out to your insurance provider with any questions.    If during the course of the call the physician/provider feels a telephone visit is not appropriate, you will not be charged for this service.\"    Patient has given verbal consent to a Telephone visit? Yes    What phone number would you like to be contacted at? [phone]    Patient would like to receive their AVS by AVS Preference: Naomi.        Additional provider notes: Virtual visit completed.  Multiple concerns regarding lightheadedness and subsequent headaches.  Feels fatigued at times and describes herself as listless.  Does not require refills.  Had been without her CPAP for about a month and now has been using it again in the last week.  Blood pressures range between 115 and 133 systolic over 73-82 diastolic.  Was in the ER May 11, 2020.  CT head without obvious findings.  Is followed for pituitary mass and [facility] with [facility] scheduled May 28, 2020.  Atrial tachycardia history.  Followed by Dr. Garcia.  Using metoprolol tartrate 50 mg twice daily.  Can get lightheadedness when she stands up or moves around.  Orthostatic " vital signs in ER described as normal.  Does have headache off and more posterior left side.  No vision change.  Prickly sensation around left ear that can go to her jaw however this is transient and short-lived.  Previously on bupropion however switch to Effexor 75 mg twice daily by psychiatrist.  Comprehensive review of systems as above otherwise all negative.        Assessment/Plan:    1. Lightheadedness  Lightheadedness question associated with metoprolol tartrate 50 mg twice daily.  Also question of postconcussion syndrome following head injury 4 to 5 years ago described and will refer to compliant to 25 mg twice daily and call with update regarding blood pressure etc. in 2 weeks.  Recent Covid-19 testing negative.  - [facility]ulatory referral to [facility]    2. Essential hypertension with goal blood pressure less than 140/90  As above.    3. Nonintractable episodic headache, unspecified headache type  Question migraine equivalent however postconcussion syndrome possible as well from head injury 4 to 5 years ago and patient is interested in assessment your concussion clinic.  - Ambulatory referral to [facility]    4. Pituitary mass (H)  Does have scheduled follow-up MRI on 8/28/2020 and will notify with these results.          EXAM: CT HEAD WO CONTRAST  LOCATION: [facility]  DATE/TIME: 5/11/2020 2:24 PM     INDICATION: Lightheadedness.  COMPARISON: 04/12/2019 brain MRI.  TECHNIQUE: Routine without IV contrast. Multiplanar reformats. Dose reduction techniques were used.     FINDINGS:  INTRACRANIAL CONTENTS: No intracranial hemorrhage, extraaxial collection, or mass effect.  No CT evidence of acute infarct. Mild presumed chronic small vessel ischemic changes. Stable ventricular size and configuration. No significant intraparenchymal   volume loss. Calcification of the distal internal carotid arteries bilaterally. Small suprasellar lesion is redemonstrated but evaluated to much better effect on  prior sella protocol brain MRI. Of note, there is no associated calcification.     VISUALIZED ORBITS/SINUSES/MASTOIDS: Prior bilateral cataract surgery. Visualized portions of the orbits are otherwise unremarkable. No paranasal sinus mucosal disease. No middle ear or mastoid effusion.     BONES/SOFT TISSUES: No acute abnormality.     IMPRESSION:   1.  Mild age-related changes with no acute intracranial abnormality.     2.  The small suprasellar lesion previously described is evaluated to much better effect on prior brain MRI. Of note, there is no associated calcification.    Phone call duration:  23 minutes    True Pugh MD

## 2021-06-08 NOTE — PATIENT INSTRUCTIONS - HE
Your symptoms show that you may have coronavirus (COVID-19). This illness can cause fever, cough and trouble breathing. Many people get a mild case and get better on their own. Some people can get very sick.     Not all patients are tested for COVID-19. If you need to be tested, your care team will let you know.     How can I protect others?    Without a test, we can't know for sure that you have COVID-19. For safety, it's very important to follow these rules.    Stay home and away from others (self-isolate) until:    At least 10 days have passed since your symptoms started. And     You've had no fever--and no medicine that reduces fever--for 3 full days (72 hours). And      Your other symptoms have resolved (gotten better).     During this time:    Stay in your own room (and use your own bathroom), if you can.    Stay away from others in your home. No hugging, kissing or shaking hands.    No visitors.    Don't go to work, school or anywhere else.     Clean  high touch  surfaces often (doorknobs, counters, handles, etc.). Use a household cleaning spray or wipes.    Cover your mouth and nose with a mask, tissue or wash cloth to avoid spreading germs.    Wash your hands and face often. Use soap and water.    For more tips, go to https://www.cdc.gov/coronavirus/2019-ncov/downloads/10Things.pdf.    How can I take care of myself?    1. Get lots of rest. Drink extra fluids (unless a doctor has told you not to).     2. Take Tylenol (acetaminophen) for fever or pain. If you have liver or kidney problems, ask your family doctor if it's okay to take Tylenol.     Adults can take either:     650 mg (two 325 mg pills) every 4 to 6 hours, or     1,000 mg (two 500 mg pills) every 8 hours as needed.     Note: Don't take more than 3,000 mg in one day.   Acetaminophen is found in many medicines (both prescribed and over-the-counter medicines). Read all labels to be sure you don't take too much.   For children, check the Tylenol  bottle for the right dose. The dose is based on the child's age or weight.    3. If you have other health problems (like cancer, heart failure, an organ transplant or severe kidney disease): Call your specialty clinic if you don't feel better in the next 2 days.    4. Know when to call 911: If your breathing is so bad that it keeps you from doing normal activities, call 911 or go to the emergency room. Tell them that you've been staying home and may have COVID-19.      What are the symptoms of COVID-19?     The most common symptoms are cough, fever and trouble breathing.     Less common symptoms include body aches, chills, diarrhea (loose, watery poops), fatigue (feeling very tired), headache, runny nose, sore throat and loss of smell.     COVID-19 can cause severe coughing (bronchitis) and lung infection (pneumonia).    How does it spread?     The virus may spread when a person coughs or sneezes into the air. The virus can travel about 6 feet this way, and it can live on surfaces.      Common  (household disinfectants) will kill the virus.    Who is at risk?  Anyone can catch COVID-19 if they're around someone who has the virus.    How can others protect themselves?     Stay away from people who have COVID-19 (or symptoms of COVID-19).    Wash hands often with soap and water. Or, use hand  with at least 60% alcohol.    Avoid touching the eyes, nose or mouth.     Wear a face mask when you go out in public, when sick or when caring for a sick person.      For more about COVID-19 and caring for yourself at home, please visit the CDC website at https://www.cdc.gov/coronavirus/2019-ncov/about/steps-when-sick.html.     To learn about care at [facility], go to [email or web address].    Below are the COVID-19 hotlines at the [place] [facility] ([facility]). Interpreters are available.     For health questions: Call 294-245-1031 or 1-396.489.5919 (7 a.m. to 7  p.m.)    For questions about schools and childcare: Call 789-035-1827 or 1-508.300.3182 (7 a.m. to 7 p.m.)

## 2021-06-09 ENCOUNTER — AMBULATORY - HEALTHEAST (OUTPATIENT)
Dept: PHYSICAL MEDICINE AND REHAB | Facility: CLINIC | Age: 81
End: 2021-06-09

## 2021-06-09 ENCOUNTER — COMMUNICATION - HEALTHEAST (OUTPATIENT)
Dept: PHYSICAL MEDICINE AND REHAB | Facility: CLINIC | Age: 81
End: 2021-06-09

## 2021-06-09 DIAGNOSIS — M79.18 LEFT BUTTOCK PAIN: ICD-10-CM

## 2021-06-09 RX ORDER — SIMVASTATIN 20 MG
TABLET ORAL
Qty: 90 TABLET | Refills: 3 | Status: SHIPPED | OUTPATIENT
Start: 2021-06-09 | End: 2022-05-19

## 2021-06-09 NOTE — PROGRESS NOTES
Patient was not able to get her video visit to work through [employer] or [employer] so she will come in person next week.    Janet Barry, PT

## 2021-06-09 NOTE — TELEPHONE ENCOUNTER
Refill Approved    Rx renewed per Medication Renewal Policy. Medication was last renewed on   Omeprazole 1/8/2020  Levothyroxine 7/11/19    Grazyna Wolff, [facility] Connection Triage/Med Refill 7/4/2020     Requested Prescriptions   Pending Prescriptions Disp Refills     levothyroxine (SYNTHROID, LEVOTHROID) 50 MCG tablet [Pharmacy Med Name: Levothyroxine Sodium Oral Tablet 50 MCG] 90 tablet 0     Sig: TAKE 1 TABLET BY MOUTH DAILY AT 6AM       Thyroid Hormones Protocol Passed - 7/4/2020 10:25 AM        Passed - Provider visit in past 12 months or next 3 months     Last office visit with prescriber/PCP: 12/3/2019 True Pugh MD OR same dept: 12/3/2019 True Pugh MD OR same specialty: 12/3/2019 True Pugh MD  Last physical: 9/13/2019 Last MTM visit: Visit date not found   Next visit within 3 mo: Visit date not found  Next physical within 3 mo: Visit date not found  Prescriber OR PCP: True Pugh MD  Last diagnosis associated with med order: 1. Acquired hypothyroidism  - levothyroxine (SYNTHROID, LEVOTHROID) 50 MCG tablet [Pharmacy Med Name: Levothyroxine Sodium Oral Tablet 50 MCG]; TAKE 1 TABLET BY MOUTH DAILY AT 6AM  Dispense: 90 tablet; Refill: 0    2. Esophageal reflux  - omeprazole (PRILOSEC) 20 MG capsule [Pharmacy Med Name: Omeprazole Oral Capsule Delayed Release 20 MG]; TAKE ONE CAPSULE BY MOUTH TWICE DAILY   Dispense: 180 capsule; Refill: 0    If protocol passes may refill for 12 months if within 3 months of last provider visit (or a total of 15 months).             Passed - TSH on file in past 12 months for patient age 12 & older     TSH   Date Value Ref Range Status   09/17/2019 2.92 0.30 - 5.00 uIU/mL Final                      omeprazole (PRILOSEC) 20 MG capsule [Pharmacy Med Name: Omeprazole Oral Capsule Delayed Release 20 MG] 180 capsule 0     Sig: TAKE ONE CAPSULE BY MOUTH TWICE DAILY       GI Medications Refill Protocol Passed - 7/4/2020 10:25 AM        Passed - PCP or prescribing  provider visit in last 12 or next 3 months.     Last office visit with prescriber/PCP: 12/3/2019 True Pugh MD OR same dept: 12/3/2019 True Pugh MD OR same specialty: 12/3/2019 True Pugh MD  Last physical: 9/13/2019 Last MTM visit: Visit date not found   Next visit within 3 mo: Visit date not found  Next physical within 3 mo: Visit date not found  Prescriber OR PCP: True Pugh MD  Last diagnosis associated with med order: 1. Acquired hypothyroidism  - levothyroxine (SYNTHROID, LEVOTHROID) 50 MCG tablet [Pharmacy Med Name: Levothyroxine Sodium Oral Tablet 50 MCG]; TAKE 1 TABLET BY MOUTH DAILY AT 6AM  Dispense: 90 tablet; Refill: 0    2. Esophageal reflux  - omeprazole (PRILOSEC) 20 MG capsule [Pharmacy Med Name: Omeprazole Oral Capsule Delayed Release 20 MG]; TAKE ONE CAPSULE BY MOUTH TWICE DAILY   Dispense: 180 capsule; Refill: 0    If protocol passes may refill for 12 months if within 3 months of last provider visit (or a total of 15 months).

## 2021-06-09 NOTE — PROGRESS NOTES
"Video Visit  Dyana Nicole is a 80 y.o. female who is being evaluated via a billable video visit in light of the ongoing global health crisis (COVID-19) that requires us to abide by social distancing mandates in order to reduce the risk of COVID-19 exposure.       The patient has been notified of following:     \"This video visit will be conducted via a video call between you and your physician/provider. We have found that certain health care needs can be provided without the need for a physical exam.  This service lets us provide the care you need with a short phone/video conversation.  If a prescription is necessary we can send it directly to your pharmacy.  If lab work is needed we can place an order for that and you can then stop by our lab to have the test done at a later time.    If during the course of the call the physician/provider feels a telephone visit is not appropriate, you will not be charged for this service.\"     Patient has given verbal consent to a video visit? 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     ALLERGIES  Oxycodone; Penicillins; Pollen; and Venom-honey bee    Date of accident : June 2017    Orders from previous visit:   Neuropsychological assessment completed     Currently doing PT  No   Completed    Currently doing OT  No   Completed     Currently doing ST   No   Completed No   Psychology  Yes    Done where:     Any new medication (other provider):   No   Meds started at last appointment  No   Meds increased at last appointment    No   Currently on medication to help with sleep    Yes    Trazodone     Currently on any mental health medications     Yes   Venlafaxine       Currently on medication for attention, ADD/ADHD    No       Is patient on a controlled substance   No     Any concerns would like to be addressed at this appointment?    No concerns, mentions a soft spot on head and wondering if it could be arthritis                                            " "           Workman's Comp   No     Start Time: 11:40am    End Time:  11:48am    Total time of phone call: 8 minutes    Patient would like the video invitation sent by: Linda Cole/e-mail address: [email or web address]  Leann Damian CMA     Outpatient Follow up Mild TBI (Concussion)  Evaluation     Pertinent History:  patient has been having concussive symptoms since an injury and June 2017.  The patient was fixing a float for a parade.  The arie was not properly placed underneath the float. When another person stood up at the same time as the patient the arie came loose and the patient slid down hitting her head on the cement. Patient's main complaint is still having headaches and cognitive issues since injury.    Date of accident : June 2017      Subjective:          HPI    The patient returns to the concussion clinic for a follow up visit, She was last seen by me on 6/10/2020, where no medication changes were made.  The patient reports that she is finally sleeping through the night.  Her daughter also is really overwhelming with her control over the patient's care.  That relationship has stopped and the patient's other daughter is now the person doing with the patient's medical concerns.  Patient reports this was a great improvement in the patient.  Overall the patient is reporting improvement in physical, cognitive, and emotional symptoms.  She is doing PT eval at [facility].  Patient is more optimistic about her recovery.  And is finally seeing improvement in most symptoms.    We discussed some treatment options and have elected to continue with current therapies, patient will return in 8 weeks and will discuss but other symptoms have not resolved..                                                      Headaches:  Significant ongoing headaches Yes  Headaches: Intermittently  Improvement :Yes   Current Headache No   Wake with HA  No     Worse Headache    4/10           How often: \"not very often\"    Average " Headache 2/10.    Best Headache 2/10.  Brings on HA:   Doing too much  Makes symptoms worse  Some activity  Makes symptoms better. rest  Taking  acetaminophen (Tylenol)        Helpful:  Yes     Physical Symptoms:  Headache-Yes       Since last visit  Improved     Nausea-No        Balance problems - No       Dizziness - Yes          Since last visit  Improved     Visual problems - Yes    Since last visit  Improved     Fatigue - Yes             Since last visit  Improved     Sensitivity to light - Yes       Since last visit  Improved     Sensitivity to sound - Yes         Since last visit  Improved     Numbness/tingling - Yes     Since last visit  Improved         Cognitive Symptoms  Feeling mentally foggy -Yes       Since last visit  Improved     Feeling slowed down -Yes       Since last visit  Improved     Difficulty Concentrating- Yes     Since last visit  Same     Difficulty remembering - Yes        Since last visit  Same       Emotional Symptoms  Irritability - Yes         Since last visit  Improved     Sadness-  Yes      Since last visit  Improved     More emotional - Yes      Since last visit  Improved     Nervousness/anxiety -Yes       Since last visit  Improved       Mental Health History:  Anxiety - Yes  Depression - No  Sleep Disorders - Yes, OCA she is compliant with CPAP  Any thought of hurting self or others currently?   No  Any history of hurting self or others?            No    Sleep History:  Drowsiness- Yes    Since last visit  Improved     Sleep less than usual - No  Sleep more than usual - No  Trouble falling asleep - No      Does the patient wake feeling rested - some times       Since last visit  Improved        Migraine Headaches      Patient history of migraines.    No      Exertion:         Do the above stated symptoms worsen with physical activity? Yes       Since last visit  Improved           Do the above stated symptoms worsen with cognitive activity? Yes      Since last visit  Improved             Work/School            Have your returned to work/school? No          Patient Active Problem List    Diagnosis Date Noted     Snoring 01/04/2019     Acute cystitis without hematuria      Anemia due to blood loss, acute 12/27/2018     Lower GI bleed 12/26/2018     BRBPR (bright red blood per rectum) 12/26/2018     Obesity (BMI 35.0-39.9) with comorbidity (H) 08/30/2018     Atrial tachycardia (H) 08/30/2018     Left ventricular outflow obstruction 08/30/2018     Hypertensive left ventricular hypertrophy, without heart failure 08/30/2018     Leg weakness, bilateral 06/25/2018     Lymphedema 06/25/2018     Adrenal insufficiency (H) 06/08/2018     Fatigue due to excessive exertion 05/25/2018     Pain in both lower extremities 05/17/2018     Venous insufficiency of both lower extremities 05/17/2018     Venous hypertension of both lower extremities 05/17/2018     Fat deposits 05/17/2018     Class 2 obesity due to excess calories in adult 05/17/2018     Vitamin D deficiency 05/17/2018     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     Essential hypertension with goal blood pressure less than 140/90 03/28/2017     Exertional dyspnea 09/15/2016     Generalized anxiety disorder 09/30/2015     Recurrent major depressive disorder, remission status unspecified (H) 09/30/2015     Arthralgia 09/29/2015     Hypothyroidism      JANICE on CPAP      Peripheral Neuropathy      Walk Is Wobbly Or Unsteady (Ataxia)      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      Insomnia      Heartburn      Arthritis      Cervical Spondylosis      Generalized Osteoarthritis Of The Hand      Lumbar Spondylosis      Trochanteric Bursitis      Lower Back Pain      Osteopenia      Microscopic colitis, unspecified microscopic colitis type      Fibromyalgia      Anemia      Hypertension      Hyperlipidemia      Past Medical History:   Diagnosis Date     Anemia     Created by Conversion      Arrhythmia      Cervical Spondylosis     Created by Conversion       Depression      Disease of thyroid gland     hypo     Fibromyalgia     Created by Conversion      GERD (gastroesophageal reflux disease)      High cholesterol      History of transfusion      Hyperlipidemia     Created by Conversion      Hypertension     Created by Conversion      Near syncope      Peripheral Neuropathy     Created by Conversion      Skin cancer, basal cell      Sleep apnea     mild     Tachycardia      Past Surgical History:   Procedure Laterality Date     Bladder lift       CARPAL TUNNEL RELEASE       CATARACT EXTRACTION, BILATERAL       CHOLECYSTECTOMY       HYSTERECTOMY       JOINT REPLACEMENT Bilateral     knees     MOHS SURGERY      Nose     OOPHORECTOMY Left     1 removed, 1 remains     AK ARTHROPLASTY TIBIAL PLATEAU      Description: Knee Replacement;  Recorded: 05/16/2013;     AK INJECT NERV BLCK,OTHR PERIPH NERV      Description: Peripheral Nerve Block Wrist Median Right;  Recorded: 05/16/2013;     REPLACEMENT TOTAL KNEE      L     REPLACEMENT TOTAL KNEE BILATERAL       ROTATOR CUFF REPAIR       TRUNK SKIN LESION EXCISIONAL BIOPSY Left 1/5/2018    Procedure: LEFT BUTTOCK EXPLORATION, EXCISION BUTTOCK MASS;  Surgeon: Lg Jameson MD;  Location: [facility] Main OR;  Service:      Family History   Problem Relation Age of Onset     Aneurysm Father         AAA     Cancer Mother         pancrease     Current Outpatient Medications   Medication Sig Dispense Refill     aspirin 81 MG EC tablet Take 81 mg by mouth at bedtime.        biotin 5 mg Tab Take 5,000 mcg by mouth daily.       calcium carbonate-vitamin D3 (CALCIUM 600 + D,3,) 600 mg calcium- 200 unit cap Take 1 tablet by mouth 2 (two) times a day.              celecoxib (CELEBREX) 200 MG capsule Take 1 capsule (200 mg total) by mouth daily. 90 capsule 1     estrogens, conjugated, (PREMARIN) 0.3 MG tablet Take 1 tablet (0.3 mg total) by mouth daily. 90 tablet 3     fluticasone propionate (FLONASE) 50 mcg/actuation nasal spray INSTILL 2  SPRAYS IN EACH NOSTRIL DAILY AS NEEDED 48 g 3     furosemide (LASIX) 20 MG tablet Take 1 tablet (20 mg total) by mouth daily. 90 tablet 3     LACTOBAC CMB #3/FOS/PANTETHINE (PROBIOTIC & ACIDOPHILUS ORAL) Take 1 capsule by mouth once daily. 1.5 billion units             levothyroxine (SYNTHROID, LEVOTHROID) 50 MCG tablet TAKE 1 TABLET BY MOUTH DAILY AT 6AM 90 tablet 0     metoprolol tartrate (LOPRESSOR) 50 MG tablet Take 1 tablet (50 mg total) by mouth 2 (two) times a day. 180 tablet 1     multivitamin with minerals (THERA-M) 9 mg iron-400 mcg Tab tablet Take 1 tablet by mouth every evening.       omeprazole (PRILOSEC) 20 MG capsule TAKE ONE CAPSULE BY MOUTH TWICE DAILY  180 capsule 0     simvastatin (ZOCOR) 20 MG tablet TAKE 1 TABLET BY MOUTH AT BEDTIME 90 tablet 3     traZODone (DESYREL) 50 MG tablet Take 25 mg by mouth at bedtime.       venlafaxine (EFFEXOR) 75 MG tablet Take 75 mg by mouth 2 (two) times a day.       No current facility-administered medications for this encounter.      Social History     Socioeconomic History     Marital status:      Spouse name: Not on file     Number of children: Not on file     Years of education: Not on file     Highest education level: Not on file   Occupational History     Not on file   Social Needs     Financial resource strain: Not on file     Food insecurity     Worry: Not on file     Inability: Not on file     Transportation needs     Medical: Not on file     Non-medical: Not on file   Tobacco Use     Smoking status: Former Smoker     Packs/day: 0.25     Years: 1.00     Pack years: 0.25     Last attempt to quit: 1961     Years since quittin.5     Smokeless tobacco: Never Used   Substance and Sexual Activity     Alcohol use: No     Drug use: No     Sexual activity: Never   Lifestyle     Physical activity     Days per week: Not on file     Minutes per session: Not on file     Stress: Not on file   Relationships     Social connections     Talks on phone: Not  on file     Gets together: Not on file     Attends Confucianist service: Not on file     Active member of club or organization: Not on file     Attends meetings of clubs or organizations: Not on file     Relationship status: Not on file     Intimate partner violence     Fear of current or ex partner: Not on file     Emotionally abused: Not on file     Physically abused: Not on file     Forced sexual activity: Not on file   Other Topics Concern     Not on file   Social History Narrative    Lives in senior living with  of over 50 years. 3 daughters       The following portions of the patient's history were reviewed and updated as appropriate: allergies, current medications, past family history, past medical history, past social history, past surgical history and problem list.    Review of Systems  A comprehensive review of systems was negative except for: What is noted above    Objective:       Discussion was held with the patient today regarding concussion in general including types of injury, symptoms that are common, treatment and variability in time to recover. Education about concussion symptoms and length of time it would take the patient to recover was also given to the patient.  I have reassured the patient her symptoms are very common when a concussion is present and will improve with time. We discussed the risks and benefits of the medication including risk of worsening depression with medication adjustments and even the possibility of emergence of suicidal ideations.       Total time spent with the patient today was 40 minutes with greater than 50% of the time spent in counseling and care coordination. The patient agrees to call before then with any questions, concerns or problems. We will assess for the appropriateness of possible psychotropic medication trials/changes. The patient will seek out appropriate emergency services should that become necessary.    Diagnosis managed and treated at today's  visit :  Post concussion syndrome  Post concussion headache  Dizziness  Fatigue  Insomnia  Sensitivity to light  Sound sensitivity  Concentration and Attention deficit  Memory difficulties  Anxiety d/t a medical condition  Irritability   Neck pain     Plan:  Medication Adjustment:  No medication changes    Other:   Patient will return to clinic in  8 weeks. They agree to call or return sooner with any questions or concerns.  Risks and benefits were discussed.  Continue with individual therapist.     Continue with the support of the clinic, reassurance, and redirection. Staff monitoring and ongoing assessments per team plan. Current psychotropic medication appears to represent the minimum effective dosage and appears medically necessary. We will continue to monitor and reassess. This team will utilize appropriate emergency services if necessary. I will make myself available if concerns or problems arise.     Mental Status Examination  She is cooperative with questioning. She is fully engaged in conversation today. Speech is normal. Thought processes normal with normal prehension and expression. Thoughts are organized and linear. Content is pertinent to the conversation and without evidence of auditory or visual hallucinations. No delusional ideation. Gen. fund of knowledge, insight and memory are normal       Video Visit Details    Type of service: Video Visit    Video Start Time: 1300    Video End Time: 1340    Total time of video visit: 40 minutes    Originating Location: Patient's home    Distant Location:  [facility] [place]    Mode of Communication: Video Conference via [facility]    General Information:  Today you had your appointment with Alida Camarillo CNP     If lab work was done today as part of your evaluation you will generally be contacted via My Chart, mail, or phone with the results within 1-5 days. If there is an alarming result we will contact you by phone. Lab  results come back at varying times, I generally wait until all labs are resulted before making comments on results. Please note labs are automatically released to My Chart once available.     If you need refills please contact your pharmacist. They will send a refill request to me to review. Please allow 3 business days for us to process all refill requests.     Please call or send a medical message through My Chart, with any questions or concerns    If you need any paperwork completed please fax forms to [phone]. Please state if you would like a copy of the completed paperwork, mailed or faxed back to the patient and a fax number to fax the paperwork to. Please allow up to 10 days for paperwork to be completed.    Alida Camarillo, CNP

## 2021-06-09 NOTE — PROGRESS NOTES
Subjective:    Dyana Nicole is seen today for hospital follow-up.  Recent admission while in [place] following syncopal type episode while in her kitchen at home.   helped her to the ground laid her flat.  No seizure activity described.  No history of seizure.  Had felt somewhat poorly prior and described nonspecific abdominal discomfort perhaps.  Was hospitalized ×3 days.  Question of atrial fibrillation demonstrated on heart monitor.  CT head did show evidence of a 4.7 x 4.1 mm midline mass distal pituitary stock patient does have appointment neurosurgeon through [facility] group on April 6, 2017 Dr. Sutherland.  History of chronic diastolic dysfunction.  Followed by Dr. Garcia.  Event monitor last fall failed to show demonstrated atrial fibrillation.  Was started on Toprol extended release 25 mg daily.  Has had 2 episodes lasting perhaps 1 minute initially and 2-1/2 minutes subsequently which she felt irregular heartbeat which then resolved.  Continues levothyroxine 50  g daily.  Describes being prescribed Z-Clay and the February until early March for upper respiratory infection.  Still has significant postnasal and chest congestion.  Remains on aspirin 81 mg daily for anticoagulation.  Has left gluteal mass which has been there for the last couple years and was apparently nicked by Dr. Cox however was to have definitive excision apparently by Dr. Boyce.  Comprehensive review of systems as above otherwise all negative.  Past medical social and family history reviewed and updated as noted below.     - Ray  3 daughters - Genesis ( at [facility])  Smoke - quit in 60s after 1 year  EtOH: sober x 33 years  Surgeries: gallbladder, appy, hysterectomy and ? 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; left CTS release; bladder lift; right TKA; left rotator cuff  Hospitalizations:    [facility] off  [facility]  emergency/ Nguyễn Nicole , cell  [facility] pharmacy [place]  [facility] pharmacy [facility]  non smoker lives in [place], [place] October-April vacation in [place] sees Dr. Eaton    Reviewed cardiology note with Dr. Garcia from 3/15/17:  Assessment:   1. Recurrent syncope related to orthostatic hypotension. Orthostatic hypotension was clearly documented on vital at OSH. It is unclear why the patient's Lasix were not stopped at the outside hospital. Recommended that she stop her Lasix at the last follow-up. Patient has sigmoid septum. She has a dynamic outflow murmur today  2.  Questionable history of paroxysmal atrial fibrillation. On review of outside records there is a possibility that the patient had a paroxysmal event of atrial fibrillation. There is no twelve-lead or telemetry strip documenting this. There is no duration of the event. She was not started on beta-blocker or calcium channel blocker and was also not started on anticoagulation.   3. Exertional dyspnea. Improving with exercise routine. PFT normal. E/A reversal on echo but no clinical signs of CHF.   4. Hypertension  5. Hyperlipidemia     Plan:  1. Discontinue lasix . If patient had significant dehydration sigmoid septum could result worsening LVOT gradient  2. Continue adequate hydration  3. Continue exercise.   4. Start Toprol XL 25 mg daily to decrease LVOT gradient.   5.  Discussed the fact that she should avoid driving until 6 months free of episodes of syncope.  6.  Recommend Sleep study. History of Sleep apnea in past. STOP:3. [facility]: 11  7. Long discussion regarding anticoagulation with patient. Given questionable episode of atrial fibrillation with no clear documentation of this event at this point I would not recommend starting anticoagulation also given recurrent syncopal episodes. If we do document further  episodes of atrial fibrillation will recommend starting anticoagulation. If she continues to have syncope would recommend watchman device.  She understands stroke risk associated with atrial fibrillation.  Follow-up in 4 weeks.       Past Surgical History:   Procedure Laterality Date     CHOLECYSTECTOMY       HYSTERECTOMY       JOINT REPLACEMENT Bilateral     knees     CA ARTHROPLASTY TIBIAL PLATEAU      Description: Knee Replacement;  Recorded: 05/16/2013;     CA INJECT NERV BLCK,OTHR PERIPH NERV      Description: Peripheral Nerve Block Wrist Median Right;  Recorded: 05/16/2013;        Family History   Problem Relation Age of Onset     Aneurysm Father         Past Medical History:   Diagnosis Date     Anemia     Created by Conversion      Arrhythmia      Cervical Spondylosis     Created by Conversion      Depression      Disease of thyroid gland     hypo     Fibromyalgia     Created by Conversion      Hyperlipidemia     Created by Conversion      Hypertension     Created by Conversion      Near syncope      Peripheral Neuropathy     Created by Conversion      Sleep apnea     mild     Tachycardia         Social History   Substance Use Topics     Smoking status: Former Smoker     Quit date: 1/1/1961     Smokeless tobacco: Never Used     Alcohol use No        Current Outpatient Prescriptions   Medication Sig Dispense Refill     aspirin 81 MG EC tablet Take 81 mg by mouth daily.       BIOTIN ORAL Take 5,000 mcg by mouth once daily.        calcium carbonate-vitamin D3 (CALCIUM 600 + D,3,) 600 mg calcium- 200 unit cap Take 1 tablet by mouth daily.       celecoxib (CELEBREX) 200 MG capsule TAKE ONE CAPSULE BY MOUTH EVERY DAY 90 capsule 1     estradiol (ESTRACE) 0.5 MG tablet Take 1 tablet (0.5 mg total) by mouth daily. 30 tablet 11     fluticasone (FLONASE) 50 mcg/actuation nasal spray 2 sprays into each nostril daily as needed.        KRILL OIL ORAL Take 1,000 mg by mouth bedtime.        LACTOBAC CMB #3/FOS/PANTETHINE  "(PROBIOTIC & ACIDOPHILUS ORAL) Take 1 capsule by mouth once daily.        levothyroxine (SYNTHROID, LEVOTHROID) 50 MCG tablet TAKE ONE TABLET BY MOUTH EVERY DAY 90 tablet 1     metoprolol succinate (TOPROL XL) 25 MG Take 1 tablet (25 mg total) by mouth daily. 30 tablet 5     MV-MINERALS/FA/OMEGA 3,6,9 #3 (WOMEN'S 50+ ADVANCED ORAL) Take 1 tablet by mouth daily.       omeprazole (PRILOSEC) 20 MG capsule TAKE ONE CAPSULE BY MOUTH TWICE A  capsule 2     simvastatin (ZOCOR) 20 MG tablet Take 1 tablet (20 mg total) by mouth bedtime. 90 tablet 3     traZODone (DESYREL) 50 MG tablet TAKE 1/2 TABLET BY MOUTH AT BEDTIME 45 tablet 1     venlafaxine (EFFEXOR-XR) 75 MG 24 hr capsule TAKE ONE CAPSULE BY MOUTH THREE TIMES A  capsule 0     doxycycline (MONODOX) 100 MG capsule Take 1 capsule (100 mg total) by mouth 2 (two) times a day for 10 days. 20 capsule 0     No current facility-administered medications for this visit.           Objective:    Vitals:    03/28/17 1226   BP: 132/60   Pulse: 76   Resp: 14   Weight: 178 lb 6.4 oz (80.9 kg)   Height: 5' 2\" (1.575 m)      Body mass index is 32.63 kg/(m^2).    Alert.  No apparent distress.  HEENT exam with conjunctiva clear.  TMs normal.  Nasopharynx with increased congestion and scant postnasal drainage.  Oropharynx otherwise with moist mucous membranes.  Neck supple.  No JVD.  Chest clear.  Cardiac exam regular.  2/6 systolic murmur at apex and right upper sternal border.  Abdomen benign without guarding or rebound.  Positive bowel sounds.  No evidence of abdominal mass.  Extremities warm and dry.  Left gluteal mass present with mild tenderness.  No overlying skin changes.  Extremities warm and dry.  Neurologic exam appears nonfocal.      US ABDOMEN LIMITED  8/31/2016 9:19 AM     INDICATION: LEFT LOWER BUTTOCK MASS  COMPARISON: CT abdomen and pelvis 4/28/2016. Ultrasound 7/8/2015.     FINDINGS:  Targeted grayscale and Doppler ultrasound evaluation was performed of the " left buttock. In the subcutaneous fat there is a lobulated hypoechoic collection with internal echogenic components measuring 5.1 x 5.0 x 1.0 cm which is similar size to CT abdomen  4/28/2016 and slightly smaller compared to ultrasound of 7/8/2015. As on the prior ultrasound, Doppler blood flow demonstrated within a vessel along the margin of the collection.     IMPRESSION:   CONCLUSION:  Stable collection in the left buttock, probably hematoma.        Assessment:    1. Orthostatic hypotension     2. Essential hypertension with goal blood pressure less than 140/90     3. Obstructive Sleep Apnea     4. Hypothyroidism, unspecified type     5. Pituitary mass     6. Mass  Ambulatory referral to General Surgery    CANCELED: Ambulatory referral to General Surgery   7. Acute sinusitis  doxycycline (MONODOX) 100 MG capsule         Plan:    40 minutes total time with patient, > 50% with counseling and coordination of cares.  Reviewed recent hospitalization March 3, 2017 while in [place] near [place].  Reviewed recent cardiology follow-up March 15, 2017 with Dr. Garcia with evidence of diastolic dysfunction, chronic.  Patient will continue Toprol XL 25 mg daily.  Monitor for palpitations or irregular heart rate.  Will follow up with Dr. Garcia of schedule April 20, 2017.  Has appointment neurosurgeon Dr. Sutherland April 6, 2017 through [facility].  Referral placed to see Dr. Clay regarding the left gluteal mass for likely definitive excision.  Doxycycline 100 mg twice daily ×10 days as well as Mucinex as needed for sinusitis concerns with postnasal drainage.  Continued monitoring regarding chronic health concerns including hypothyroidism, JANICE, etc.  Patient states recent lab evaluation through hospitalization etc. normal.  Did review documents regarding echocardiogram March 4 with EF 60-65% and left ventricular systolic function normal with grade 1 out of 4 diastolic dysfunction present.  Stress nuclear  study described as normal March 5, 2017.

## 2021-06-10 NOTE — PROGRESS NOTES
[facility] Daily Progress     Patient Name: Dyana Nicole  Date: 2020  Visit # 7  PTA visit #: 0  Referral Diagnosis: neck pain/HA's  Referring provider: True Pugh MD  Visit Diagnosis:     ICD-10-CM    1. Neck pain  M54.2    2. Post concussion syndrome  F07.81    3. Cervicalgia  M54.2    4. Cervicogenic headache  R51    5. Cervical spondylosis without myelopathy  M47.812          Assessment:     Overall pt feels therapy is helping with the pain levels but continues to have some sore spots and pain.  She felt the addition of the exercises today was helpful but were limited in what we could add as her right arm is sore from crocheting.      Patient is appropriate to continue with skilled physical therapy intervention, as indicated by initial plan of care.    Goal Status:  Pt. will report decreased intensity, frequency of : in 6 weeks;Pain;Headache    Pt will: Able to turn head easier to talk to people or for driving with more ease as AROM improves within 8-12 visits  Pt will: Able to look to read or joan 45 minutes with proper posture and pain levels 2/10 within 8-12 visits as neck strength improves      Plan / Patient Education:     Continue with initial plan of care.    Subjective:     Pain Ratin/10       My knees are still sore from this weekend.  We did a lot of sitting and there was a long car ride.  I still have the soreness in the back of my head but overall think I am feeling better.    Objective:     LV, neural fascial tensions.     Knee: pain to palpation over left lateral hamstring near insertion.  Advised pt to ice  20    Exercises:  Exercise #1: cervical AROm:  flex, ext and rotation   hold 10-15 seconds X 3-5 reps  Comment #1: chin tuck and scapular retraction   hold 10 seconds x 10 reps  Exercise #2: reverse wall push-up  hold 10 seconds x 6 reps  Comment #2: cervical isometrics:  extension and rotation    hold 10 seconds X 6 reps.  Exercise #3: may need to be  supine pushing into pillow  for extension as it hurts her head to push into hands      Treatment Today     TREATMENT MINUTES COMMENTS   Evaluation     Self-care/ Home management 8 edu on heat versus ice    Manual therapy  Fascial release using Strain-Counterstrain of B cranial/cervical/sternal-LV, B cerivcal gray rami-N, B cranial dura-N   Neuromuscular Re-education  Recip inhib of neural, LV fascia   Therapeutic Activity     Therapeutic Exercises 20   [id number]   Gait training     Modality__________________                Total 28    Blank areas are intentional and mean the treatment did not include these items.       Janet Barry, PT  8/21/2020

## 2021-06-10 NOTE — TELEPHONE ENCOUNTER
RN cannot approve Refill Request    RN can NOT refill this medication med is not covered by policy/route to provider. Last office visit: 12/3/2019 True Pugh MD Last Physical: 9/13/2019 Last MTM visit: Visit date not found Last visit same specialty: 12/3/2019 True Pugh MD.  Next visit within 3 mo: Visit date not found  Next physical within 3 mo: Visit date not found      Celia Correia, [facility] Triage/Med Refill 8/1/2020    Requested Prescriptions   Pending Prescriptions Disp Refills     celecoxib (CELEBREX) 200 MG capsule [Pharmacy Med Name: Celecoxib Oral Capsule 200 MG] 90 capsule 0     Sig: TAKE ONE CAPSULE BY MOUTH ONE TIME DAILY       There is no refill protocol information for this order

## 2021-06-10 NOTE — PROGRESS NOTES
[facility] Daily Progress     Patient Name: Dyana Nicole  Date: 2020  Visit #:2  PTA visit #: 0  Referral Diagnosis: [unfilled]  Referring provider: True Pugh MD  Visit Diagnosis:     ICD-10-CM    1. Neck pain  M54.2    2. Post concussion syndrome  F07.81    3. Cervicalgia  M54.2    4. Cervicogenic headache  R51          Assessment:     Results of Therapy    HA reduced 4-5/10  To 2-3/10   Lymphatics of the neck and head responded well to releases.   Continue with head lymphatics  Lightheadedness is less    Patient is appropriate to continue with skilled physical therapy intervention, as indicated by initial plan of care.    Goal Status:  Pt. will report decreased intensity, frequency of : in 6 weeks;Pain;Headache    Pt will: Able to turn head easier to talk to people or for driving with more ease as AROM improves within 8-12 visits  Pt will: Able to look to read or joan 45 minutes with proper posture and pain levels 2/10 within 8-12 visits as neck strength improves      Plan / Patient Education:     Continue with initial plan of care.    Subjective:     Pain Ratin-5/10  Pain to the neck and up into the L side of the head        Objective:      4 sec  Sphenoid restriction,   Restricted lymphatics  Neck 10 sec,    Head 12 sec   Dural restrictions C3,6,7,T3,12    Treatment Today     TREATMENT MINUTES COMMENTS   Evaluation     Self-care/ Home management     Manual therapy 60 MFR OA,  Dural tube pull   SCS to KELSY-LV  CT-LV,  AJ-LV,   CEPH-LV,   L  EPIC2,3,4,5,6-LV,   L SIG1-LV,   LPS-LV,   L ISS-LV,   HARRIS-LV    Neuromuscular Re-education     Therapeutic Activity     Therapeutic Exercises     Gait training     Modality__________________                Total 60    Blank areas are intentional and mean the treatment did not include these items.       Randy Spivey  2020

## 2021-06-10 NOTE — PROGRESS NOTES
[facility]   Initial Evaluation        [facility] Certification Request    July 28, 2020      Patient: Dyana Nicole  MR Number: [id number]  YOB: 1940  Date of Visit: 7/28/2020      Dear VEE Turner    Thank you for this referral.   We are seeing Dyana Nicole for Physical Therapy of neck, back and concussion symptoms.    Medicare and/or Medicaid requires physician review and approval of the treatment plan. Please review the plan of care and verify that you agree with the therapy plan of care by co-signing this note.      If you have any questions or concerns, please don't hesitate to call.    Sincerely,      Janet Barry, PT        Physician recommendation:     ___ Follow therapist's recommendation        ___ Modify therapy      *Physician co-signature indicates they certify the need for these services furnished within this plan and while under their care.      Patient Name: Dyana Nicole  Date of evaluation: 7/28/2020  Referral Diagnosis: Neck, Back and concussion  Referring provider: Alida Camarillo FNP  Visit Diagnosis:     ICD-10-CM    1. Cervicalgia  M54.2    2. Neck pain  M54.2 PT eval and treat   3. Post concussion syndrome  F07.81 PT eval and treat   4. Low back pain, unspecified back pain laterality, unspecified chronicity, unspecified whether sciatica present  M54.5 PT eval and treat   5. Cervicogenic headache  R51        Assessment:        Patient wants to focus on her neck and head and not the low back.  She feels that is feeling ok at this time.    Patient presents with chronic neck pain with headaches which has progressively getting worse.  Two years ago she hit her head on the floor.  She had physical therapy but doesn't feel the pain ever fully resolved.  The past 6+ months the pain has intensified.  She had a CT and MRI in the past which were essentially negative.  She did report having arthritic changes in her neck.  She demonstrates pain  to palpation, decrease AROM, decrease flexibility and headaches.  Functional limitations are described as occurring with: turning head, headaches, reading, crocheting.  Pt. is appropriate for skilled PT intervention as outlined in the Plan of Care (POC).  She did feel her muscles were looser after therapy and the massage today. She will also see a therapist for other manual therapy techniques to help with pain levels.    She is caring more for her  as his health declines and is stressed with the situation.  She doesn't feel her symptoms are related to the stress of caring for her .    Goals:  Pt. will report decreased intensity, frequency of : in 6 weeks;Pain;Headache    Pt will: Able to turn head easier to talk to people or for driving with more ease as AROM improves within 8-12 visits  Pt will: Able to look to read or joan 45 minutes with proper posture and pain levels 2/10 within 8-12 visits as neck strength improves      Patient's expectations/goals are realistic.    Barriers to Learning or Achieving Goals:  Past Medical History:   Diagnosis Date     Anemia     Created by Conversion      Arrhythmia      Cervical Spondylosis     Created by Conversion      Depression      Disease of thyroid gland     hypo     Fibromyalgia     Created by Conversion      GERD (gastroesophageal reflux disease)      High cholesterol      History of transfusion      Hyperlipidemia     Created by Conversion      Hypertension     Created by Conversion      Near syncope      Peripheral Neuropathy     Created by Conversion      Skin cancer, basal cell      Sleep apnea     mild     Tachycardia           Plan / Patient Instructions:        Plan of Care:   Authorization / Certification Start Date: 07/28/20  Authorization / Certification End Date: 10/23/20  Authorization / Certification Number of Visits: 12, prn  Communication with: Referral Source  Patient Related Instruction: Nature of Condition;Treatment plan and  "rationale;Self Care instruction;Basis of treatment  Times per Week: 1-2  Number of Weeks: 12  Number of Visits: 8-12, prn  Therapeutic Exercise: ROM;Stretching;Strengthening  Neuromuscular Reeducation: kinesio tape;posture;core  Manual Therapy: soft tissue mobilization;myofascial release;joint mobilization  Modalities: traction;TENS;electrical stimulation      Plan for next visit:   Intro stretches  Review stretches prn  Massage  UBE       Subjective:           History of Present Illness:    Dyana is a 80 y.o. female who presents to therapy today with complaints of neck and concussion symptoms.  Two years ago she was on a trailer fixing a float and the trailer tipped and she went down and hit her head on the cement floor.  As time has progressed the symptoms have been better worse and more intense instead of better.  The pain is on the left side of her head where she had the stitches from the fall. She gets pressure and  tingling into her head and ears and can't lay on that side.  Denies symptoms into her extremities.  The headaches have been increasing as well. She had a CT scan which was normal. She did have extensive physical therapy spring of 2019 but feels it was not very helpful or lasting. Symptoms are constant and getting worse. Reports having 4 bad bumps on her head in the past 10 years and each scan has come up negative. She also reported at one point a few years ago she was told she has arthritic changes in her neck.    Pain Ratin}  Pain rating at best: 6  Pain rating at worst: 9  Pain description: burning, dull, pain, shooting, tingling and \"hurt\"    Functional limitations are described as occurring with:   turning head, headaches, reading, crocheting    Patient reports benefit from:  arthritis strength Tylenol, rest       Objective:      Patient Outcome Measures :    Neck Disability Score in %: 62     Scores range from 0-100%, where a score of 0% represents minimal pain and maximal function. The " minmal clinically important difference is a score reduction of 10%.       Examination  1. Cervicalgia     2. Neck pain  PT eval and treat   3. Post concussion syndrome  PT eval and treat   4. Low back pain, unspecified back pain laterality, unspecified chronicity, unspecified whether sciatica present  PT eval and treat   5. Cervicogenic headache       Involved side: Left  Posture Observation:      Shoulder/Thoracic complex: Moderately increased upper thoracic kyphosis, mild forward head    AROM:       Cervical ROM  Date: 7/28/2020     *Indicate scale AROM AROM AROM   Cervical Flexion P     Cervical Extension 21 P      Right Left Right Left Right Left   Cervical Sidebending         Cervical Rotation 32, P 13, P       Cervical Protraction      Cervical Retraction      Thoracic Flexion      Thoracic Extension      Thoracic Sidebending         Thoracic Rotation            Strength:      UE:  4+ to 5-/5 throughout    Palpation: bilateral scalenes, UT, LS, SO with L>R                   Left: cervical paraspinals, SCM,             Denied TMJ tenderness today    Flexibility:  Poor to fair in cervical spine    Cervical Special Tests  Cervical Special Tests Right Left UE Nerve Mobility Right Left   Cervical compression P P Median nerve     Cervical distraction negative negative Ulnar nerve     Spurling s test P P Radial nerve     Shoulder abduction sign   Thoracic outlet     Deep neck flexor endurance test   Edith     Upper cervical rotation   Adson s     Sharper-Evin   Cervical rotation lateral flexion     Alar ligament test - - Other:     Other:   Other:       Exercises:  Exercise #1: cervical AROm:  flex, ext and rotation   hold 10-15 seconds X 3-5 reps  Comment #1: chin tuck and scapular retraction   hold 10 seconds x 10 reps    Treatment Today     TREATMENT MINUTES COMMENTS   Evaluation 20 Neck, , concussion   Self-care/ Home management 10 edu on headache management: ice, laying down, stretches like chin tucks   Manual  therapy 15 Pt supine in hooklying: massage left SCM, cervical extensors, SO regionlevator scapulae   Neuromuscular Re-education     Therapeutic Activity     Therapeutic Exercises 10 Exercise code: [id number]  See above or flow sheet.  Handouts issued   Gait training     Modality__________________                Total 55    Blank areas are intentional and mean the treatment did not include these items.     Past Medical History:   Diagnosis Date     Anemia     Created by Conversion      Arrhythmia      Cervical Spondylosis     Created by Conversion      Depression      Disease of thyroid gland     hypo     Fibromyalgia     Created by Conversion      GERD (gastroesophageal reflux disease)      High cholesterol      History of transfusion      Hyperlipidemia     Created by Conversion      Hypertension     Created by Conversion      Near syncope      Peripheral Neuropathy     Created by Conversion      Skin cancer, basal cell      Sleep apnea     mild     Tachycardia        PT Evaluation Code: (Please list factors)  Patient History/Comorbidities:   Past Medical History:   Diagnosis Date     Anemia     Created by Conversion      Arrhythmia      Cervical Spondylosis     Created by Conversion      Depression      Disease of thyroid gland     hypo     Fibromyalgia     Created by Conversion      GERD (gastroesophageal reflux disease)      High cholesterol      History of transfusion      Hyperlipidemia     Created by Conversion      Hypertension     Created by Conversion      Near syncope      Peripheral Neuropathy     Created by Conversion      Skin cancer, basal cell      Sleep apnea     mild     Tachycardia      Examination: pain to palpation, headaches  Clinical Presentation: stable  Clinical Decision Making: low    Patient History/  Comorbidities Examination  (body structures and functions, activity limitations, and/or participation restrictions) Clinical Presentation Clinical Decision Making (Complexity)   No documented  Comorbidities or personal factors 1-2 Elements Stable and/or uncomplicated Low   1-2 documented comorbidities or personal factor 3 Elements Evolving clinical presentation with changing characteristics Moderate   3-4 documented comorbidities or personal factors 4 or more Unstable and unpredictable High         Janet Barry, PT  7/28/2020  7:57 AM

## 2021-06-10 NOTE — PROGRESS NOTES
"[facility] Daily Progress     Patient Name: Dyana Nicole  Date: 2020  Visit # 4  PTA visit #: 0  Referral Diagnosis: neck pain/HA's  Referring provider: True Pugh MD  Visit Diagnosis:     ICD-10-CM    1. Neck pain  M54.2    2. Post concussion syndrome  F07.81    3. Cervicalgia  M54.2    4. Cervicogenic headache  R51    5. Cervical spondylosis without myelopathy  M47.812          Assessment:     There was improvement in her pain post treatment with very good release of fascial tensions treated. She was also instructed to \"think gray\" when it comes to her sx to allow her to see if there are any improvements in her overall symptoms/function.     Patient is appropriate to continue with skilled physical therapy intervention, as indicated by initial plan of care.    Goal Status:  Pt. will report decreased intensity, frequency of : in 6 weeks;Pain;Headache    Pt will: Able to turn head easier to talk to people or for driving with more ease as AROM improves within 8-12 visits  Pt will: Able to look to read or joan 45 minutes with proper posture and pain levels 2/10 within 8-12 visits as neck strength improves      Plan / Patient Education:     Continue with initial plan of care.    Subjective:     Pain Ratin/10   She did feel pretty good after the treatment. She didn't seem to have a HA for quite a few hours.   Today she is doing ok. Usually the HA starts more when she gets up and is moving more. (Appt today is at 8 am).      Objective:     Neural, MS fascial tensions.     Treatment Today     TREATMENT MINUTES COMMENTS   Evaluation     Self-care/ Home management     Manual therapy 25 Fascial release using Strain-Counterstrain of B cranial sutures-MS, B eye myochains-MS, B cervical E/F (P)-MS    Dural tube release. Supraorbital nerve   Neuromuscular Re-education 15 Recip inhib of neural, MS fascia   Therapeutic Activity     Therapeutic Exercises 15 AA/PROM to C-spine, T-spine, ribs, cranium  "   Gait training     Modality__________________                Total 55    Blank areas are intentional and mean the treatment did not include these items.       Lg Moore  8/7/2020

## 2021-06-10 NOTE — PROGRESS NOTES
[facility] Daily Progress     Patient Name: Dyana Nicole  Date: 2020  Visit # 6  PTA visit #: 0  Referral Diagnosis: neck pain/HA's  Referring provider: True Pugh MD  Visit Diagnosis:     ICD-10-CM    1. Neck pain  M54.2    2. Post concussion syndrome  F07.81    3. Cervicalgia  M54.2    4. Cervicogenic headache  R51    5. Cervical spondylosis without myelopathy  M47.812    6. Low back pain, unspecified back pain laterality, unspecified chronicity, unspecified whether sciatica present  M54.5    7. Leg weakness, bilateral  R29.898          Assessment:     She did feel improvement in her headache after treatment today. There was very good release fo fascial tensions which should improve cervical/UE and head pain.     Patient is appropriate to continue with skilled physical therapy intervention, as indicated by initial plan of care.    Goal Status:  Pt. will report decreased intensity, frequency of : in 6 weeks;Pain;Headache    Pt will: Able to turn head easier to talk to people or for driving with more ease as AROM improves within 8-12 visits  Pt will: Able to look to read or joan 45 minutes with proper posture and pain levels 2/10 within 8-12 visits as neck strength improves      Plan / Patient Education:     Continue with initial plan of care.    Subjective:     Pain Ratin/10     She spent the weekend with her daughters at a cabin and did a lot of shopping. Today her head really hurts. She is having a hard time getting back in the swing of things from the vacation.    Before the trip she really did feel like the headache was getting better.      Objective:     LV, neural fascial tensions.     Treatment Today     TREATMENT MINUTES COMMENTS   Evaluation     Self-care/ Home management     Manual therapy 25 Fascial release using Strain-Counterstrain of B cranial/cervical/sternal-LV, B cerivcal gray rami-N, B cranial dura-N   Neuromuscular Re-education 15 Recip inhib of neural, LV fascia    Therapeutic Activity     Therapeutic Exercises 15 AA/PROM to BUE, C-T spine, ribs   Gait training     Modality__________________                Total 55    Blank areas are intentional and mean the treatment did not include these items.       Lg Moore  8/18/2020

## 2021-06-10 NOTE — PROGRESS NOTES
[facility] Daily Progress     Patient Name: Dyana Nicole  Date: 2020  Visit # 5   PTA visit #: 0  Referral Diagnosis: neck pain/HA's  Referring provider: True Pugh MD  Visit Diagnosis:     ICD-10-CM    1. Neck pain  M54.2    2. Post concussion syndrome  F07.81    3. Cervicalgia  M54.2    4. Cervicogenic headache  R51    5. Cervical spondylosis without myelopathy  M47.812    6. Low back pain, unspecified back pain laterality, unspecified chronicity, unspecified whether sciatica present  M54.5    7. Leg weakness, bilateral  R29.898          Assessment:     She is showing improvement in her overall sx which is a great sign for her. She did have very good release of fascial tensions with treatment today which should improve her cranial drainage.     Patient is appropriate to continue with skilled physical therapy intervention, as indicated by initial plan of care.    Goal Status:  Pt. will report decreased intensity, frequency of : in 6 weeks;Pain;Headache    Pt will: Able to turn head easier to talk to people or for driving with more ease as AROM improves within 8-12 visits  Pt will: Able to look to read or joan 45 minutes with proper posture and pain levels 2/10 within 8-12 visits as neck strength improves      Plan / Patient Education:     Continue with initial plan of care.    Subjective:     Pain Ratin/10    She is feeling ok. The HA's aren't lasting as long.       Objective:     LV fascial tensions.     Treatment Today     TREATMENT MINUTES COMMENTS   Evaluation     Self-care/ Home management     Manual therapy 25 Fascial release using Strain-Counterstrain of B cranial/cerivcal/sternal-LV   Neuromuscular Re-education     Therapeutic Activity     Therapeutic Exercises     Gait training     Modality__________________                Total 25    Blank areas are intentional and mean the treatment did not include these items.       Lg Moore  2020

## 2021-06-10 NOTE — PROGRESS NOTES
[facility] Daily Progress     Patient Name: Dyana Nicole  Date: 2020  Visit # 3  PTA visit #: 0  Referral Diagnosis: neck pain/HA's  Referring provider: True Pugh MD  Visit Diagnosis:     ICD-10-CM    1. Neck pain  M54.2    2. Post concussion syndrome  F07.81    3. Cervicalgia  M54.2    4. Cervicogenic headache  R51    5. Cervical spondylosis without myelopathy  M47.812          Assessment:     She did not have a HA after treatment today. There was very good release of fascial tensions which should help to improve HA and neck pain.     Patient is appropriate to continue with skilled physical therapy intervention, as indicated by initial plan of care.    Goal Status:  Pt. will report decreased intensity, frequency of : in 6 weeks;Pain;Headache    Pt will: Able to turn head easier to talk to people or for driving with more ease as AROM improves within 8-12 visits  Pt will: Able to look to read or joan 45 minutes with proper posture and pain levels 2/10 within 8-12 visits as neck strength improves      Plan / Patient Education:     Continue with initial plan of care.    Subjective:     Pain Ratin-5/10   She felt like the last treatment went ok but didn't get too much relief out of the treatment.   She does have the HA today. This is always on the left side above the eye.     Objective:     Neural fascial tensions.     Treatment Today     TREATMENT MINUTES COMMENTS   Evaluation     Self-care/ Home management     Manual therapy 25 Fascial release using Strain-Counterstrain of B cranial dura-N, B cerv/upper thor post-gang-N, B cervical Gray rami-N,   Dural tube release.    Neuromuscular Re-education 15 Recip inhib of neural fascia   Therapeutic Activity     Therapeutic Exercises 15 AA/PROM to C-spine, T-spine, ribs, cranium    Gait training     Modality__________________                Total 55    Blank areas are intentional and mean the treatment did not include these items.        Lg Moore  8/5/2020

## 2021-06-10 NOTE — PROGRESS NOTES
[facility] Daily Progress     Patient Name: Dyana Nicole  Date: 2020  Visit # 8  PTA visit #: 0  Referral Diagnosis: neck pain/HA's  Referring provider: True Pugh MD  Visit Diagnosis:     ICD-10-CM    1. Neck pain  M54.2    2. Post concussion syndrome  F07.81    3. Cervicalgia  M54.2          Assessment:     She has been unable to raise her right arm due to pain levels. Feel it is more muscular because After massage she could lift it easier but still below 90 degrees.  She may think about calling her MD about her shoulder  Patient is appropriate to continue with skilled physical therapy intervention, as indicated by initial plan of care.    Goal Status:  Pt. will report decreased intensity, frequency of : in 6 weeks;Pain;Headache    Pt will: Able to turn head easier to talk to people or for driving with more ease as AROM improves within 8-12 visits  Pt will: Able to look to read or joan 45 minutes with proper posture and pain levels 2/10 within 8-12 visits as neck strength improves      Plan / Patient Education:     Continue with initial plan of care.    Subjective:     Pain Ratin/10       My arm really hurts today.  I have been unable to sleep. Try to position with pillows and Tylenol arthritis does help a little  My knee is feeling much better.  I used ice and it helped.     Objective:     LV, neural fascial tensions.       Exercises:  Exercise #1: cervical AROm:  flex, ext and rotation   hold 10-15 seconds X 3-5 reps  Comment #1: chin tuck and scapular retraction   hold 10 seconds x 10 reps  Exercise #2: reverse wall push-up  hold 10 seconds x 6 reps  Comment #2: cervical isometrics:  extension and rotation    hold 10 seconds X 6 reps.  Exercise #3: may need to be supine pushing into pillow  for extension as it hurts her head to push into hands  Comment #3: supine wall fredrick  X 20      Treatment Today     TREATMENT MINUTES COMMENTS   Evaluation     Self-care/ Home management      Manual therapy 15 Massage right shoulder: deltoid, biceps, triceps   Neuromuscular Re-education  Recip inhib of neural, LV fascia   Therapeutic Activity     Therapeutic Exercises 15 See above or flow sheet  [id number]   Gait training     Modality__________________                Total 30    Blank areas are intentional and mean the treatment did not include these items.       Janet Barry, PT  8/25/2020

## 2021-06-11 NOTE — PROGRESS NOTES
[facility] Daily Progress     Patient Name: Dyana Nicole  Date: 2020  Visit #9   PTA visit #: 0  Referral Diagnosis: neck pain/HA's  Referring provider: Alida Camarillo FNP  Visit Diagnosis:     ICD-10-CM    1. Neck pain  M54.2    2. Post concussion syndrome  F07.81    3. Cervicalgia  M54.2    4. Cervicogenic headache  R51    5. Cervical spondylosis without myelopathy  M47.812          Assessment:     She continues to have good improvement in her symptoms overall. There was excellent release of fascial tensions and joint mobility post treatment today.     Patient is appropriate to continue with skilled physical therapy intervention, as indicated by initial plan of care.    Goal Status:  Pt. will report decreased intensity, frequency of : in 6 weeks;Pain;Headache  Pt will: Able to turn head easier to talk to people or for driving with more ease as AROM improves within 8-12 visits  Pt will: Able to look to read or joan 45 minutes with proper posture and pain levels 2/10 within 8-12 visits as neck strength improves      Plan / Patient Education:     Continue with initial plan of care.    Subjective:     Pain Ratin/10   She did get a cortisone injection in the shoulder and it doesn't seem to have helped yet. She is having a hard time doing anything that she needs the R hand/arm for which is frustrating for her.   Her headaches have been good. She hasn't had any headaches. The neck is still bothersome up high in her neck at the base of the skull.   The last treatment did take care of the L sided neck pain and her ear isn't plugging like it was before.         Objective:     Neural, MS fascial tensions.         Treatment Today     TREATMENT MINUTES COMMENTS   Evaluation     Self-care/ Home management     Manual therapy 15 Fascial release using Strain-Counterstrain of B cranial dura-N, B upper cerv PLL-MS, B cerv ALL-MS  OM, SB, temporal rocking  PA grade 1-2 of C1-3 on transverse process    Neuromuscular Re-education 15 Recip inhib of  MS, neural fascia   Therapeutic Activity     Therapeutic Exercises 25 AA/PROM to C-spine, T-spine, BUE, ribs, cranium.       [id number]   Gait training     Modality__________________                Total 55    Blank areas are intentional and mean the treatment did not include these items.       Lg Moore, PT  9/22/2020

## 2021-06-11 NOTE — PROGRESS NOTES
Assessment/Plan:    1. Acute pain of right shoulder  Right shoulder pain.  Please see procedure note for methylprednisolone injection.  Tolerated well.  If persisting concerns beyond the next 2 weeks or so would consider orthopedic referral for consideration of MRI rule out rotator cuff tear etc.  - methylPREDNISolone acetate injection 80 mg (DEPO-MEDROL)  - Right shoulder iInjection    2. Essential hypertension with goal blood pressure less than 140/90  Hypertension stable.  Using metoprolol tartrate 50 mg taking half tablet twice daily with resolved dizziness that occurred when she was on a full pill twice daily.  - Basic Metabolic Panel  - metoprolol tartrate (LOPRESSOR) 50 MG tablet; Take 0.5 tablets (25 mg total) by mouth 2 (two) times a day.  Dispense: 90 tablet; Refill: 3    3. Hypothyroidism, unspecified type  Check TSH while on levothyroxine 50 mcg daily.  - Thyroid Stimulating Hormone (TSH)    4. Encounter for immunization  High-dose flu shot provided.  - Influenza,Quad,High Dose,PF 65 YR+    YEE 7 questionnaire 6 out of 21 with PHQ 9 questionnaire 9 out of 27.        Subjective:    Dyana Nicole is seen today for right shoulder pain.  Symptoms x5 or 6 weeks.  States that she was crocheting a washcloth that took several hours still had not completed.  Working at a different stitch.  Seem to have associated right shoulder and upper arm pain associated with this.  No chest pain or palpitations.  Has had left shoulder rotator cuff surgery in the past however denies any right shoulder surgery.  States it difficult to sleep due to pain.  Woke up around 4 this morning with the discomfort.  Some radiation into small finger of right hand at times.  Using Tylenol arthritis strength 2 tablets every 4 hours.  Benefits for a while then starts to wear off.  Continues thyroid replacement with levothyroxine 50 mcg daily with prior TSH of 2.92 September 2, 2017 2019.  Has had some hair loss concerns at times however  recent improvement noted.  Comprehensive review of systems as above otherwise all negative.     - Ray  3 daughters - Genesis ( at [facility]), Eneida  Smoke - quit in 60s after 1 year  EtOH: sober x 36 years  Surgeries: gallbladder, appy, hysterectomy and ? 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; left CTS release; bladder lift; right TKA; left rotator cuff  Hospitalizations    Past Surgical History:   Procedure Laterality Date     Bladder lift       CARPAL TUNNEL RELEASE       CATARACT EXTRACTION, BILATERAL       CHOLECYSTECTOMY       HYSTERECTOMY       JOINT REPLACEMENT Bilateral     knees     MOHS SURGERY      Nose     OOPHORECTOMY Left     1 removed, 1 remains     SC ARTHROPLASTY TIBIAL PLATEAU      Description: Knee Replacement;  Recorded: 05/16/2013;     SC INJECT NERV BLCK,OTHR PERIPH NERV      Description: Peripheral Nerve Block Wrist Median Right;  Recorded: 05/16/2013;     REPLACEMENT TOTAL KNEE      L     REPLACEMENT TOTAL KNEE BILATERAL       ROTATOR CUFF REPAIR       TRUNK SKIN LESION EXCISIONAL BIOPSY Left 1/5/2018    Procedure: LEFT BUTTOCK EXPLORATION, EXCISION BUTTOCK MASS;  Surgeon: Lg Jameson MD;  Location: [facility] Main OR;  Service:         Family History   Problem Relation Age of Onset     Aneurysm Father         AAA     Cancer Mother         pancrease        Past Medical History:   Diagnosis Date     Anemia     Created by Conversion      Arrhythmia      Cervical Spondylosis     Created by Conversion      Depression      Disease of thyroid gland     hypo     Fibromyalgia     Created by Conversion      GERD (gastroesophageal reflux disease)      High cholesterol      History of transfusion      Hyperlipidemia     Created by Conversion      Hypertension     Created by Conversion      Near syncope      Peripheral Neuropathy     Created by Conversion      Skin cancer, basal cell      Sleep apnea     mild      Tachycardia         Social History     Tobacco Use     Smoking status: Former Smoker     Packs/day: 0.25     Years: 1.00     Pack years: 0.25     Last attempt to quit: 1961     Years since quittin.7     Smokeless tobacco: Never Used   Substance Use Topics     Alcohol use: No     Drug use: No        Current Outpatient Medications   Medication Sig Dispense Refill     aspirin 81 MG EC tablet Take 81 mg by mouth at bedtime.        calcium carbonate-vitamin D3 (CALCIUM 600 + D,3,) 600 mg calcium- 200 unit cap Take 1 tablet by mouth 2 (two) times a day.              celecoxib (CELEBREX) 200 MG capsule TAKE ONE CAPSULE BY MOUTH ONE TIME DAILY  90 capsule 3     estrogens, conjugated, (PREMARIN) 0.3 MG tablet Take 1 tablet (0.3 mg total) by mouth daily. 90 tablet 3     fluticasone propionate (FLONASE) 50 mcg/actuation nasal spray INSTILL 2 SPRAYS IN EACH NOSTRIL DAILY AS NEEDED 48 g 3     furosemide (LASIX) 20 MG tablet Take 1 tablet (20 mg total) by mouth daily. 90 tablet 3     LACTOBAC CMB #3/FOS/PANTETHINE (PROBIOTIC & ACIDOPHILUS ORAL) Take 1 capsule by mouth once daily. 1.5 billion units             levothyroxine (SYNTHROID, LEVOTHROID) 50 MCG tablet TAKE 1 TABLET BY MOUTH DAILY AT 6AM 90 tablet 0     metoprolol tartrate (LOPRESSOR) 50 MG tablet Take 0.5 tablets (25 mg total) by mouth 2 (two) times a day. 90 tablet 3     multivitamin with minerals (THERA-M) 9 mg iron-400 mcg Tab tablet Take 1 tablet by mouth every evening.       omeprazole (PRILOSEC) 20 MG capsule TAKE ONE CAPSULE BY MOUTH TWICE DAILY  180 capsule 0     simvastatin (ZOCOR) 20 MG tablet TAKE 1 TABLET BY MOUTH AT BEDTIME 90 tablet 3     traZODone (DESYREL) 50 MG tablet Take 25 mg by mouth at bedtime.       venlafaxine (EFFEXOR) 75 MG tablet Take 75 mg by mouth 2 (two) times a day.       No current facility-administered medications for this visit.           Objective:    Vitals:    20 1104   BP: 134/80   Pulse: 85   Temp: 98.4  F (36.9   C)   SpO2: 96%      There is no height or weight on file to calculate BMI.    Alert.  No apparent distress.  Does have difficulty with abduction and forward flexion her right arm.  Describes discomfort with this.  Is able to resist downward pressure of right elbow however when in a abducted position.  Distal CMS appears intact.  No ecchymoses or shoulder swelling concerns.  No objective weakness into hand.  Please see procedure note for corticosteroid injection right shoulder.      This note has been dictated using voice recognition software and as a result may contain minor grammatical errors and unintended word substitutions.

## 2021-06-11 NOTE — PROGRESS NOTES
[facility] Daily Progress     Patient Name: Dyana Nicole  Date: 2020  Visit #   10  PTA visit #: 0  Referral Diagnosis: neck pain/HA's  Referring provider: Alida Camarillo FNP  Visit Diagnosis:     ICD-10-CM    1. Neck pain  M54.2    2. Post concussion syndrome  F07.81    3. Cervicalgia  M54.2    4. Cervicogenic headache  R51    5. Cervical spondylosis without myelopathy  M47.812    6. Low back pain, unspecified back pain laterality, unspecified chronicity, unspecified whether sciatica present  M54.5          Assessment:     She is doing better but is still having pain over her lambdoidal suture. This could be from her sutures and fall that she had in the past. Hopefully working over the greater occipital nerve is helpful for her.       Patient is appropriate to continue with skilled physical therapy intervention, as indicated by initial plan of care.    Goal Status:  Pt. will report decreased intensity, frequency of : in 6 weeks;Pain;Headache  Pt will: Able to turn head easier to talk to people or for driving with more ease as AROM improves within 8-12 visits  Pt will: Able to look to read or joan 45 minutes with proper posture and pain levels 2/10 within 8-12 visits as neck strength improves      Plan / Patient Education:     Continue with initial plan of care.    Subjective:     Pain Ratin/10   She is really doing pretty good. There is one spot on the back of her head that can really affect her when lying down. This has been there for quite a while too.   The shoulder is still very sore for her.         Objective:     Neural, LV fascial tensions.         Treatment Today     TREATMENT MINUTES COMMENTS   Evaluation     Self-care/ Home management     Manual therapy 25 Fascial release using Strain-Counterstrain of B cervical MED-LV, B SVF-LV cervical, cranial-LV  GO nerve releases   Neuromuscular Re-education 15 Recip inhib of  LV,  neural fascia   Therapeutic Activity     Therapeutic  Exercises 15 AA/PROM to C-spine, T-spine, ribs, cranium.       [id number]   Gait training     Modality__________________                Total 55    Blank areas are intentional and mean the treatment did not include these items.       Lg Moore, PT  9/29/2020

## 2021-06-11 NOTE — PROGRESS NOTES
[facility] Daily Progress     Patient Name: Dyana Nicole  Date: 9/15/2020  Visit #9   PTA visit #: 0  Referral Diagnosis: neck pain/HA's  Referring provider: Alida Camarillo FNP  Visit Diagnosis:     ICD-10-CM    1. Neck pain  M54.2    2. Post concussion syndrome  F07.81    3. Cervicalgia  M54.2    4. Cervicogenic headache  R51    5. Cervical spondylosis without myelopathy  M47.812          Assessment:     There was good release of fascial tensions today. This should help to improve her head tingling as well as her neck pain on the left side.    Patient is appropriate to continue with skilled physical therapy intervention, as indicated by initial plan of care.    Goal Status:  Pt. will report decreased intensity, frequency of : in 6 weeks;Pain;Headache  Pt will: Able to turn head easier to talk to people or for driving with more ease as AROM improves within 8-12 visits  Pt will: Able to look to read or joan 45 minutes with proper posture and pain levels 2/10 within 8-12 visits as neck strength improves      Plan / Patient Education:     Continue with initial plan of care.    Subjective:     Pain Ratin/10   Things are going ok. She is seeing the MD about her shoulder.  The pain did get a little better after the last treatment. She then tried to get up from a seated position and that brought back a lot of her pain. It is now sore in the front of her shoulder.   The neck is still sore but she is not having the HA's which is really good. There are some tingles in the L side of her head. It seems to have started out in her L ear and now it will affect the whole side of her head when she gets that feeling.        Objective:     Neural, MS fascial tensions.         Treatment Today     TREATMENT MINUTES COMMENTS   Evaluation     Self-care/ Home management     Manual therapy 15 Fascial release using Strain-Counterstrain of B cranial dura-N, B TRIG/FAC-N, B cervical E (P)-MS    Neuromuscular  Re-education 15 Recip inhib of  MS, neural fascia   Therapeutic Activity     Therapeutic Exercises 25 AA/PROM to C-spine, T-spine, BUE, ribs, cranium.       [id number]   Gait training     Modality__________________                Total 55    Blank areas are intentional and mean the treatment did not include these items.       Lg Moore, PT  9/15/2020

## 2021-06-11 NOTE — PROGRESS NOTES
Thank you for asking the [facility] team to see Dyana Nicole in the [facility].     Assessment/Plan:     Breakthru palpitations at night - will start short acting metoprolol in the evening to try to suppress this. Will also check TSH/Free T4. EKG today shows NSR and is unchanged from 2016.    Chest heaviness and dyspnea with exertion - Will get stress test prior to her upcoming trip to [place].    F/U Dr. Garcia in 6-8 weeks       Current History:   Dyana Nicole is a 77 y.o. with atrial arrthymia, diastolic dysfunction, moderate tricuspid valve regurgitation, a sigmoid septum with LVOT gradient, and a history of syncope and orthostatic hypotension while on furosemide who follows with my colleague, Dr. Garcia. Over the past week or so she has noted more palpitations at night while in bed. They improve after a few moments if she gets up on her feet. One episode woke her up and was associated with a heaviness in the center of her chest and heaviness of her arms. She felt her heart pounding quickly in her neck and was sweaty. She denies dizziness. She has chronic dyspnea with a flight of stairs but no exertional chest discomfort. A holter this week showed some PAC and PVC and reported exertional dyspnea was associated with a sinus rate in the 80-90s.     Dyana denies pnd/orthopnea, edema, or early satiety.     Past Medical History:     Past Medical History:   Diagnosis Date     Anemia     Created by Conversion      Arrhythmia      Cervical Spondylosis     Created by Conversion      Depression      Disease of thyroid gland     hypo     Fibromyalgia     Created by Conversion      Hyperlipidemia     Created by Conversion      Hypertension     Created by Conversion      Near syncope      Peripheral Neuropathy     Created by Conversion      Sleep apnea     mild     Tachycardia        Past Surgical History:     Past Surgical History:   Procedure Laterality Date     CHOLECYSTECTOMY        HYSTERECTOMY       JOINT REPLACEMENT Bilateral     knees     MT ARTHROPLASTY TIBIAL PLATEAU      Description: Knee Replacement;  Recorded: 05/16/2013;     MT INJECT NERV BLCK,OTHR PERIPH NERV      Description: Peripheral Nerve Block Wrist Median Right;  Recorded: 05/16/2013;       Family History:     Family History   Problem Relation Age of Onset     Aneurysm Father        Social History:    reports that she quit smoking about 56 years ago. She has never used smokeless tobacco. She reports that she does not drink alcohol or use illicit drugs.    Meds:     Current Outpatient Prescriptions   Medication Sig     aspirin 81 MG EC tablet Take 81 mg by mouth daily.     BIOTIN ORAL Take 5,000 mcg by mouth once daily.      budesonide (ENTOCORT EC) 3 mg 24 hr capsule Take 6 mg by mouth.     calcium carbonate-vitamin D3 (CALCIUM 600 + D,3,) 600 mg calcium- 200 unit cap Take 1 tablet by mouth daily.     celecoxib (CELEBREX) 200 MG capsule TAKE ONE CAPSULE BY MOUTH EVERY DAY     celecoxib (CELEBREX) 200 MG capsule TAKE ONE CAPSULE BY MOUTH EVERY DAY     estradiol (ESTRACE) 0.5 MG tablet Take 1 tablet (0.5 mg total) by mouth daily.     fluticasone (FLONASE) 50 mcg/actuation nasal spray 2 sprays into each nostril daily as needed.      KRILL OIL ORAL Take 1,000 mg by mouth bedtime.      LACTOBAC CMB #3/FOS/PANTETHINE (PROBIOTIC & ACIDOPHILUS ORAL) Take 1 capsule by mouth once daily.      levothyroxine (SYNTHROID, LEVOTHROID) 50 MCG tablet TAKE ONE TABLET BY MOUTH EVERY DAY     metoprolol succinate (TOPROL-XL) 50 MG 24 hr tablet Take 1 tablet (50 mg total) by mouth daily.     MV-MINERALS/FA/OMEGA 3,6,9 #3 (WOMEN'S 50+ ADVANCED ORAL) Take 1 tablet by mouth daily.     omeprazole (PRILOSEC) 20 MG capsule TAKE ONE CAPSULE BY MOUTH TWICE A DAY (Patient taking differently: TAKE ONE CAPSULE DAILY)     simvastatin (ZOCOR) 20 MG tablet TAKE ONE TABLET BY MOUTH AT BEDTIME     simvastatin (ZOCOR) 20 MG tablet TAKE ONE TABLET BY MOUTH AT  "BEDTIME     traZODone (DESYREL) 50 MG tablet TAKE 1/2 TABLET BY MOUTH AT BEDTIME     venlafaxine (EFFEXOR XR) 75 MG 24 hr capsule Take 1 capsule (75 mg total) by mouth daily. Take with 150 mg = 225 mg total     venlafaxine (EFFEXOR-XR) 150 MG 24 hr capsule Take 1 capsule (150 mg total) by mouth daily.     venlafaxine (EFFEXOR-XR) 150 MG 24 hr capsule Take 1 capsule (150 mg total) by mouth daily. Take with 75 mg = 225 mg total     venlafaxine (EFFEXOR-XR) 75 MG 24 hr capsule TAKE ONE CAPSULE BY MOUTH THREE TIMES A DAY (Patient taking differently: TAKE ONE CAPSULE BY MOUTH TWO TIMES A DAY)       Allergies:   Penicillins; Pollen; and Venom-honey bee    Review of Systems:   Review of Systems:   General: WNL  Eyes: WNL  Ears/Nose/Throat: WNL  Lungs: WNL  Heart: WNL  Stomach: WNL  Bladder: WNL  Muscle/Joints: WNL  Skin: WNL  Nervous System: WNL  Mental Health: WNL     Blood: WNL       Objective:      Physical Exam  @LASTENCWT:3@  5' 2\" (1.575 m)  @BMI:3@  /74 (Patient Site: Right Arm, Patient Position: Sitting, Cuff Size: Adult Large)  Pulse 74  Resp 18  Ht 5' 2\" (1.575 m)  Wt 182 lb (82.6 kg)  SpO2 96%  BMI 33.29 kg/m2    General Appearance:   Alert, cooperative and in no acute distress.   HEENT:  No scleral icterus; the mucous membranes were pink and moist.   Neck: JVP flat. No thyromegaly. No HJR   Chest: The spine was straight. The chest was symmetric.   Lungs:   Respirations unlabored; the lungs are clear to auscultation.   Cardiovascular:   S1 and S2 normal and with 2/6 systolic murmur. No clicks or rubs. No carotid bruits noted. Right DP, PT, and radial pulses 2+. Left DP, PT, and radial pulses 2+.   Abdomen:  No organomegaly, masses, bruits, or tenderness. Bowels sounds are present   Extremities: No cyanosis, clubbing, or edema.   Skin: No xanthelasma.   Neurologic: Mood and affect are appropriate.         Lab Review   Lab Results   Component Value Date     11/02/2016     09/03/2016    NA " 141 09/02/2016    K 4.6 11/02/2016    K 4.0 09/03/2016    K 4.6 09/02/2016     11/02/2016     09/03/2016     09/02/2016    CO2 28 11/02/2016    CO2 29 09/03/2016    CO2 30 09/02/2016    BUN 16 11/02/2016    BUN 14 09/03/2016    BUN 15 09/02/2016    CREATININE 0.74 11/02/2016    CREATININE 0.72 09/03/2016    CREATININE 0.80 09/02/2016    CALCIUM 9.3 11/02/2016    CALCIUM 9.4 09/03/2016    CALCIUM 9.4 09/02/2016     Lab Results   Component Value Date    WBC 11.6 (H) 11/02/2016    WBC 5.9 09/03/2016    WBC 6.1 09/02/2016    WBC 6.5 04/24/2015    WBC 10.5 12/08/2014    HGB 13.4 11/02/2016    HGB 13.4 09/03/2016    HGB 13.7 09/02/2016    HCT 40.0 11/02/2016    HCT 39.5 09/03/2016    HCT 41.2 09/02/2016    MCV 90 11/02/2016    MCV 90 09/03/2016    MCV 90 09/02/2016     11/02/2016     09/03/2016     09/02/2016     Lab Results   Component Value Date    CHOL 193 09/03/2016    CHOL 228 (H) 05/20/2014    CHOL 223 (H) 05/16/2013    TRIG 79 09/03/2016    TRIG 100 05/20/2014    TRIG 106 05/16/2013    HDL 66 09/03/2016    HDL 73 05/20/2014    HDL 69 05/16/2013     Lab Results   Component Value Date    BNP 69 09/28/2016         Roxie Lubin M.D.

## 2021-06-11 NOTE — PROGRESS NOTES
[facility] Daily Progress     Patient Name: Dyana Nicole  Date: 2020  Visit #9   PTA visit #: 0  Referral Diagnosis: neck pain/HA's  Referring provider: Alida Camarillo FNP  Visit Diagnosis:     ICD-10-CM    1. Neck pain  M54.2    2. Post concussion syndrome  F07.81    3. Cervicalgia  M54.2    4. Cervicogenic headache  R51    5. Cervical spondylosis without myelopathy  M47.812    6. Low back pain, unspecified back pain laterality, unspecified chronicity, unspecified whether sciatica present  M54.5    7. Leg weakness, bilateral  R29.898          Assessment:     She is likely dealing with some impingement issues with the R shoulder and avoidance of this is key to helping to keep her neck/head pain away. There was good release of fascial tensions today with treatment.   Patient is appropriate to continue with skilled physical therapy intervention, as indicated by initial plan of care.    Goal Status:  Pt. will report decreased intensity, frequency of : in 6 weeks;Pain;Headache  Pt will: Able to turn head easier to talk to people or for driving with more ease as AROM improves within 8-12 visits  Pt will: Able to look to read or joan 45 minutes with proper posture and pain levels 2/10 within 8-12 visits as neck strength improves      Plan / Patient Education:     Continue with initial plan of care.    Subjective:     Pain Ratin/10   She has really been having pain in her arm. It is keeping her awake at night. It starts up in her shoulder and it can end up down into her pinky finger. It seems to hurt more when she is laying down. She doesn't really feel the pain when letting the arm hang. She feels like this all started from crocheting doing a different stitch.   Headaches have been good for her.   She is doing her HEP at home especially with her shoulder blades.       Objective:     LV, MS fascial tensions.         Treatment Today     TREATMENT MINUTES COMMENTS   Evaluation     Self-care/ Home  management     Manual therapy 15 Fascial release using Strain-Counterstrain of B scap/sternal-LV, RUE/shoulder (P)-MS    Neuromuscular Re-education 15 Recip inhib of LV, MS fascia   Therapeutic Activity     Therapeutic Exercises 25 AA/PROM to C-spine, T-spine, BUE, ribs, cranium.   Shoulder eductaion for position to avoid impingement and in the car/sleeping/sitting.     [id number]   Gait training     Modality__________________                Total 55    Blank areas are intentional and mean the treatment did not include these items.       Lg Moore, PT  9/8/2020

## 2021-06-11 NOTE — PROGRESS NOTES
"Video Visit  Dyana Nicole is a 80 y.o. female who is being evaluated via a billable video visit in light of the ongoing global health crisis (COVID-19) that requires us to abide by social distancing mandates in order to reduce the risk of COVID-19 exposure.       The patient has been notified of following:     \"This video visit will be conducted via a video call between you and your physician/provider. We have found that certain health care needs can be provided without the need for a physical exam.  This service lets us provide the care you need with a short phone/video conversation.  If a prescription is necessary we can send it directly to your pharmacy.  If lab work is needed we can place an order for that and you can then stop by our lab to have the test done at a later time.    If during the course of the call the physician/provider feels a telephone visit is not appropriate, you will not be charged for this service.\"     Patient has given verbal consent to a video visit? 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     ALLERGIES  Oxycodone; Penicillins; Pollen; and Venom-honey bee    Date of accident : June 2017    Orders from previous visit:   Neuropsychological assessment completed    Yes   Currently doing PT  Yes   Completed No   Currently doing OT  No   Completed No    Currently doing ST   No   Completed No   Psychology  Yes       Any new medication (other provider):   No   Meds started at last appointment  No  Meds increased at last appointment    No    Currently on medication to help with sleep    Yes    Trazodone     Currently on any mental health medications     Yes   Venlafaxine       Currently on medication for attention, ADD/ADHD    No       Is patient on a controlled substance   No                                                       Workman's Comp   No     Start Time: 12:55pm    End Time:  1:00pm    Total time of phone call: 5 minutes    Patient would like the video " invitation sent by: Yari   Number/e-mail address:    [email or web address]    Lexie Franz MING     Is patient on a controlled substance   No     Outpatient Follow up Mild TBI (Concussion)  Evaluation       Pertinent History:  patient has been having concussive symptoms since an injury and June 2017.  The patient was fixing a float for a parade.  The arie was not properly placed underneath the float. When another person stood up at the same time as the patient the arie came loose and the patient slid down hitting her head on the cement. Patient's main complaint is still having headaches and cognitive issues since injury. At previous appointment the patient informed me that her daughter would no longer be involved in her health care.      Date of accident : June 2017      Subjective:          HPI    The patient returns to the concussion clinic for a follow up visit, She was last seen by me on 7/18/2020, where no medication changes were made.  Patient reports that her headaches are much better they have reduced in severity and frequency.  The patient reports that she stepped into a hole and tumbled and hit the back of her head.  Symptoms did not increase patient is working with physical therapy.  Patient reports that she continued to have the tender spot on the back of her head.  Overall patient is reporting improvement in physical, cognitive, and emotional symptoms.    We discussed some treatment options and have elected to continue with current therapies.                                                      Headaches:  Significant ongoing headaches Yes  Headaches: Intermittently  Improvement :Yes   Current Headache No   Wake with HA  No     Worse Headache    3/10           How often: couple times a week    Average Headache 2/10.    Best Headache 2/10.  Brings on HA:   Doing too much  Makes symptoms worse  activity  Makes symptoms better. rest  Taking  acetaminophen (Tylenol)        Helpful:  Yes     Physical  Symptoms:  Headache-Yes       Since last visit  Improved     Nausea-No        Balance problems - No      Dizziness - Yes          Since last visit  Improved and Worsen     Visual problems - Yes    Since last visit  Improved and Worsen     Fatigue - Yes             Since last visit  Improved and Worsen     Sensitivity to light - Yes       Since last visit  Improved and Worsen     Sensitivity to sound - Yes         Since last visit  Improved and Worsen     Numbness/tingling - Yes     Since last visit  Improved and Worsen         Cognitive Symptoms  Feeling mentally foggy -Yes       Since last visit  Improved     Feeling slowed down -Yes       Since last visit  Improved     Difficulty Concentrating- Yes     Since last visit  Improved and Worsen     Difficulty remembering - Yes        Since last visit  Improved       Emotional Symptoms  Irritability - Yes         Since last visit  Improved     Sadness-  Yes      Since last visit  Improved     More emotional - Yes      Since last visit  Improved     Nervousness/anxiety -Yes       Since last visit  Improved       Mental Health History:  Anxiety - Yes  Depression - No  Sleep Disorders - Yes, OCA she is compliant with CPAP  Any thought of hurting self or others currently?   No  Any history of hurting self or others?            No    Sleep History:  Drowsiness- Yes    Since last visit  Improved     Sleep less than usual - No  Sleep more than usual - No  Trouble falling asleep - No      Does the patient wake feeling rested - most of the time       Since last visit  Improved        Migraine Headaches      Patient history of migraines.    No      Exertion:         Do the above stated symptoms worsen with physical activity? Yes       Since last visit  Improved           Do the above stated symptoms worsen with cognitive activity? Yes      Since last visit  Improved            Work/School              Have your returned to work/school? No          Patient Active Problem List     Diagnosis Date Noted     Snoring 01/04/2019     Acute cystitis without hematuria      Anemia due to blood loss, acute 12/27/2018     Lower GI bleed 12/26/2018     BRBPR (bright red blood per rectum) 12/26/2018     Obesity (BMI 35.0-39.9) with comorbidity (H) 08/30/2018     Atrial tachycardia (H) 08/30/2018     Left ventricular outflow obstruction 08/30/2018     Hypertensive left ventricular hypertrophy, without heart failure 08/30/2018     Leg weakness, bilateral 06/25/2018     Lymphedema 06/25/2018     Adrenal insufficiency (H) 06/08/2018     Fatigue due to excessive exertion 05/25/2018     Pain in both lower extremities 05/17/2018     Venous insufficiency of both lower extremities 05/17/2018     Venous hypertension of both lower extremities 05/17/2018     Fat deposits 05/17/2018     Class 2 obesity due to excess calories in adult 05/17/2018     Vitamin D deficiency 05/17/2018     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     Essential hypertension with goal blood pressure less than 140/90 03/28/2017     Exertional dyspnea 09/15/2016     Generalized anxiety disorder 09/30/2015     Recurrent major depressive disorder, remission status unspecified (H) 09/30/2015     Arthralgia 09/29/2015     Hypothyroidism      JANICE on CPAP      Peripheral Neuropathy      Walk Is Wobbly Or Unsteady (Ataxia)      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      Insomnia      Heartburn      Arthritis      Cervical Spondylosis      Generalized Osteoarthritis Of The Hand      Lumbar Spondylosis      Trochanteric Bursitis      Lower Back Pain      Osteopenia      Microscopic colitis, unspecified microscopic colitis type      Fibromyalgia      Anemia      Hypertension      Hyperlipidemia      Past Medical History:   Diagnosis Date     Anemia     Created by Conversion      Arrhythmia      Cervical Spondylosis     Created by Conversion      Depression      Disease of thyroid gland     hypo     Fibromyalgia     Created by Conversion      GERD  (gastroesophageal reflux disease)      High cholesterol      History of transfusion      Hyperlipidemia     Created by Conversion      Hypertension     Created by Conversion      Near syncope      Peripheral Neuropathy     Created by Conversion      Skin cancer, basal cell      Sleep apnea     mild     Tachycardia      Past Surgical History:   Procedure Laterality Date     Bladder lift       CARPAL TUNNEL RELEASE       CATARACT EXTRACTION, BILATERAL       CHOLECYSTECTOMY       HYSTERECTOMY       JOINT REPLACEMENT Bilateral     knees     MOHS SURGERY      Nose     OOPHORECTOMY Left     1 removed, 1 remains     NE ARTHROPLASTY TIBIAL PLATEAU      Description: Knee Replacement;  Recorded: 05/16/2013;     NE INJECT NERV BLCK,OTHR PERIPH NERV      Description: Peripheral Nerve Block Wrist Median Right;  Recorded: 05/16/2013;     REPLACEMENT TOTAL KNEE      L     REPLACEMENT TOTAL KNEE BILATERAL       ROTATOR CUFF REPAIR       TRUNK SKIN LESION EXCISIONAL BIOPSY Left 1/5/2018    Procedure: LEFT BUTTOCK EXPLORATION, EXCISION BUTTOCK MASS;  Surgeon: Lg Jameson MD;  Location: [facility] Main OR;  Service:      Family History   Problem Relation Age of Onset     Aneurysm Father         AAA     Cancer Mother         pancrease     Current Outpatient Medications   Medication Sig Dispense Refill     aspirin 81 MG EC tablet Take 81 mg by mouth at bedtime.        biotin 5 mg Tab Take 5,000 mcg by mouth daily.       calcium carbonate-vitamin D3 (CALCIUM 600 + D,3,) 600 mg calcium- 200 unit cap Take 1 tablet by mouth 2 (two) times a day.              celecoxib (CELEBREX) 200 MG capsule TAKE ONE CAPSULE BY MOUTH ONE TIME DAILY  90 capsule 3     estrogens, conjugated, (PREMARIN) 0.3 MG tablet Take 1 tablet (0.3 mg total) by mouth daily. 90 tablet 3     fluticasone propionate (FLONASE) 50 mcg/actuation nasal spray INSTILL 2 SPRAYS IN EACH NOSTRIL DAILY AS NEEDED 48 g 3     furosemide (LASIX) 20 MG tablet Take 1 tablet (20 mg  total) by mouth daily. 90 tablet 3     LACTOBAC CMB #3/FOS/PANTETHINE (PROBIOTIC & ACIDOPHILUS ORAL) Take 1 capsule by mouth once daily. 1.5 billion units             levothyroxine (SYNTHROID, LEVOTHROID) 50 MCG tablet TAKE 1 TABLET BY MOUTH DAILY AT 6AM 90 tablet 0     metoprolol tartrate (LOPRESSOR) 50 MG tablet Take 1 tablet (50 mg total) by mouth 2 (two) times a day. 180 tablet 1     multivitamin with minerals (THERA-M) 9 mg iron-400 mcg Tab tablet Take 1 tablet by mouth every evening.       omeprazole (PRILOSEC) 20 MG capsule TAKE ONE CAPSULE BY MOUTH TWICE DAILY  180 capsule 0     simvastatin (ZOCOR) 20 MG tablet TAKE 1 TABLET BY MOUTH AT BEDTIME 90 tablet 3     traZODone (DESYREL) 50 MG tablet Take 25 mg by mouth at bedtime.       venlafaxine (EFFEXOR) 75 MG tablet Take 75 mg by mouth 2 (two) times a day.       No current facility-administered medications for this encounter.      Social History     Socioeconomic History     Marital status:      Spouse name: Not on file     Number of children: Not on file     Years of education: Not on file     Highest education level: Not on file   Occupational History     Not on file   Social Needs     Financial resource strain: Not on file     Food insecurity     Worry: Not on file     Inability: Not on file     Transportation needs     Medical: Not on file     Non-medical: Not on file   Tobacco Use     Smoking status: Former Smoker     Packs/day: 0.25     Years: 1.00     Pack years: 0.25     Last attempt to quit: 1961     Years since quittin.7     Smokeless tobacco: Never Used   Substance and Sexual Activity     Alcohol use: No     Drug use: No     Sexual activity: Never   Lifestyle     Physical activity     Days per week: Not on file     Minutes per session: Not on file     Stress: Not on file   Relationships     Social connections     Talks on phone: Not on file     Gets together: Not on file     Attends Restorationism service: Not on file     Active member of  club or organization: Not on file     Attends meetings of clubs or organizations: Not on file     Relationship status: Not on file     Intimate partner violence     Fear of current or ex partner: Not on file     Emotionally abused: Not on file     Physically abused: Not on file     Forced sexual activity: Not on file   Other Topics Concern     Not on file   Social History Narrative    Lives in senior living with  of over 50 years. 3 daughters       The following portions of the patient's history were reviewed and updated as appropriate: allergies, current medications, past family history, past medical history, past social history, past surgical history and problem list.    Review of Systems  A comprehensive review of systems was negative except for: What is noted above    Objective:       Discussion was held with the patient today regarding concussion in general including types of injury, symptoms that are common, treatment and variability in time to recover. Education about concussion symptoms and length of time it would take the patient to recover was also given to the patient.  I have reassured the patient her symptoms are very common when a concussion is present and will improve with time. We discussed the risks and benefits of the medication including risk of worsening depression with medication adjustments and even the possibility of emergence of suicidal ideations.       Total time spent with the patient today was 40 minutes with greater than 50% of the time spent in counseling and care coordination. The patient agrees to call before then with any questions, concerns or problems. We will assess for the appropriateness of possible psychotropic medication trials/changes. The patient will seek out appropriate emergency services should that become necessary.    Diagnosis managed and treated at today's visit :  Post concussion syndrome  Post concussion headache  Dizziness  Fatigue  Insomnia  Sensitivity to  light  Sound sensitivity  Concentration and Attention deficit  Memory difficulties  Anxiety d/t a medical condition  Irritability     Plan:  Medication Adjustment:  No medication changes    Other:   Patient will return to clinic in 4 weeks. They agree to call or return sooner with any questions or concerns.  Risks and benefits were discussed.  Continue with individual therapist.     Continue with the support of the clinic, reassurance, and redirection. Staff monitoring and ongoing assessments per team plan. Current psychotropic medication appears to represent the minimum effective dosage and appears medically necessary. We will continue to monitor and reassess. This team will utilize appropriate emergency services if necessary. I will make myself available if concerns or problems arise.     Mental Status Examination  She is cooperative with questioning. She is fully engaged in conversation today. Speech is normal. Thought processes normal with normal prehension and expression. Thoughts are organized and linear. Content is pertinent to the conversation and without evidence of auditory or visual hallucinations. No delusional ideation. Gen. fund of knowledge, insight and memory are normal       Video Visit Details    Type of service: Video Visit    Video Start Time: 1300    Video End Time:  1340    Total time of video visit: 40 minutes    Originating Location: Patient's home    Distant Location:  [facility] [place]    Mode of Communication: Video Conference via [place] and [facility]    General Information:  Today you had your appointment with Alida Camarillo CNP     If lab work was done today as part of your evaluation you will generally be contacted via My Chart, mail, or phone with the results within 1-5 days. If there is an alarming result we will contact you by phone. Lab results come back at varying times, I generally wait until all labs are resulted before making comments on  results. Please note labs are automatically released to My Chart once available.     If you need refills please contact your pharmacist. They will send a refill request to me to review. Please allow 3 business days for us to process all refill requests.     Please call or send a medical message through My Chart, with any questions or concerns    If you need any paperwork completed please fax forms to [phone]. Please state if you would like a copy of the completed paperwork, mailed or faxed back to the patient and a fax number to fax the paperwork to. Please allow up to 10 days for paperwork to be completed.    Alida Camarillo, CNP

## 2021-06-11 NOTE — TELEPHONE ENCOUNTER
Refill Approved    Rx renewed per Medication Renewal Policy. Medication was last renewed on 7/11/19.    Kandis Conway, [facility] Triage/Med Refill 9/17/2020     Requested Prescriptions   Pending Prescriptions Disp Refills     furosemide (LASIX) 20 MG tablet [Pharmacy Med Name: Furosemide Oral Tablet 20 MG] 90 tablet 0     Sig: TAKE ONE TABLET BY MOUTH ONE TIME DAILY       Diuretics/Combination Diuretics Refill Protocol  Passed - 9/16/2020 10:45 AM        Passed - Visit with PCP or prescribing provider visit in past 12 months     Last office visit with prescriber/PCP: 12/3/2019 True Pugh MD OR same dept: 12/3/2019 True Pugh MD OR same specialty: 12/3/2019 True Pugh MD  Last physical: 9/13/2019 Last MTM visit: Visit date not found   Next visit within 3 mo: 9/16/2020 True Pugh MD  Next physical within 3 mo: Visit date not found  Prescriber OR PCP: True Pugh MD  Last diagnosis associated with med order: 1. Right atrial enlargement  - furosemide (LASIX) 20 MG tablet [Pharmacy Med Name: Furosemide Oral Tablet 20 MG]; TAKE ONE TABLET BY MOUTH ONE TIME DAILY   Dispense: 90 tablet; Refill: 0    2. Dyspnea  - furosemide (LASIX) 20 MG tablet [Pharmacy Med Name: Furosemide Oral Tablet 20 MG]; TAKE ONE TABLET BY MOUTH ONE TIME DAILY   Dispense: 90 tablet; Refill: 0    If protocol passes may refill for 12 months if within 3 months of last provider visit (or a total of 15 months).             Passed - Serum Potassium in past 12 months      Lab Results   Component Value Date    Potassium 4.7 09/16/2020             Passed - Serum Sodium in past 12 months      Lab Results   Component Value Date    Sodium 140 09/16/2020             Passed - Blood pressure on file in past 12 months     BP Readings from Last 1 Encounters:   09/16/20 134/80             Passed - Serum Creatinine in past 12 months      Creatinine   Date Value Ref Range Status   09/16/2020 0.81 0.60 - 1.10 mg/dL Final

## 2021-06-11 NOTE — PROGRESS NOTES
Right shoulder iInjection    Date/Time: 9/16/2020 11:45 AM  Performed by: True Pugh MD  Authorized by: True Pugh MD       Universal Protocol    Site marked: Yes    Prior images obtained and reviewed: Yes    Required items: required blood products, implants, devices, and special equipment available    Patient identity confirmed: verbally with patient    Reevaluation: Patient was reevaluated immediately before administering moderate or deep sedation or anesthesia    Confirmation checklist: patient's identity using two indicators    Time out: Immediately prior to procedure a time out was called to verify the correct patient, procedure, equipment, support staff and site/side marked as required    Universal Protocol: Joint Commission Universal Protocol was followed    Preparation: Patient was prepped and draped in the usual sterile fashion    Indications  Indications: pain     Location  Body area: shoulder  Joint: right shoulder      Anesthesia    Local anesthesia used?: Yes    Local anesthetic: lidocaine 1% without epinephrine    Anesthetic total (mL): 1    Sedation  Patient sedation: No    Procedure Details  Needle size: 22 G  Ultrasound guidance: yes  Approach: posterior  Methylprednisolone amount: 80 mg  Lidocaine 1% amount: 3 mL      Post-procedure   Length of time physician present for 1:1 monitoring during sedation: 0

## 2021-06-11 NOTE — PROGRESS NOTES
"Video Visit  Dyana Nicole is a 80 y.o. female who is being evaluated via a billable video visit in light of the ongoing global health crisis (COVID-19) that requires us to abide by social distancing mandates in order to reduce the risk of COVID-19 exposure.       The patient has been notified of following:     \"This video visit will be conducted via a video call between you and your physician/provider. We have found that certain health care needs can be provided without the need for a physical exam.  This service lets us provide the care you need with a short phone/video conversation.  If a prescription is necessary we can send it directly to your pharmacy.  If lab work is needed we can place an order for that and you can then stop by our lab to have the test done at a later time.    If during the course of the call the physician/provider feels a telephone visit is not appropriate, you will not be charged for this service.\"     Patient has given verbal consent to a video visit? 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     ALLERGIES  Oxycodone; Penicillins; Pollen; and Venom-honey bee    Date of accident : June 2017    Orders from previous visit:   Neuropsychological assessment completed    Yes   Currently doing PT  Yes   Completed No   Currently doing OT  No   Completed No    Currently doing ST   No   Completed No   Psychology  Yes        Any new medication (other provider):   No   Meds started at last appointment  No   Meds increased at last appointment    No     Currently on medication to help with sleep    Yes    Trazodone     Currently on any mental health medications     Yes   Venlafaxine       Currently on medication for attention, ADD/ADHD    No       Is patient on a controlled substance   No     Any concerns would like to be addressed at this appointment?      Would like to talk about PT                                                       Workman's Comp   No     Start Time: " "12:50     End Time:  12:55    Total time of phone call: 5 minutes    Patient would like the video invitation sent by: Yari  Number/e-mail address:[email or web address]    Leann BENITEZ RickieMING     Is patient on a controlled substance   No        Outpatient Follow up Mild TBI (Concussion)  Evaluation       Pertinent History:   patient has been having concussive symptoms since an injury and June 2017.  The patient was fixing a float for a parade.  The arie was not properly placed underneath the float. When another person stood up at the same time as the patient the arie came loose and the patient slid down hitting her head on the cement. Patient's main complaint is still having headaches and cognitive issues since injury. At previous appointment the patient informed me that her daughter would no longer be involved in her health care.      Date of accident : June 2017     Subjective:          HPI    The patient returns to the concussion clinic for a follow up visit, She was last seen by me on 9/2/2020, where no medication changes were made.  Patient reports that she continues to go to physical therapy.  Patient states that her physical therapist is working on \"different areas\" than what she would like.  The patient did do massage of the patient's head and this did help for a little bit.  But by the time the patient got to the car her tenderness in the back of the head was back.  She believes that she was receiving craniosacral massage reports that helped for about an hour, it is encouraging that the pain actually stopped even if the hurt return.  Patient also reports a sleep is a little problematic she is waking throughout the night.  Otherwise overall patient is reporting improvement in physical, cognitive and emotional symptoms.  Her main complaint about the pain in the back of her head still remains.  This is the patient's primary concern    We discussed some treatment options and have elected to have patient talk with " primary but amitriptyline to see if this could help with her sleep.  Patient will continue with physical therapy if therapist does not help with problem area, I will order craniosacral massage for the patient..                                                      Headaches:  Significant ongoing headaches Yes  Headaches: Intermittently  Improvement :Yes   Current Headache No   Wake with HA  No      Worse Headache    2/10           How often: couple times a week    Average Headache 2/10.    Best Headache 2/10.  Brings on HA:   Doing too much  Makes symptoms worse  activity  Makes symptoms better. rest  Taking  acetaminophen (Tylenol)        Helpful:  Yes     Physical Symptoms:  Headache-Yes       Since last visit  Improved     Nausea-No         Balance problems - No        Dizziness - Yes          Since last visit  Improved     Visual problems - Yes    Since last visit  Improved     Fatigue - Yes             Since last visit  Improved     Sensitivity to light - Yes       Since last visit  Improved     Sensitivity to sound - Yes         Since last visit  Improved     Numbness/tingling - Yes     Since last visit  Improved         Cognitive Symptoms  Feeling mentally foggy -Yes       Since last visit  Improved     Feeling slowed down -Yes       Since last visit  Improved     Difficulty Concentrating- Yes     Since last visit  Improved     Difficulty remembering - Yes        Since last visit  Improved       Emotional Symptoms  Irritability - Yes         Since last visit  Improved     Sadness-  Yes      Since last visit  Improved     More emotional - Yes      Since last visit  Improved     Nervousness/anxiety -Yes       Since last visit  Improved       Mental Health History:  Anxiety - Yes  Depression - No  Sleep Disorders -Yes, OCA she is compliant with CPAP}  Any thought of hurting self or others currently?   No  Any history of hurting self or others?            No    Sleep History:  Drowsiness- Yes    Since last visit   Same     Sleep less than usual - Yes  Sleep more than usual - No  Trouble falling asleep - Yes     Since last visit  Same     Does the patient wake feeling rested - No        Since last visit  Same        Migraine Headaches      Patient history of migraines.    No      Exertion:         Do the above stated symptoms worsen with physical activity? Yes       Since last visit  Improved           Do the above stated symptoms worsen with cognitive activity? Yes      Since last visit  Improved            Work/School           Have your returned to work/school? No          Patient Active Problem List    Diagnosis Date Noted     Snoring 01/04/2019     Acute cystitis without hematuria      Anemia due to blood loss, acute 12/27/2018     Lower GI bleed 12/26/2018     BRBPR (bright red blood per rectum) 12/26/2018     Obesity (BMI 35.0-39.9) with comorbidity (H) 08/30/2018     Atrial tachycardia (H) 08/30/2018     Left ventricular outflow obstruction 08/30/2018     Hypertensive left ventricular hypertrophy, without heart failure 08/30/2018     Leg weakness, bilateral 06/25/2018     Lymphedema 06/25/2018     Adrenal insufficiency (H) 06/08/2018     Fatigue due to excessive exertion 05/25/2018     Pain in both lower extremities 05/17/2018     Venous insufficiency of both lower extremities 05/17/2018     Venous hypertension of both lower extremities 05/17/2018     Fat deposits 05/17/2018     Class 2 obesity due to excess calories in adult 05/17/2018     Vitamin D deficiency 05/17/2018     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     Essential hypertension with goal blood pressure less than 140/90 03/28/2017     Exertional dyspnea 09/15/2016     Generalized anxiety disorder 09/30/2015     Recurrent major depressive disorder, remission status unspecified (H) 09/30/2015     Arthralgia 09/29/2015     Hypothyroidism      JANICE on CPAP      Peripheral Neuropathy      Walk Is Wobbly Or Unsteady (Ataxia)      Localized Primary Osteoarthritis Of The  Left Hip      Insomnia      Heartburn      Arthritis      Cervical Spondylosis      Generalized Osteoarthritis Of The Hand      Lumbar Spondylosis      Trochanteric Bursitis      Lower Back Pain      Osteopenia      Microscopic colitis, unspecified microscopic colitis type      Fibromyalgia      Anemia      Hypertension      Hyperlipidemia      Past Medical History:   Diagnosis Date     Anemia     Created by Conversion      Arrhythmia      Cervical Spondylosis     Created by Conversion      Depression      Disease of thyroid gland     hypo     Fibromyalgia     Created by Conversion      GERD (gastroesophageal reflux disease)      High cholesterol      History of transfusion      Hyperlipidemia     Created by Conversion      Hypertension     Created by Conversion      Near syncope      Peripheral Neuropathy     Created by Conversion      Skin cancer, basal cell      Sleep apnea     mild     Tachycardia      Past Surgical History:   Procedure Laterality Date     Bladder lift       CARPAL TUNNEL RELEASE       CATARACT EXTRACTION, BILATERAL       CHOLECYSTECTOMY       HYSTERECTOMY       JOINT REPLACEMENT Bilateral     knees     MOHS SURGERY      Nose     OOPHORECTOMY Left     1 removed, 1 remains     [place] ARTHROPLASTY TIBIAL PLATEAU      Description: Knee Replacement;  Recorded: 05/16/2013;     [place] INJECT NERV BLCK,OTRAMONA PERIPH NERV      Description: Peripheral Nerve Block Wrist Median Right;  Recorded: 05/16/2013;     REPLACEMENT TOTAL KNEE      L     REPLACEMENT TOTAL KNEE BILATERAL       ROTATOR CUFF REPAIR       TRUNK SKIN LESION EXCISIONAL BIOPSY Left 1/5/2018    Procedure: LEFT BUTTOCK EXPLORATION, EXCISION BUTTOCK MASS;  Surgeon: Lg Jameson MD;  Location: [facility];  Service:      Family History   Problem Relation Age of Onset     Aneurysm Father         AAA     Cancer Mother         pancrease     Current Outpatient Medications   Medication Sig Dispense Refill     aspirin 81 MG EC tablet Take 81 mg by  mouth at bedtime.        calcium carbonate-vitamin D3 (CALCIUM 600 + D,3,) 600 mg calcium- 200 unit cap Take 1 tablet by mouth 2 (two) times a day.              celecoxib (CELEBREX) 200 MG capsule TAKE ONE CAPSULE BY MOUTH ONE TIME DAILY  90 capsule 3     estrogens, conjugated, (PREMARIN) 0.3 MG tablet Take 1 tablet (0.3 mg total) by mouth daily. 90 tablet 3     fluticasone propionate (FLONASE) 50 mcg/actuation nasal spray INSTILL 2 SPRAYS IN EACH NOSTRIL DAILY AS NEEDED 48 g 3     furosemide (LASIX) 20 MG tablet TAKE ONE TABLET BY MOUTH ONE TIME DAILY  90 tablet 3     LACTOBAC CMB #3/FOS/PANTETHINE (PROBIOTIC & ACIDOPHILUS ORAL) Take 1 capsule by mouth once daily. 1.5 billion units             levothyroxine (SYNTHROID, LEVOTHROID) 50 MCG tablet TAKE 1 TABLET BY MOUTH DAILY AT 6AM 90 tablet 0     metoprolol tartrate (LOPRESSOR) 50 MG tablet Take 0.5 tablets (25 mg total) by mouth 2 (two) times a day. 90 tablet 3     multivitamin with minerals (THERA-M) 9 mg iron-400 mcg Tab tablet Take 1 tablet by mouth every evening.       omeprazole (PRILOSEC) 20 MG capsule TAKE ONE CAPSULE BY MOUTH TWICE DAILY  180 capsule 0     simvastatin (ZOCOR) 20 MG tablet TAKE 1 TABLET BY MOUTH AT BEDTIME 90 tablet 3     traZODone (DESYREL) 50 MG tablet Take 25 mg by mouth at bedtime.       venlafaxine (EFFEXOR) 75 MG tablet Take 75 mg by mouth 2 (two) times a day.       No current facility-administered medications for this encounter.      Social History     Socioeconomic History     Marital status:      Spouse name: Not on file     Number of children: Not on file     Years of education: Not on file     Highest education level: Not on file   Occupational History     Not on file   Social Needs     Financial resource strain: Not on file     Food insecurity     Worry: Not on file     Inability: Not on file     Transportation needs     Medical: Not on file     Non-medical: Not on file   Tobacco Use     Smoking status: Former Smoker      Packs/day: 0.25     Years: 1.00     Pack years: 0.25     Last attempt to quit: 1961     Years since quittin.7     Smokeless tobacco: Never Used   Substance and Sexual Activity     Alcohol use: No     Drug use: No     Sexual activity: Never   Lifestyle     Physical activity     Days per week: Not on file     Minutes per session: Not on file     Stress: Not on file   Relationships     Social connections     Talks on phone: Not on file     Gets together: Not on file     Attends [employer] service: Not on file     Active member of club or organization: Not on file     Attends meetings of clubs or organizations: Not on file     Relationship status: Not on file     Intimate partner violence     Fear of current or ex partner: Not on file     Emotionally abused: Not on file     Physically abused: Not on file     Forced sexual activity: Not on file   Other Topics Concern     Not on file   Social History Narrative    Lives in senior living with  of over 50 years. 3 daughters       The following portions of the patient's history were reviewed and updated as appropriate: allergies, current medications, past family history, past medical history, past social history, past surgical history and problem list.    Review of Systems  A comprehensive review of systems was negative except for: What is noted above    Objective:       Discussion was held with the patient today regarding concussion in general including types of injury, symptoms that are common, treatment and variability in time to recover. Education about concussion symptoms and length of time it would take the patient to recover was also given to the patient.  I have reassured the patient her symptoms are very common when a concussion is present and will improve with time. We discussed the risks and benefits of the medication including risk of worsening depression with medication adjustments and even the possibility of emergence of suicidal ideations.        Total time spent with the patient today was 40 minutes with greater than 50% of the time spent in counseling and care coordination. The patient agrees to call before then with any questions, concerns or problems. We will assess for the appropriateness of possible psychotropic medication trials/changes. The patient will seek out appropriate emergency services should that become necessary.    Diagnosis managed and treated at today's visit :  Post concussion syndrome  Post concussion headache  Dizziness  Fatigue  Insomnia  Sensitivity to light  Sound sensitivity  Concentration and Attention deficit  Memory difficulties  Anxiety d/t a medical condition  Irritability     Plan:  Medication Adjustment:  No medication changes, she will talk with primary about possibly starting Amitriptyline     Other:   Patient will return to clinic in 4 weeks. They agree to call or return sooner with any questions or concerns.  Risks and benefits were discussed.  Continue with individual therapist.     Continue with the support of the clinic, reassurance, and redirection. Staff monitoring and ongoing assessments per team plan. Current psychotropic medication appears to represent the minimum effective dosage and appears medically necessary. We will continue to monitor and reassess. This team will utilize appropriate emergency services if necessary. I will make myself available if concerns or problems arise.     Mental Status Examination  She is cooperative with questioning. She is fully engaged in conversation today. Speech is normal. Thought processes normal with normal prehension and expression. Thoughts are organized and linear. Content is pertinent to the conversation and without evidence of auditory or visual hallucinations. No delusional ideation. Gen. fund of knowledge, insight and memory are normal       Video Visit Details    Type of service: Video Visit    Video Start Time: 1250    Video End Time:  1330    Total time of video  visit: 40 minutes    Originating Location: Patient's home    Distant Location:  [facility] [place]    Mode of Communication: Video Conference via [employer] and [employer] [facility]    General Information:  Today you had your appointment with Alida Camarillo CNP     If lab work was done today as part of your evaluation you will generally be contacted via My Chart, mail, or phone with the results within 1-5 days. If there is an alarming result we will contact you by phone. Lab results come back at varying times, I generally wait until all labs are resulted before making comments on results. Please note labs are automatically released to My Chart once available.     If you need refills please contact your pharmacist. They will send a refill request to me to review. Please allow 3 business days for us to process all refill requests.     Please call or send a medical message through My Chart, with any questions or concerns    If you need any paperwork completed please fax forms to [phone]. Please state if you would like a copy of the completed paperwork, mailed or faxed back to the patient and a fax number to fax the paperwork to. Please allow up to 10 days for paperwork to be completed.    Alida Camarillo CNP

## 2021-06-11 NOTE — PROGRESS NOTES
[facility] Daily Progress     Patient Name: Dyana Nicole  Date: 2020  Visit #9   PTA visit #: 0  Referral Diagnosis: neck pain/HA's  Referring provider: True Pugh MD  Visit Diagnosis:     ICD-10-CM    1. Neck pain  M54.2    2. Post concussion syndrome  F07.81    3. Cervicalgia  M54.2    4. Cervicogenic headache  R51    5. Cervical spondylosis without myelopathy  M47.812          Assessment:     There was very good release of fascial tensions today which should help with her cranial drainage and pressure in her head.   Patient is appropriate to continue with skilled physical therapy intervention, as indicated by initial plan of care.    Goal Status:  Pt. will report decreased intensity, frequency of : in 6 weeks;Pain;Headache    Pt will: Able to turn head easier to talk to people or for driving with more ease as AROM improves within 8-12 visits  Pt will: Able to look to read or joan 45 minutes with proper posture and pain levels 2/10 within 8-12 visits as neck strength improves      Plan / Patient Education:     Continue with initial plan of care.    Subjective:     Pain Ratin/10   Things are going ok.   The HA's aren't as much as before. They do come and go now and don't last as long as they used to.   She did have a fall this past Saturday after going into the grass and stepping in a hole.   She will feel a lot of pressure in her head when bending over and when coming back up she can have a little headache.     Objective:     LV, neural, MS fascial tensions.         Treatment Today     TREATMENT MINUTES COMMENTS   Evaluation     Self-care/ Home management     Manual therapy 25 Fascial release using Strain-Counterstrain of B cervical EPI-LV, B cerv MED-LV, B cerv SVE-LV, B upper cerv gray rami-N, B cranial sutures-MS  Dural tube release   Neuromuscular Re-education 15 Recip inhib of neural, LV, MS fascia   Therapeutic Activity     Therapeutic Exercises 15 AA/PROM to C-spine,  T-spine, BUE, ribs, cranium.   [id number]   Gait training     Modality__________________                Total 55    Blank areas are intentional and mean the treatment did not include these items.       Lg Moore, PT  9/1/2020

## 2021-06-12 NOTE — PROGRESS NOTES
[facility] Daily Progress     Patient Name: Dyana Nicole  Date: 10/13/2020  Visit #   10  PTA visit #: 0  Referral Diagnosis: neck pain/HA's  Referring provider: Alida Camarillo FNP  Visit Diagnosis:     ICD-10-CM    1. Neck pain  M54.2    2. Post concussion syndrome  F07.81    3. Cervicalgia  M54.2    4. Cervicogenic headache  R51.9    5. Cervical spondylosis without myelopathy  M47.812          Assessment:     There was good resolution of the LE superficial fascial tension which did help to improve her occipital torsion. This should help with her balance as well as possibly her greater occipital nerve pain on the back of her head.        Patient is appropriate to continue with skilled physical therapy intervention, as indicated by initial plan of care.    Goal Status:  Pt. will report decreased intensity, frequency of : in 6 weeks;Pain;Headache  Pt will: Able to turn head easier to talk to people or for driving with more ease as AROM improves within 8-12 visits  Pt will: Able to look to read or joan 45 minutes with proper posture and pain levels 2/10 within 8-12 visits as neck strength improves      Plan / Patient Education:     Continue with initial plan of care.    Subjective:     Pain Ratin/10   The back of her head does feel good for just a short time. The test is how she does when she puts her head back on her recliner.   She is having an EMG in her right arm. The shoulder is still really sore.         Objective:     Neural, MS fascial tensions.     Occipital torsion: counterclockwise.     Treatment Today     TREATMENT MINUTES COMMENTS   Evaluation     Self-care/ Home management     Manual therapy 25 Fascial release using Strain-Counterstrain of OM suture, BLE-SF, upper cerivcal gray-N  GO nerve releases   Neuromuscular Re-education 15 Recip inhib of  MS,  neural fascia   Therapeutic Activity     Therapeutic Exercises 15 AA/PROM to C-spine, T-spine, ribs, cranium.       [id number]    Gait training     Modality__________________                Total 55    Blank areas are intentional and mean the treatment did not include these items.       Lg Moore, PT  10/13/2020

## 2021-06-12 NOTE — TELEPHONE ENCOUNTER
Refill Approved    Rx renewed per Medication Renewal Policy. Medication was last renewed on 07/04/2020.  Last office visit was 09/16/2020 with PCP.    Carlita Banks, [facility] Triage/Med Refill 10/5/2020     Requested Prescriptions   Pending Prescriptions Disp Refills     levothyroxine (SYNTHROID, LEVOTHROID) 50 MCG tablet [Pharmacy Med Name: Levothyroxine Sodium Oral Tablet 50 MCG] 90 tablet 0     Sig: TAKE ONE TABLET BY MOUTH ONE TIME DAILY AT 6AM       Thyroid Hormones Protocol Passed - 10/2/2020 12:32 PM        Passed - Provider visit in past 12 months or next 3 months     Last office visit with prescriber/PCP: 9/16/2020 True Pugh MD OR same dept: 9/16/2020 True Pugh MD OR same specialty: 9/16/2020 True Pugh MD  Last physical: 9/13/2019 Last MTM visit: Visit date not found   Next visit within 3 mo: Visit date not found  Next physical within 3 mo: Visit date not found  Prescriber OR PCP: True Pugh MD  Last diagnosis associated with med order: 1. Acquired hypothyroidism  - levothyroxine (SYNTHROID, LEVOTHROID) 50 MCG tablet [Pharmacy Med Name: Levothyroxine Sodium Oral Tablet 50 MCG]; TAKE ONE TABLET BY MOUTH ONE TIME DAILY AT 6AM  Dispense: 90 tablet; Refill: 0    If protocol passes may refill for 12 months if within 3 months of last provider visit (or a total of 15 months).             Passed - TSH on file in past 12 months for patient age 12 & older     TSH   Date Value Ref Range Status   09/16/2020 2.18 0.30 - 5.00 uIU/mL Final

## 2021-06-12 NOTE — TELEPHONE ENCOUNTER
Copied response from My chart communication:    ===View-only below this line===  ----- Message -----  From: Pk Garcia DO  Sent: 10/27/2020  12:50 PM CDT  To: Zan Coburn RN  Subject: FW: Question about medications                   Would recommend we get a 24 hour holter.  Dr. Pugh reduce metoprolol dt lightheadedness (? Bradycardia), now tachycardia.  Indications: palpitation and tachycardia.      Kaelyn,     Wero     PC to patient and discussion of recommendation. Pt agreeable. Will place order and schedulers will call to arrange. Order placed. Staff message sent to schedulers to call. CMM,Rn

## 2021-06-12 NOTE — PROGRESS NOTES
[facility] Daily Progress     Patient Name: Dyana Nicole  Date: 10/27/2020  Visit #   10  PTA visit #: 0  Referral Diagnosis: neck pain/HA's  Referring provider: Alida Camarillo FNP  Visit Diagnosis:     ICD-10-CM    1. Neck pain  M54.2    2. Post concussion syndrome  F07.81    3. Cervicalgia  M54.2    4. Cervicogenic headache  R51.9    5. Cervical spondylosis without myelopathy  M47.812    6. Low back pain, unspecified back pain laterality, unspecified chronicity, unspecified whether sciatica present  M54.5          Assessment:     Overall she continues to be much better. Her headaches are doing quite well.         Patient is appropriate to continue with skilled physical therapy intervention, as indicated by initial plan of care.    Goal Status:  Pt. will report decreased intensity, frequency of : in 6 weeks;Pain;Headache  Pt will: Able to turn head easier to talk to people or for driving with more ease as AROM improves within 8-12 visits  Pt will: Able to look to read or joan 45 minutes with proper posture and pain levels 2/10 within 8-12 visits as neck strength improves      Plan / Patient Education:     Continue with initial plan of care.    Subjective:     Pain Ratin/10   Her head just feels really tight today. She hasn't been having HA's which is good though.   She isn't sure if the weather or what that is causing some of her issues.          Objective:     Neural, LV fascial tensions.       Treatment Today     TREATMENT MINUTES COMMENTS   Evaluation     Self-care/ Home management     Manual therapy 30 Fascial release using Strain-Counterstrain of B cranial dura-N, B cranial/cerv/sternal-LV  Dural tube release   Neuromuscular Re-education 15 Recip inhib of  LV, neural fascia   Therapeutic Activity     Therapeutic Exercises        [id number]   Gait training     Modality__________________                Total 45    Blank areas are intentional and mean the treatment did not include these  items.       Lg Moore, PT  10/27/2020

## 2021-06-12 NOTE — PROGRESS NOTES
Assessment:    1. Major depression, recurrent  venlafaxine (EFFEXOR XR) 75 MG 24 hr capsule   2. Generalized anxiety disorder     3. Pituitary mass     4. Hematoma           Plan:    40 minutes total time with patient, > 50% with counseling and coordination of cares.  Did discuss major depression, recurrent as well as history of generalized anxiety.  Marital difficulties reviewed at length regarding counseling on hopes of versus expectations and marriage as well as activities that can be shared together as well as need for independent activities as well.  Patient and  are in agreement with this and will consider marriage counseling services also for further benefit.  Will increase venlafaxine from 150 mg up to 225 mg daily with recommended reassessment in next 4-8 weeks prior to leaving for [place] this October.  Discussed follow-up with neurosurgeon in October Dr. Sutherland regarding pituitary mass stable at 4 mm diameter.  Recent PET scan apparently completed described as negative as well as CT chest abdomen pelvis.  Continues use of metoprolol succinate 50 mg daily as well as metoprolol tartrate 25 mg at bedtime for nocturnal palpitation concerns, resolved.  This had been recommended by Dr. Zarco.  PHQ 9 questionnaire 13 out of 27 and YEE 7 questionnaire 3 out of 21 today.        Subjective:    Dyana Nicole is seen today for concerns with worsening mood.  Using venlafaxine 150 mg daily.  Describes frustration with the fact that her  watches sports all the time.  She is trying to stay active outside of their home however dreads returning to her home.  Needs to watch sports unless nothing else on television.  Will admit in place games on her computer however would not do this if she had something more entertaining to do.  Recently traveled to [place] with daughter and granddaughter etc.   stated back.  Both enjoyed their time apart.  Does have history of anxiety which she feels is been  stable.  Followed by Dr. Garcia with history of described diastolic dysfunction otherwise paroxysmal atrial fibrillation and does not drive currently ×6 months per recommendation.  Seen by Dr. Zarco and was told to continue metoprolol succinate 50 mg daily however add 25 mg metoprolol tartrate tablet at bedtime.  No recurrent syncopal episodes.  Continues thyroid replacement with levothyroxine 50 mcg daily as well as simvastatin 20 mg at bedtime for cholesterol benefits.  Comprehensive review of systems as above otherwise all negative.     - Ray  3 daughters - [facility] ( at [facility])  Smoke - quit in 60s after 1 year  EtOH: sober x 33 years  Surgeries: gallbladder, appy, hysterectomy and ? 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; left CTS release; bladder lift; right TKA; left rotator cuff  Hospitalizations:    Past Surgical History:   Procedure Laterality Date     CHOLECYSTECTOMY       HYSTERECTOMY       JOINT REPLACEMENT Bilateral     knees     MA ARTHROPLASTY TIBIAL PLATEAU      Description: Knee Replacement;  Recorded: 05/16/2013;     MA INJECT NERV BLCK,OTHR PERIPH NERV      Description: Peripheral Nerve Block Wrist Median Right;  Recorded: 05/16/2013;        Family History   Problem Relation Age of Onset     Aneurysm Father         Past Medical History:   Diagnosis Date     Anemia     Created by Conversion      Arrhythmia      Cervical Spondylosis     Created by Conversion      Depression      Disease of thyroid gland     hypo     Fibromyalgia     Created by Conversion      GERD (gastroesophageal reflux disease)      High cholesterol      Hyperlipidemia     Created by Conversion      Hypertension     Created by Conversion      Near syncope      Peripheral Neuropathy     Created by Conversion      Sleep apnea     mild     Tachycardia         Social History   Substance Use Topics     Smoking status: Former Smoker      Quit date: 1/1/1961     Smokeless tobacco: Never Used     Alcohol use No        Current Outpatient Prescriptions   Medication Sig Dispense Refill     aspirin 81 MG EC tablet Take 81 mg by mouth daily.       BIOTIN ORAL Take 5,000 mcg by mouth once daily.        budesonide (ENTOCORT EC) 3 mg 24 hr capsule Take 3 mg by mouth. As needed       calcium carbonate-vitamin D3 (CALCIUM 600 + D,3,) 600 mg calcium- 200 unit cap Take 1 tablet by mouth daily.       celecoxib (CELEBREX) 200 MG capsule TAKE ONE CAPSULE BY MOUTH EVERY DAY 90 capsule 1     estradiol (ESTRACE) 0.5 MG tablet TAKE ONE TABLET BY MOUTH EVERY DAY 30 tablet 2     fluticasone (FLONASE) 50 mcg/actuation nasal spray 2 sprays into each nostril daily as needed.        KRILL OIL ORAL Take 1,000 mg by mouth bedtime.        LACTOBAC CMB #3/FOS/PANTETHINE (PROBIOTIC & ACIDOPHILUS ORAL) Take 1 capsule by mouth once daily.        levothyroxine (SYNTHROID, LEVOTHROID) 50 MCG tablet TAKE ONE TABLET BY MOUTH EVERY DAY 90 tablet 2     metoprolol succinate (TOPROL-XL) 50 MG 24 hr tablet Take 1 tablet (50 mg total) by mouth daily. 30 tablet 5     metoprolol tartrate (LOPRESSOR) 25 MG tablet Take 1 tablet (25 mg total) by mouth at bedtime. 30 tablet 11     MV-MINERALS/FA/OMEGA 3,6,9 #3 (WOMEN'S 50+ ADVANCED ORAL) Take 1 tablet by mouth daily.       omeprazole (PRILOSEC) 20 MG capsule TAKE ONE CAPSULE BY MOUTH TWICE A  capsule 1     simvastatin (ZOCOR) 20 MG tablet TAKE ONE TABLET BY MOUTH AT BEDTIME 90 tablet 2     traZODone (DESYREL) 50 MG tablet TAKE 1/2 TABLET BY MOUTH AT BEDTIME 45 tablet 1     diphenoxylate-atropine (LOMOTIL) 2.5-0.025 mg per tablet Take 1 tablet by mouth 4 (four) times a day as needed for diarrhea.       venlafaxine (EFFEXOR XR) 75 MG 24 hr capsule Take 3 capsules (225 mg total) by mouth daily. 90 capsule 5     No current facility-administered medications for this visit.           Objective:    Vitals:    08/16/17 1137   BP: 120/70   Pulse: 68    Weight: 184 lb (83.5 kg)      Body mass index is 33.65 kg/(m^2).    Alert.  No apparent distress.  Appears to have somewhat sad affect.  No psychomotor agitation.  Otherwise cooperative and forthcoming.  Transfers slowly however independently.       TECHNIQUE: MRI of the brain without and with contrast according to the pituitary protocol.  COMPARISON: MRI brain 3/4/2017     FINDINGS:  There is overall unchanged appearance of the T1-weighted hyperintense lesion measuring 0.4 x 0.4 cm (image 8, sagittal precontrast series 7). There is no definitive superimposed postcontrast enhancement. The lesion appears T2-weighted isointense. As   before, the right aspect of the optic chiasm appears slightly superiorly oriented with respect to the contralateral side of uncertain clinical significance. The internal carotid artery flow voids and cavernous sinuses have a normal appearance.     There is no restricted diffusion. There are periventricular FLAIR signal abnormalities most in keeping with sequela of chronic microvascular ischemic change. Normal cerebral and cerebellar volume for age. Normal ventricles. There is no mass effect,   midline shift, or extraaxial collection. No evidence for acute or chronic intracranial blood products. Intracranial flow voids are intact. Paranasal sinuses are free from significant disease. Mastoid air cells appear free from significant disease. There   are bilateral lens replacements.     IMPRESSION:   CONCLUSION:   1.  Overall unchanged 0.4 x 0.4 cm T1-weighted hyperintense suprasellar lesion which does not demonstrate any definitive superimposed postcontrast enhancement. This could represent a Rathke cleft cyst. Although less likely, a craniopharyngioma remains in   the differential. Continued attention on followup imaging.         NM Exercise Stress Test Conclusion:    1.The patient's exercise capacity is normal.    2.The exercise nuclear stress test is negative for inducible myocardial  ischemia or infarction.    3.The left ventricular ejection fraction is 74%.    4.When compared to the images of 9/22/2016, there has been no significant change.         MRI PELVIS WITHOUT AND WITH CONTRAST  4/20/2017 9:30 AM      INDICATION: Left gluteal mass. Previous hematoma 3 years ago from a fall. Removal of hardware from the lower back-pelvis in November 2016 with possible involvement of the prior hematoma.  TECHNIQUE: Routine.?Additional postgadolinium fat-suppressed T1 sequences were obtained.  CONTRAST: 8 mL Gadavist IV.  COMPARISON: CT 4/20/2017.     FINDINGS: There is a small slender fluid collection in the subcutaneous fat of the left gluteal region with a thin surrounding enhancing and likely fibrotic wall. This measures 0.7 x 4.5 x 4.8 cm and is compatible with a small residual chronic hematoma   or seroma. There is some linear low signal intensity scarring extending superficially from this collection toward the skin. There is no focal soft tissue mass.     There are screw tracks within both iliac bones medially adjacent to the SI joints. There is linear scarring in the overlying subcutaneous fat bilaterally, on the left this tracks to the site of the small gluteal collection. There is artifact from fusion   hardware in the lumbar spine and upper sacrum. No significant marrow signal abnormality within limitations of the artifact from the hardware.     IMPRESSION:   CONCLUSION:  1.  Slender likely residual chronic hematoma or seroma in the left gluteal subcutaneous fat.  2.  No soft tissue mass.          CT CHEST, ABDOMEN, AND PELVIS  4/20/2017 7:37 AM      INDICATION: Neoplasm of unspecified behavior of endocrine glands and other parts of nervous system  TECHNIQUE: CT chest, abdomen, and pelvis. Dose reduction techniques were used.   IV CONTRAST: Iohexol (Omni) 100 mL  COMPARISON: CT chest 9/29/2016. CT abdomen 4/28/2016.     FINDINGS:   CHEST: Small left lower lobe calcified granuloma. No adenopathy,  infiltrate, or effusion.      ABDOMEN: Aortoiliac atherosclerosis. The mesenteric vessels appear patent. Circumaortic left renal vein. Cholecystectomy. Left inferior renal 4 mm and mid renal 2 mm nonobstructive stones. Normal-appearing liver, adrenals, right kidney, spleen, and   pancreas. No adenopathy.     PELVIS: Hysterectomy. No adenopathy, ascites, or mass.     MUSCULOSKELETAL: Extensive lumbosacral fusion hardware.     IMPRESSION:   CONCLUSION:  1.  No neoplasm seen.  2.  Left renal nonobstructive stones.

## 2021-06-12 NOTE — PROGRESS NOTES
"Video Visit  Dyana Nicole is a 80 y.o. female who is being evaluated via a billable video visit in light of the ongoing global health crisis (COVID-19) that requires us to abide by social distancing mandates in order to reduce the risk of COVID-19 exposure.       The patient has been notified of following:     \"This video visit will be conducted via a video call between you and your physician/provider. We have found that certain health care needs can be provided without the need for a physical exam.  This service lets us provide the care you need with a short phone/video conversation.  If a prescription is necessary we can send it directly to your pharmacy.  If lab work is needed we can place an order for that and you can then stop by our lab to have the test done at a later time.    If during the course of the call the physician/provider feels a telephone visit is not appropriate, you will not be charged for this service.\"     Patient has given verbal consent to a video visit? 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     ALLERGIES  Oxycodone, Penicillins, Pollen, and Venom-honey bee    Date of accident : June 2017    Orders from previous visit:   Neuropsychological assessment completed    Yes   Currently doing PT  Yes, myofacial therapy completed  Yes   Currently doing OT  No   Completed No    Currently doing ST   No   Completed No   Psychology  Yes      Any new medication (other provider):   No   Currently on medication to help with sleep    Yes    Trazodone     Currently on any mental health medications     Yes   Venlafaxine      Currently on medication for attention, ADD/ADHD    No       Is patient on a controlled substance   No     Any concerns would like to be addressed at this appointment?  No                                                      Workman's Comp   No     Start Time: 1400    End Time:  1406    Total time of phone call: 6 minutes    Patient would like the video " "invitation sent by: [email or web address]   Number/e-mail address: [phone]     Marcus Slater CMA     Is patient on a controlled substance   No          Outpatient Follow up Mild TBI (Concussion)  Evaluation       Pertinent History:  patient has been having concussive symptoms since an injury and June 2017.  The patient was fixing a float for a parade.  The arie was not properly placed underneath the float. When another person stood up at the same time as the patient the arie came loose and the patient slid down hitting her head on the cement. Patient's main complaint is still having headaches and cognitive issues since injury. At previous appointment the patient informed me that her daughter would no longer be involved in her health care.      Date of accident : June 2017         Subjective:          HPI    The patient returns to the concussion clinic for a follow up visit, She was last seen by me on 9-, where no medication changes were made.  The patient has finished with her craniosacral massage and she continues to have the pain in the back of her head.  She reports that her headaches do not last as long but they continue to be there and her neck continues to hurt.  This pain in her head also causes her ear to feel \"plugged\" and have ear pain at times.  Overall patient is reporting improvement with headaches but no change in any other physical, cognitive, or emotional symptoms    We discussed some treatment options and have elected to refer patient to an ear specialist.                                                      Headaches:  Significant ongoing headaches Yes  Headaches: Intermittently  Improvement :Yes   Current Headache Yes   Wake with HA  No     Worse Headache    2/10           How often: couple times a week    Average Headache 2/10.    Best Headache 2/10.  Brings on HA:   Doing too much  Makes symptoms worse  activity  Makes symptoms better. rest  Taking  acetaminophen (Tylenol)        Helpful:  Yes "     Physical Symptoms:  Headache-Yes       Since last visit  Improved     Nausea-No          Balance problems - No      Dizziness - Yes          Since last visit  Improved     Visual problems - Yes    Since last visit  Improved     Fatigue - Yes             Since last visit  Same     Sensitivity to light - Yes       Since last visit  Same     Sensitivity to sound - Yes         Since last visit  Same     Numbness/tingling - Yes     Since last visit  Same         Cognitive Symptoms  Feeling mentally foggy -Yes       Since last visit  Same     Feeling slowed down -Yes       Since last visit  Same     Difficulty Concentrating- Yes     Since last visit  Same     Difficulty remembering - Yes        Since last visit  Same       Emotional Symptoms  Irritability - Yes         Since last visit  Same     Sadness-  Yes      Since last visit  Same     More emotional - Yes      Since last visit  Same     Nervousness/anxiety -Yes       Since last visit  Same       Mental Health History:  Anxiety - Yes  Depression - No  Sleep Disorders - No  Any thought of hurting self or others currently?   No  Any history of hurting self or others?            No    Sleep History:  Drowsiness- Yes    Since last visit  Same     Sleep less than usual - Yes  Sleep more than usual - No  Trouble falling asleep - Yes     Since last visit  Same     Does the patient wake feeling rested - No        Since last visit  Same        Migraine Headaches      Patient history of migraines.    No      Exertion:         Do the above stated symptoms worsen with physical activity? Yes       Since last visit  Same           Do the above stated symptoms worsen with cognitive activity? Yes      Since last visit  Same                  Patient Active Problem List    Diagnosis Date Noted     Snoring 01/04/2019     Acute cystitis without hematuria      Anemia due to blood loss, acute 12/27/2018     Lower GI bleed 12/26/2018     BRBPR (bright red blood per rectum) 12/26/2018      Obesity (BMI 35.0-39.9) with comorbidity (H) 08/30/2018     Atrial tachycardia (H) 08/30/2018     Left ventricular outflow obstruction 08/30/2018     Hypertensive left ventricular hypertrophy, without heart failure 08/30/2018     Leg weakness, bilateral 06/25/2018     Lymphedema 06/25/2018     Adrenal insufficiency (H) 06/08/2018     Fatigue due to excessive exertion 05/25/2018     Pain in both lower extremities 05/17/2018     Venous insufficiency of both lower extremities 05/17/2018     Venous hypertension of both lower extremities 05/17/2018     Fat deposits 05/17/2018     Class 2 obesity due to excess calories in adult 05/17/2018     Vitamin D deficiency 05/17/2018     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     Essential hypertension with goal blood pressure less than 140/90 03/28/2017     Exertional dyspnea 09/15/2016     Generalized anxiety disorder 09/30/2015     Recurrent major depressive disorder, remission status unspecified (H) 09/30/2015     Arthralgia 09/29/2015     Hypothyroidism      JANICE on CPAP      Peripheral Neuropathy      Walk Is Wobbly Or Unsteady (Ataxia)      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      Insomnia      Heartburn      Arthritis      Cervical Spondylosis      Generalized Osteoarthritis Of The Hand      Lumbar Spondylosis      Trochanteric Bursitis      Lower Back Pain      Osteopenia      Microscopic colitis, unspecified microscopic colitis type      Fibromyalgia      Anemia      Hypertension      Hyperlipidemia      Past Medical History:   Diagnosis Date     Anemia     Created by Conversion      Arrhythmia      Cervical Spondylosis     Created by Conversion      Depression      Disease of thyroid gland     hypo     Fibromyalgia     Created by Conversion      GERD (gastroesophageal reflux disease)      High cholesterol      History of transfusion      Hyperlipidemia     Created by Conversion      Hypertension     Created by Conversion      Near syncope      Peripheral  Neuropathy     Created by Conversion      Skin cancer, basal cell      Sleep apnea     mild     Tachycardia      Past Surgical History:   Procedure Laterality Date     Bladder lift       CARPAL TUNNEL RELEASE       CATARACT EXTRACTION, BILATERAL       CHOLECYSTECTOMY       HYSTERECTOMY       JOINT REPLACEMENT Bilateral     knees     MOHS SURGERY      Nose     OOPHORECTOMY Left     1 removed, 1 remains     LA ARTHROPLASTY TIBIAL PLATEAU      Description: Knee Replacement;  Recorded: 05/16/2013;     LA INJECT NERV BLCK,OTHR PERIPH NERV      Description: Peripheral Nerve Block Wrist Median Right;  Recorded: 05/16/2013;     REPLACEMENT TOTAL KNEE      L     REPLACEMENT TOTAL KNEE BILATERAL       ROTATOR CUFF REPAIR       TRUNK SKIN LESION EXCISIONAL BIOPSY Left 1/5/2018    Procedure: LEFT BUTTOCK EXPLORATION, EXCISION BUTTOCK MASS;  Surgeon: Lg Jameson MD;  Location: [facility] Main OR;  Service:      Family History   Problem Relation Age of Onset     Aneurysm Father         AAA     Cancer Mother         pancrease     Current Outpatient Medications   Medication Sig Dispense Refill     aspirin 81 MG EC tablet Take 81 mg by mouth at bedtime.        calcium carbonate-vitamin D3 (CALCIUM 600 + D,3,) 600 mg calcium- 200 unit cap Take 1 tablet by mouth 2 (two) times a day.              celecoxib (CELEBREX) 200 MG capsule TAKE ONE CAPSULE BY MOUTH ONE TIME DAILY  90 capsule 3     estrogens, conjugated, (PREMARIN) 0.3 MG tablet Take 1 tablet (0.3 mg total) by mouth daily. 90 tablet 3     fluticasone propionate (FLONASE) 50 mcg/actuation nasal spray INSTILL 2 SPRAYS IN EACH NOSTRIL DAILY AS NEEDED 48 g 3     furosemide (LASIX) 20 MG tablet TAKE ONE TABLET BY MOUTH ONE TIME DAILY  90 tablet 3     LACTOBAC CMB #3/FOS/PANTETHINE (PROBIOTIC & ACIDOPHILUS ORAL) Take 1 capsule by mouth once daily. 1.5 billion units             levothyroxine (SYNTHROID, LEVOTHROID) 50 MCG tablet TAKE ONE TABLET BY MOUTH ONE TIME DAILY AT 6AM 90  tablet 3     metoprolol tartrate (LOPRESSOR) 50 MG tablet Take 0.5 tablets (25 mg total) by mouth 2 (two) times a day. 90 tablet 3     multivitamin with minerals (THERA-M) 9 mg iron-400 mcg Tab tablet Take 1 tablet by mouth every evening.       omeprazole (PRILOSEC) 20 MG capsule TAKE ONE CAPSULE BY MOUTH TWICE DAILY  180 capsule 3     simvastatin (ZOCOR) 20 MG tablet TAKE 1 TABLET BY MOUTH AT BEDTIME 90 tablet 3     traZODone (DESYREL) 50 MG tablet Take 25 mg by mouth at bedtime.       venlafaxine (EFFEXOR) 75 MG tablet Take 75 mg by mouth 2 (two) times a day.       No current facility-administered medications for this encounter.      Social History     Socioeconomic History     Marital status:      Spouse name: Not on file     Number of children: Not on file     Years of education: Not on file     Highest education level: Not on file   Occupational History     Not on file   Social Needs     Financial resource strain: Not on file     Food insecurity     Worry: Not on file     Inability: Not on file     Transportation needs     Medical: Not on file     Non-medical: Not on file   Tobacco Use     Smoking status: Former Smoker     Packs/day: 0.25     Years: 1.00     Pack years: 0.25     Quit date: 1961     Years since quittin.8     Smokeless tobacco: Never Used   Substance and Sexual Activity     Alcohol use: No     Drug use: No     Sexual activity: Never   Lifestyle     Physical activity     Days per week: Not on file     Minutes per session: Not on file     Stress: Not on file   Relationships     Social connections     Talks on phone: Not on file     Gets together: Not on file     Attends Alevism service: Not on file     Active member of club or organization: Not on file     Attends meetings of clubs or organizations: Not on file     Relationship status: Not on file     Intimate partner violence     Fear of current or ex partner: Not on file     Emotionally abused: Not on file     Physically abused:  Not on file     Forced sexual activity: Not on file   Other Topics Concern     Not on file   Social History Narrative    Lives in senior living with  of over 50 years. 3 daughters       The following portions of the patient's history were reviewed and updated as appropriate: allergies, current medications, past family history, past medical history, past social history, past surgical history and problem list.    Review of Systems  A comprehensive review of systems was negative except for: What is noted above    Objective:       Discussion was held with the patient today regarding concussion in general including types of injury, symptoms that are common, treatment and variability in time to recover. Education about concussion symptoms and length of time it would take the patient to recover was also given to the patient.  I have reassured the patient her symptoms are very common when a concussion is present and will improve with time. We discussed the risks and benefits of the medication including risk of worsening depression with medication adjustments and even the possibility of emergence of suicidal ideations.       Total time spent with the patient today was 30 minutes with greater than 50% of the time spent in counseling and care coordination. The patient agrees to call before then with any questions, concerns or problems. We will assess for the appropriateness of possible psychotropic medication trials/changes. The patient will seek out appropriate emergency services should that become necessary.    Diagnosis managed and treated at today's visit :  Post concussion syndrome  Post concussion headache  Dizziness  Fatigue  Insomnia  Sensitivity to light  Sound sensitivity  Concentration and Attention deficit  Memory difficulties  Anxiety d/t a medical condition  Irritability      Plan:  Medication Adjustment:  No medication changes    Other:   Patient will return to clinic in 4 weeks. They agree to call or  return sooner with any questions or concerns.  Risks and benefits were discussed.  Continue with individual therapist.     Continue with the support of the clinic, reassurance, and redirection. Staff monitoring and ongoing assessments per team plan. Current psychotropic medication appears to represent the minimum effective dosage and appears medically necessary. We will continue to monitor and reassess. This team will utilize appropriate emergency services if necessary. I will make myself available if concerns or problems arise.     Mental Status Examination  She is cooperative with questioning. She is fully engaged in conversation today. Speech is normal. Thought processes normal with normal prehension and expression. Thoughts are organized and linear. Content is pertinent to the conversation and without evidence of auditory or visual hallucinations. No delusional ideation. Gen. fund of knowledge, insight and memory are normal       Video Visit Details    Type of service: Video Visit    Video Start Time: 1430    Video End Time:  1500    Total time of video visit: 30 minutes    Originating Location: Patient's home    Distant Location:  [facility]    Mode of Communication: Video Conference via [facility]    General Information:  Today you had your appointment with Alida Camarillo CNP     If lab work was done today as part of your evaluation you will generally be contacted via My Chart, mail, or phone with the results within 1-5 days. If there is an alarming result we will contact you by phone. Lab results come back at varying times, I generally wait until all labs are resulted before making comments on results. Please note labs are automatically released to My Chart once available.     If you need refills please contact your pharmacist. They will send a refill request to me to review. Please allow 3 business days for us to process all refill requests.     Please call or send  a medical message through My Chart, with any questions or concerns    If you need any paperwork completed please fax forms to [phone]. Please state if you would like a copy of the completed paperwork, mailed or faxed back to the patient and a fax number to fax the paperwork to. Please allow up to 10 days for paperwork to be completed.    Alida Camarillo, CNP

## 2021-06-12 NOTE — TELEPHONE ENCOUNTER
Refill Approved    Rx renewed per Medication Renewal Policy. Medication was last renewed on 7/4/20.    Kandis Conway, [facility] Triage/Med Refill 10/9/2020     Requested Prescriptions   Pending Prescriptions Disp Refills     omeprazole (PRILOSEC) 20 MG capsule [Pharmacy Med Name: Omeprazole Oral Capsule Delayed Release 20 MG] 180 capsule 0     Sig: TAKE ONE CAPSULE BY MOUTH TWICE DAILY       GI Medications Refill Protocol Passed - 10/7/2020  5:27 PM        Passed - PCP or prescribing provider visit in last 12 or next 3 months.     Last office visit with prescriber/PCP: 9/16/2020 True Pugh MD OR same dept: 9/16/2020 True Pugh MD OR same specialty: 9/16/2020 True Pugh MD  Last physical: 9/13/2019 Last MTM visit: Visit date not found   Next visit within 3 mo: Visit date not found  Next physical within 3 mo: Visit date not found  Prescriber OR PCP: True Pugh MD  Last diagnosis associated with med order: 1. Esophageal reflux  - omeprazole (PRILOSEC) 20 MG capsule [Pharmacy Med Name: Omeprazole Oral Capsule Delayed Release 20 MG]; TAKE ONE CAPSULE BY MOUTH TWICE DAILY   Dispense: 180 capsule; Refill: 0    If protocol passes may refill for 12 months if within 3 months of last provider visit (or a total of 15 months).

## 2021-06-13 NOTE — PROGRESS NOTES
Assessment/Plan:    1. Palpitations  Palpitations, recurrent.  Episodes this morning lasting several minutes likely.  I did speak with event monitor staffing [phone] who noted episodes of sinus rhythm, sinus tachycardia and atrial tachycardia with ventricular rate 105 bpm.  Fastest heart rate 116 beats a minute with junctional tachycardia at 115 beats a minute.  This would not normally record due to benign findings.  Is scheduled to complete event monitor December 18, 2020 after having placed November 18, 2020.  Continues to work with Dr. Garcia as well.  Encourage patient to continue metoprolol tartrate 50 mg twice daily dosing at this time.  Did have 1 hour nap following event.  Encourage appropriate sleep, avoidance of caffeine, ensure adequate hydration.    2. Shortness of breath  Shortness of breath associate with palpitations also as well as likely underlying anxiety during this event.  Reassurance provided currently.    3. Lightheadedness  Lightheadedness associated with anxiety and palpitations described.  Resolved.    4. Essential hypertension with goal blood pressure less than 140/90  Hypertension stable with metoprolol tartrate 50 mg twice daily.    5. Hypothyroidism, unspecified type  Continues levothyroxine 50 mcg daily.    6. Hyperlipidemia, unspecified hyperlipidemia type  Tinea simvastatin 20 mg at bedtime.          Subjective:    Dyana Nicole is seen today for evaluation of recent event.  Palpitations.  Happened sometime between 830 and 9:00 this morning apparently.  Felt like fast heart rate as well as shortness of breath and lightheadedness.  Associated palpitations.  Following felt weak and tired and no energy.  Top of her arms felt felt heavy.  Did take a nap for about an hour and did feel better after.  Had been up around 5 this morning and could not get back to bed initially.  Palpitation history.  Has event monitor placed November 18, 2020 and will have removed December 18, 2020.   Ability to call to determine what the event or rhythm was with phone number provided as noted above.  Continues use metoprolol to tartrate 50 mg twice daily dosing after previously trying to decrease dose to 25 mg twice daily.  Simvastatin 20 mg at bedtime for lipid management.  Remains on venlafaxine 75 mg twice daily as well with history of depression.  Comprehensive review of systems as above otherwise all negative.     - Ray  3 daughters - Genesis ( at [facility]), Eneida  Smoke - quit in 60s after 1 year  EtOH: sober x 36 years  Surgeries: gallbladder, appy, hysterectomy and ? 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; left CTS release; bladder lift; right TKA; left rotator cuff  Hospitalizations:    Past Surgical History:   Procedure Laterality Date     Bladder lift       CARPAL TUNNEL RELEASE       CATARACT EXTRACTION, BILATERAL       CHOLECYSTECTOMY       HYSTERECTOMY       JOINT REPLACEMENT Bilateral     knees     MOHS SURGERY      Nose     OOPHORECTOMY Left     1 removed, 1 remains     ND ARTHROPLASTY TIBIAL PLATEAU      Description: Knee Replacement;  Recorded: 05/16/2013;     ND INJECT NERV BLCK,OTHR PERIPH NERV      Description: Peripheral Nerve Block Wrist Median Right;  Recorded: 05/16/2013;     REPLACEMENT TOTAL KNEE      L     REPLACEMENT TOTAL KNEE BILATERAL       ROTATOR CUFF REPAIR       TRUNK SKIN LESION EXCISIONAL BIOPSY Left 1/5/2018    Procedure: LEFT BUTTOCK EXPLORATION, EXCISION BUTTOCK MASS;  Surgeon: Lg Jameson MD;  Location: [facility] Main OR;  Service:         Family History   Problem Relation Age of Onset     Aneurysm Father         AAA     Cancer Mother         pancrease        Past Medical History:   Diagnosis Date     Anemia     Created by Conversion      Arrhythmia      Cervical Spondylosis     Created by Conversion      Depression      Disease of thyroid gland     hypo     Fibromyalgia      Created by Conversion      GERD (gastroesophageal reflux disease)      High cholesterol      History of transfusion      Hyperlipidemia     Created by Conversion      Hypertension     Created by Conversion      Near syncope      Peripheral Neuropathy     Created by Conversion      Skin cancer, basal cell      Sleep apnea     mild     Tachycardia         Social History     Tobacco Use     Smoking status: Former Smoker     Packs/day: 0.25     Years: 1.00     Pack years: 0.25     Quit date: 1961     Years since quittin.9     Smokeless tobacco: Never Used   Substance Use Topics     Alcohol use: No     Drug use: No        Current Outpatient Medications   Medication Sig Dispense Refill     aspirin 81 MG EC tablet Take 81 mg by mouth at bedtime.        calcium carbonate-vitamin D3 (CALCIUM 600 + D,3,) 600 mg calcium- 200 unit cap Take 1 tablet by mouth 2 (two) times a day.              celecoxib (CELEBREX) 200 MG capsule TAKE ONE CAPSULE BY MOUTH ONE TIME DAILY  90 capsule 3     fluticasone propionate (FLONASE) 50 mcg/actuation nasal spray INSTILL 2 SPRAYS IN EACH NOSTRIL DAILY AS NEEDED 48 g 3     furosemide (LASIX) 20 MG tablet TAKE ONE TABLET BY MOUTH ONE TIME DAILY  90 tablet 3     LACTOBAC CMB #3/FOS/PANTETHINE (PROBIOTIC & ACIDOPHILUS ORAL) Take 1 capsule by mouth once daily. 1.5 billion units             levothyroxine (SYNTHROID, LEVOTHROID) 50 MCG tablet TAKE ONE TABLET BY MOUTH ONE TIME DAILY AT 6AM 90 tablet 3     metoprolol tartrate (LOPRESSOR) 50 MG tablet Take 0.5 tablets (25 mg total) by mouth 2 (two) times a day. 90 tablet 3     multivitamin with minerals (THERA-M) 9 mg iron-400 mcg Tab tablet Take 1 tablet by mouth every evening.       omeprazole (PRILOSEC) 20 MG capsule TAKE ONE CAPSULE BY MOUTH TWICE DAILY  180 capsule 3     simvastatin (ZOCOR) 20 MG tablet TAKE 1 TABLET BY MOUTH AT BEDTIME 90 tablet 3     traZODone (DESYREL) 50 MG tablet Take 25 mg by mouth at bedtime. Pt taking 75mg at bedtime        venlafaxine (EFFEXOR) 75 MG tablet Take 75 mg by mouth 2 (two) times a day.       No current facility-administered medications for this visit.           Objective:    There were no vitals filed for this visit.   There is no height or weight on file to calculate BMI.    Alert.  No apparent distress.  Chest clear.  Cardiac exam regular.  No cardiac ectopy.  No murmur.  Extremities warm and dry.  No edema.      This note has been dictated using voice recognition software and as a result may contain minor grammatical errors and unintended word substitutions.

## 2021-06-13 NOTE — PROGRESS NOTES
"Video Visit  Dyana Nicole is a 80 y.o. female who is being evaluated via a billable video visit in light of the ongoing global health crisis (COVID-19) that requires us to abide by social distancing mandates in order to reduce the risk of COVID-19 exposure.       The patient has been notified of following:     \"This video visit will be conducted via a video call between you and your physician/provider. We have found that certain health care needs can be provided without the need for a physical exam.  This service lets us provide the care you need with a short phone/video conversation.  If a prescription is necessary we can send it directly to your pharmacy.  If lab work is needed we can place an order for that and you can then stop by our lab to have the test done at a later time.    If during the course of the call the physician/provider feels a telephone visit is not appropriate, you will not be charged for this service.\"     Patient has given verbal consent to a video visit? 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     ALLERGIES  Oxycodone, Penicillins, Pollen, and Venom-honey bee    Date of accident : June 2017     Orders from previous visit:   Neuropsychological assessment completed    Yes   Currently doing PT  Yes   Completed No   Currently doing OT  No   Completed No    Currently doing ST   No   Completed No   Psychology  No    Done where:     Any new medication (other provider):   No   Currently on medication to help with sleep    Yes    Trazodone    Currently on any mental health medications     Yes   Venlafaxine      Currently on medication for attention, ADD/ADHD    No       Any concerns would like to be addressed at this appointment?  No                                                      Workman's Comp   No   QRC   No   Present: No    Start Time: 12:53am    End Time: 1:00pm    Total time of phone call: 7 minutes    Patient would like the video invitation sent by: " "Naomi Slater CMA     Is patient on a controlled substance   No      Outpatient Follow up Mild TBI (Concussion)  Evaluation     Pertinent History:   patient has been having concussive symptoms since an injury and June 2017.  The patient was fixing a float for a parade.  The arie was not properly placed underneath the float. When another person stood up at the same time as the patient the arie came loose and the patient slid down hitting her head on the cement. Patient's main complaint is still having headaches and cognitive issues since injury. At previous appointment the patient informed me that her daughter would no longer be involved in her health care.      Date of accident : June 2017    Subjective:          HPI    The patient returns to the concussion clinic for a follow up visit, She was last seen by me on 11/2/2020, where no medication changes were made.  The patient continues to state that she has the \"weird sensation\" on her head.  She reports she cannot lay on her back while sleeping.  Patient does report that she has \"gotten rid of\" her headaches and the lightheadedness.  Patient also reports that ENT appointment went really well and her ears do not \"feel plugged anymore\".  Overall patient is reporting improvement in physical, cognitive, and emotional symptoms.  The only thing left concerns to the patient is the pain at the coccyx area of her head.    We discussed some treatment options and have elected to discharge patient from the concussion clinic.  She may contact me after she returns from her trip to [place]..                                                      Headaches:  Significant ongoing headaches Yes  Headaches: Intermittently  Improvement :Yes   Current Headache No   Wake with HA  No     Worse Headache    1/10           How often: \"once in awhile\"    Average Headache 1/10.    Best Headache 1/10.  Brings on HA:   Doing too much  Makes symptoms worse  activity  Makes symptoms " better.rest  Taking  acetaminophen (Tylenol)        Helpful:  No     Physical Symptoms:  Headache-Yes       Since last visit  Improved     Nausea-No         Balance problems - No      Dizziness - Yes          Since last visit  Improved     Visual problems - No    Since last visit  Improved     Fatigue - Yes             Since last visit  Improved     Sensitivity to light - Yes       Since last visit  Improved     Sensitivity to sound - Yes         Since last visit  Improved     Numbness/tingling - Yes     Since last visit  Same         Cognitive Symptoms  Feeling mentally foggy -Yes       Since last visit  Improved     Feeling slowed down -Yes       Since last visit  Improved     Difficulty Concentrating- Yes     Since last visit  Improved     Difficulty remembering - Yes        Since last visit  Improved       Emotional Symptoms  Irritability - Yes         Since last visit  Same     Sadness-  Yes      Since last visit  Improved     More emotional - Yes      Since last visit  Same     Nervousness/anxiety -Yes       Since last visit  Same       Mental Health History:  Anxiety - Yes  Depression - No  Sleep Disorders - No  Any thought of hurting self or others currently?   No  Any history of hurting self or others?            No    Sleep History:  Drowsiness- Yes    Since last visit  Improved     Sleep less than usual - Yes  Sleep more than usual - No  Trouble falling asleep - Yes     Since last visit  Improved     Does the patient wake feeling rested - sometimes       Since last visit  Improved        Migraine Headaches      Patient history of migraines.    No      Exertion:         Do the above stated symptoms worsen with physical activity? Yes       Since last visit  Improved           Do the above stated symptoms worsen with cognitive activity? Yes      Since last visit  Same            Work/School               Have your returned to work/school? 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     Snoring 01/04/2019     Acute cystitis without hematuria      Anemia due to blood loss, acute 12/27/2018     Lower GI bleed 12/26/2018     BRBPR (bright red blood per rectum) 12/26/2018     Obesity (BMI 35.0-39.9) with comorbidity (H) 08/30/2018     Atrial tachycardia (H) 08/30/2018     Left ventricular outflow obstruction 08/30/2018     Hypertensive left ventricular hypertrophy, without heart failure 08/30/2018     Leg weakness, bilateral 06/25/2018     Lymphedema 06/25/2018     Adrenal insufficiency (H) 06/08/2018     Fatigue due to excessive exertion 05/25/2018     Pain in both lower extremities 05/17/2018     Venous insufficiency of both lower extremities 05/17/2018     Venous hypertension of both lower extremities 05/17/2018     Fat deposits 05/17/2018     Class 2 obesity due to excess calories in adult 05/17/2018     Vitamin D deficiency 05/17/2018     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     Essential hypertension with goal blood pressure less than 140/90 03/28/2017     Exertional dyspnea 09/15/2016     Generalized anxiety disorder 09/30/2015     Recurrent major depressive disorder, remission status unspecified (H) 09/30/2015     Arthralgia 09/29/2015     Hypothyroidism      JANICE on CPAP      Peripheral Neuropathy      Walk Is Wobbly Or Unsteady (Ataxia)      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      Insomnia      Heartburn      Arthritis      Cervical Spondylosis      Generalized Osteoarthritis Of The Hand      Lumbar Spondylosis      Trochanteric Bursitis      Lower Back Pain      Osteopenia      Microscopic colitis, unspecified microscopic colitis type      Fibromyalgia      Anemia      Hypertension      Hyperlipidemia      Past Medical History:   Diagnosis Date     Anemia     Created by Conversion      Arrhythmia      Cervical Spondylosis     Created by Conversion      Depression      Disease of thyroid gland     hypo     Fibromyalgia     Created by Conversion      GERD  (gastroesophageal reflux disease)      High cholesterol      History of transfusion      Hyperlipidemia     Created by Conversion      Hypertension     Created by Conversion      Near syncope      Peripheral Neuropathy     Created by Conversion      Skin cancer, basal cell      Sleep apnea     mild     Tachycardia      Past Surgical History:   Procedure Laterality Date     Bladder lift       CARPAL TUNNEL RELEASE       CATARACT EXTRACTION, BILATERAL       CHOLECYSTECTOMY       HYSTERECTOMY       JOINT REPLACEMENT Bilateral     knees     MOHS SURGERY      Nose     OOPHORECTOMY Left     1 removed, 1 remains     NM ARTHROPLASTY TIBIAL PLATEAU      Description: Knee Replacement;  Recorded: 05/16/2013;     NM INJECT NERV BLCK,OTHR PERIPH NERV      Description: Peripheral Nerve Block Wrist Median Right;  Recorded: 05/16/2013;     REPLACEMENT TOTAL KNEE      L     REPLACEMENT TOTAL KNEE BILATERAL       ROTATOR CUFF REPAIR       TRUNK SKIN LESION EXCISIONAL BIOPSY Left 1/5/2018    Procedure: LEFT BUTTOCK EXPLORATION, EXCISION BUTTOCK MASS;  Surgeon: Lg Jameson MD;  Location: [facility] Main OR;  Service:      Family History   Problem Relation Age of Onset     Aneurysm Father         AAA     Cancer Mother         pancrease     Current Outpatient Medications   Medication Sig Dispense Refill     aspirin 81 MG EC tablet Take 81 mg by mouth at bedtime.        calcium carbonate-vitamin D3 (CALCIUM 600 + D,3,) 600 mg calcium- 200 unit cap Take 1 tablet by mouth 2 (two) times a day.              celecoxib (CELEBREX) 200 MG capsule TAKE ONE CAPSULE BY MOUTH ONE TIME DAILY  90 capsule 3     estrogens, conjugated, (PREMARIN) 0.3 MG tablet Take 1 tablet (0.3 mg total) by mouth daily. 90 tablet 3     fluticasone propionate (FLONASE) 50 mcg/actuation nasal spray INSTILL 2 SPRAYS IN EACH NOSTRIL DAILY AS NEEDED 48 g 3     furosemide (LASIX) 20 MG tablet TAKE ONE TABLET BY MOUTH ONE TIME DAILY  90 tablet 3     LACTOBAC CMB  #3/FOS/PANTETHINE (PROBIOTIC & ACIDOPHILUS ORAL) Take 1 capsule by mouth once daily. 1.5 billion units             levothyroxine (SYNTHROID, LEVOTHROID) 50 MCG tablet TAKE ONE TABLET BY MOUTH ONE TIME DAILY AT 6AM 90 tablet 3     metoprolol tartrate (LOPRESSOR) 50 MG tablet Take 0.5 tablets (25 mg total) by mouth 2 (two) times a day. 90 tablet 3     multivitamin with minerals (THERA-M) 9 mg iron-400 mcg Tab tablet Take 1 tablet by mouth every evening.       omeprazole (PRILOSEC) 20 MG capsule TAKE ONE CAPSULE BY MOUTH TWICE DAILY  180 capsule 3     simvastatin (ZOCOR) 20 MG tablet TAKE 1 TABLET BY MOUTH AT BEDTIME 90 tablet 3     traZODone (DESYREL) 50 MG tablet Take 25 mg by mouth at bedtime. Pt taking 75mg at bedtime       venlafaxine (EFFEXOR) 75 MG tablet Take 75 mg by mouth 2 (two) times a day.       No current facility-administered medications for this encounter.      Social History     Socioeconomic History     Marital status:      Spouse name: Not on file     Number of children: Not on file     Years of education: Not on file     Highest education level: Not on file   Occupational History     Not on file   Social Needs     Financial resource strain: Not on file     Food insecurity     Worry: Not on file     Inability: Not on file     Transportation needs     Medical: Not on file     Non-medical: Not on file   Tobacco Use     Smoking status: Former Smoker     Packs/day: 0.25     Years: 1.00     Pack years: 0.25     Quit date: 1961     Years since quittin.9     Smokeless tobacco: Never Used   Substance and Sexual Activity     Alcohol use: No     Drug use: No     Sexual activity: Never   Lifestyle     Physical activity     Days per week: Not on file     Minutes per session: Not on file     Stress: Not on file   Relationships     Social connections     Talks on phone: Not on file     Gets together: Not on file     Attends L[employer]-day service: Not on file     Active member of club or organization:  Not on file     Attends meetings of clubs or organizations: Not on file     Relationship status: Not on file     Intimate partner violence     Fear of current or ex partner: Not on file     Emotionally abused: Not on file     Physically abused: Not on file     Forced sexual activity: Not on file   Other Topics Concern     Not on file   Social History Narrative    Lives in senior living with  of over 50 years. 3 daughters       The following portions of the patient's history were reviewed and updated as appropriate: allergies, current medications, past family history, past medical history, past social history, past surgical history and problem list.    Review of Systems  A comprehensive review of systems was negative except for: What is noted above    Objective:       Discussion was held with the patient today regarding concussion in general including types of injury, symptoms that are common, treatment and variability in time to recover. Education about concussion symptoms and length of time it would take the patient to recover was also given to the patient.  I have reassured the patient her symptoms are very common when a concussion is present and will improve with time. We discussed the risks and benefits of the medication including risk of worsening depression with medication adjustments and even the possibility of emergence of suicidal ideations.       Total time spent with the patient today was 40 minutes with greater than 50% of the time spent in counseling and care coordination. The patient agrees to call before then with any questions, concerns or problems. We will assess for the appropriateness of possible psychotropic medication trials/changes. The patient will seek out appropriate emergency services should that become necessary.    Diagnosis managed and treated at today's visit :      Post concussion syndrome  Post concussion headache  Dizziness  Fatigue  Insomnia  Sensitivity to light  Sound  sensitivity  Concentration and Attention deficit  Memory difficulties  Anxiety d/t a medical condition  Irritability      Plan:  Medication Adjustment:  No medication changes    Other:   Patient will return to clinic if there is any worsening of symptoms They agree to call or return sooner with any questions or concerns.  Risks and benefits were discussed.  Continue with individual therapist.     Continue with the support of the clinic, reassurance, and redirection. Staff monitoring and ongoing assessments per team plan. Current psychotropic medication appears to represent the minimum effective dosage and appears medically necessary. We will continue to monitor and reassess. This team will utilize appropriate emergency services if necessary. I will make myself available if concerns or problems arise.     Mental Status Examination  She is cooperative with questioning. She is fully engaged in conversation today. Speech is normal. Thought processes normal with normal prehension and expression. Thoughts are organized and linear. Content is pertinent to the conversation and without evidence of auditory or visual hallucinations. No delusional ideation. Gen. fund of knowledge, insight and memory are normal       Video Visit Details    Type of service: Video Visit    Video Start Time: 1300    Video End Time:  1330    Total time of video visit: 30 minutes    Originating Location: Patient's home    Distant Location:  [facility]    Mode of Communication: Video Conference via [employer]    General Information:  Today you had your appointment with Alida Camarillo CNP     If lab work was done today as part of your evaluation you will generally be contacted via My Chart, mail, or phone with the results within 1-5 days. If there is an alarming result we will contact you by phone. Lab results come back at varying times, I generally wait until all labs are resulted before making comments  on results. Please note labs are automatically released to My Chart once available.     If you need refills please contact your pharmacist. They will send a refill request to me to review. Please allow 3 business days for us to process all refill requests.     Please call or send a medical message through My Chart, with any questions or concerns    If you need any paperwork completed please fax forms to [phone]. Please state if you would like a copy of the completed paperwork, mailed or faxed back to the patient and a fax number to fax the paperwork to. Please allow up to 10 days for paperwork to be completed.    Alida Camarillo, CNP

## 2021-06-13 NOTE — PROGRESS NOTES
"Assessment:    1. Dizziness  Ambulatory referral to Neurology   2. Pituitary tumor     3. Frequent falls  Ambulatory referral to Neurology         Plan:    Discussed long-standing issues with balance.  Described hollow had recently which she presented to the emergency department for recently September 13, 2017 with workup including had MRI/MRA appearing unremarkable other than 4 x 4.5 mm pituitary lesion mildly increased since 2013 measuring 3 mm done.  Patient will continue to follow with Dr. Sutherland with [facility] neurology.  [facility].  Can wean from meclizine as able due to side effects of dry mouth etc.  Recent lab evaluation including comprehensive metabolic panel, CBC, TSH, troponin I and urine culture unremarkable.  Recommend ambulating with benefits of cane versus walker to avoid falls.  Anticipate follow-up as scheduled, sooner if concerns.        Subjective:    Dyana Nicole is seen today for ongoing concerns.  Vertigo mentioned however does not have true vertigo and in fact has had more chronic concerns over past 2 years or so with a recent episode of described \"hollow had\" for which she presented to the emergency department after daughter noted over the phone that her voice sounded as if her dentures were loose however patient does not have dentures.  Describes having walked crooked for years and does have a walker available but does not use consistently.  Has had multiple falls perhaps 5 falls in the last year or so.  Seen by neurologist through [facility] Dr. Sutherland with prior appointment April 6, 2017.  Word finding difficulties at times ongoing for the past couple years unchanged also.  Has not tried canalith repositioning maneuver however does describe being told that crystals are loose.  No fevers.  No shortness of breath.  Stamina diminished however stable.    lives [facility]  emergency/ Nguy[facility]n Nicole , cell  [facility] pharmacy [place]  [facility] pharmacy [facility]  non smoker lives in [place], [place] October-April vacation in [place] sees Dr. Eaton     - Ray  3 daughters - Genesis ( at [facility])  Smoke - quit in 60s after 1 year  EtOH: sober x 33 years  Surgeries: gallbladder, appy, hysterectomy and ? 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; left CTS release; bladder lift; right TKA; left rotator cuff  Hospitalizations:    Past Surgical History:   Procedure Laterality Date     CHOLECYSTECTOMY       HYSTERECTOMY       JOINT REPLACEMENT Bilateral     knees     MO ARTHROPLASTY TIBIAL PLATEAU      Description: Knee Replacement;  Recorded: 05/16/2013;     MO INJECT NERV BLCK,OTHR PERIPH NERV      Description: Peripheral Nerve Block Wrist Median Right;  Recorded: 05/16/2013;        Family History   Problem Relation Age of Onset     Aneurysm Father         Past Medical History:   Diagnosis Date     Anemia     Created by Conversion      Arrhythmia      Cervical Spondylosis     Created by Conversion      Depression      Disease of thyroid gland     hypo     Fibromyalgia     Created by Conversion      GERD (gastroesophageal reflux disease)      High cholesterol      Hyperlipidemia     Created by Conversion      Hypertension     Created by Conversion      Near syncope      Peripheral Neuropathy     Created by Conversion      Sleep apnea     mild     Tachycardia         Social History   Substance Use Topics     Smoking status: Former Smoker     Quit date: 1/1/1961     Smokeless tobacco: Never Used     Alcohol use No        Current Outpatient Prescriptions   Medication Sig Dispense Refill     aspirin 81 MG EC tablet Take 81 mg by mouth daily.       BIOTIN ORAL Take 5,000 mcg by mouth once daily.        budesonide (ENTOCORT EC) 3 mg 24  hr capsule Take 3 mg by mouth. As needed       calcium carbonate-vitamin D3 (CALCIUM 600 + D,3,) 600 mg calcium- 200 unit cap Take 1 tablet by mouth daily.       celecoxib (CELEBREX) 200 MG capsule TAKE ONE CAPSULE BY MOUTH EVERY DAY 90 capsule 1     diphenoxylate-atropine (LOMOTIL) 2.5-0.025 mg per tablet Take 1 tablet by mouth 4 (four) times a day as needed for diarrhea.       estradiol (ESTRACE) 0.5 MG tablet TAKE ONE TABLET BY MOUTH EVERY DAY 30 tablet 2     fluticasone (FLONASE) 50 mcg/actuation nasal spray 2 sprays into each nostril daily as needed.        KRILL OIL ORAL Take 1,000 mg by mouth bedtime.        LACTOBAC CMB #3/FOS/PANTETHINE (PROBIOTIC & ACIDOPHILUS ORAL) Take 1 capsule by mouth once daily.        levothyroxine (SYNTHROID, LEVOTHROID) 50 MCG tablet TAKE ONE TABLET BY MOUTH EVERY DAY 90 tablet 2     meclizine (ANTIVERT) 25 mg tablet Take 1 tablet (25 mg total) by mouth 3 (three) times a day as needed. 30 tablet 0     metoprolol succinate (TOPROL-XL) 50 MG 24 hr tablet Take 1 tablet (50 mg total) by mouth daily. 30 tablet 5     metoprolol tartrate (LOPRESSOR) 25 MG tablet Take 1 tablet (25 mg total) by mouth at bedtime. 30 tablet 11     MV-MINERALS/FA/OMEGA 3,6,9 #3 (WOMEN'S 50+ ADVANCED ORAL) Take 1 tablet by mouth daily.       omeprazole (PRILOSEC) 20 MG capsule TAKE ONE CAPSULE BY MOUTH TWICE A  capsule 1     simvastatin (ZOCOR) 20 MG tablet TAKE ONE TABLET BY MOUTH AT BEDTIME 90 tablet 2     traZODone (DESYREL) 50 MG tablet TAKE 1/2 TABLET BY MOUTH AT BEDTIME 45 tablet 1     venlafaxine (EFFEXOR XR) 75 MG 24 hr capsule Take 3 capsules (225 mg total) by mouth daily. 90 capsule 5     No current facility-administered medications for this visit.           Objective:    Vitals:    09/19/17 1458   BP: 120/70   Pulse: 76   Weight: 187 lb (84.8 kg)      Body mass index is 34.2 kg/(m^2).    Alert.  No apparent distress.  Cooperative.  Is able to stand without assist.  Ambulates slowly and  cautiously.  Able to turn in place.  Romberg equivocal.  No pronator drift.  Upper and lower extremities with diminished DTRs otherwise appear symmetric.  No rash.  Cranial nerve exam appears nonfocal.      [facility]  1. HEAD MRI WITHOUT AND WITH IV CONTRAST  2. HEAD MRA WITHOUT IV CONTRAST  3. NECK MRA WITHOUT AND WITH IV CONTRAST  9/13/2017 6:36 PM     INDICATION: Dizziness, difficulty ambulating.  TECHNIQUE:   1. Head MRI without and with intravenous contrast.  2. 3D time-of-flight head MRA without intravenous contrast.  3. Neck MRA without and with IV contrast.  CONTRAST: 8.5 mL Gadavist.  COMPARISON: 6/29/2015 MRI/MRA head and neck examination. More recent MRI examinations of the head, last dated 4/20/2017.     FINDINGS:  HEAD MRI: No acute infarct/restricted diffusion. No mass lesion, hemorrhage, or extra-axial fluid collections. Mild generalized cerebral and cerebellar atrophy.  Scattered foci of nonspecific T2/FLAIR hyperintense signal in the cerebral white matter and   juaquin consistent with moderate chronic small vessel ischemic changes. No abnormal contrast enhancement identified.     No significant interval change in a 4 x 4.5 mm intrinsically T1 bright suprasellar lesion, which is slightly increased in conspicuity as compared to 7/22/2013 examination (previously 3 mm). The major intracranial vascular flow voids are maintained. The   orbits are unremarkable. The calvarium and skull base are unremarkable.  The paranasal sinuses are clear. The mastoid air cells are clear.      HEAD MRA: No aneurysm. No significant intracranial stenosis. Balanced vertebral arteries supply a normal basilar artery.  The major intracranial arterial anatomy is within normal limits.     NECK MRA:   RIGHT CAROTID: No measurable stenosis of the right ICA based on NASCET criteria.     LEFT CAROTID: No measurable stenosis of the left ICA based on NASCET criteria.     VERTEBRAL ARTERIES: Balanced vertebral arteries are patent  in the neck and into the head.      AORTIC ARCH: Classic aortic arch anatomy with no significant stenosis at the origin of the great vessels.     IMPRESSION:   CONCLUSION:  HEAD MRI:   1.  No acute infarct or hemorrhage.  2.  Stable age-related changes as detailed above.  3.  No interval change in 4 x 4.5 mm intrinsically T1 bright suprasellar lesion, slightly increased in conspicuity as compared to 7/22/2013 examination (previously 3 mm). Differential considerations include proteinaceous pituitary microadenoma or   nonneoplastic cyst versus adamantinomatous craniopharyngioma.     HEAD MRA:   1.  Normal Head MRA.  2.  No aneurysm, high flow AVM or significant stenosis identified.     NECK MRA:  1.  No significant stenosis in the neck vessels based on NASCET criteria.  2.  No evidence for dissection or pseudoaneurysm.

## 2021-06-13 NOTE — PROGRESS NOTES
Assessment/Plan:    1. Dizziness  Intermittent dizziness.  Still concern regarding likely arrhythmia etiology without current evidence for orthostatic hypotension etc.  LEXX hookup x4 weeks.  Will notify with results.  Electrolytes including basic metabolic panel magnesium levels obtained.  Recent thyroid testing was normal.  Monitor closely notify with recurrent concerns.  Patient had previously had concerns with metoprolol dosing at 50 mg twice daily improved when decreasing to half tablet twice daily however due to increased palpitations was asked to increase dose back to 50 mg twice daily by Dr. Garcia cardiology.  - LEXX Monitor Hook-Up; Future  - Basic Metabolic Panel; Future  - Magnesium; Future    2. Postconcussion syndrome  Postconcussion syndrome described having prior head injury 2016 followed by [facility]    3. Essential hypertension with goal blood pressure less than 140/90  Hypertension stable with metoprolol tartrate 50 mg twice daily.    4. 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    5. Hyperlipidemia, unspecified hyperlipidemia type  History of hyperlipidemia.  Patient will complete lipid cascade at earliest convenience while fasting.  - Lipid Cascade; Future        Subjective:    Dyana Nicole is seen today for intermittent dizziness.  Had episode on November 9 and has not had any issues since this time.  Hard to predict.  Not necessarily just with position change or movement.  Will come on quickly and then and just as quickly.  Has had CT scan most recently May 11, 2020 that appeared to show age-related changes.  Brain MRI May 29, 2020 with suprasellar mass consistent with a Rathke's cleft cyst with mild generalized volume loss and small vessel ischemic change.  Patient did cut back metoprolol tartrate 50 mg twice daily down to 25 mg twice daily in the  past however recurrent concerns for complications and was asked by Dr. Garcia to increased again to a full tablet 50 mg twice daily.  Remains on levothyroxine 50 mcg daily.  Nonfasting today.  Postconcussion syndrome dating back to 2016.  Followed by concussion clinic.  Not clear correlation with current symptoms of intermittent dizziness however.  Comprehensive review of systems as above otherwise all negative.     - Ray  3 daughters - Genesis ( at [facility]), Eneida  Smoke - quit in 60s after 1 year  EtOH: no EtOH > 36 years  Surgeries: gallbladder, appy, hysterectomy and ? 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; left CTS release; bladder lift; right TKA; left rotator cuff  Hospitalizations:    Past Surgical History:   Procedure Laterality Date     Bladder lift       CARPAL TUNNEL RELEASE       CATARACT EXTRACTION, BILATERAL       CHOLECYSTECTOMY       HYSTERECTOMY       JOINT REPLACEMENT Bilateral     knees     MOHS SURGERY      Nose     OOPHORECTOMY Left     1 removed, 1 remains     MS ARTHROPLASTY TIBIAL PLATEAU      Description: Knee Replacement;  Recorded: 05/16/2013;     MS INJECT NERV BLCK,OTHR PERIPH NERV      Description: Peripheral Nerve Block Wrist Median Right;  Recorded: 05/16/2013;     REPLACEMENT TOTAL KNEE      L     REPLACEMENT TOTAL KNEE BILATERAL       ROTATOR CUFF REPAIR       TRUNK SKIN LESION EXCISIONAL BIOPSY Left 1/5/2018    Procedure: LEFT BUTTOCK EXPLORATION, EXCISION BUTTOCK MASS;  Surgeon: Lg Jameson MD;  Location: [facility] Main OR;  Service:         Family History   Problem Relation Age of Onset     Aneurysm Father         AAA     Cancer Mother         pancrease        Past Medical History:   Diagnosis Date     Anemia     Created by Conversion      Arrhythmia      Cervical Spondylosis     Created by Conversion      Depression      Disease of thyroid gland     hypo     Fibromyalgia      Created by Conversion      GERD (gastroesophageal reflux disease)      High cholesterol      History of transfusion      Hyperlipidemia     Created by Conversion      Hypertension     Created by Conversion      Near syncope      Peripheral Neuropathy     Created by Conversion      Skin cancer, basal cell      Sleep apnea     mild     Tachycardia         Social History     Tobacco Use     Smoking status: Former Smoker     Packs/day: 0.25     Years: 1.00     Pack years: 0.25     Quit date: 1961     Years since quittin.9     Smokeless tobacco: Never Used   Substance Use Topics     Alcohol use: No     Drug use: No        Current Outpatient Medications   Medication Sig Dispense Refill     aspirin 81 MG EC tablet Take 81 mg by mouth at bedtime.        calcium carbonate-vitamin D3 (CALCIUM 600 + D,3,) 600 mg calcium- 200 unit cap Take 1 tablet by mouth 2 (two) times a day.              celecoxib (CELEBREX) 200 MG capsule TAKE ONE CAPSULE BY MOUTH ONE TIME DAILY  90 capsule 3     estrogens, conjugated, (PREMARIN) 0.3 MG tablet Take 1 tablet (0.3 mg total) by mouth daily. 90 tablet 3     fluticasone propionate (FLONASE) 50 mcg/actuation nasal spray INSTILL 2 SPRAYS IN EACH NOSTRIL DAILY AS NEEDED 48 g 3     furosemide (LASIX) 20 MG tablet TAKE ONE TABLET BY MOUTH ONE TIME DAILY  90 tablet 3     LACTOBAC CMB #3/FOS/PANTETHINE (PROBIOTIC & ACIDOPHILUS ORAL) Take 1 capsule by mouth once daily. 1.5 billion units             levothyroxine (SYNTHROID, LEVOTHROID) 50 MCG tablet TAKE ONE TABLET BY MOUTH ONE TIME DAILY AT 6AM 90 tablet 3     metoprolol tartrate (LOPRESSOR) 50 MG tablet Take 0.5 tablets (25 mg total) by mouth 2 (two) times a day. 90 tablet 3     multivitamin with minerals (THERA-M) 9 mg iron-400 mcg Tab tablet Take 1 tablet by mouth every evening.       omeprazole (PRILOSEC) 20 MG capsule TAKE ONE CAPSULE BY MOUTH TWICE DAILY  180 capsule 3     simvastatin (ZOCOR) 20 MG tablet TAKE 1 TABLET BY MOUTH AT  BEDTIME 90 tablet 3     traZODone (DESYREL) 50 MG tablet Take 25 mg by mouth at bedtime. Pt taking 75mg at bedtime       venlafaxine (EFFEXOR) 75 MG tablet Take 75 mg by mouth 2 (two) times a day.       No current facility-administered medications for this visit.           Objective:    Vitals:    11/11/20 1314   BP: 120/70   Pulse: 77   Temp: 98.4  F (36.9  C)   SpO2: 97%   Weight: 190 lb (86.2 kg)      Body mass index is 34.75 kg/m .    Alert.  No apparent distress.  Chest clear.  Cardiac exam regular without cardiac ectopy.  Extremities warm and dry with neurologic exam appearing symmetric, nonfocal.      EXAM: CT HEAD WO CONTRAST  LOCATION: [facility]  DATE/TIME: 5/11/2020 2:24 PM     INDICATION: Lightheadedness.  COMPARISON: 04/12/2019 brain MRI.  TECHNIQUE: Routine without IV contrast. Multiplanar reformats. Dose reduction techniques were used.     FINDINGS:  INTRACRANIAL CONTENTS: No intracranial hemorrhage, extraaxial collection, or mass effect.  No CT evidence of acute infarct. Mild presumed chronic small vessel ischemic changes. Stable ventricular size and configuration. No significant intraparenchymal   volume loss. Calcification of the distal internal carotid arteries bilaterally. Small suprasellar lesion is redemonstrated but evaluated to much better effect on prior sella protocol brain MRI. Of note, there is no associated calcification.     VISUALIZED ORBITS/SINUSES/MASTOIDS: Prior bilateral cataract surgery. Visualized portions of the orbits are otherwise unremarkable. No paranasal sinus mucosal disease. No middle ear or mastoid effusion.     BONES/SOFT TISSUES: No acute abnormality.     IMPRESSION:   1.  Mild age-related changes with no acute intracranial abnormality.     2.  The small suprasellar lesion previously described is evaluated to much better effect on prior brain MRI. Of note, there is no associated calcification.        [facility]  1. HEAD MRI WITHOUT AND WITH IV  CONTRAST  2. HEAD MRA WITHOUT IV CONTRAST  3. NECK MRA WITHOUT AND WITH IV CONTRAST  9/13/2017 6:36 PM     INDICATION: Dizziness, difficulty ambulating.  TECHNIQUE:   1. Head MRI without and with intravenous contrast.  2. 3D time-of-flight head MRA without intravenous contrast.  3. Neck MRA without and with IV contrast.  CONTRAST: 8.5 mL Gadavist.  COMPARISON: 6/29/2015 MRI/MRA head and neck examination. More recent MRI examinations of the head, last dated 4/20/2017.     FINDINGS:  HEAD MRI: No acute infarct/restricted diffusion. No mass lesion, hemorrhage, or extra-axial fluid collections. Mild generalized cerebral and cerebellar atrophy.  Scattered foci of nonspecific T2/FLAIR hyperintense signal in the cerebral white matter and   juaquin consistent with moderate chronic small vessel ischemic changes. No abnormal contrast enhancement identified.     No significant interval change in a 4 x 4.5 mm intrinsically T1 bright suprasellar lesion, which is slightly increased in conspicuity as compared to 7/22/2013 examination (previously 3 mm). The major intracranial vascular flow voids are maintained. The   orbits are unremarkable. The calvarium and skull base are unremarkable.  The paranasal sinuses are clear. The mastoid air cells are clear.      HEAD MRA: No aneurysm. No significant intracranial stenosis. Balanced vertebral arteries supply a normal basilar artery.  The major intracranial arterial anatomy is within normal limits.     NECK MRA:   RIGHT CAROTID: No measurable stenosis of the right ICA based on NASCET criteria.     LEFT CAROTID: No measurable stenosis of the left ICA based on NASCET criteria.     VERTEBRAL ARTERIES: Balanced vertebral arteries are patent in the neck and into the head.      AORTIC ARCH: Classic aortic arch anatomy with no significant stenosis at the origin of the great vessels.     IMPRESSION:   CONCLUSION:  HEAD MRI:   1.  No acute infarct or hemorrhage.  2.  Stable age-related changes as  detailed above.  3.  No interval change in 4 x 4.5 mm intrinsically T1 bright suprasellar lesion, slightly increased in conspicuity as compared to 7/22/2013 examination (previously 3 mm). Differential considerations include proteinaceous pituitary microadenoma or   nonneoplastic cyst versus adamantinomatous craniopharyngioma.     HEAD MRA:   1.  Normal Head MRA.  2.  No aneurysm, high flow AVM or significant stenosis identified.     NECK MRA:  1.  No significant stenosis in the neck vessels based on NASCET criteria.  2.  No evidence for dissection or pseudoaneurysm.        This note has been dictated using voice recognition software and as a result may contain minor grammatical errors and unintended word substitutions.

## 2021-06-14 NOTE — PROGRESS NOTES
[facility] Discharge Summary  Patient Name: Dyana Nicole  Date: 1/14/2021  Referral Diagnosis: Neck pain/HA's, post concussion  Referring provider: Alida Camarillo FNP  Visit Diagnosis:   1. Neck pain     2. Post concussion syndrome     3. Cervicalgia     4. Cervicogenic headache     5. Cervical spondylosis without myelopathy     6. Low back pain, unspecified back pain laterality, unspecified chronicity, unspecified whether sciatica present         Patient was seen for 10 visits from 7/28/2020 to 10/27/2020 with 0 missed appointments.  The patient was progressing towards goals and has demonstrated understanding of and independence in the home program for self-care, and progression to next steps.  She will initiate contact if questions or concerns arise. She was told that we could continue with treatment as we were making gains but she did not make further appointments.     Therapy will be discontinued at this time.  The patient will need a new referral to resume.    Thank you for your referral.  Lg Moore  1/14/2021  9:30 PM

## 2021-06-14 NOTE — PROGRESS NOTES
Assessment:    1. Moderate episode of recurrent major depressive disorder  venlafaxine (EFFEXOR-XR) 75 MG 24 hr capsule    sertraline (ZOLOFT) 100 MG tablet   2. Dizziness     3. Frequent falls     4. Chronic low back pain, unspecified back pain laterality, with sciatica presence unspecified  Ambulatory referral to Spine Care   5. Hematoma  Ambulatory referral to General Surgery         Plan:    Discussed worsening depression.  Suboptimal control with PHQ 9 questionnaire 19 out of 27 and YEE 7 questionnaire 3 out of 21.  Patient is seen today with her daughter Genesis.  Decision to wean from venlafaxine extended release currently 225 mg daily by decreasing by 75 mg every 3-5 days until discontinued and initiate sertraline 100 mg use half tablet daily over first 2 weeks and then may increase to 1 tablet daily with reassessment late December 2017.  Sertraline is worked well for several family members apparently.  Patient does recall trying Wellbutrin in the past as well.  Refer patient back to spine care regarding chronic low back pain.  Prior surgery with subsequent hardware removal with Dr. Cox.  Also due to left buttocks hematoma did refer patient to general surgery for second opinion having had already seen Dr. Mcintyre in the past we elected not to pursue further intervention.        Subjective:    Dyana Nicole is seen today for evaluation of worsening depression.  Currently on venlafaxine extended release 225 mg daily.  Patient does not feel that this is working any longer.  Has been on it for years.  Apparently has been on Wellbutrin in the past.  Has several family members that have done well with sertraline and is interested in trying this.  States dizziness has improved and states went to the balance clinic in had a 60% chance of falling and now decreased to 30% chance after PT services which were twice a week over past month.  Patient does suffer from chronic lower back pain.  Had prior surgery with  Dr. Cox.  Subsequent hardware removal due to discomfort apparently from screws.  Back pain persists.  Also had fallen historically and suffered left buttocks hematoma.  This area continues to bother her.  Had seen a surgeon Dr. Mcintyre who elected not to pursue further intervention at that time and patient would like to have second opinion regarding this.  Patient has had decreased interest in activities.  Less social interactions.  Does not feel that she has any friends at her current senior living facility.     - Ray  3 daughters - Genesis ( at [facility])  Smoke - quit in 60s after 1 year  EtOH: sober x 33 years  Surgeries: gallbladder, appy, hysterectomy and ? 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; left CTS release; bladder lift; right TKA; left rotator cuff  [facility]:    [facility]  emergency/ Nguy[facility]n Nicole , cell  [facility] [place]  [facility] pharmacy [facility]  non smoker lives in [place], [place] October-April vacation in [place] sees Dr. Eaton      Past Surgical History:   Procedure Laterality Date     CHOLECYSTECTOMY       HYSTERECTOMY       JOINT REPLACEMENT Bilateral     knees     VA ARTHROPLASTY TIBIAL PLATEAU      Description: Knee Replacement;  Recorded: 05/16/2013;     VA INJECT NERV BLCK,OTHR PERIPH NERV      Description: Peripheral Nerve Block Wrist Median Right;  Recorded: 05/16/2013;        Family History   Problem Relation Age of Onset     Aneurysm Father         Past Medical History:   Diagnosis Date     Anemia     Created by Conversion      Arrhythmia      Cervical Spondylosis     Created by Conversion      Depression      Disease of thyroid gland     hypo     Fibromyalgia     Created by Conversion      GERD (gastroesophageal reflux disease)       High cholesterol      Hyperlipidemia     Created by Conversion      Hypertension     Created by Conversion      Near syncope      Peripheral Neuropathy     Created by Conversion      Sleep apnea     mild     Tachycardia         Social History   Substance Use Topics     Smoking status: Former Smoker     Quit date: 1/1/1961     Smokeless tobacco: Never Used     Alcohol use No        Current Outpatient Prescriptions   Medication Sig Dispense Refill     aspirin 81 MG EC tablet Take 81 mg by mouth daily.       BIOTIN ORAL Take 5,000 mcg by mouth once daily.        budesonide (ENTOCORT EC) 3 mg 24 hr capsule Take 3 mg by mouth. As needed - pt using every other day       calcium carbonate-vitamin D3 (CALCIUM 600 + D,3,) 600 mg calcium- 200 unit cap Take 1 tablet by mouth daily.       celecoxib (CELEBREX) 200 MG capsule TAKE ONE CAPSULE BY MOUTH EVERY DAY 90 capsule 1     diphenoxylate-atropine (LOMOTIL) 2.5-0.025 mg per tablet Take 1 tablet by mouth 4 (four) times a day as needed for diarrhea.       estradiol (ESTRACE) 0.5 MG tablet TAKE ONE TABLET BY MOUTH EVERY DAY 30 tablet 2     fluticasone (FLONASE) 50 mcg/actuation nasal spray 2 sprays into each nostril daily as needed.        KRILL OIL ORAL Take 1,000 mg by mouth bedtime.        LACTOBAC CMB #3/FOS/PANTETHINE (PROBIOTIC & ACIDOPHILUS ORAL) Take 1 capsule by mouth once daily.        levothyroxine (SYNTHROID, LEVOTHROID) 50 MCG tablet TAKE ONE TABLET BY MOUTH EVERY DAY 90 tablet 2     metoprolol tartrate (LOPRESSOR) 50 MG tablet Take 1 tablet (50 mg total) by mouth 2 (two) times a day. 60 tablet 11     MV-MINERALS/FA/OMEGA 3,6,9 #3 (WOMEN'S 50+ ADVANCED ORAL) Take 1 tablet by mouth daily.       omeprazole (PRILOSEC) 20 MG capsule TAKE ONE CAPSULE BY MOUTH TWICE A  capsule 1     simvastatin (ZOCOR) 20 MG tablet TAKE ONE TABLET BY MOUTH AT BEDTIME 90 tablet 2     traZODone (DESYREL) 50 MG tablet TAKE 1/2 TABLET BY MOUTH AT BEDTIME 45 tablet 1     meclizine  (ANTIVERT) 25 mg tablet Take 1 tablet (25 mg total) by mouth 3 (three) times a day as needed. 30 tablet 0     sertraline (ZOLOFT) 100 MG tablet take 1/2 tablet (50 mg) daily x 2 weeks, then 1 tablet (100 mg) daily 30 tablet 2     venlafaxine (EFFEXOR-XR) 75 MG 24 hr capsule take two tablets (150 mg) daily x 5 days, then one tablet (75 mg) daily x 5 days, then discontinue 15 capsule 0     No current facility-administered medications for this visit.           Objective:    Vitals:    11/21/17 1412   BP: 120/70   Pulse: 68   Weight: 187 lb (84.8 kg)      Body mass index is 34.2 kg/(m^2).    Alert.  Tearful at times.  No apparent distress.  Does transfer somewhat slowly otherwise independently.  No active psychosis or significant psychomotor agitation.

## 2021-06-14 NOTE — PROGRESS NOTES
"Assessment:   Dyana Nicole is a 77 y.o. y.o. female with past medical history significant for hypertension, hypothyroidism, hyperlipidemia, anxiety/depression, GERD who presents today for follow-up regarding left-sided low back pain thought to be myofascial pain in etiology from a fluid collection that occurred after previous lumbar hardware removal (patient has a previous lumbar fusion).  The patient is looking for a second opinion regarding surgery to decompress this fluid collection.  Discussed with the patient that Dr. Vila is not a surgeon.  Would recommend that she get her second opinion with Dr. Johnson, whom she Top[place] has an opinion with on Monday.  If she does not have relief after surgical intervention, then other options can be provided to her from the spine clinic.  She is neurologically intact and without any red flag symptoms.       Plan:     A shared decision making plan was used.  The patient's values and choices were respected.  The following represents what was discussed and decided upon by the physician and the patient.      1.  DIAGNOSTIC TESTS:  No further diagnostic tests are necessary at this time from this clinic's perspective, will defer to the surgeon if updated imaging of the mass is needed.  She had an ultrasound of the lesion area.  Per the report (heterogeneous cystic mass left but I measures 8.4×7.9×3.1 cm and has appearance most consistent with an organized hematoma with some intrarenal hemorrhagic debris and septations.  There is a single area of slight vascularity in a septation, I suspect is due to this hematoma surrounding a vessel rather than a solid component.  No solid regions identified.  This lies just deep to the subcutaneous fat.  Conclusion: Probable organized hematoma deep to the subcutaneous fat.  Because of the slight atypical appearance would suggest a follow-up ultrasound in 3 months.\"  This was performed on July 8, 2015.  2.  PHYSICAL THERAPY: We will hold off " on physical therapy at this time.  She may benefit from more physical therapy if her pain does not improve after potential surgical intervention.  3.  MEDICATIONS: No changes to her medications at this time.  She is recovering from opiate addiction.  She wishes to be very careful with opiate medications.  She is very mindful and urgent about this.  4.  INTERVENTIONS: Recommend that she follow-up with Dr. Johnson as planned.  If Dr. Johnson does not feel that she would need surgery, she can return to the spine clinic for further treatment.  If Dr. Johnson recommends that she follow up with Dr. Cox, Dr. Vila will help facilitate this appointment at the [place] location.  5.  PATIENT EDUCATION:    -Her questions were answered to her satisfaction today.  She was in agreement with the treatment plan.  6.  FOLLOW-UP: Nurse navigation is asked to call the patient on Monday afternoon to find out what Dr. Johnson recommends.  Further recommendations will be forthcoming based on what Dr. Johnson says.  Patient is welcome to follow-up in the spine clinic if surgery is not an option.  She is welcome to call with any questions or concerns.    Total of 25 minutes was spent in the care of this patient.  18 minutes was spent in face-to-face time with the patient, counseling, teaching, answering the patient's questions.    Subjective:     Dyana Nicole is a 77 y.o. female who presents today for follow-up regarding sided low back pain.  Patient reports that she has a mass/fluid collection in the left low back area.  She has been seeing Dr. Cox.  She saw Dr. Mcintyre, who was going to do surgery but then recommended that she go back to see Dr. Cox.  She states that she would like a second opinion now as to what to do with this area.  This lump near the left side of her low back just above the buttock does cause significant pain.  The pain tends to be worse if she sits or puts pressure over the area.  It is  constantly there, but less intense if she does not have any pressure over it.  She did have an ultrasound in 2015.  Like a definitive answer as to what is the next step.  Did undergo bilateral hardware blocks in April 2016.  Dr. Cox removed the hardware earlier this year.    Medical history is reviewed and is significant for skin cancer, irritable bowel syndrome, depression, heart disease is, high blood pressure, hypercholesterolemia, hypothyroidism.    Review of Systems:  Positive for hoarseness, chest pressure, cough, shortness of breath, changes in bowel movements, diarrhea, easy bruising, poor sleep quality, indigestion, back pain, joint pain, leg pain, fainting, headaches, depression, difficulty swallowing, blood sugar changes, cold/heat intolerance.  Her primary care physician is aware of all of these symptoms.  A total of 13 systems were reviewed today.  Please see the intake questionnaire for details.     Objective:   CONSTITUTIONAL:  Vital signs as above.  No acute distress.  The patient is well nourished and well groomed.    PSYCHIATRIC:  The patient is awake, alert, oriented to person, place and time.  The patient is answering questions appropriately with clear speech.  Normal affect.  SKIN:  Skin over the face, posterior torso, bilateral upper and lower extremities is clean, dry, intact without rashes.  MUSCULOSKELETAL:  Gait is non-antalgic.  The patient is able to heel and toe walk without any difficulty.  Is a large fluid collection over the left low back just lateral to the midline.  This is tender to the touch.  There is no skin discoloration.  There is a well-healed surgical incision over the low back.  The patient has 5/5 strength for the bilateral hip flexors, knee flexors/extensors, ankle dorsiflexors/plantar flexors, ankle evertors/invertors.    NEUROLOGICAL: Trace patellar, medial hamstring, achilles reflexes which are symmetric bilaterally.  No ankle clonus bilaterally.  Sensation to  light touch is intact in the bilateral L4, L5, and S1 dermatomes.       RESULTS: Ultrasound report reviewed today.  Please see above for details.

## 2021-06-15 PROBLEM — I10 ESSENTIAL HYPERTENSION WITH GOAL BLOOD PRESSURE LESS THAN 140/90: Status: ACTIVE | Noted: 2017-03-28

## 2021-06-15 PROBLEM — E23.6 PITUITARY MASS (H): Status: ACTIVE | Noted: 2017-03-28

## 2021-06-15 NOTE — ANESTHESIA PREPROCEDURE EVALUATION
Anesthesia Evaluation      Patient summary reviewed   No history of anesthetic complications     Airway   Mallampati: I  Neck ROM: full   Pulmonary - negative ROS and normal exam   (+) shortness of breath, sleep apnea on no CPAP, mild,                          Cardiovascular - negative ROS and normal exam  (+) hypertension well controlled, , hypercholesterolemia,      Neuro/Psych - negative ROS   (+) depression, anxiety/panic attacks,     Endo/Other - negative ROS   (+) hypothyroidism, obesity,      GI/Hepatic/Renal - negative ROS   (+) GERD well controlled,             Dental - normal exam                        Anesthesia Plan  Planned anesthetic: general endotracheal    ASA 3   Induction: intravenous   Anesthetic plan and risks discussed with: patient and child/children    Post-op plan: routine recovery

## 2021-06-15 NOTE — PROGRESS NOTES
Assessment/Plan:     Visit for Preoperative Exam.    1. Preop cardiovascular exam     2. Hematoma  HM2(CBC w/o Differential)   3. Chronic low back pain, unspecified back pain laterality, with sciatica presence unspecified     4. Frequent falls     5. Dizziness     6. Moderate episode of recurrent major depressive disorder     7. Essential hypertension with goal blood pressure less than 140/90  Basic Metabolic Panel       Patient approved for surgery with general or local anesthesia. Postoperative pain to be managed by surgeon during post-operative Global Surgical Package timeframe, typically 30-60 days for major surgery, and less for others. Labs will be done as indicated. Above recommendations were reviewed with the patient. Follow up as needed. Proceed with proposed surgery without additional clinical clarifications. No Cardiology consultation or non-invasive testing. Low Risk Surgery.     Preoperative Cardiac Risk Stratificaiton and Management Cardiac risk assessment - Low  Beta-blocker protocal - Not indicated  NO active Cardiac Conditions including no Unstable/Severe Angina, Recent Myocardial Infarction (<3 mo), New, worsening or decompensated heart failure, Significant Arrhythmias, or Severe Valvular Disease  Low Risk Surgery  Functional Capacity > 4 METS  No Beta Blockage/cardiac evaluation: No Ischemic Heart Disease; Compensated or prior heart failure; Diabetes mellitus; Chronic kidney disease; Cerebrovascular disease.     Check CBC today with h/o hematoma.    Check BMP re: medication monitoring.    Improvement noted after switching to sertraline (currently on 100 mg daily).  Will continue over next 2-4 weeks then decide if further dose adjustment needed.    9/13/17 EKG:    Sinus rhythm  Normal ECG  When compared with ECG of 29-JUN-2017 09:25,  No significant change was found  Confirmed by BRADLEY VALENTIN MD LOC:JN ([id number]) on 9/14/2017 4:02:01 PM    Okay for scheduled procedure.  Will determine following  "regarding whether improvement in left buttock pain vs sciatica.  Continue management with [facility].      Subjective:     Scheduled Procedure: hematoma removal - left buttock  Surgery Date:  01/05/2018  Surgery Location:  [facility]  Surgeon:  Dr. Zaldivar    Patient had fallen historically and suffered left buttocks hematoma.  This area continues to bother her.  Schedule hematoma evacuation noted with Dr. Zaldivar.  Also, history of worsening depression.  Prior suboptimal control with PHQ 9 questionnaire 19 out of 27 and YEE 7 questionnaire 3 out of 21.  Patient is seen today with her daughter Genesis.  Decision to wean from venlafaxine extended release currently 225 mg daily by decreasing by 75 mg every 3-5 days until discontinued and initiate sertraline 100 mg use half tablet daily over first 2 weeks and then increased to 1 tablet daily.  Feeling much better.  Sertraline is worked well for several family members apparently.  Patient does recall trying Wellbutrin in the past as well.  Also, referred patient back to [facility] regarding chronic low back pain which \"was a postive\" and utilizing home exercises.  Prior surgery with subsequent hardware removal with Dr. Cox.     - Ray  3 daughters - Genesis ( at [facility])  Smoke - quit in 60s after 1 year  EtOH: sober x 33 years  Surgeries: gallbladder, appy, hysterectomy and ? 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; left CTS release; bladder lift; right TKA; left rotator cuff  Hospitalizations:    Current Outpatient Prescriptions   Medication Sig Dispense Refill     aspirin 81 MG EC tablet Take 81 mg by mouth daily.       BIOTIN ORAL Take 5,000 mcg by mouth once daily.        budesonide (ENTOCORT EC) 3 mg 24 hr capsule Take 3 mg by mouth. As needed - pt using every other day       calcium carbonate-vitamin D3 (CALCIUM 600 + D,3,) 600 mg calcium- 200 unit cap Take 1 " tablet by mouth daily.       celecoxib (CELEBREX) 200 MG capsule TAKE ONE CAPSULE BY MOUTH EVERY DAY 90 capsule 1     diphenoxylate-atropine (LOMOTIL) 2.5-0.025 mg per tablet Take 1 tablet by mouth 4 (four) times a day as needed for diarrhea.       estradiol (ESTRACE) 0.5 MG tablet TAKE ONE TABLET BY MOUTH EVERY DAY 30 tablet 2     fluticasone (FLONASE) 50 mcg/actuation nasal spray 2 sprays into each nostril daily as needed.        KRILL OIL ORAL Take 1,000 mg by mouth bedtime.        LACTOBAC CMB #3/FOS/PANTETHINE (PROBIOTIC & ACIDOPHILUS ORAL) Take 1 capsule by mouth once daily.        levothyroxine (SYNTHROID, LEVOTHROID) 50 MCG tablet TAKE ONE TABLET BY MOUTH EVERY DAY 90 tablet 2     metoprolol tartrate (LOPRESSOR) 50 MG tablet Take 1 tablet (50 mg total) by mouth 2 (two) times a day. 60 tablet 11     MV-MINERALS/FA/OMEGA 3,6,9 #3 (WOMEN'S 50+ ADVANCED ORAL) Take 1 tablet by mouth daily.       omeprazole (PRILOSEC) 20 MG capsule TAKE ONE CAPSULE BY MOUTH TWICE A  capsule 1     sertraline (ZOLOFT) 100 MG tablet take 1/2 tablet (50 mg) daily x 2 weeks, then 1 tablet (100 mg) daily 30 tablet 2     simvastatin (ZOCOR) 20 MG tablet TAKE ONE TABLET BY MOUTH AT BEDTIME 90 tablet 2     traZODone (DESYREL) 50 MG tablet TAKE ONE-HALF TABLET BY MOUTH AT BEDTIME 45 tablet 3     No current facility-administered medications for this visit.        Allergies   Allergen Reactions     Penicillins Hives     Pollen      Venom-Honey Bee Swelling       Immunization History   Administered Date(s) Administered     Influenza high dose, seasonal 10/09/2015, 09/12/2017     Influenza, inj, historic,unspecified 08/23/2016     Influenza, seasonal,quad inj 6-35 mos 07/12/2012, 10/04/2013, 09/26/2014     Pneumo Conj 13-V (2010&after) 05/17/2016     Pneumo Polysac 23-V 10/12/2009     Td,adult,historic,unspecified 01/01/2012     ZOSTER 08/01/2012       Patient Active Problem List   Diagnosis     Localized Primary Osteoarthritis Of The  Left Hip     Insomnia     Heartburn     Arthritis     Cervical Spondylosis     Generalized Osteoarthritis Of The Hand     Lumbar Spondylosis     Trochanteric Bursitis     Lower Back Pain     Osteopenia     Colitis     Fibromyalgia     Anemia     Hypertension     Orthostatic Hypotension     Dizziness     Recurrent Major Depression In Partial Remission     Hyperlipidemia     Peripheral Neuropathy     Walk Is Wobbly Or Unsteady (Ataxia)     Unable To Restrain Bowel Movement     Open Wound Of The Scalp     Hypothyroidism     Open Wound Of The Finger(S)     Open Wound Of A Tooth (Broken)     Obstructive Sleep Apnea     Arthralgia     Generalized anxiety disorder     Depressive disorder, not elsewhere classified     Exertional dyspnea     Pituitary mass     Essential hypertension with goal blood pressure less than 140/90     Rapid palpitations       Past Medical History:   Diagnosis Date     Anemia     Created by Conversion      Arrhythmia      Cervical Spondylosis     Created by Conversion      Depression      Disease of thyroid gland     hypo     Fibromyalgia     Created by Conversion      GERD (gastroesophageal reflux disease)      High cholesterol      History of transfusion      Hyperlipidemia     Created by Conversion      Hypertension     Created by Conversion      Near syncope      Peripheral Neuropathy     Created by Conversion      Sleep apnea     mild     Tachycardia        Social History     Social History     Marital status:      Spouse name: N/A     Number of children: N/A     Years of education: N/A     Occupational History     Not on file.     Social History Main Topics     Smoking status: Former Smoker     Quit date: 1/1/1961     Smokeless tobacco: Never Used     Alcohol use No     Drug use: No     Sexual activity: Not on file     Other Topics Concern     Not on file     Social History Narrative       Past Surgical History:   Procedure Laterality Date     CHOLECYSTECTOMY       HYSTERECTOMY        JOINT REPLACEMENT Bilateral     knees     NM ARTHROPLASTY TIBIAL PLATEAU      Description: Knee Replacement;  Recorded: 05/16/2013;     NM INJECT NERV BLCK,OTHR PERIPH NERV      Description: Peripheral Nerve Block Wrist Median Right;  Recorded: 05/16/2013;       History of Present Illness  Recent Health  Fever: no  Chills: no  Fatigue: no  Chest Pain: no  Cough: no  Dyspnea: no  Urinary Frequency: no  Nausea: no  Vomiting: no  Diarrhea: no  Abdominal Pain: no  Easy Bruising: no  Lower Extremity Swelling: no  Poor Exercise Tolerance: no    Most recent Health Maintenance Visit:  uncertain    Pertinent History  Prior Anesthesia: yes  Previous Anesthesia Reaction:  no  Diabetes: no  Cardiovascular Disease: yes, HTN  Pulmonary Disease: no  Renal Disease: no  GI Disease: no  Sleep Apnea: no  Thromboembolic Problems: no  Clotting Disorder: no  Bleeding Disorder: no  Transfusion Reaction: no  Impaired Immunity: no  Steroid use in the last 6 months: no  Frequent Aspirin use: yes, ASA 81 mg daily    Family history of noncontributory    Social history of patient does not wear denture or partial plates and there are no concerns regarding care after surgery    After surgery, the patient plans to recover at home with family.    Review of Systems  Pertinent items are noted in HPI.  A 12 point comprehensive review of systems was negative except as noted.          Objective:         Vitals:    01/03/18 1259   BP: 110/70   Pulse: 64   Weight: 198 lb (89.8 kg)       Physical Exam:    General Appearance: Alert, cooperative, no distress, appears stated age.  Obesity.  Head: Normocephalic, without obvious abnormality, atraumatic  Eyes: PERRL, conjunctiva/corneas clear, EOM's intact.  Glasses.  Ears: Normal TM's and external ear canals, both ears  Nose: Nares normal, septum midline,mucosa normal, no drainage  Throat: Lips, mucosa, and tongue normal; teeth and gums normal  Neck: Supple, symmetrical, trachea midline, no adenopathy;   thyroid: not enlarged, symmetric, no tenderness/mass/nodules; no carotid bruit or JVD  Back: Symmetric, no curvature, ROM normal, no CVA tenderness  Lungs: Clear to auscultation bilaterally, respirations unlabored  Breasts: No breast masses, tenderness, asymmetry, or nipple discharge.  Heart: Regular rate and rhythm, S1 and S2 normal, no murmur, rub, or gallop  Abdomen: Soft, non-tender, bowel sounds active all four quadrants,  no masses, no organomegaly  Pelvic:Not examined  Extremities: Extremities normal, atraumatic, no cyanosis or edema  Skin: Skin color, texture, turgor normal, no rashes or lesions.  Left buttock hematoma.  Lymph nodes: Cervical, supraclavicular, and axillary nodes normal  Neurologic: Decreased left patellar DTR.  Decreased bilateral Achilles DTR.

## 2021-06-15 NOTE — ANESTHESIA POSTPROCEDURE EVALUATION
Patient: Dyana Nicole  LEFT BUTTOCK EXPLORATION, EXCISION BUTTOCK MASS  Anesthesia type: general    Patient location: PACU  Last vitals:   Vitals:    01/05/18 1521   BP: 159/67   Pulse:    Resp: 16   Temp: 36.7  C (98  F)   SpO2:      Post vital signs: stable  Level of consciousness: awake and responds to simple questions  Post-anesthesia pain: pain controlled  Post-anesthesia nausea and vomiting: no  Pulmonary: unassisted, return to baseline  Cardiovascular: stable and blood pressure at baseline  Hydration: adequate  Anesthetic events: no    QCDR Measures:  ASA# 11 - Dayanna-op Cardiac Arrest: ASA11B - Patient did NOT experience unanticipated cardiac arrest  ASA# 12 - Dayanna-op Mortality Rate: ASA12B - Patient did NOT die  ASA# 13 - PACU Re-Intubation Rate: ASA13B - Patient did NOT require a new airway mgmt  ASA# 10 - Composite Anes Safety: ASA10A - No serious adverse event    Additional Notes:

## 2021-06-15 NOTE — ANESTHESIA CARE TRANSFER NOTE
Last vitals:   Vitals:    01/05/18 1449   BP: (!) 184/80   Pulse: 69   Resp: 16   Temp: 36.7  C (98  F)   SpO2: 100%     Patient's level of consciousness is drowsy  Spontaneous respirations: yes  Maintains airway independently: yes  Dentition unchanged: yes  Oropharynx: oropharynx clear of all foreign objects    QCDR Measures:  ASA# 20 - Surgical Safety Checklist: WHO surgical safety checklist completed prior to induction  PQRS# 430 - Adult PONV Prevention: 4558F - Pt received => 2 anti-emetic agents (different classes) preop & intraop  ASA# 8 - Peds PONV Prevention: NA - Not pediatric patient, not GA or 2 or more risk factors NOT present  PQRS# 424 - Dayanna-op Temp Management: NA - MAC anesthesia or case < 60 minutes  PQRS# 426 - PACU Transfer Protocol: - Transfer of care checklist used  ASA# 14 - Acute Post-op Pain: ASA14B - Patient did NOT experience pain >= 7 out of 10

## 2021-06-16 PROBLEM — G93.89 SUPRASELLAR MASS: Status: ACTIVE | Noted: 2020-11-11

## 2021-06-16 PROBLEM — I87.303 VENOUS HYPERTENSION OF BOTH LOWER EXTREMITIES: Status: ACTIVE | Noted: 2018-05-17

## 2021-06-16 PROBLEM — Q24.8 LEFT VENTRICULAR OUTFLOW OBSTRUCTION: Status: ACTIVE | Noted: 2018-08-30

## 2021-06-16 PROBLEM — E66.01 MORBID OBESITY (H): Status: ACTIVE | Noted: 2018-08-30

## 2021-06-16 PROBLEM — I89.0 LYMPHEDEMA: Status: ACTIVE | Noted: 2018-06-25

## 2021-06-16 PROBLEM — D62 ANEMIA DUE TO BLOOD LOSS, ACUTE: Status: ACTIVE | Noted: 2018-12-27

## 2021-06-16 PROBLEM — K62.5 BRBPR (BRIGHT RED BLOOD PER RECTUM): Status: ACTIVE | Noted: 2018-12-26

## 2021-06-16 PROBLEM — R06.83 SNORING: Status: ACTIVE | Noted: 2019-01-04

## 2021-06-16 PROBLEM — E55.9 VITAMIN D DEFICIENCY: Status: ACTIVE | Noted: 2018-05-17

## 2021-06-16 PROBLEM — M79.604 PAIN IN BOTH LOWER EXTREMITIES: Status: ACTIVE | Noted: 2018-05-17

## 2021-06-16 PROBLEM — M79.605 PAIN IN BOTH LOWER EXTREMITIES: Status: ACTIVE | Noted: 2018-05-17

## 2021-06-16 PROBLEM — I11.9 HYPERTENSIVE LEFT VENTRICULAR HYPERTROPHY, WITHOUT HEART FAILURE: Status: ACTIVE | Noted: 2018-08-30

## 2021-06-16 PROBLEM — K92.2 LOWER GI BLEED: Status: ACTIVE | Noted: 2018-12-26

## 2021-06-16 PROBLEM — E65 FAT DEPOSITS: Status: ACTIVE | Noted: 2018-05-17

## 2021-06-16 PROBLEM — I87.2 VENOUS INSUFFICIENCY OF BOTH LOWER EXTREMITIES: Status: ACTIVE | Noted: 2018-05-17

## 2021-06-16 PROBLEM — R29.898 LEG WEAKNESS, BILATERAL: Status: ACTIVE | Noted: 2018-06-25

## 2021-06-16 PROBLEM — E66.812 CLASS 2 OBESITY DUE TO EXCESS CALORIES IN ADULT: Status: ACTIVE | Noted: 2018-05-17

## 2021-06-16 PROBLEM — E27.40 ADRENAL INSUFFICIENCY (H): Status: ACTIVE | Noted: 2018-06-08

## 2021-06-16 PROBLEM — I47.19 ATRIAL TACHYCARDIA (H): Status: ACTIVE | Noted: 2018-08-30

## 2021-06-16 PROBLEM — E66.09 CLASS 2 OBESITY DUE TO EXCESS CALORIES IN ADULT: Status: ACTIVE | Noted: 2018-05-17

## 2021-06-16 PROBLEM — T73.3XXA FATIGUE DUE TO EXCESSIVE EXERTION: Status: ACTIVE | Noted: 2018-05-25

## 2021-06-16 NOTE — TELEPHONE ENCOUNTER
Telephone Encounter by Jenny Chase at 7/23/2019  3:50 PM     Author: Jenny Chase Service: -- Author Type: --    Filed: 7/23/2019  3:51 PM Encounter Date: 7/22/2019 Status: Signed    : Jenny Chase       Per insurance- This will have to go thru appeals process: See original telephone encounter date 06/20/2019 for additional information.

## 2021-06-16 NOTE — TELEPHONE ENCOUNTER
Telephone Encounter by Max Townsend at 6/21/2019  2:15 PM     Author: Max Townsend Service: -- Author Type: --    Filed: 6/21/2019  2:22 PM Encounter Date: 6/20/2019 Status: Signed    : Max Townsend       PRIOR AUTHORIZATION DENIED    Denial Rational: You need to first try (4) of these drugs that are covered under the patient's formulary: Diclofenac potassium or diclofenac sodium delayed/extended-release, ibuprofen, meloxicam, naproxen regular/delayed-release OR your doctor needs to give us specific medical reasons why four (4) of the covered drugs are not appropriate for you.        Appeal Information: If the provider would like to appeal this denial, please provide a letter of medical necessity and once it has been completed and placed in the patient's chart, notify the [facility] ([facility] MED [id number]) and the appeal can be initiated on behalf of the patient and provider.  Please also include any therapies that the patient has tried and their outcomes.

## 2021-06-16 NOTE — PROGRESS NOTES
"Assessment/Plan:    1. Left lumbar radiculopathy  Left lower extremity radiculopathy likely.  Patient referred back to see Dr. Wynn with spine care clinic who she has seen in the past.  Initiate gabapentin 100 mg titrating to 3 times daily as tolerated.  Possible dizziness previously on higher dose.  Possible lumbar MRI to further evaluate current pathology in order to determine definitive treatment options.  - Ambulatory referral to [facility]  - gabapentin (NEURONTIN) 100 MG capsule; Take 100 mg by mouth 3 (three) times a day.  Dispense: 90 capsule; Refill: 2    2. Bursitis of left hip, unspecified bursa  Left hip bursitis previously noted per [facility] with question in retrospect if early symptoms of sciatica.  Patient described corticosteroid injection at that time which was not of any benefit.          Subjective:    Dyana Nicole is seen today for left lower back pain.  Pain radiates into buttocks and left posterior thigh as well as below level of knee to her ankle more posterior lateral aspect perhaps.  Just sitting today and this exam room caused leg to feel somewhat numb.  Hurts worse to sit for prolonged periods of time.  Did purchase a donut pillow which does seem to provide some relief.  Had described a fall that happened at home while moving a stool back in mid December while residing at [facility].  Subsequently went to [place] and was seen by a provider who described \"sciatica\" however no further work-up completed at that time.  Patient has seen Dr. Wynn in the past with lumbar fusion procedure previously noted.  Comprehensive review of systems as above otherwise all negative.     - Ray   3 daughters - Genesis ( at [facility]), Eneida   Smoke - quit in 60s after 1 year   EtOH: sober x 36 years   Surgeries: gallbladder, appy, hysterectomy and ? 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; left " CTS release; bladder lift; right TKA; left rotator cuff   Hospitalizations:    Past Surgical History:   Procedure Laterality Date     Bladder lift       CARPAL TUNNEL RELEASE       CATARACT EXTRACTION, BILATERAL       CHOLECYSTECTOMY       HYSTERECTOMY       JOINT REPLACEMENT Bilateral     knees     MOHS SURGERY      Nose     OOPHORECTOMY Left     1 removed, 1 remains     WY ARTHROPLASTY TIBIAL PLATEAU      Description: Knee Replacement;  Recorded: 2013;     [place] INJECT NERV BLCK,OTHR PERIPH NERV      Description: Peripheral Nerve Block Wrist Median Right;  Recorded: 2013;     REPLACEMENT TOTAL KNEE      L     REPLACEMENT TOTAL KNEE BILATERAL       ROTATOR CUFF REPAIR       TRUNK SKIN LESION EXCISIONAL BIOPSY Left 2018    Procedure: LEFT BUTTOCK EXPLORATION, EXCISION BUTTOCK MASS;  Surgeon: Lg Jameson MD;  Location: [facility] OR;  Service:         Family History   Problem Relation Age of Onset     Aneurysm Father         AAA     Cancer Mother         pancrease        Past Medical History:   Diagnosis Date     Anemia     Created by Conversion      Arrhythmia      Cervical Spondylosis     Created by Conversion      Depression      Disease of thyroid gland     hypo     Fibromyalgia     Created by Conversion      GERD (gastroesophageal reflux disease)      High cholesterol      History of transfusion      Hyperlipidemia     Created by Conversion      Hypertension     Created by Conversion      Near syncope      Peripheral Neuropathy     Created by Conversion      Skin cancer, basal cell      Sleep apnea     mild     Tachycardia         Social History     Tobacco Use     Smoking status: Former Smoker     Packs/day: 0.25     Years: 1.00     Pack years: 0.25     Quit date: 1961     Years since quittin.3     Smokeless tobacco: Never Used   Substance Use Topics     Alcohol use: No     Drug use: No        Current Outpatient Medications   Medication Sig Dispense Refill     aspirin 81 MG EC  tablet Take 81 mg by mouth at bedtime.        calcium carbonate-vitamin D3 (CALCIUM 600 + D,3,) 600 mg calcium- 200 unit cap Take 1 tablet by mouth 2 (two) times a day.              celecoxib (CELEBREX) 200 MG capsule TAKE ONE CAPSULE BY MOUTH ONE TIME DAILY  90 capsule 3     estrogens, conjugated, (PREMARIN) 0.3 MG tablet Take 1 tablet (0.3 mg total) by mouth daily. 90 tablet 3     fluticasone propionate (FLONASE) 50 mcg/actuation nasal spray INSTILL 2 SPRAYS IN EACH NOSTRIL DAILY AS NEEDED 48 g 3     furosemide (LASIX) 20 MG tablet TAKE ONE TABLET BY MOUTH ONE TIME DAILY  90 tablet 3     LACTOBAC CMB #3/FOS/PANTETHINE (PROBIOTIC & ACIDOPHILUS ORAL) Take 1 capsule by mouth once daily. 1.5 billion units             levothyroxine (SYNTHROID, LEVOTHROID) 50 MCG tablet TAKE ONE TABLET BY MOUTH ONE TIME DAILY AT 6AM 90 tablet 3     metoprolol tartrate (LOPRESSOR) 50 MG tablet Take 0.5 tablets (25 mg total) by mouth 2 (two) times a day. 90 tablet 3     multivitamin with minerals (THERA-M) 9 mg iron-400 mcg Tab tablet Take 1 tablet by mouth every evening.       omeprazole (PRILOSEC) 20 MG capsule TAKE ONE CAPSULE BY MOUTH TWICE DAILY  180 capsule 3     simvastatin (ZOCOR) 20 MG tablet TAKE 1 TABLET BY MOUTH AT BEDTIME 90 tablet 3     traZODone (DESYREL) 50 MG tablet Take 25 mg by mouth at bedtime. Pt taking 75mg at bedtime       venlafaxine (EFFEXOR) 75 MG tablet Take 75 mg by mouth daily.        gabapentin (NEURONTIN) 100 MG capsule Take 100 mg by mouth 3 (three) times a day. 90 capsule 2     No current facility-administered medications for this visit.           Objective:    Vitals:    04/13/21 1118   BP: 120/70   Pulse: 70   SpO2: 97%   Weight: 191 lb (86.6 kg)      Body mass index is 34.93 kg/m .    Alert.  No apparent distress at rest however does transfer somewhat slowly and then utilizes cane to assist with ambulation.  Deep tendon reflexes appear symmetric bilateral patella with decreased bilateral Achilles DTRs  on exam.  No ankle swelling.  No rash.      This note has been dictated using voice recognition software and as a result may contain minor grammatical errors and unintended word substitutions.

## 2021-06-16 NOTE — PATIENT INSTRUCTIONS - HE
Park,      It was very nice to meet you and Ivy.   I have ordered an x-ray of your left hip.  You can call Dr. Pugh's office to schedule the appointment to have the x-ray done.  I have also ordered an MRI of your low back.  Someone will call you to schedule the MRI of your low back.      Please plan to follow-up with me in person to review the results of the studies. Please do not hesitate to contact the clinic at [phone] if you have any questions/concerns or any worsening of your pain prior to that time. You are also welcome to contact me via [facility].    Please use the chart to increase the dose to an amount that controls your pain (do not exceed 3 tablets three times a day).  The chart is just a guide, you do not have to follow it exactly.  You can go up more slowly if you are more comfortable doing that.  You can increase once every 5 or 7 days if you do not want to increase every 3 days.  If you have any questions on how to increase the dose or any side effects to this medication, please call the clinic.    Gabapentin 100mg Dosing Chart    DATE  MORNING AFTERNOON BEDTIME    Day 1 0 0 1    Day 2 0 0 1    Day 3 0 0 1    Day 4 1 0 1    Day 5 1 0 1    Day 6 1 0 1    Day 7 1 1 1    Day 8 1 1 1    Day 9 1 1 1    Day 10 1 1 2    Day 11 1 1 2    Day 12 1 1 2    Day 13 2 1 2    Day 14 2 1 2    Day 15 2 1 2    Day 16 2 2 2    Day 17 2 2 2    Day 18 2 2 2    Day 19 2 2 3    Day 20 2 2 3    Day 21 2 2 3    Day 22 3 2 3    Day 23 3 2 3    Day 24 3 2 3    Day 25 3 3 3    Day 26 3 3 3    Day 27 3 3 3     Continue medication, taking 3 capsules three times daily    Please call if you have any questions regarding how to take your medication  Clinic Phone # [phone]

## 2021-06-16 NOTE — PROGRESS NOTES
ASSESSMENT: Dyana Nicole (AKANGEL LUIS Nick)  is a 81 y.o. female  with a BMI of 34.75 with past medical history significant for atrial tachycardia, hypertension, hypothyroidism, hyperlipidemia, anxiety/depression, GERD, colitis, osteoarthritis, skin cancer who presents today for new patient evaluation of subacute left-sided low back pain with left radicular/sciatic type leg symptoms.  The pain started after a fall in December 2020 and her pain has been progressively worsening since that time.  She does have left hip flexor weakness on examination today, but otherwise is without focal neurologic deficit.  She does state that her bladder incontinence (which she does have at baseline) has been worsening).  She does have a history of a previous lumbar fusion surgery.  There may be an element of left hip pain secondary to primary osteoarthritis of the hip, based on her exam today.    PSP:  Dr. Vila (patient last seen on November 24 of 2017).    RASHIDA:  42 %  WHO-5:  14 (The patient is not interested in behavioral health therapy as she already sees a counselor)    PLAN:  A shared decision making model was used.  The patient's values and choices were respected.  The following represents what was discussed and decided upon by the physician and the patient.  Her daughter, Ivy, was present for the entire encounter.    1.  DIAGNOSTIC TESTS:    -An x-ray of the left hip was ordered today for further work-up regarding the buttock pain.  -An MRI of the lumbar spine was ordered today for further work-up regarding the left leg weakness, worsening bladder incontinence, especially in the face of previous lumbar fusion surgery.  2.  PHYSICAL THERAPY: Briefly discussed that she may benefit from physical therapy, but will await the results of her imaging studies first.  3.  MEDICATIONS:    -The patient was just started on gabapentin 100 mg by her primary care physician.  Her primary care physician deferred to the spine center for further  titration.  A chart was given with how to increase the dose to a maximum of 3 tablets three times a day.  The lowest therapeutic dose should be used.   The chart is just a guide.  She does not have to increase every 3 days if she does not want to.  She can go up more slowly than this if she would like.  The patient is asked to call the clinic if there are any side effects to the Gabapentin or if questions arise as to how to increase the dose.  4.  INTERVENTIONS: She may benefit from injections, but will await the results of her imaging studies first.  -Her second dose of the Covid vaccine was on March 31 of 2021.  5.  PATIENT EDUCATION:   -The patient is concerned about a recent 7 to 8 pound weight gain in the last 4 months.  She is encouraged to follow-up with her primary care physician to see if any further work-up is necessary (potentially testing her TSH).  This is most likely due to inactivity secondary to pain and her recent trip to [place], but organic causes can be ruled out.  -She is having some increased leg swelling but was not wearing her ASHVIN hose today.  She does have a diagnosis of lymphedema.  She is encouraged to follow-up with Dr. Alfred at the lymphedema clinic regarding this.  -She has stopped taking her Vesicare medication, which could be contributing to her worsened bladder dysfunction.  She is encouraged to contact Dr. Pugh's office for another prescription of this medication.  Again the MRI of the low back is ordered today for further work-up of this as well.  -All of her and her daughter's questions were answered to their satisfaction today.  They were in agreement with the treatment plan.  6.  FOLLOW-UP:   They are asked to follow-up in person after the MRI and the x-ray to review the results and discuss other treatment options.  If there are any questions/concerns or any significant worsening of pain prior to that time, the patient is asked to call the clinic via the nurse navigation  line or via [facility].       SUBJECTIVE:  Dyana Nicole (Park)  Is a 81 y.o. female who presents today for new patient evaluation of low back pain with radiation to the left buttock.  She states that the pain started in December 2020.  She did have a fall, but she is not sure if that is the cause.  Reports that she fell onto her buttocks onto a concrete floor.  Reports that she did not really have any pain at that time.  She was able to get up on her own, though it did take a while.  Reports that a few days later she was having some left buttock pain so she went to [facility] urgent [facility].  She was told that she had bursitis.  They did do a bursitis injection but she reports that she had no relief.  They did not do any x-rays at that time.  She then went down to [place].  She was down there for few months, and when she returned her daughter states that she is in a significant amount of pain and she feels like her mobility has significantly decreased.  At this time Park says that she is getting pain not only in the buttock but it is starting to go down the left lateral leg into the left lateral lower leg and even into the foot.  She does have some numbness and tingling and she does feel like the leg is weak.  She denies any symptoms on the right side.  She says that the symptoms have progressively gone further distally down her leg, where they used to be more proximal.  Her pain is worse with sleeping on her left side and with walking.  She does feel better if she can walk with a walker or something supporting her.  She likes to use a grocery cart to lean on when she goes to the store.  She has tried a Medrol Dosepak which did not provide any relief.  She is taking gabapentin 100 mg at bedtime that she does started last night.  She is also taking Celebrex which does help.  She is wondering what else can be done.  She has not had any other treatments for this episode of pain.    The patient was previously seen on  November 24 of 2017.    Medications:  Reviewed and correct in the chart.      Allergies: Reviewed and significant for penicillin (hives), oxycodone (hallucinations)    PMH:  Reviewed and significant for atrial tachycardia, hypertension, hyperlipidemia, hypothyroidism, skin cancer, depression, colitis, irritable bowel syndrome, GERD, osteoarthritis.    PSH:  Reviewed and significant for hysterectomy, right rotator cuff repair, right carpal tunnel surgery, bladder lift, cholecystectomy, skin cancer removal, cyst removed from her back left side, left rotator cuff repair, left total knee arthroplasty, bilateral cataract removal, right total knee arthroplasty, low back fusion (November 11, 2013 by Dr. Yane Colon), left wrist ORIF, lumbar spine hardware removal, Rathke cleft cyst removal, cyst removal of the low back.    Family History:  Reviewed and significant for pancreatic cancer in her mother, diabetes in her dad and brother, heart disease in her dad, hypertension in both parents, stroke in her father, abdominal aortic aneurysm in her father, Alzheimer's in her father.    Social History: The patient is  and is a homemaker.  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  She denies any tobacco or illicit drug use.    ROS: Positive for 7 to 8 pound weight gain in the last 4 months, mild headaches, hoarseness, difficulty swallowing, dry eyes, palpitations, leg swelling, shortness of breath, cough, diarrhea, constipation, loss of bladder control (she does report that this is worsening recently), joint pain, muscle pain, muscle fatigue, sciatica, itching, imbalance, fall, bruising, insomnia, depression.  She reports that the symptoms are chronic.   Specifically negative for bowel dysfunction, fevers,chills, appetite changes, unexplained weight loss.  Otherwise 13 systems reviewed are negative.  Please see the patient's intake questionnaire from today for details.      OBJECTIVE:  PHYSICAL  EXAMINATION:    CONSTITUTIONAL:  Vital signs as above.  No acute distress.  The patient is well nourished and well groomed.  PSYCHIATRIC:  The patient is awake, alert, oriented to person, place, time and answering questions appropriately with clear speech.    SKIN:  Skin over the face, bilateral lower extremities, and posterior torso is clean, dry, intact without rashes.  There are well-healed surgical incisions over the low back.  GAIT: Her gait is significantly antalgic.  She can walk briefly on her heels and on her toes, but requires significant hand-held assist for balance and assistance.  STANDING EXAMINATION: She does have significant tenderness over the sacroiliac joints bilaterally.  No significant tenderness over the bilateral lumbar paraspinal muscles.  She does report some pain with lumbar flexion, but no significant pain with lumbar extension.  She reports pain with right lumbar sidebending which reproduces the left leg pain.  No significant pain with left lumbar sidebending though.  No significant pain with bilateral trunk rotation.  MUSCLE STRENGTH: She has 4/5 strength for the left hip flexor.  She does not have any pain with this testing.  She has 5/5 strength of the right hip flexor.  The patient has 5/5 strength for the bilateral knee flexors/extensors, ankle dorsiflexors/plantar flexors, great toe extensors, ankle evertors/invertors.  NEUROLOGICAL:  2/4 patellar, with absent medial hamstring, and and trace achilles reflexes bilaterally.  Equivocal Babinski's bilaterally.  No ankle clonus bilaterally. Sensation to light touch is intact in the bilateral L4, L5, and S1 dermatomes.  VASCULAR:  2/4 dorsalis pedis pulses bilaterally.  Bilateral lower extremities are warm.  There is  edema of the bilateral lower extremities.  ABDOMINAL:  Soft, non-distended, non-tender throughout all quadrants.  No pulsatile mass palpated in the left lower quadrant.  LYMPH NODES:  No palpable or tender inguinal/popliteal  lymph nodes.  MUSCULOSKELETAL: Negative straight leg raising test bilaterally.  The patient does not have any pain with right hip range of motion testing with hip in a flexed position testing internal/external rotation.  Norma's test is negative on the right side.  With left hip range of motion testing, she does get some pain with left hip internal rotation, but no significant pain with left hip external rotation.  Fabere's test is positive on the left side for reproduction of the left buttock pain and pain radiating down the lateral left thigh.
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
## 2021-06-17 NOTE — PATIENT INSTRUCTIONS - HE
"Patient Instructions by Markos Noonan DO at 1/3/2019  8:20 AM     Author: Markos Noonan DO Service: -- Author Type: Physician    Filed: 1/3/2019  8:24 AM Encounter Date: 1/3/2019 Status: Addendum    : Markos Noonan DO (Physician)    Related Notes: Original Note by Markos Noonan DO (Physician) filed at 1/3/2019  8:24 AM         What is sleep apnea?    Sleep apnea is a condition that makes you stop breathing for short periods while you are asleep. There are 2 types of sleep apnea. One is called \"obstructive sleep apnea,\" and the other is called \"central sleep apnea.\"  In obstructive sleep apnea, you stop breathing because your throat narrows or closes (figure 1). In central sleep apnea, you stop breathing because your brain does not send the right signals to your muscles to make you breathe. When people talk about sleep apnea, they are usually referring to obstructive sleep apnea, which is what this article is about.  People with sleep apnea do not know that they stop breathing when they are asleep. But they do sometimes wake up startled or gasping for breath. They also often hear from loved ones that they snore.  What are the symptoms of sleep apnea? -- The main symptoms of sleep apnea are loud snoring, tiredness, and daytime sleepiness. Other symptoms can include:  ?Restless sleep  ?Waking up choking or gasping  ?Morning headaches, dry mouth, or sore throat  ?Waking up often to urinate  ?Waking up feeling unrested or groggy  ?Trouble thinking clearly or remembering things  Some people with sleep apnea don't have symptoms, or they don't know they have them. They might figure that it's normal to be tired or to snore a lot.  Should I see a doctor or nurse? -- Yes. If you think you might have sleep apnea, see your doctor.  Is there a test for sleep apnea? -- Yes. If your doctor or nurse suspects you have sleep apnea, he or she might send you for a \"sleep study.\" Sleep studies can sometimes be done " "at home, but they are usually done in a sleep lab. For the study, you spend the night in the lab, and you are hooked up to different machines that monitor your heart rate, breathing, and other body functions. The results of the test will tell your doctor or nurse if you have the disorder.  Is there anything I can do on my own to help my sleep apnea? -- Yes. Here are some things that might help:  ?Stay off your back when sleeping. (This is not always practical, because people cannot control their position while asleep. Plus, it only helps some people.)  ?Lose weight, if you are overweight  ?Avoid alcohol, because it can make sleep apnea worse  How is sleep apnea treated? -- The most effective treatment for sleep apnea is a device that keeps your airway open while you sleep. Treatment with this device is called \"continuous positive airway pressure,\" or CPAP. People getting CPAP wear a face mask at night that keeps them breathing (figure 2).  If your doctor or nurse recommends a CPAP machine, try to be patient about using it. The mask might seem uncomfortable to wear at first, and the machine might seem noisy, but using the machine can really pay off. People with sleep apnea who use a CPAP machine feel more rested and generally feel better.  There is also another device that you wear in your mouth called an \"oral appliance\" or \"mandibular advancement device.\" It also helps keep your airway open while you sleep.  In rare cases, when nothing else helps, doctors recommend surgery to keep the airway open. Surgery to do this is not always effective, and even when it is, the problem can come back.  Is sleep apnea dangerous? -- It can be. People with sleep apnea do not get good-quality sleep, so they are often tired and not alert. This puts them at risk for car accidents and other types of accidents. Plus, studies show that people with sleep apnea are more likely than others to have high blood pressure, heart attacks, and " other serious heart problems. In people with severe sleep apnea, getting treated (for example, with a CPAP machine) can help prevent some of these problems.    GRAPHICS  Airway in a person with sleep apnea    Normally when a person sleeps, the airway remains open, and air can pass from the nose and mouth to the lungs. In a person with sleep apnea, parts of the throat and mouth drop into the airway and block off the flow of air. This can cause loud snoring and interrupt breathing for short periods.  Graphic 89079 Version 5.0    Continuous positive airway pressure (CPAP) for sleep apnea    The CPAP mask gently blows air into your nose while you sleep. It puts just enough pressure on your airway to keep it from closing. The mask in this picture fits over just the nose. Other CPAP devices have masks that fit over the nose and mouth.    Please bring one tab of low dose melatonin 3 mg or less to the night of the study.    If the patient wishes to take the melatonin. It is completely voluntary.    Patient may take own melatonin after arrival to sleep center. Do not drive or operate machinery after intake of melatonin.       Patient education: What is a sleep study?     What is a sleep study? -- A sleep study is a test that measures how well you sleep and checks for sleep problems. For some sleep studies, you stay overnight in a sleep lab at a hospital or sleep center.     What happens during a sleep study? -- Before you go to sleep, a technician attaches small, sticky patches called electrodes to your head, chest, and legs. He or she will also place a small tube beneath your nose and might wrap 1 or 2 belts around your chest.   Each of these items has wires that connect to monitors. The monitors record your movement, brain activity, breathing, and other body functions while you sleep.  If you have a history of trouble falling asleep, your doctor might prescribe a medicine to help you fall asleep in the lab. If you have  never taken the medicine before, your doctor might ask you take it on a night before your sleep study to see how it affects you.   Why might my doctor order a sleep study? -- Your doctor will order a sleep study if he or she thinks you have sleep apnea or a different condition that makes you:   ?Have sudden jerking leg movements while you sleep, called periodic limb movements.   ?Feel very sleepy during the day and fall asleep all of a sudden, called narcolepsy.   ?Have trouble falling asleep or staying asleep over a long period of time, called chronic insomnia.   ?Do odd things while you sleep, such as walking.  How should I prepare for a sleep study? -- On the day of your sleep study, you should:   ?Avoid alcohol   ?Avoid drinking coffee, tea, sodas, and other drinks that have caffeine in the afternoon and evening   ?Take all of your regular medicines    The cost of care estimate line is [phone]. They are able to give the patient an estimate of the charges and also an estimate of their insurance coverage/patient responsibility.    Please bring your machine, mask, hose and power cord on the next clinical visit with me. Thank you.

## 2021-06-17 NOTE — PATIENT INSTRUCTIONS - HE
Please complete your pain diary and return the diary to the [facility] at your earliest convenience.  The [facility] will contact you to schedule your next appointment after your pain diary is reviewed by your doctor.  Thank you.    DISCHARGE INSTRUCTIONS    During office hours (8:00 a.m.- 4:00 p.m.) questions or concerns may be answered  by calling [facility] Navigation Nurses at  [phone].  Messages received after hours will be returned the following business day.      In the case of an emergency, please dial 911 or seek assistance at the nearest Emergency Room/Urgent Care facility.     All Patients:  ? You may experience an increase in your symptoms for the first 2 days, once the numbing medication wears off.    ? You may resume your regular medication, no pain medication until you have completed your diary    ? You may shower. No swimming, tub bath or hot tub for 24 hours; remove bandage after 4 hours    ? Continue your activities that can cause you pain to test the blocks.                         ? You should not drive for the next 3 to 5 hours (have someone drive you)           POSSIBLE PROCEDURE SIDE EFFECTS  -Call Spine Center if concerned-    Increased Pain  Increased numbness/tingling     Nausea/Vomiting  Bruising/bleeding at site (hematoma)             Swelling at site (edema) Headache  Difficulty walking  Infection        Fever greater than 100.5

## 2021-06-17 NOTE — PROGRESS NOTES
Assessment:   Dyana Nicole (SHONNA Nick)  is a 81 y.o. y.o. female with past medical history significant for atrial tachycardia, hypertension, hypothyroidism, hyperlipidemia, anxiety/depression, GERD, colitis, osteoarthritis, skin cancer who presents today for follow-up regarding subacute left-sided low back pain with left radicular/sciatic type leg symptoms.  Pain started after a fall in December 2020 and her pain has progressively worsened since that time with left hip flexor weakness on hr initial evaluation on April 16 of 2021.  She was also having worsening bowel/bladder incontinence from her baseline.  She has a history of a previous lumbar fusion surgery.    At this time she has had an updated MRI which shows degenerative changes but no apparent etiology of her pain.  There is no significant central canal stenosis that would contribute to her worsening bowel/bladder incontinence.  Her worst area of pain is in her left buttock and her xrays of her hip show mild primary osteoarthritis.  She does have a new complaint today of right thigh pain, but she describes this is more muscular pain which may be compensatory from putting more weight on the right leg compared to the left due to the severe pain she has in the left low back.  Her left lateral thigh pain is likely due to his IT band syndrome.    PSP:  Dr. Vila       Plan:     A shared decision making plan was used.  The patient's values and choices were respected.  The following represents what was discussed and decided upon by the physician and the patient.  Her daughter, Ivy, was present for the entire encounter.    1.  DIAGNOSTIC TESTS:    -MRI of the lumbar spine from April 24 of 2021 was personally reviewed today by the physician with the physician performing her own interpretation.  She does have an L1-L5 fusion.  There does appear to be transitional anatomy with a lumbarized S1 though to match previous imaging count, the last surgical segment is  called L4-5.  There is no significant central canal stenosis.  No significant neuroforaminal stenosis.  There is a slight spondylolisthesis above the level of her fusion.  The images were shown to the patient and the findings were explained using a spine model.  -The patient's x-rays of the bilateral hips from April 24 of 2021 were personally reviewed today by the physician with the physician performing her own interpretation.  There is mild joint space narrowing of both hips.  Otherwise no dislocation or obvious fracture seen.  The results were discussed with the patient.  2.  PHYSICAL THERAPY: Patient is completed several sessions of physical therapy.  We will hold off on more physical therapy until a more definitive diagnosis can be made.  She may benefit from more physical therapy once a specific diagnosis can be made.  3.  MEDICATIONS:  She was previously instructed to increase her gabapentin dose to a maximum of 300 mg 3 times a day to start.  At this time, 200 mg in the morning, 100 mg in the afternoon, 300 mg at bedtime.  She does appear to be tolerating this well.  -She can continue to take Celebrex 200 mg once a day as needed for pain.  4.  INTERVENTIONS:    -Discussed that is very difficult to know where her pain is coming from.  Given that she feels better when she can lean on her walker, this does indicate that it is from a weightbearing structure such as from her hip joint or her SI joint.  The pain is worse in the buttock, directly at the ischial tuberosity.  There may be a component of ischial tuberosity bursitis.  -Offered to trial a left intra-articular hip joint injection to determine if the buttock pain is coming from the hip.  She wanted to proceed with this an order was provided and she did wish to schedule this today.  Time was spent coordinating with the procedure team to ensure that she can have this injection done today.  -If she fails to have relief with the diagnostic hip joint injection,  would recommend a diagnostic left ischial tuberosity bursa injection.  -She failed to have relief with the diagnostic hip joint injection and the diagnostic left ischial tuberosity bursa injection, then the recommendation would be made to proceed with a left sacroiliac joint injection.  - Her second dose of the COVID vaccine was 3-31-21.  5.  PATIENT EDUCATION:    -Discussed that her worsening bowel and bladder function does not appear to be coming from the spine.  She should follow up with her primary care physician to see what should be done next.  The primary care physician may opt to refer her to a urologist and/or GI specialist, but will defer to the primary care physician.  -Discussed the purpose of the diagnostic injections.  Explained that these will be to try to determine what specifically is causing her pain and then more targeted treatment can be recommended after that point.  -All of her and her daughter's questions were answered to their satisfaction today.  They were in agreement with the treatment plan.  6.  FOLLOW-UP: The patient will be given a pain diary for her diagnostic hip injection today.  After this is reviewed, further recommendations will be made.  If there are any questions/concerns or any significant worsening of pain prior to that time, the patient is asked to call the clinic via the nurse navigation line or via [employer].     A total of 42 minutes was spent in the care of this patient.    Subjective:     Dyana Nicole (AKA Park) is a 81 y.o. female who presents today for follow-up regarding severe left-sided buttock pain.  Since her initial evaluation on April 16 of 2021 she is noticing more pain in the right thigh and leg, but she attributes this to overuse in the muscles in this leg from putting most of her weight on the right leg because it hurts too much to put pain on her left leg.  She describes excruciating pain in the left buttock.  She says that this is actually worse when she  sits down.  However it is still bad with walking and laying on her right side.  She does feel better if she can walk with her walker and try to offload some of the weight onto the walker.  She does have some pain radiating down the left lateral thigh stopping at the knee.  This may be from iliotibial band syndrome .  She rates her pain today at a 9 out of 10.  At worst is a 10 out of 10.  At best is a 6 out of 10.  Her pain is worse with walking, laying on her side at night.  Walking with her walker does seem to be helpful.  It seems to offload some of the pain and she feels better.  She has done extensive physical therapy in the past.    Is here today to review her x-ray of the hip as well as her MRI and no with the treatment plan is at this point.    Past medical history is reviewed and is unchanged for any new medical diagnoses in the interim.      Family history is reviewed and is unchanged in the interim.      Review of Systems:  Positive for continued bowel and bladder incontinence, headache, pain in neck significant to sleep at night, trip/stumbles..  Pertinent negatives include no fevers, chills, unexplained weight loss, bowel incontinence, bladder incontinence, trips, stumbles, falls.  All others reviewed and are negative.     Objective:   CONSTITUTIONAL:  Vital signs as above.  No acute distress.  The patient is well nourished and well groomed.    PSYCHIATRIC:  The patient is awake, alert, oriented to person, place and time.  The patient is answering questions appropriately with clear speech.  Normal affect.  SKIN:  Skin over the face, posterior torso, bilateral upper and lower extremities is clean, dry, intact without rashes.  MUSCULOSKELETAL: Patient in transfer from sit to stand independently.  She does have tenderness over the left greater trochanteric bursa.  She does have significant tenderness over the IT band, particularly proximally, just below the greater tuberosity and then distally, just above  the knee.  There is also some more moderate tenderness in the middle of the IT band.  She points to pain over the ischial tuberosity.  She does have an antalgic gait.

## 2021-06-17 NOTE — PROGRESS NOTES
Assessment/Plan:    1. Bilateral leg weakness  Bilateral leg weakness, likely multifactorial.  Will evaluate further for potential claudication with peripheral arterial disease as etiology.  Also will need to ensure no evidence for spinal stenosis.  Referral to vascular Center.  Ankle-brachial indices to be completed.  Cervical, thoracic and/or lumbar MRI in future if persistent concerns.  Consider neurology referral.  Check CK level to ensure no evidence for myositis while on simvastatin.  Will remain off simvastatin through May.  If continued leg weakness with no evidence for CK elevation and would resume statin therapy.  - Ambulatory referral to Vascular Center  - CK Total  - Basic Metabolic Panel    2. AR (allergic rhinitis)  Refill on Flonase provided which should be helpful regarding left eustachian tube dysfunction as well.  - fluticasone (FLONASE) 50 mcg/actuation nasal spray; 2 sprays into each nostril daily as needed.  Dispense: 16 g; Refill: 11    3. Hot flashes  Instructed patient to try decreasing Estrace from 0.5 mg to 0.25 mg daily for hot flash management.  Notify breakthrough symptoms.  - estradiol (ESTRACE) 0.5 MG tablet; Take 1 tablet (0.5 mg total) by mouth daily.  Dispense: 90 tablet; Refill: 3    4. Hyperlipidemia  As above will remain off simvastatin currently due to leg weakness.  Consider restarting statin if no improvement and normal CK level.    5. Claudication  As above, referral placed to see vascular Center with ankle-brachial indices initially consideration for lower extremity angiogram rule out peripheral arterial disease.  - US Ankle Brachial Indices; Future    6. Eustachian tube dysfunction, left  As above, Flonase to continue.    7. Urinary incontinence  [facility] findings.  Urodynamic testing etc. would be recommended.          Subjective:    Dyana Nicole is seen today for multiple concerns.  Leg weakness  typically worse with activity.  Legs feel like wet noodles by the time she gets up a flight of stairs.  Worse with activity better at rest.  Not a lot of pain with this.  Perhaps right greater than left leg asymmetry in size however has had right knee replacement previously.  No other muscle pain at this time.  Continue simvastatin for cholesterol management.  Levothyroxine for thyroid replacement.  Hot flashes historically better with Estrace 0.5 mg daily.  Needs refill.  Recent colitis flare using budesonide 3 mg using 3 tablets daily ×1 month and 2 tablets daily ×2 weeks then 1 tablet daily ×2 weeks.  Had MRI performed yesterday for pituitary evaluation.  Comprehensive review of systems as above otherwise all negative.    lives [facility] off [address] [facility]  emergency/ Ray Corey , cell  [facility] pharmacy [place]  [facility] pharmacy [facility]  non smoker lives in [place], [place] October-April vacation in [place] sees Dr. Eaton     - Ray  3 daughters - Genesis ( at [facility])  Smoke - quit in 60s after 1 year  EtOH: sober x 33 years  Surgeries: gallbladder, appy, hysterectomy and ? 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; left CTS release; bladder lift; right TKA; left rotator cuff  Hospitalizations:      Past Surgical History:   Procedure Laterality Date     CHOLECYSTECTOMY       HYSTERECTOMY       JOINT REPLACEMENT Bilateral     knees     CO ARTHROPLASTY TIBIAL PLATEAU      Description: Knee Replacement;  Recorded: 05/16/2013;     CO INJECT NERV BLCK,JOE PERIPH NERV      Description: Peripheral Nerve Block Wrist Median Right;  Recorded: 05/16/2013;     TRUNK SKIN LESION EXCISIONAL BIOPSY Left 1/5/2018    Procedure: LEFT BUTTOCK EXPLORATION, EXCISION BUTTOCK MASS;  Surgeon: Lg Jameson MD;  Location:  [facility];  Service:         Family History   Problem Relation Age of Onset     Aneurysm Father         Past Medical History:   Diagnosis Date     Anemia     Created by Conversion      Arrhythmia      Cervical Spondylosis     Created by Conversion      Depression      Disease of thyroid gland     hypo     Fibromyalgia     Created by Conversion      GERD (gastroesophageal reflux disease)      High cholesterol      History of transfusion      Hyperlipidemia     Created by Conversion      Hypertension     Created by Conversion      Near syncope      Peripheral Neuropathy     Created by Conversion      Sleep apnea     mild     Tachycardia         Social History   Substance Use Topics     Smoking status: Former Smoker     Quit date: 1/1/1961     Smokeless tobacco: Never Used     Alcohol use No        Current Outpatient Prescriptions   Medication Sig Dispense Refill     aspirin 81 MG EC tablet Take 81 mg by mouth at bedtime.        budesonide (ENTOCORT EC) 3 mg 24 hr capsule Take 9 mg by mouth every other day. As needed - pt using every other day       calcium carbonate-vitamin D3 (CALCIUM 600 + D,3,) 600 mg calcium- 200 unit cap Take 1 tablet by mouth daily.       celecoxib (CELEBREX) 200 MG capsule Take 200 mg by mouth daily.       estradiol (ESTRACE) 0.5 MG tablet Take 1 tablet (0.5 mg total) by mouth daily. 30 tablet 5     estradiol (ESTRACE) 0.5 MG tablet Take 1 tablet (0.5 mg total) by mouth daily. 90 tablet 3     fluticasone (FLONASE) 50 mcg/actuation nasal spray 2 sprays into each nostril daily as needed. 16 g 11     KRILL OIL ORAL Take 1,000 mg by mouth bedtime.        LACTOBAC CMB #3/FOS/PANTETHINE (PROBIOTIC & ACIDOPHILUS ORAL) Take 1 capsule by mouth once daily.        levothyroxine (SYNTHROID, LEVOTHROID) 50 MCG tablet TAKE ONE TABLET BY MOUTH EVERY DAY 90 tablet 2     metoprolol tartrate (LOPRESSOR) 50 MG tablet Take 1 tablet (50 mg total) by mouth 2 (two) times a day. 60 tablet 11      MV-MINERALS/FA/OMEGA 3,6,9 #3 (WOMEN'S 50+ ADVANCED ORAL) Take 1 tablet by mouth daily.       omeprazole (PRILOSEC) 20 MG capsule TAKE ONE CAPSULE BY MOUTH TWICE A  capsule 2     sertraline (ZOLOFT) 100 MG tablet Take 1 tablet (100 mg total) by mouth daily. 90 tablet 3     simvastatin (ZOCOR) 20 MG tablet Take 20 mg by mouth at bedtime.       traZODone (DESYREL) 50 MG tablet Take 25 mg by mouth at bedtime.       BIOTIN ORAL Take 5,000 mcg by mouth once daily.        diphenoxylate-atropine (LOMOTIL) 2.5-0.025 mg per tablet Take 1 tablet by mouth 4 (four) times a day as needed for diarrhea.       levothyroxine (SYNTHROID, LEVOTHROID) 50 MCG tablet Take 50 mcg by mouth at bedtime.       levothyroxine (SYNTHROID, LEVOTHROID) 50 MCG tablet TAKE ONE TABLET BY MOUTH EVERY DAY 90 tablet 2     omeprazole (PRILOSEC) 20 MG capsule Take 20 mg by mouth 2 (two) times a day before meals.       omeprazole (PRILOSEC) 20 MG capsule TAKE ONE CAPSULE BY MOUTH TWICE A  capsule 2     No current facility-administered medications for this visit.           Objective:    Vitals:    04/12/18 1434   BP: 110/60   Pulse: 60   Weight: 195 lb (88.5 kg)      Body mass index is 35.67 kg/(m^2).    Alert.  No apparent distress.  Transfer slowly with assistance with cane for ambulation.  Chest clear.  Cardiac exam regular.  Diminished popliteal pulse and lower extremity pulses on exam with lymphedema noted.  No rash.  Cooperative and forthcoming.          [facility]  1. HEAD MRI WITHOUT AND WITH IV CONTRAST  2. HEAD MRA WITHOUT IV CONTRAST  3. NECK MRA WITHOUT AND WITH IV CONTRAST  9/13/2017 6:36 PM     INDICATION: Dizziness, difficulty ambulating.  TECHNIQUE:   1. Head MRI without and with intravenous contrast.  2. 3D time-of-flight head MRA without intravenous contrast.  3. Neck MRA without and with IV contrast.  CONTRAST: 8.5 mL Gadavist.  COMPARISON: 6/29/2015 MRI/MRA head and neck examination. More recent MRI examinations of  the head, last dated 4/20/2017.     FINDINGS:  HEAD MRI: No acute infarct/restricted diffusion. No mass lesion, hemorrhage, or extra-axial fluid collections. Mild generalized cerebral and cerebellar atrophy.  Scattered foci of nonspecific T2/FLAIR hyperintense signal in the cerebral white matter and   juaquin consistent with moderate chronic small vessel ischemic changes. No abnormal contrast enhancement identified.     No significant interval change in a 4 x 4.5 mm intrinsically T1 bright suprasellar lesion, which is slightly increased in conspicuity as compared to 7/22/2013 examination (previously 3 mm). The major intracranial vascular flow voids are maintained. The   orbits are unremarkable. The calvarium and skull base are unremarkable.  The paranasal sinuses are clear. The mastoid air cells are clear.      HEAD MRA: No aneurysm. No significant intracranial stenosis. Balanced vertebral arteries supply a normal basilar artery.  The major intracranial arterial anatomy is within normal limits.     NECK MRA:   RIGHT CAROTID: No measurable stenosis of the right ICA based on NASCET criteria.     LEFT CAROTID: No measurable stenosis of the left ICA based on NASCET criteria.     VERTEBRAL ARTERIES: Balanced vertebral arteries are patent in the neck and into the head.      AORTIC ARCH: Classic aortic arch anatomy with no significant stenosis at the origin of the great vessels.     IMPRESSION:   CONCLUSION:  HEAD MRI:   1.  No acute infarct or hemorrhage.  2.  Stable age-related changes as detailed above.  3.  No interval change in 4 x 4.5 mm intrinsically T1 bright suprasellar lesion, slightly increased in conspicuity as compared to 7/22/2013 examination (previously 3 mm). Differential considerations include proteinaceous pituitary microadenoma or   nonneoplastic cyst versus adamantinomatous craniopharyngioma.     HEAD MRA:   1.  Normal Head MRA.  2.  No aneurysm, high flow AVM or significant stenosis identified.     NECK  MRA:  1.  No significant stenosis in the neck vessels based on NASCET criteria.  2.  No evidence for dissection or pseudoaneurysm.      This note has been dictated using voice recognition software and as a result may contain minor grammatical errors and unintended word substitutions.

## 2021-06-17 NOTE — TELEPHONE ENCOUNTER
Attempted to contact patient, but she was unreachable at this time.  A message was left encouraging the patient to contact the [facility] to further discuss care plan recommendations by Dr. Vila after the diagnostic left hip injection that was completed on 5/4/21.

## 2021-06-17 NOTE — PROGRESS NOTES
VASCULAR SURGERY CLINIC CONSULTATION    VASCULAR SURGEON: Joseph Thomas MD    LOCATION:  [facility]    Dyana Nicole   Medical Record #:  [id number]  YOB: 1940  Age:  78 y.o.     Date of Service: 4/16/2018    PRIMARY CARE PROVIDER: True Pugh MD      Reason for visit: Bilateral lower extremity weakness.    IMPRESSION: Patient has significant leg edema both lower extremities and fibromyalgia causing significant discomfort but no evidence of peripheral arterial insufficiency.    RECOMMENDATION: I discussed with Consuelo that the findings on ultrasound revealed that she has adequate arterial flow to both lower extremities with very minimal peripheral arterial disease.  She is unable to tolerate the ABIs because the blood pressure cuff cause her significant pain from her fibromyalgia on her lower extremities.  She does have significant adipose tissue of both lower extremities consistent with leg edema that stops at the ankle and spares the foot.  Do not think she has lymphedema and I do not see evidence of very extensive chronic venous insufficiency.  She does have multiple benign spider veins in the lower legs.  I think Consuelo would benefit from referral to lymphedema therapy with massage therapy and attempts at fitting her with some compression hose that she can tolerate from a pain standpoint.  This should help improve her symptoms of generalized heaviness and fatigue in her legs throughout the day with standing and walking.  She will follow-up with me as needed in the future.    HPI:  Dyana Nicole is a 78 y.o. female who was seen today in consultation by Dr. Pugh regarding bilateral lower extremity fatigue and discomfort.  She currently does not have symptoms of claudication however she describes significant fatigue and pain in her lower legs even with touch with prolonged standing or limited walking.  She does not describe classic claudication symptoms of walking stairs  "and certain distance before developing calf pain and having to stop and rest.  She also does not notice significant swelling in bilateral lower extremities however she has had chronically in large legs insistent with lipedema for multiple years.  She also has bad fibromyalgia and has pain in multiple locations.  Patient has had lower lumbar fusion for chronic back pain however she says that she does not feel that her symptoms of weakness and \"noodle like\" sensation in her legs is related to her back.  She also does not have significant varicose veins or swelling and is tried compression hose in the past but was unable to tolerate them because she has significant tenderness in her with any sort of compression or touching of her skin.    PHH:    Past Medical History:   Diagnosis Date     Anemia     Created by Conversion      Arrhythmia      Cervical Spondylosis     Created by Conversion      Depression      Disease of thyroid gland     hypo     Fibromyalgia     Created by Conversion      GERD (gastroesophageal reflux disease)      High cholesterol      History of transfusion      Hyperlipidemia     Created by Conversion      Hypertension     Created by Conversion      Near syncope      Peripheral Neuropathy     Created by Conversion      Sleep apnea     mild     Tachycardia         Past Surgical History:   Procedure Laterality Date     CHOLECYSTECTOMY       HYSTERECTOMY       JOINT REPLACEMENT Bilateral     knees     MT ARTHROPLASTY TIBIAL PLATEAU      Description: Knee Replacement;  Recorded: 05/16/2013;     MT INJECT NERV BLCK,OTHR PERIPH NERV      Description: Peripheral Nerve Block Wrist Median Right;  Recorded: 05/16/2013;     TRUNK SKIN LESION EXCISIONAL BIOPSY Left 1/5/2018    Procedure: LEFT BUTTOCK EXPLORATION, EXCISION BUTTOCK MASS;  Surgeon: Lg Jameson MD;  Location: [facility] OR;  Service:        ALLERGIES:  Penicillins; Pollen; and Venom-honey bee    MEDS:    Current Outpatient Prescriptions: "      aspirin 81 MG EC tablet, Take 81 mg by mouth at bedtime. , Disp: , Rfl:      budesonide (ENTOCORT EC) 3 mg 24 hr capsule, Take 9 mg by mouth every other day. As needed - pt using every other day, Disp: , Rfl:      calcium carbonate-vitamin D3 (CALCIUM 600 + D,3,) 600 mg calcium- 200 unit cap, Take 1 tablet by mouth daily., Disp: , Rfl:      celecoxib (CELEBREX) 200 MG capsule, Take 200 mg by mouth daily., Disp: , Rfl:      diphenoxylate-atropine (LOMOTIL) 2.5-0.025 mg per tablet, Take 1 tablet by mouth 4 (four) times a day as needed for diarrhea., Disp: , Rfl:      estradiol (ESTRACE) 0.5 MG tablet, Take 1 tablet (0.5 mg total) by mouth daily., Disp: 30 tablet, Rfl: 5     estradiol (ESTRACE) 0.5 MG tablet, Take 1 tablet (0.5 mg total) by mouth daily., Disp: 90 tablet, Rfl: 3     fluticasone (FLONASE) 50 mcg/actuation nasal spray, 2 sprays into each nostril daily as needed., Disp: 16 g, Rfl: 11     KRILL OIL ORAL, Take 1,000 mg by mouth bedtime. , Disp: , Rfl:      LACTOBAC CMB #3/FOS/PANTETHINE (PROBIOTIC & ACIDOPHILUS ORAL), Take 1 capsule by mouth once daily. , Disp: , Rfl:      levothyroxine (SYNTHROID, LEVOTHROID) 50 MCG tablet, Take 50 mcg by mouth at bedtime., Disp: , Rfl:      metoprolol tartrate (LOPRESSOR) 50 MG tablet, Take 1 tablet (50 mg total) by mouth 2 (two) times a day., Disp: 60 tablet, Rfl: 11     MV-MINERALS/FA/OMEGA 3,6,9 #3 (WOMEN'S 50+ ADVANCED ORAL), Take 1 tablet by mouth daily., Disp: , Rfl:      omeprazole (PRILOSEC) 20 MG capsule, TAKE ONE CAPSULE BY MOUTH TWICE A DAY, Disp: 180 capsule, Rfl: 2     sertraline (ZOLOFT) 100 MG tablet, Take 1 tablet (100 mg total) by mouth daily., Disp: 90 tablet, Rfl: 3     simvastatin (ZOCOR) 20 MG tablet, Take 20 mg by mouth at bedtime., Disp: , Rfl:      traZODone (DESYREL) 50 MG tablet, Take 25 mg by mouth at bedtime., Disp: , Rfl:     SOCIAL HABITS:    History   Smoking Status     Former Smoker     Quit date: 1/1/1961   Smokeless Tobacco     Never  Used       History   Alcohol Use No       History   Drug Use No       FAMILY HISTORY:    Family History   Problem Relation Age of Onset     Aneurysm Father        REVIEW OF SYSTEMS:    A 12 point ROS was reviewed and except for what is listed in the HPI above, all others are negative    PE:  /80  Pulse 72  Temp 97.9  F (36.6  C)  Resp 16  Wt 194 lb (88 kg)  BMI 35.48 kg/m2  Wt Readings from Last 1 Encounters:   04/16/18 194 lb (88 kg)     Body mass index is 35.48 kg/(m^2).    EXAM:  GENERAL: This is a well-developed 78 y.o. female who appears her stated age  EYES: Grossly normal.  MOUTH: Buccal mucosa normal   CARDIAC:  NS1 S2, No Murmur  CHEST/LUNG:  Clear lung fields bilaterally   GASTROINTESINAL (ABDOMEN): Soft, non-tender, B/S present, no pulsatile mass  MUSCULOSKELETAL: Grossly normal and both lower extremities are intact.  HEME/LYMPH: No lymphedema  NEUROLOGIC: Focally intact, Alert and oriented x 3.   PSYCH: appropriate affect  INTEGUMENT: No open lesions or ulcers.  She has multiple small spider veins scattered throughout the bilateral lower extremities.  No no varicose veins noted significant adipose tissue noted in both lower extremities especially below the knee that stops at the ankle and a sparing of the foot.  No significant swelling of the foot noted bilaterally.  She has no pitting edema on examination.  No skin changes consistent with hemosiderin deposition and no evidence of lipodermatosclerosis.  Pulse Exam:     DP: Left +2   Right  +2     PT:   Left +2   Right  +2          DIAGNOSTIC STUDIES:     Images:  Us Arterial Legs Bilateral Complete    Result Date: 4/13/2018  [facility] EXAM: DUPLEX ARTERIAL ULTRASOUND OF THE BILATERAL LOWER EXTREMITIES 4/13/2018 5:06 PM INDICATION: Claudication. COMPARISON: None. TECHNIQUE: Duplex imaging is performed utilizing gray-scale, two-dimensional images, and color-flow imaging. Doppler waveform analysis and spectral Doppler imaging is also  performed. FINDINGS: The patient was unable to tolerate blood pressure cuff inflation to obtain ABIs. Due to this a bilateral lower extremity duplex ultrasound was performed. The systolic brachial pressures were 130 mmHg on the right and 135 mmHg on the left. DUPLEX ARTERIAL ULTRASOUND FINDINGS: LOWER EXTREMITY ARTERIAL DUPLEX EXAM WITH WAVEFORMS RIGHT (cm/s) EIA: 160 T CFA: 147, 123, 143 T PFA: 93 B SFA: 141 T SFA-Proximal: 131 T SFA-Mid: 106 B SFA-Distal: 109 B Popliteal: 93, 95, 102 T PTA: 123 T GEN: 28 M DPA: 67 B (M=monophasic, B=biphasic, T=triphasic) LEFT (cm/s) EIA: 113 T CFA: 154, 135, 127 T PFA: 101 B SFA: 134 T SFA-Proximal: 134 T SFA-Mid: 107 T SFA-Distal: 118 T Popliteal: 81, 72, 93 T PTA: 112 T GEN: 19 M DPA: 119 B (M=monophasic, B=biphasic, T=triphasic) RIGHT: Mild calcified atherosclerotic changes of the visualized femoropopliteal segment. Normal triphasic/biphasic waveforms from the visualized external iliac artery to the posterior tibial and dorsalis pedis arteries. LEFT: Mild calcified atherosclerotic changes of the visualized arteries. Normal triphasic/biphasic waveforms from the visualized external iliac artery to the posterior tibial and dorsalis pedis arteries.     1.  Patient unable to tolerate BRIAN exam. 2.  Arterial duplex ultrasound does not demonstrate evidence for significant peripheral arterial stenosis.      I personally reviewed the images.    LABS:      Sodium   Date Value Ref Range Status   04/12/2018 140 136 - 145 mmol/L Final   01/03/2018 140 136 - 145 mmol/L Final   09/13/2017 141 136 - 145 mmol/L Final     Potassium   Date Value Ref Range Status   04/12/2018 4.8 3.5 - 5.0 mmol/L Final   01/03/2018 5.2 (H) 3.5 - 5.0 mmol/L Final   09/13/2017 4.1 3.5 - 5.0 mmol/L Final     Chloride   Date Value Ref Range Status   04/12/2018 103 98 - 107 mmol/L Final   01/03/2018 104 98 - 107 mmol/L Final   09/13/2017 105 98 - 107 mmol/L Final     BUN   Date Value Ref Range Status   04/12/2018 21 8  - 28 mg/dL Final   01/03/2018 19 8 - 28 mg/dL Final   09/13/2017 18 8 - 28 mg/dL Final     Creatinine   Date Value Ref Range Status   04/12/2018 0.80 0.60 - 1.10 mg/dL Final   01/03/2018 0.86 0.60 - 1.10 mg/dL Final   09/13/2017 0.80 0.60 - 1.10 mg/dL Final     Hemoglobin   Date Value Ref Range Status   01/03/2018 12.7 12.0 - 16.0 g/dL Final   09/13/2017 12.6 12.0 - 16.0 g/dL Final   11/02/2016 13.4 12.0 - 16.0 g/dL Final     Platelets   Date Value Ref Range Status   01/03/2018 226 140 - 440 thou/uL Final   09/13/2017 226 140 - 440 thou/uL Final   11/02/2016 241 140 - 440 thou/uL Final     BNP   Date Value Ref Range Status   09/28/2016 69 0 - 137 pg/mL Final     INR   Date Value Ref Range Status   05/31/2015 1.00 0.90 - 1.10 Final   08/01/2011 1.15 (H) 0.90 - 1.10 Final     Comment:                                                     INR Therapeutic Ranges                                                  Mech. Valve 2.5-3.5                                                  Post surg.  2.0-3.0                                                  DVT/PE      2.0-3.0   07/31/2011 1.20 (H) 0.90 - 1.10 Final     Comment:                                                     INR Therapeutic Ranges                                                  Mech. Valve 2.5-3.5                                                  Post surg.  2.0-3.0                                                  DVT/PE      2.0-3.0       Joseph Thomas MD  VASCULAR SURGERY

## 2021-06-17 NOTE — PATIENT INSTRUCTIONS - HE
Follow-up visit in 2 weeks with Dr. Vila to discuss injection outcome and determine care plan going forward.     DISCHARGE INSTRUCTIONS    During office hours (8:00 a.m.- 4:00 p.m.) questions or concerns may be answered  by calling [facility] Navigation Nurses at  [phone].  Messages received after hours will be returned the following business day.      In the case of an emergency, please dial 911 or seek assistance at the nearest Emergency Room/Urgent Care facility.     All Patients:    ? You may experience an increase in your symptoms for the first 2 days (It may take anywhere between 2 days- 2 weeks for the steroid to have maximum effect).    ? You may use ice on the injection site, as frequently as 20 minutes each hour if needed.    ? You may take your pain medicine.    ? You may continue taking your regular medication after your injection. If you have had a Medial Branch Block you may resume pain medication once your pain diary is completed.    ? You may shower. No swimming, tub bath or hot tub for 48 hours.  You may remove your bandaid/bandage as soon as you are home.    ? You may resume light activities, as tolerated.    ? Resume your usual diet as tolerated.      POSSIBLE STEROID SIDE EFFECTS (If steroid/cortisone was used for your procedure)    -If you experience these symptoms, it should only last for a short period      Swelling of the legs                Skin redness (flushing)       Mouth (oral) irritation     Blood sugar (glucose) levels              Sweats                      Mood changes    Headache    Sleeplessness    Weakened immune system for up to 14 days, which could increase the risk of claribel the COVID-19 virus and/or experiencing more severe symptoms of the disease, if exposed.    Decreased effectiveness of the flu vaccine if given within 2 weeks of the steroid.         POSSIBLE PROCEDURE SIDE EFFECTS  -Call the Spine Center if you are concerned    Increased Pain              Increased numbness/tingling        Nausea/Vomiting            Bruising/bleeding at site        Redness or swelling                                                Difficulty walking        Weakness             Fever greater than 100.5    *In the event of a severe headache after an epidural steroid injection that is relieved by lying down, please call the [facility] to speak with a clinical staff member*

## 2021-06-17 NOTE — PROGRESS NOTES
Leg weakness, poss. claudication. Leg weakness typically worse with activity.  Legs feel like wet noodles by the time she gets up a flight of stairs.  Worse with activity better at rest. L leg ache with rest. Perhaps right greater than left leg  Referred by Dr. Pugh HE PCP. Consideration for lower extremity angiogram rule out PAD. HTN. ASA. Dr. Pugh told patient to not take simvastatin for one month.

## 2021-06-17 NOTE — PATIENT INSTRUCTIONS - HE
Patient Instructions by Pk Garcia DO at 6/26/2019  2:10 PM     Author: Pk Garcia DO Service: -- Author Type: Physician    Filed: 6/26/2019  2:37 PM Encounter Date: 6/26/2019 Status: Addendum    : Pk Garcia DO (Physician)    Related Notes: Original Note by Pk Garcia DO (Physician) filed at 6/26/2019  2:35 PM       Below is a list of instructions we discussed today in clinic:   1. Increase metoprolol to 50 mg twice daily.       It was a pleasure to meet with you today in clinic.  Please do not hesitate to call the [facility] clinic with any questions or concerns at [phone].    Sincerely,

## 2021-06-17 NOTE — PATIENT INSTRUCTIONS - HE
Patient Instructions by True Pugh MD at 9/13/2019 10:20 AM     Author: True uPgh MD Service: -- Author Type: Physician    Filed: 9/13/2019 11:06 AM Encounter Date: 9/13/2019 Status: Signed    : True Pugh MD (Physician)         Patient Education   Understanding USDA MyPlate  The USDA (US Department of Agriculture) has guidelines to help you make healthy food choices. These are called MyPlate. MyPlate shows the food groups that make up healthy meals using the image of a place setting. Before you eat, think about the healthiest choices for what to put onto your plate or into your cup or bowl. To learn more about building a healthy plate, visit www.choosemyplate.gov.       The Food Groups    Fruits: Any fruit or 100% fruit juice counts as part of the Fruit Group. Fruits may be fresh, canned, frozen, or dried, and may be whole, cut-up, or pureed. Make half your plate fruits and vegetables.    Vegetables: Any vegetable or 100% vegetable juice counts as a member of the Vegetable Group. Vegetables may be fresh, frozen, canned, or dried. They can be served raw or cooked and may be whole, cut-up, or mashed. Make half your plate fruits and vegetables.     Grains: All foods made from grains are part of the Grains Group. These include wheat, rice, oats, cornmeal, and barley such as bread, pasta, oatmeal, cereal, tortillas, and grits. Grains should be no more than a quarter of your plate. At least half of your grains should be whole grains.    Protein: This group includes meat, poultry, seafood, beans and peas, eggs, processed soy products (like tofu), nuts (including nut butters), and seeds. Make protein choices no more than a quarter of your plate. Meat and poultry choices should be lean or low fat.    Dairy: All fluid milk products and foods made from milk that contain calcium, like yogurt and cheese are part of the Dairy Group. (Foods that have little calcium, such as cream, butter, and cream  cheese, are not part of the group.) Most dairy choices should be low-fat or fat-free.    Oils: These are fats that are liquid at room temperature. They include canola, corn, olive, soybean, and sunflower oil. Foods that are mainly oil include mayonnaise, certain salad dressings, and soft margarines. You should have only 5 to 7 teaspoons of oils a day. You probably already get this much from the food you eat.  Use Edgar to Help Build Your Meals  The [employer] can help you plan and track your meals and activity. You can look up individual foods to see or compare their nutritional value. You can get guidelines for what and how much you should eat. You can compare your food choices. And you can assess personal physical activities and see ways you can improve. Go to www.[place].gov/Sanlorenzocker/.    8725-3376 [employer]. [address] [place] [address]. All rights reserved. This information is not intended as a substitute for professional medical care. Always follow your healthcare professional's instructions.           Patient Education   Activities of Daily Living  Your Health Risk Assessment indicates you have difficulties with activities of daily living such as eating, getting dressed, grooming, bathing, walking, or using the toilet. Please make a follow up appointment for us to address this issue in more detail.     Patient Education   Instrumental Activities of Daily Living  Your Health Risk Assessment indicates you have difficulties with instrumental activities of daily living which include laundry, housekeeping, banking, shopping, using the telephone, food preparation, transportation, or taking your own medications. Please make a follow up appointment for us to address this issue in more detail.    [employer] has resources available on the following website: https://www.[facility].org/caregivers.html     Also, here is a local agency that provides help with meals and other  assistance:   [facility] Line: [phone]     Patient Education   Urinary Incontinence, Female (Adult)  Urinary incontinence means loss of control of the bladder. This problem affects many women, especially as they get older. If you have incontinence, you may be embarrassed to ask for help. But know that this problem can be treated.  Types of Incontinence  There are different types of incontinence. Two of the main types are described here. You can have more than one type.    Stress incontinence. With this type, urine leaks when pressure (stress) is put on the bladder. This may happen when you cough, sneeze, or laugh. Stress incontinence most often occurs because the pelvic floor muscles that support the bladder and urethra are weak. This can happen after pregnancy and vaginal childbirth or a hysterectomy. It can also be due to excess body weight or hormone changes.    Urge incontinence (also called overactive bladder). With this type, a sudden urge to urinate is felt often. This may happen even though there may not be much urine in the bladder. The need to urinate often during the night is common. Urge incontinence most often occurs because of bladder spasms. This may be due to bladder irritation or infection. Damage to bladder nerves or pelvic muscles, constipation, and certain medicines can also lead to urge incontinence.  Treatment of urinary incontinence depends on the cause. Further evaluation is needed to find the type you have. This will likely include an exam and certain tests. Based on the results, you and your healthcare provider can then plan treatment. Until a diagnosis is made, the home care tips below can help relieve symptoms.  Home care    Do pelvic floor muscle exercises, if they are prescribed. The pelvic floor muscles help support the bladder and urethra. Many women find that their symptoms improve when doing special exercises that strengthen these muscles. To do the exercises contract the  muscles you would use to stop your stream of urine, but do this when youre not urinating. Hold for 10 seconds, then relax. Repeat 10 to 20 times in a row, at least 3 times a day. Your provider may give you other instructions for how to do the exercises and how often.    Keep a bladder diary. This helps track how often and how much you urinate over a set period of time. Bring this diary with you to your next visit with the provider. The information can help your provider learn more about your bladder problem.    Lose weight, if advised to by your provider. Excess weight puts pressure on the bladder. Your provider can help you create a weight-loss plan thats right for you. This may include exercising more and making certain diet changes.    Don't consume foods and drinks that may irritate the bladder. These can include alcohol and caffeinated drinks.    Quit smoking. Smoking and other tobacco use can lead to chronic cough that strains the pelvic floor muscles. Smoking may also damage the bladder and urethra. Talk with your provider about treatments or methods you can use to quit smoking.    If drinking large amounts of fluid causes you to have symptoms, you may be advised to limit your fluid intake. You may also be advised to drink most of your fluids during the day and to limit fluids at night.    If youre worried about urine leakage or accidents, you may wear absorbent pads to catch urine. Change the pads often. This helps reduce discomfort. It may also reduce the risk of skin or bladder infections.  Follow-up care  Follow up with your healthcare provider, or as directed. It may take some to find the right treatment for your problem. Your treatment plan may include special therapies or medicines. Certain procedures or surgery may also be options. Be sure to discuss any questions you have with your provider.  When to seek medical advice  Call the healthcare provider right away if any of these occur:    Fever of  100.4 F (38 C) or higher, or as directed by your provider    Bladder pain or fullness    Abdominal swelling    Nausea or vomiting    Back pain    Weakness, dizziness or fainting  Date Last Reviewed: 10/1/2017    0132-0396 [employer]. [address] [place] [address], [place] [address]. All rights reserved. This information is not intended as a substitute for professional medical care. Always follow your healthcare professional's instructions.     Patient Education   Depression and Suicide in Older Adults  Nearly 2 million older Americans have some type of depression. Sadly, some of them even take their own lives. Yet depression among older adults is often ignored. Learn the warning signs. You may help spare a loved one needless pain. You may also save a life.       What Is Depression?  Depression is a mood disorder that affects the way you think and feel. The most common symptom is a feeling of deep sadness. People who are depressed also may seem tired and listless. And nothing seems to give them pleasure. Its normal to grieve or be sad sometimes. But sadness lessens or passes with time. Depression rarely goes away or improves on its own. Other symptoms of depression are:    Sleeping more or less than normal    Eating more or less than normal    Having headaches, stomachaches, or other pains that dont go away    Feeling nervous, empty, or worthless    Crying a great deal    Thinking or talking about suicide or death    Feeling confused or forgetful  What Causes It?  The causes of depression arent fully known. Certain chemicals in the brain play a role. Depression does run in families. And life stresses can also trigger depression in some people. That may be the case with older adults. They often face great burdens, such as the death of friends or a spouse. They may have failing health. And they are more likely to be alone, lonely, or poor.  How You Can Help  Often, depressed people may not want to ask for help.  When they do, they may be ignored. Or, they may receive the wrong treatment. You can help by showing parents and older friends love and support. If they seem depressed, help them find the right treatment. Talk to your doctor. Or contact a local mental health center, social service agency, or hospital. With modern treatment, no one has to suffer from depression.  Resources:    National [place] of Mental Health  330.169.8009  www.nimh.nih.gov    National [place] on Mental Illness  779.455.6835  www.ryne.org    [facility]  172.506.6141  www.nmha.org    National Suicide Hotline  251.845.8580 (800-SUICIDE)      7265-3670 [employer]. [address] [place] [address], [place] [address]. All rights reserved. This information is not intended as a substitute for professional medical care. Always follow your healthcare professional's instructions.           Advance Directive  Patients advance directive was discussed and I am comfortable with the patients wishes.  Patient Education   Personalized Prevention Plan  You are due for the preventive services outlined below.  Your care team is available to assist you in scheduling these services.  If you have already completed any of these items, please share that information with your care team to update in your medical record.  Health Maintenance   Topic Date Due   ? MEDICARE ANNUAL WELLNESS VISIT  03/06/2006   ? INFLUENZA VACCINE RULE BASED (1) 08/01/2019   ? ZOSTER VACCINES (3 of 3) 11/23/2018   ? FALL RISK ASSESSMENT  10/02/2019   ? ADVANCE CARE PLANNING  11/03/2019   ? DEPRESSION FOLLOW UP  03/13/2020   ? DXA SCAN  10/16/2020   ? TD 18+ HE  11/24/2028   ? PNEUMOCOCCAL POLYSACCHARIDE VACCINE AGE 65 AND OVER  Completed   ? PNEUMOCOCCAL CONJUGATE VACCINE FOR ADULTS (PCV13 OR PREVNAR)  Completed

## 2021-06-17 NOTE — PATIENT INSTRUCTIONS - HE
Diagnostic hip joint injection is ordered today.  After we see how you respond, then further recommendations can be made.

## 2021-06-17 NOTE — PROGRESS NOTES
Assessment/Plan:     1. UTI (urinary tract infection)  We discussed options.  She has tolerated Cipro well in the past, so we elect to treat with Cipro twice daily for 5 days.  Reviewed warning signs and symptoms of persistence or worsening, when to seek care.  No evidence of pyelonephritis or complications.  - Urinalysis-UC if Indicated    2. Urinary incontinence  Chronic issue, likely contributing to above.  Advised follow-up with urology for further evaluation.    3. Left nephrolithiasis  This time her symptoms do not suggest infection related to nephrolithiasis, reviewed with patient symptoms of this.  Advised follow-up with urology to discuss stone management and prevention further.      Subjective:      Dyana Nicole is a 78 y.o. female presenting to clinic today for evaluation of urinary frequency, urgency, and severe lower pelvic dysuria with urination.  Sudden onset yesterday.  Associated with a headache that was more intense than her usual headaches.  Describes very frequent but very small amount of urine, sometimes feeling as though she could not urinate.  Small amount of blood in her urine this morning.  Describes chronic bladder leakage, uncertain if she completely empties.  Denies any back pain or fevers.  No diarrhea.  History of renal stones noted on CT scan 417, noting a 4 mm and a 2 mm stone both in the left kidney.  She has not followed up with urology.  Chronic difficulties with urinary incontinence, previously evaluated by Dr. Pugh with urology evaluation recommended, patient has not yet completed.    Current Outpatient Prescriptions   Medication Sig Dispense Refill     aspirin 81 MG EC tablet Take 81 mg by mouth at bedtime.        budesonide (ENTOCORT EC) 3 mg 24 hr capsule Take 9 mg by mouth every other day. As needed - pt using every other day       calcium carbonate-vitamin D3 (CALCIUM 600 + D,3,) 600 mg calcium- 200 unit cap Take 1 tablet by mouth daily.       celecoxib (CELEBREX) 200  MG capsule Take 200 mg by mouth daily.       diphenoxylate-atropine (LOMOTIL) 2.5-0.025 mg per tablet Take 1 tablet by mouth 4 (four) times a day as needed for diarrhea.       estradiol (ESTRACE) 0.5 MG tablet Take 1 tablet (0.5 mg total) by mouth daily. 30 tablet 5     fluticasone (FLONASE) 50 mcg/actuation nasal spray 2 sprays into each nostril daily as needed. 16 g 11     KRILL OIL ORAL Take 1,000 mg by mouth bedtime.        LACTOBAC CMB #3/FOS/PANTETHINE (PROBIOTIC & ACIDOPHILUS ORAL) Take 1 capsule by mouth once daily.        levothyroxine (SYNTHROID, LEVOTHROID) 50 MCG tablet Take 50 mcg by mouth at bedtime.       metoprolol tartrate (LOPRESSOR) 50 MG tablet Take 1 tablet (50 mg total) by mouth 2 (two) times a day. 60 tablet 11     MV-MINERALS/FA/OMEGA 3,6,9 #3 (WOMEN'S 50+ ADVANCED ORAL) Take 1 tablet by mouth daily.       omeprazole (PRILOSEC) 20 MG capsule TAKE ONE CAPSULE BY MOUTH TWICE A  capsule 2     sertraline (ZOLOFT) 100 MG tablet Take 1 tablet (100 mg total) by mouth daily. 90 tablet 3     simvastatin (ZOCOR) 20 MG tablet Take 20 mg by mouth at bedtime.       traZODone (DESYREL) 50 MG tablet Take 25 mg by mouth at bedtime.       ciprofloxacin HCl (CIPRO) 250 MG tablet Take 1 tablet (250 mg total) by mouth 2 (two) times a day for 5 days. 10 tablet 0     estradiol (ESTRACE) 0.5 MG tablet Take 1 tablet (0.5 mg total) by mouth daily. 90 tablet 3     No current facility-administered medications for this visit.        Past Medical History, Family History, and Social History reviewed.  Past Medical History:   Diagnosis Date     Anemia     Created by Conversion      Arrhythmia      Cervical Spondylosis     Created by Conversion      Depression      Disease of thyroid gland     hypo     Fibromyalgia     Created by Conversion      GERD (gastroesophageal reflux disease)      High cholesterol      History of transfusion      Hyperlipidemia     Created by Conversion      Hypertension     Created by  "Conversion      Near syncope      Peripheral Neuropathy     Created by Conversion      Sleep apnea     mild     Tachycardia      Past Surgical History:   Procedure Laterality Date     CHOLECYSTECTOMY       HYSTERECTOMY       JOINT REPLACEMENT Bilateral     knees     AK ARTHROPLASTY TIBIAL PLATEAU      Description: Knee Replacement;  Recorded: 05/16/2013;     AK INJECT NERV BLCK,OTHR PERIPH NERV      Description: Peripheral Nerve Block Wrist Median Right;  Recorded: 05/16/2013;     TRUNK SKIN LESION EXCISIONAL BIOPSY Left 1/5/2018    Procedure: LEFT BUTTOCK EXPLORATION, EXCISION BUTTOCK MASS;  Surgeon: Lg Jameson MD;  Location: [facility] Main OR;  Service:      Penicillins; Pollen; and Venom-honey bee  Family History   Problem Relation Age of Onset     Aneurysm Father      Social History     Social History     Marital status:      Spouse name: N/A     Number of children: N/A     Years of education: N/A     Occupational History     Not on file.     Social History Main Topics     Smoking status: Former Smoker     Quit date: 1/1/1961     Smokeless tobacco: Never Used     Alcohol use No     Drug use: No     Sexual activity: Not on file     Other Topics Concern     Not on file     Social History Narrative         Review of systems is as stated in HPI, and the remainder of the 10 system review is otherwise negative.    Objective:     Vitals:    04/23/18 1117   BP: 112/60   Patient Site: Left Arm   Patient Position: Sitting   Cuff Size: Adult Large   Pulse: (!) 57   Resp: 16   Temp: 97.8  F (36.6  C)   TempSrc: Oral   SpO2: 95%   Weight: 190 lb (86.2 kg)   Height: 5' 2\" (1.575 m)    Body mass index is 34.75 kg/(m^2).    Alert female.  Mucous membranes moist.  Abdomen is soft.  Mild tenderness palpation in the suprapubic region, no rebound or guarding.  No CVA tenderness.    Office Visit on 04/23/2018   Component Date Value Ref Range Status     Color, UA 04/23/2018 Yellow  Colorless, Yellow, Straw, Light " Yellow Preliminary     Clarity, UA 04/23/2018 Cloudy* Clear Preliminary     Glucose, UA 04/23/2018 Negative  Negative Preliminary     Bilirubin, UA 04/23/2018 Small* Negative Preliminary     Ketones, UA 04/23/2018 15 mg/dL* Negative Preliminary     Specific Gravity, UA 04/23/2018 1.015  1.005 - 1.030 Preliminary     Blood, UA 04/23/2018 Large* Negative Preliminary     pH, UA 04/23/2018 6.0  5.0 - 8.0 Preliminary     Protein, UA 04/23/2018 100 mg/dL* Negative mg/dL Preliminary     Urobilinogen, UA 04/23/2018 0.2 E.U./dL  0.2 E.U./dL, 1.0 E.U./dL Preliminary     Nitrite, UA 04/23/2018 Negative  Negative Preliminary     Leukocytes, UA 04/23/2018 Small* Negative Preliminary          This note has been dictated using voice recognition software. Any grammatical or context distortions are unintentional and inherent to the the software.

## 2021-06-17 NOTE — TELEPHONE ENCOUNTER
Patient contacted to inform her of Dr. Vila's recommendation to move forward with a left hip steroid ultrasound-guided injection based on her positive response to the diagnostic hip injection that was completed by Dr. Escudero the other day.  The patient was in agreement with the plan and wished to proceed.    Procedure requirements were reviewed with the patient to her verbalized understanding.    A message was placed with the [facility] scheduling team to contact the patient to schedule the procedure with Dr. Escudero.    She was encouraged to contact the Spine Center if she had any questions or concerns prior to her next appointment.

## 2021-06-17 NOTE — PATIENT INSTRUCTIONS - HE
Patient Instructions by Randy Chavis PA-C at 9/30/2019 10:10 AM     Author: Randy Chavis PA-C Service: -- Author Type: Physician Assistant    Filed: 9/30/2019 12:21 PM Encounter Date: 9/30/2019 Status: Addendum    : Randy Cahvis PA-C (Physician Assistant)    Related Notes: Original Note by Randy Chavis PA-C (Physician Assistant) filed at 9/30/2019 12:20 PM       You may apply ice topically 20 minutes on each hour while awake for the first day or 2.  Then you may alternate ice and heat as you find relief.  Do not fall asleep with ice or heating pads.    Return to see her primary care provider if any complication or new symptoms or concerns arise.      Patient Education     Hematoma  A hematoma is a collection of blood trapped outside of a blood vessel. It is what we think of as a bruise or a contusion. It is usually seen under the skin as a black and blue spot on your arm or leg, or a bump on your head after an injury. It can be almost anywhere on or in your body. It can also occur in an internal organ where it can be more serious.  A hematoma is caused by an injury with damage to small blood vessels. This causes blood to leak into the tissues. Blood forms a pocket under the skin that swells and looks like a purplish patch.  Gradually the blood in the hematoma is absorbed back into the body. The swelling and pain of the hematoma will go away. This takes from 1 to 4 weeks, depending on the size of the hematoma. The skin over the hematoma may turn bluish then brown and yellow as the blood is dissolved and absorbed. Usually, this only takes a couple of weeks but can last months.  Home care    Limit motion of the joints near the hematoma. If the hematoma is large and painful, avoid sports and other vigorous physical activity until the swelling and pain goes away.    Apply an ice pack (ice cubes in a plastic bag, wrapped in a thin towel or a frozen bag of peas) over the injured area for 20 minutes every 1 to 2  hours the first day. Continue with ice packs 3 to 4 times a day for the next 2 days. Continue the use of ice packs for relief of pain and swelling as needed.    If you need anything for pain, you can take acetaminophen, unless you were given a different pain medicine to use. Talk with your doctor before using this medicine if you have chronic liver or kidney disease. Also talk with your doctor if you have had a stomach ulcer or digestive tract bleeding, or are taking blood-thinner medicines.  Follow-up care  Follow up with your healthcare provider, or as advised. If X-rays or a CT scan were done, you will be notified if there is a change in the reading, especially if it affects treatment.  When to seek medical advice  Call your healthcare provider right away f any of the following occur:    Redness around the hematoma    Increase in pain or warmth in the hematoma    Increase in size of the hematoma    Fever of 100.4 F (38 C) or higher, or as directed by your healthcare provider    If the hematoma is on the arm or leg, watch for:  ? Increased swelling or pain in the extremity  ? Numbness or tingling or blue color of the hand or foot  Date Last Reviewed: 11/5/2015 2000-2017 [employer]. [address] [place] [address], [place] [address]. All rights reserved. This information is not intended as a substitute for professional medical care. Always follow your healthcare professional's instructions.

## 2021-06-18 NOTE — PROGRESS NOTES
[facility] Daily Progress     Patient Name: Dyana Nicole  Date: 6/7/2018  Visit #: 4  PTA visit #:  2      Date of evaluation: 5/29/2018  Referral Diagnosis:   Associated Diagnoses   Bilateral leg weakness [R29.898]       Leg swelling [M79.89]       Pain in both lower extremities [M79.604, M79.605]       Venous insufficiency of both lower extremities [I87.2]       Venous hypertension of both lower extremities [I87.303]       Fat deposits [E65]       Class 1 obesity without serious comorbidity in adult [E66.9]       Vitamin D deficiency [E55.9]             Referring provider: Sheela Alfred, *    Visit Diagnosis:     ICD-10-CM    1. Leg swelling M79.89    2. Venous insufficiency of both lower extremities I87.2    3. Venous hypertension of both lower extremities I87.303    4. Lymphedema I89.0    5. Bilateral leg weakness R29.898        Diagnoses and all orders for this visit:    Leg swelling    Venous insufficiency of both lower extremities    Venous hypertension of both lower extremities    Lymphedema    Bilateral leg weakness          Assessment:   Pt  is tolerating bandaging well  Low legs appear smaller to pt and are near symmetrical  Patient demonstrates understanding/independence with home program.  Patient is benefitting from skilled physical therapy and is making steady progress toward functional goals.  Patient is appropriate to continue with skilled physical therapy intervention, as indicated by initial plan of care.    Goal Status:  No Data Recorded  Patient will have a decreased volume in : bilateral;LE;by 5%;to decrease risk of infection;for better fit of clothing;for improved body image;for ease of movement;in 4 weeks;Progressing toward  Patient will have decreased fibrosis, scar tissue for improved lymphatic mobilitiy : in 4 weeks;Progressing toward  Patient will perform, verbalize self-management of: skin care;self-monitoring;exercise;massage;self-compression;compression  "wear;compression care;infection prevention;in 4 weeks;Progressing toward    Plan / Patient Education:     Continue with initial plan of care. Continue with manual lymph drainage, medical compression bandages, exercises, skin care, and patient education.Coordinate compression garment fitting.  Check on status of recent blood work for Myasthenia Gravis    Re measure next Thursday to assess readiness for garment fit  Pt to schedule garment fit for next Friday and wear bandages to [facility]  Subjective:     Pain Ratin The legs are  Smaller, ankle ridge was gone  Bandages were removed last night and bandages were washed  Has worn compression tubes since then  Still awaiting MG result    Objective:   SAO2 97-96%, HR  68- 60 BPM  Pt denies shortness of breath with today's visit  Caregiver present: Yes, .    Observation of swelling: B low legs visibly smaller  Ankle ledge gone on L, reduced on R    Volume measurements taken:  No      Skin condition is:  better per pt, intact, soft    Compression:  Compression tubes on, bandages removed last night and washed    Medication for infection:  No    Treatment Today         TREATMENT MINUTES COMMENTS   Evaluation     Self-care/ Home management 25     Medical compression bandaging to bilateral lower extremities from forefoot to below the knees: Applied  Eucerin skin lotion, stockinet, rosidal soft 01 rolls, comprilan 03 rolls.Used a small piece of comperm LF size \"J\" on top to protect bandages.  Instructed to remove them if uncomfortable.  Reviewed washing,drying instructions. Pt given 2 Comperm G tubes to wear when washing bandages   Manual therapy 35 Manual therapy: manual lymph drainage for bilateral lower extremities lymphedema  Deep abdominal breath, neck effleurage, short neck, shoulder collectors, bilateral axilla, bilateral inguinal, anterior inguinal to axilla anastomosis on right side and left side, left lower extremity evacuation, right lower extremity evacuation, " abdomen, neck effleurage.     Neuromuscular Re-education     Therapeutic Activity     Therapeutic Exercises  Reviewed briefly, questions were answered regarding Lymphedema exercises.   Gait training     Modality__________________                Total 70    Blank areas are intentional and mean the treatment did not include these items.       Kandis Reed [facility],CLT6/7/2018

## 2021-06-18 NOTE — PROGRESS NOTES
[facility] Daily Progress     Patient Name: Dyana Nicole  Date: 6/11/2018  Visit #: 5  PTA visit #:  3      Date of evaluation: 5/29/2018  Referral Diagnosis:   Associated Diagnoses   Bilateral leg weakness [R29.898]       Leg swelling [M79.89]       Pain in both lower extremities [M79.604, M79.605]       Venous insufficiency of both lower extremities [I87.2]       Venous hypertension of both lower extremities [I87.303]       Fat deposits [E65]       Class 1 obesity without serious comorbidity in adult [E66.9]       Vitamin D deficiency [E55.9]             Referring provider: Sheela Alfred, *    Visit Diagnosis:     ICD-10-CM    1. Leg swelling M79.89    2. Venous insufficiency of both lower extremities I87.2    3. Venous hypertension of both lower extremities I87.303    4. Lymphedema I89.0    5. Bilateral leg weakness R29.898        Diagnoses and all orders for this visit:    Leg swelling    Venous insufficiency of both lower extremities    Venous hypertension of both lower extremities    Lymphedema    Bilateral leg weakness          Assessment:   Pt  is tolerating bandaging well  Low legs appear smaller to pt and are near symmetrical  Patient demonstrates understanding/independence with home program.  Patient is benefitting from skilled physical therapy and is making steady progress toward functional goals.  Patient is appropriate to continue with skilled physical therapy intervention, as indicated by initial plan of care.    Goal Status:  No Data Recorded  Patient will have a decreased volume in : bilateral;LE;by 5%;to decrease risk of infection;for better fit of clothing;for improved body image;for ease of movement;in 4 weeks;Progressing toward  Patient will have decreased fibrosis, scar tissue for improved lymphatic mobilitiy : in 4 weeks;Progressing toward  Patient will perform, verbalize self-management of: skin care;self-monitoring;exercise;massage;self-compression;compression  "wear;compression care;infection prevention;in 4 weeks;Progressing toward    Plan / Patient Education:     Continue with initial plan of care. Continue with manual lymph drainage, medical compression bandages, exercises, skin care, and patient education.Coordinate compression garment fitting.  Check on status of recent blood work for Myasthenia Gravis    Re measure Thursday to assess readiness for garment fit  Pt to schedule garment fit for next Friday and wear bandages to [facility]  Subjective:     Pain Ratin The legs are  Smaller, ankle ridge was gone  Legs look much smaller    Objective:   SAO2 95-96%, HR  60- 60 BPM  Pt denies shortness of breath with today's visit  Caregiver present: Yes, .    Observation of swelling: B low legs visibly smaller  Ankle ledge nearly gone on L, reduced on R    Volume measurements taken:  No      Skin condition is:  better per pt, intact, soft    Compression:  Compression tubes on, bandages removed last night     Medication for infection:  No    Treatment Today         TREATMENT MINUTES COMMENTS   Evaluation     Self-care/ Home management 25     Medical compression bandaging to bilateral lower extremities from forefoot to below the knees: Applied  Eucerin skin lotion, stockinet, rosidal soft 01 rolls, comprilan 03 rolls.Used a small piece of comperm LF size \"J\" on top to protect bandages.  Instructed to remove them if uncomfortable.  Reviewed washing,drying instructions. Pt given 4 Comperm G tubes to wear when washing bandages   Manual therapy 30 Manual therapy: manual lymph drainage for bilateral lower extremities lymphedema  Deep abdominal breath, neck effleurage, short neck, shoulder collectors, bilateral axilla, bilateral inguinal, anterior inguinal to axilla anastomosis on right side and left side, left lower extremity evacuation, right lower extremity evacuation, abdomen, neck effleurage.     Neuromuscular Re-education     Therapeutic Activity     Therapeutic Exercises "  Reviewed briefly, questions were answered regarding Lymphedema exercises.   Gait training     Modality__________________                Total 55    Blank areas are intentional and mean the treatment did not include these items.       [facility],CLT6/11/2018

## 2021-06-18 NOTE — PROGRESS NOTES
[facility] Daily Progress     Patient Name: Dyana Nicole  Date: 6/4/2018  Visit #: 3  PTA visit #:  1      Date of evaluation: 5/29/2018  Referral Diagnosis:   Associated Diagnoses   Bilateral leg weakness [R29.898]       Leg swelling [M79.89]       Pain in both lower extremities [M79.604, M79.605]       Venous insufficiency of both lower extremities [I87.2]       Venous hypertension of both lower extremities [I87.303]       Fat deposits [E65]       Class 1 obesity without serious comorbidity in adult [E66.9]       Vitamin D deficiency [E55.9]             Referring provider: Sheela Alfred, *    Visit Diagnosis:     ICD-10-CM    1. Leg swelling M79.89    2. Venous insufficiency of both lower extremities I87.2    3. Venous hypertension of both lower extremities I87.303    4. Lymphedema I89.0    5. Bilateral leg weakness R29.898        Diagnoses and all orders for this visit:    Leg swelling    Venous insufficiency of both lower extremities    Venous hypertension of both lower extremities    Lymphedema    Bilateral leg weakness          Assessment:   Pt very tender to touch when donning compression tubes, but is tolerating bandaging well  Low legs appear smaller to pt and are near symmetrical  Patient demonstrates understanding/independence with home program.  Patient is benefitting from skilled physical therapy and is making steady progress toward functional goals.  Patient is appropriate to continue with skilled physical therapy intervention, as indicated by initial plan of care.    Goal Status:  No Data Recorded  Patient will have a decreased volume in : bilateral;LE;by 5%;to decrease risk of infection;for better fit of clothing;for improved body image;for ease of movement;in 4 weeks;Progressing toward  Patient will have decreased fibrosis, scar tissue for improved lymphatic mobilitiy : in 4 weeks;Progressing toward  Patient will perform, verbalize self-management of: skin  "care;self-monitoring;exercise;massage;self-compression;compression wear;compression care;infection prevention;in 4 weeks;Progressing toward    Plan / Patient Education:     Continue with initial plan of care. Continue with manual lymph drainage, medical compression bandages, exercises, skin care, and patient education.Coordinate compression garment fitting.  Check on status of recent blood work for Myasthenia Gravis    Subjective:     Pain Ratin The legs are just uncomfortable  Itching is the  Worst problem  Exercising  Is going well  Has occasional shortness of breath, even while inactive, had a blood draw to check for Myasthenia Gravis    Objective:   SAO2 97-99%, HR  58- 62 BPM  Pt denies shortness of breath with today's visit  Caregiver present: Yes, .    Observation of swelling: left leg is better.     Volume measurements taken:  No      Skin condition is:  better per pt, intact, soft    Compression:  Bandages were worn to treatment.    Medication for infection:  No    Treatment Today         TREATMENT MINUTES COMMENTS   Evaluation     Self-care/ Home management 35     Bandages were removed, legs washed with warm water    Medical compression bandaging to bilateral lower extremities from forefoot to below the knees: Applied  Eucerin skin lotion, stockinet, rosidal soft 01 rolls, comprilan 03 rolls.Used a small piece of comperm LF size \"J\" on top to protect bandages.  Instructed to remove them if uncomfortable.  Reviewed washing,drying instructions. Pt given 2 Comperm G tubes to wear when washing bandages   Manual therapy 35 Manual therapy: manual lymph drainage for bilateral lower extremities lymphedema  Deep abdominal breath, neck effleurage, short neck, shoulder collectors, bilateral axilla, bilateral inguinal, anterior inguinal to axilla anastomosis on right side and left side, left lower extremity evacuation, right lower extremity evacuation, abdomen, neck effleurage.     Neuromuscular " Re-education     Therapeutic Activity     Therapeutic Exercises  Reviewed briefly, questions were answered regarding Lymphedema exercises.   Gait training     Modality__________________                Total 70    Blank areas are intentional and mean the treatment did not include these items.       [facility],CLT6/4/2018

## 2021-06-18 NOTE — PATIENT INSTRUCTIONS - HE
Patient Instructions by Pk Garcia DO at 4/29/2021  8:30 AM     Author: Pk Garcia DO Service: -- Author Type: Physician    Filed: 4/29/2021  9:14 AM Encounter Date: 4/29/2021 Status: Signed    : Pk Garcia DO (Physician)       It was a pleasure to meet with you today in clinic.  Please do not hesitate to call the [facility] clinic with any questions or concerns at [phone].    Additional Provider Instructions:   1. Increase metoprolol to 75 mg in AM and 50 mg in PM  2. Increase fluid intake.      Sincerely,

## 2021-06-18 NOTE — PROGRESS NOTES
"Vascular/Wound Consultation      I have been asked to see Dyana Nicole by True Pugh MD, Dr. NELLI Thomas    Date Of Service: 05/17/2018     Chief Complaint: leg aching with falls    History of Present Illness:  This 78 y.o. female presents to the [facility], referred by Dr. Thomas who saw her for possible arterial disease, for me to see her as a physical medicine rehabilitation physician for pain and swelling with weakness in the legs and falls.  The patient's problems began about 5-6 years ago.  She gets sore and weak legs with walking.  She must sit down and just standing still does not help. Sometimes her big toes get tingling and numb.   There have been no new masses, rashes, or swellings of any other joints. There has been no new unexplained weight loss, loss of appetite, nausea and/or vomiting, weight loss and chest pain. She has urinary incontinence.  This has been a long term problem.  This has been worsening.  She hasn't seen urologist for over 5 years.  Dyana Nicole complains of pain in the involved extremity.Pain is rated a 5 on a 10 point scale.  Pain is described as stabbing with burning.  She can't predict when this will happen. She had a fusion of her back in 2013 which was causing mainly right low back and leg pain.  She has a known pituitary cyst which is followed by Dr. Ye.  She is aware of her \"weaknesss\" issues.  She has gained over 60 pounds in the last 10 years.      Past Medical History:    Past Medical History:   Diagnosis Date     Anemia     Created by Conversion      Arrhythmia      Cervical Spondylosis     Created by Conversion      Depression      Disease of thyroid gland     hypo     Fibromyalgia     Created by Conversion      GERD (gastroesophageal reflux disease)      High cholesterol      History of transfusion      Hyperlipidemia     Created by Conversion      Hypertension     Created by Conversion      Near syncope      " Peripheral Neuropathy     Created by Conversion      Sleep apnea     mild     Tachycardia        Surgical History:   Past Surgical History:   Procedure Laterality Date     Bladder lift       CARPAL TUNNEL RELEASE       CATARACT EXTRACTION, BILATERAL       CHOLECYSTECTOMY       HYSTERECTOMY       JOINT REPLACEMENT Bilateral     knees     MOHS SURGERY      Nose     IN ARTHROPLASTY TIBIAL PLATEAU      Description: Knee Replacement;  Recorded: 05/16/2013;     IN INJECT NERV BLCK,OTHR PERIPH NERV      Description: Peripheral Nerve Block Wrist Median Right;  Recorded: 05/16/2013;     REPLACEMENT TOTAL KNEE      L     REPLACEMENT TOTAL KNEE BILATERAL       ROTATOR CUFF REPAIR       TRUNK SKIN LESION EXCISIONAL BIOPSY Left 1/5/2018    Procedure: LEFT BUTTOCK EXPLORATION, EXCISION BUTTOCK MASS;  Surgeon: Lg Jameson MD;  Location: [facility];  Service:        Medications:    Current Outpatient Prescriptions:      aspirin 81 MG EC tablet, Take 81 mg by mouth at bedtime. , Disp: , Rfl:      budesonide (ENTOCORT EC) 3 mg 24 hr capsule, Take 9 mg by mouth every other day. As needed - pt using every other day, Disp: , Rfl:      calcium carbonate-vitamin D3 (CALCIUM 600 + D,3,) 600 mg calcium- 200 unit cap, Take 1 tablet by mouth daily., Disp: , Rfl:      celecoxib (CELEBREX) 200 MG capsule, Take 200 mg by mouth daily., Disp: , Rfl:      celecoxib (CELEBREX) 200 MG capsule, TAKE ONE CAPSULE BY MOUTH EVERY DAY, Disp: 90 capsule, Rfl: 1     diphenoxylate-atropine (LOMOTIL) 2.5-0.025 mg per tablet, Take 1 tablet by mouth 4 (four) times a day as needed for diarrhea., Disp: , Rfl:      estradiol (ESTRACE) 0.5 MG tablet, Take 1 tablet (0.5 mg total) by mouth daily., Disp: 30 tablet, Rfl: 5     estradiol (ESTRACE) 0.5 MG tablet, Take 1 tablet (0.5 mg total) by mouth daily., Disp: 90 tablet, Rfl: 3     fluticasone (FLONASE) 50 mcg/actuation nasal spray, 2 sprays into each nostril daily as needed., Disp: 16 g, Rfl: 11      KRILL OIL ORAL, Take 1,000 mg by mouth bedtime. , Disp: , Rfl:      LACTOBAC CMB #3/FOS/PANTETHINE (PROBIOTIC & ACIDOPHILUS ORAL), Take 1 capsule by mouth once daily. , Disp: , Rfl:      levothyroxine (SYNTHROID, LEVOTHROID) 50 MCG tablet, Take 50 mcg by mouth at bedtime., Disp: , Rfl:      metoprolol tartrate (LOPRESSOR) 50 MG tablet, Take 1 tablet (50 mg total) by mouth 2 (two) times a day., Disp: 60 tablet, Rfl: 11     MV-MINERALS/FA/OMEGA 3,6,9 #3 (WOMEN'S 50+ ADVANCED ORAL), Take 1 tablet by mouth daily., Disp: , Rfl:      omeprazole (PRILOSEC) 20 MG capsule, TAKE ONE CAPSULE BY MOUTH TWICE A DAY, Disp: 180 capsule, Rfl: 2     sertraline (ZOLOFT) 100 MG tablet, Take 1 tablet (100 mg total) by mouth daily., Disp: 90 tablet, Rfl: 3     simvastatin (ZOCOR) 20 MG tablet, Take 20 mg by mouth at bedtime., Disp: , Rfl:      traZODone (DESYREL) 50 MG tablet, Take 25 mg by mouth at bedtime., Disp: , Rfl:     Allergies:   Allergies   Allergen Reactions     Penicillins Hives     Pollen      Venom-Honey Bee Swelling       Family history:   Family History   Problem Relation Age of Onset     Aneurysm Father      AAA     Cancer Mother      pancrease       Social History:   Social History     Social History     Marital status:      Spouse name: N/A     Number of children: N/A     Years of education: N/A     Occupational History     Not on file.     Social History Main Topics     Smoking status: Former Smoker     Packs/day: 0.25     Years: 1.00     Quit date: 1/1/1961     Smokeless tobacco: Never Used     Alcohol use No     Drug use: No     Sexual activity: No     Other Topics Concern     Not on file     Social History Narrative    Lives in senior living with  of over 50 years. 3 daughters         Imaging:    I personally reviewed the following imaging today and those on care everywhere, if indicated    Us Arterial Legs Bilateral Complete    Result Date: 4/13/2018  [facility] EXAM: DUPLEX ARTERIAL  ULTRASOUND OF THE BILATERAL LOWER EXTREMITIES 4/13/2018 5:06 PM INDICATION: Claudication. COMPARISON: None. TECHNIQUE: Duplex imaging is performed utilizing gray-scale, two-dimensional images, and color-flow imaging. Doppler waveform analysis and spectral Doppler imaging is also performed. FINDINGS: The patient was unable to tolerate blood pressure cuff inflation to obtain ABIs. Due to this a bilateral lower extremity duplex ultrasound was performed. The systolic brachial pressures were 130 mmHg on the right and 135 mmHg on the left. DUPLEX ARTERIAL ULTRASOUND FINDINGS: LOWER EXTREMITY ARTERIAL DUPLEX EXAM WITH WAVEFORMS RIGHT (cm/s) EIA: 160 T CFA: 147, 123, 143 T PFA: 93 B SFA: 141 T SFA-Proximal: 131 T SFA-Mid: 106 B SFA-Distal: 109 B Popliteal: 93, 95, 102 T PTA: 123 T GEN: 28 M DPA: 67 B (M=monophasic, B=biphasic, T=triphasic) LEFT (cm/s) EIA: 113 T CFA: 154, 135, 127 T PFA: 101 B SFA: 134 T SFA-Proximal: 134 T SFA-Mid: 107 T SFA-Distal: 118 T Popliteal: 81, 72, 93 T PTA: 112 T GEN: 19 M DPA: 119 B (M=monophasic, B=biphasic, T=triphasic) RIGHT: Mild calcified atherosclerotic changes of the visualized femoropopliteal segment. Normal triphasic/biphasic waveforms from the visualized external iliac artery to the posterior tibial and dorsalis pedis arteries. LEFT: Mild calcified atherosclerotic changes of the visualized arteries. Normal triphasic/biphasic waveforms from the visualized external iliac artery to the posterior tibial and dorsalis pedis arteries.     1.  Patient unable to tolerate BRIAN exam. 2.  Arterial duplex ultrasound does not demonstrate evidence for significant peripheral arterial stenosis.    MRI PELVIS WITHOUT AND WITH CONTRAST  4/20/2017 9:30 AM      INDICATION: Left gluteal mass. Previous hematoma 3 years ago from a fall. Removal of hardware from the lower back-pelvis in November 2016 with possible involvement of the prior hematoma.  TECHNIQUE: Routine.?Additional postgadolinium fat-suppressed  T1 sequences were obtained.  CONTRAST: 8 mL Gadavist IV.  COMPARISON: CT 4/20/2017.     FINDINGS: There is a small slender fluid collection in the subcutaneous fat of the left gluteal region with a thin surrounding enhancing and likely fibrotic wall. This measures 0.7 x 4.5 x 4.8 cm and is compatible with a small residual chronic hematoma   or seroma. There is some linear low signal intensity scarring extending superficially from this collection toward the skin. There is no focal soft tissue mass.     There are screw tracks within both iliac bones medially adjacent to the SI joints. There is linear scarring in the overlying subcutaneous fat bilaterally, on the left this tracks to the site of the small gluteal collection. There is artifact from fusion   hardware in the lumbar spine and upper sacrum. No significant marrow signal abnormality within limitations of the artifact from the hardware.     IMPRESSION:   CONCLUSION:  1.  Slender likely residual chronic hematoma or seroma in the left gluteal subcutaneous fat.  2.  No soft tissue mass.    Date: 9/13/2017 Department: [facility] Released By/Authorizing: Rex Degroot MD (auto-released)   Study Result   [facility]  1. HEAD MRI WITHOUT AND WITH IV CONTRAST  2. HEAD MRA WITHOUT IV CONTRAST  3. NECK MRA WITHOUT AND WITH IV CONTRAST  9/13/2017 6:36 PM     INDICATION: Dizziness, difficulty ambulating.  TECHNIQUE:   1. Head MRI without and with intravenous contrast.  2. 3D time-of-flight head MRA without intravenous contrast.  3. Neck MRA without and with IV contrast.  CONTRAST: 8.5 mL Gadavist.  COMPARISON: 6/29/2015 MRI/MRA head and neck examination. More recent MRI examinations of the head, last dated 4/20/2017.     FINDINGS:  HEAD MRI: No acute infarct/restricted diffusion. No mass lesion, hemorrhage, or extra-axial fluid collections. Mild generalized cerebral and cerebellar atrophy.  Scattered foci of nonspecific  T2/FLAIR hyperintense signal in the cerebral white matter and   juaquin consistent with moderate chronic small vessel ischemic changes. No abnormal contrast enhancement identified.     No significant interval change in a 4 x 4.5 mm intrinsically T1 bright suprasellar lesion, which is slightly increased in conspicuity as compared to 7/22/2013 examination (previously 3 mm). The major intracranial vascular flow voids are maintained. The   orbits are unremarkable. The calvarium and skull base are unremarkable.  The paranasal sinuses are clear. The mastoid air cells are clear.      HEAD MRA: No aneurysm. No significant intracranial stenosis. Balanced vertebral arteries supply a normal basilar artery.  The major intracranial arterial anatomy is within normal limits.     NECK MRA:   RIGHT CAROTID: No measurable stenosis of the right ICA based on NASCET criteria.     LEFT CAROTID: No measurable stenosis of the left ICA based on NASCET criteria.     VERTEBRAL ARTERIES: Balanced vertebral arteries are patent in the neck and into the head.      AORTIC ARCH: Classic aortic arch anatomy with no significant stenosis at the origin of the great vessels.     IMPRESSION:   CONCLUSION:  HEAD MRI:   1.  No acute infarct or hemorrhage.  2.  Stable age-related changes as detailed above.  3.  No interval change in 4 x 4.5 mm intrinsically T1 bright suprasellar lesion, slightly increased in conspicuity as compared to 7/22/2013 examination (previously 3 mm). Differential considerations include proteinaceous pituitary microadenoma or   nonneoplastic cyst versus adamantinomatous craniopharyngioma.     HEAD MRA:   1.  Normal Head MRA.  2.  No aneurysm, high flow AVM or significant stenosis identified.     NECK MRA:  1.  No significant stenosis in the neck vessels based on NASCET criteria.  2.  No evidence for dissection or pseudoaneurysm.         Labs:     I personally reviewed the following labs today and those on care everywhere, if  indicated    Lab Results   Component Value Date    SEDRATE 26 (H) 05/17/2018         No results found for: CRP        Lab Results   Component Value Date    CREATININE 0.80 04/12/2018      Lab Results   Component Value Date    HGBA1C 5.4 05/20/2014           Lab Results   Component Value Date    BUN 21 04/12/2018              Lab Results   Component Value Date    ALBUMIN 3.6 09/13/2017       No results found for: [id number]    Lab Results   Component Value Date    TSH 1.88 09/13/2017     Lab Results   Component Value Date    WBC 7.0 01/03/2018    HGB 12.7 01/03/2018    HCT 38.6 01/03/2018    MCV 92 01/03/2018     01/03/2018        Review of Symptoms:  Full 12 point review of systems is negative, except as noted above.    Physical Exam:      Vital Signs: /64  Pulse 69  Temp 98.2  F (36.8  C) (Oral)   Resp 12  Wt 190 lb 3.2 oz (86.3 kg)  SpO2 95%  BMI 34.79 kg/m2    General: In no apparent distress.     Psych: Alert and oriented X 3.  Affect normal.    Respiratory:  Lungs are clear to auscultation throughout with full inspiration    Cardiovascular: Normal S1, S2 without murmur, gallop or rub.  No audible carotid bruits. Regular rhythm.     Abdominal: Normal bowel sounds without any pain, guarding or rigidity. No inguinal lymphadenopathy palpated.    Musculoskeletal:  Normal range of motion in hips, knees and ankles bilaterally throughout .  There is no active joint synovitis, erythema, swelling or joint laxity.  SLR's are normal bilaterally.  Range of motion of the low back is pain free in all directions.       Neurological:  Sensation is intact to pin prick and light touch in both legs.  Strength testing is normal in hip flexion, knee flexion, knee extension, ankle dorsiflexion and great toe extension bilaterally. Deep tendon reflexes, knee jerks and ankle jerks, are normal bilaterally.      Vascular: Dorsalis pedis and posterior tibialis pulses are strong and equal bilaterally. There are  significant telangietasias, medial ankle venous flares, venous varicosities  and spider veins.    Integumentary: Skin of the legs is uniformly warm and dry and with negative Stemmer's Sign.  Nails are normal.  She has a pear shaped body with ledge at the ankles and feet spared.  In addition, there is pain to palpation in the proximal arms and legs.        Impression: 78 y.o. female with   1.  Bilateral leg swelling  2.  Bilateral leg pain  3.  Increasing urinary incontinence  4.  Obesity    Plan:        1.  The swelling and pain was discussed in detail with the patient and her .  Questions were answered.  I doubt this is coming from her back though with her increasing bowel and bladder problems this is certainly a possibility.  I believe, based on her exam, this is due to venous insufficiency with hypertension along with lipedema causing pain and swelling.        2.  Venous insufficiency study of legs bilaterally.         3.  To therapy and then compression.        4.  ESR/CR/Vit D        5.  L/S spine-MRI with bladder changes.  If negative and stable will refer to urology to help with this leakage which is very bothersome and limiting.        6.  Follow up in 2 months or when needed.        7.  Eventually would benefit from bariatric medicine to help with weight loss.  We will discuss this at her next visit.      Thank you very much for referring Dyana Nicole. If you have any questions please feel free to contact me at [phone].    Time spent with patient 60 minutes with greater than 50% time in education counseling and coordination of cares, excluding procedures.      Sheela Alfred MD, ABWMS, FACCWS, [facility]  Medical Director Wound Care and Lymphedema  [facility]  [phone]

## 2021-06-18 NOTE — PROGRESS NOTES
[facility] Certification Request    May 29, 2018      Patient: Dyana Nicole  MR Number: [id number]  YOB: 1940  Date of Visit: 5/29/2018      Dear Dr. Alfred, Sheela Beltran, *:    Thank you for this referral.   We are seeing Dyana Nicole for Physical Therapy  Swelling:  Venous;  Right leg and Left leg, with lipedema.    Medicare and/or Medicaid requires physician review and approval of the treatment plan. Please review the plan of care and verify that you agree with the therapy plan of care by co-signing this note.      Plan of Care  Authorization / Certification Start Date: 05/29/18  Authorization / Certification End Date: 06/28/18  Authorization / Certification Number of Visits: 12-15  Communication with: Referral Source;Patient Caregiver  Patient Related Instruction: Nature of Condition;Treatment plan and rationale;Self Care instruction;Basis of treatment;Body mechanics;Posture;Precautions;Next steps;Expected outcome  Times per Week: 2  Number of Weeks: 5-6  Number of Visits: 12  Discharge Planning: patient will be discharge to self care.  Therapeutic Exercise: Lymphedema;Strengthening;Stretching  Manual Therapy: lymphatic drainage massage  Functional Training (ADL's): skin care;self care;lymphedema precautions;bandage/garment wear and care    Goals:  Patient will have a decreased volume in : bilateral;LE;by 5%;to decrease risk of infection;for better fit of clothing;for improved body image;for ease of movement;in 4 weeks  Patient will have decreased fibrosis, scar tissue for improved lymphatic mobilitiy : in 4 weeks  Patient will perform, verbalize self-management of: skin care;self-monitoring;exercise;massage;self-compression;compression wear;compression care;infection prevention;in 4 weeks      If you have any questions or concerns, please don't hesitate to call.    Sincerely,      Ann Welsh PT, MICHELLE-GRISELDA        Physician recommendation:     __X_ Follow therapist's  recommendation        ___ Modify therapy      *Physician co-signature indicates they certify the need for these services furnished within this plan and while under their care.        [facility]   Lymphedema Initial Evaluation    Preferred name: [facility]  Patient Name: Dyana Nicole  Date of evaluation: 5/29/2018  Referral Diagnosis:   Associated Diagnoses   Bilateral leg weakness [R29.898]       Leg swelling [M79.89]       Pain in both lower extremities [M79.604, M79.605]       Venous insufficiency of both lower extremities [I87.2]       Venous hypertension of both lower extremities [I87.303]       Fat deposits [E65]       Class 1 obesity without serious comorbidity in adult [E66.9]       Vitamin D deficiency [E55.9]           Referring provider: Sheela Alfred, *  Visit Diagnosis:     ICD-10-CM    1. Leg swelling M79.89    2. Venous insufficiency of both lower extremities I87.2    3. Venous hypertension of both lower extremities I87.303    4. Lymphedema I89.0        Assessment:     Dyana Nicole is a 78 y.o. female who presents to therapy today with chief complaints of legs swelling and weakness. Onset date of sx was November 2017.  Pt reported h/o legs weakness. Previous treatments have included none. Swelling improves with elevation. Pain symptoms are 9-4/10.  Functional impairments include walking, wearing shoes.  Pt demo's signs and sx consistent with venous lymphatic lower extremities, obesity.   Impairments in  pain, posture, joint mobility, strength, gait/locomotion and balance, integumentary integrity, swelling  Prognosis to achieve goals is  good      Pt. would be a good candidate for edema management and to develop a home management program.    Diagnoses and all orders for this visit:    Leg swelling    Venous insufficiency of both lower extremities    Venous hypertension of both lower extremities    Lymphedema         Goals:   No Data Recorded  Patient will have a decreased volume in  ": bilateral;LE;by 5%;to decrease risk of infection;for better fit of clothing;for improved body image;for ease of movement;in 4 weeks  Patient will have decreased fibrosis, scar tissue for improved lymphatic mobilitiy : in 4 weeks  Patient will perform, verbalize self-management of: skin care;self-monitoring;exercise;massage;self-compression;compression wear;compression care;infection prevention;in 4 weeks    Patient's goal:\"walk steady and reduce swelling\".    Goals and plan of care were set in collaboration with the patient.    Patient's expectations/goals are realistic.    Barriers to Learning or Achieving Goals:  Chronicity of the problem.  Co-morbidities or other medical factors.  obesity.    Lymphedema Category:  V - Stage 2 with Low Functional Demand       Plan / Patient Instructions:      Plan of Care:   Authorization / Certification Start Date: 05/29/18  Authorization / Certification End Date: 06/28/18  Authorization / Certification Number of Visits: 12-15  Communication with: Referral Source;Patient Caregiver  Patient Related Instruction: Nature of Condition;Treatment plan and rationale;Self Care instruction;Basis of treatment;Body mechanics;Posture;Precautions;Next steps;Expected outcome  Times per Week: 2  Number of Weeks: 5-6  Number of Visits: 12  Discharge Planning: patient will be discharge to self care.  Therapeutic Exercise: Lymphedema;Strengthening;Stretching  Manual Therapy: lymphatic drainage massage  Functional Training (ADL's): skin care;self care;lymphedema precautions;bandage/garment wear and care  Treatment techniques, plan of care, and goals were discussed with the patient. The patient agrees to the plan as outlined. The plan of care is dynamic and will be modified on an ongoing basis.  Plan for next visit: Continue with manual lymph drainage, medical compression bandages to BILATERAL LOWER EXTREMITIES modified as needed for tolerance, exercises, skin care, and patient education.Coordinate " compression garment fitting.  Lower extremities strengthening and gait training as time allows it.     Subjective:         Social information:   Living Situation:single family home and lives with others    Occupation:retired   Work Status:NA   Equipment Available: None    History of Present Illness:    Dyana is a 78 y.o. female who presents to therapy today with complaints of legs swelling and weakness. Date of onset/duration of symptoms is 2017. Onset was gradual. Symptoms are intermittent and not improving. She reports  an episodic  history of similar symptoms. She describes their previous level of function as not limited    Pain Ratin  Pain rating at best: 3  Pain rating at worst: 9  Pain description: sharp, shooting and weakness    Functional limitations are described as occurring with:   ascending and descending stairs or curbs  sleeping  walking , wearing shoes.    Patient reports ascending and descending       Objective:       Past Medical History:   Diagnosis Date     Anemia     Created by Conversion      Arrhythmia      Cervical Spondylosis     Created by Conversion      Depression      Disease of thyroid gland     hypo     Fibromyalgia     Created by Conversion      GERD (gastroesophageal reflux disease)      High cholesterol      History of transfusion      Hyperlipidemia     Created by Conversion      Hypertension     Created by Conversion      Near syncope      Peripheral Neuropathy     Created by Conversion      Sleep apnea     mild     Tachycardia        Patient Outcome Measures :    Lymphedema Life Impact Score (%): 83   Scores range from %, where a score of 20% represents the least impact on the individual's life (minimal physical, psychosocial and functional concerns).     Lymphedema Assessment 2018   Right Lower Extremity Total Estimated Volume (cm3) 9305.14   Left Lower Extremity Total Estimated Volume (cm3) 9541.75   Swelling Description Left>Right   Lower  Extremity Swelling Comparison (%) 2.54       Lower Extremity Lymphedema  5/29/2018   R Big Toe (cm) 7.6   R 10cm from tip of second toe (cm) 20.9   R 20cm from tip of second toe (cm) 21.7   R 30cm from tip of second toe (cm) 39.3   R 40cm from tip of second toe (cm) 48.3   R 50cm from tip of second toe (cm) 49.9   R 60cm from tip of second toe (cm) 54.8   R 70cm from tip of second toe (cm) 58.4   Right Lower Extremity Total Estimated Volume (cm3) 9305.14   L Big Toe (cm) 7.5   L 10cm from tip of second toe (cm) 21.2   L 20cm from tip of second toe (cm) 27   L 30cm from tip of second toe (cm) 39   L 40cm from tip of second toe (cm) 47.7   L 50cm from tip of second toe (cm) 49.8   L 60cm from tip of second toe (cm) 55.2   L 70cm from tip of second toe (cm) 59.4   Left Lower Extremity Total Estimated Volume (cm3) 9541.75   Swelling Description Left>Right   Lower Extremity Swelling Comparison (%) 2.54           Degree of swelling: Moderate < 40%    Areas of most significant swelling: low legs.        Examination  1. Leg swelling     2. Venous insufficiency of both lower extremities     3. Venous hypertension of both lower extremities     4. Lymphedema         Posture Observation:      General sitting posture is  fair.  General standing posture is fair.    Involved area: Bilateral Leg  Edema: Pitting at grade, 1+, Moderate and Stemmers sign  negative  Induration: No  Fibrosis: mild  Temperature: Normal  Sensation: Normal  Skin color: Normal  Skin texture: Dry  Wounds: Absent  Muscle tone: Normal  Gait: slow, independent, needs to hold her 's arm.        Treatment Today     Patient signed ABN for the bandaging supplies: yes.   Provided 03 rolls of comprilan, 01 rolls of rosidal soft. 01 set per limb.  New compression garment: has orders , need to coordinate appointment for fitting      TREATMENT MINUTES COMMENTS   Evaluation 30 low   Self-care/ Home management 15 Instructed on skin care, lymphedema, precautions,  "infections and care of the skin,patient was able to verbalize instructions, her questions were answered and provided written instructions.  Discussed POC, nature of condition and basis of treatment.  Medical compression bandaging to left lower extremity: Applied  Eucerin skin lotion, stockinet, ,artiflex 01 rolls, comprilan 03 rolls.Used a small piece of comperm LF size \"G\" on top to protect bandages.  Instructed to remove them if uncomfortable.      Left lower extremity, rosidal soft from ankle to below the knees   Manual therapy     Neuromuscular Re-education     Therapeutic Activity     Therapeutic Exercises 15 Exercises:  Exercise #1: instructed and performed seated lymphedema exercises due to patient's bvalance issues.     Gait training     Modality__________________                Total 60    Blank areas are intentional and mean the treatment did not include these items.       PT Evaluation Code: (Please list factors)  Patient History/Comorbidities: obesity  Examination: bilateral lower extremities edema.  Clinical Presentation: stable.  Clinical Decision Making: low.    Patient History/  Comorbidities Examination  (body structures and functions, activity limitations, and/or participation restrictions) Clinical Presentation Clinical Decision Making (Complexity)   No documented Comorbidities or personal factors 1-2 Elements Stable and/or uncomplicated Low   1-2 documented comorbidities or personal factor 3 Elements Evolving clinical presentation with changing characteristics Moderate   3-4 documented comorbidities or personal factors 4 or more Unstable and unpredictable High            JORDEN Goins  5/29/2018  2:00 PM               "

## 2021-06-18 NOTE — LETTER
Letter by Pk Garcia DO at      Author: Pk Garcia DO Service: -- Author Type: --    Filed:  Encounter Date: 1/18/2019 Status: (Other)       Dyana Nicole  [address] [facility] [address]      January 18, 2019      Dear Dyana,    This letter is to remind you that you will be due for your follow up appointment with Dr. Pk Garcia . To help ensure you are in the best health possible, a regular follow-up with your cardiologist is essential.     Please call our Patient Scheduling Line at [phone] to schedule your appointment at your earliest convenience.  If you have recently scheduled an appointment, please disregard this letter.    We look forward to seeing you again. As always, we are available at the number  above for any questions or concerns you may have.      Sincerely,     The Physicians and Staff of [facility]

## 2021-06-18 NOTE — LETTER
Letter by Markos Noonan DO at      Author: Markos Noonan DO Service: -- Author Type: --    Filed:  Encounter Date: 1/8/2019 Status: (Other)       Dyana Nicole  [address] [facility] [id number]       January 8, 2019         Dear Ms. Nicole,      Enclosed is the report from your recent sleep study.     Please call with questions or contact us using [employer].              Sincerely,      Markos Noonan DO

## 2021-06-18 NOTE — PROGRESS NOTES
"Assessment/Plan:    1. SOB (shortness of breath)  Discussed shortness of breath likely multifactorial with deconditioning.  Chest x-ray negative.  CBC normal.  Basic metabolic panel and BNP level pending.  - XR Chest 2 Views  - HM2(CBC w/o Differential)  - Basic Metabolic Panel  - BNP(B-type Natriuretic Peptide)    2. Dysphagia  Discussed dysphagia with history of esophageal stricture and dilation procedure in the past.  Will see her gastroenterologist next week for follow-up of microscopic colitis and will discuss regarding arranging upper endoscopy.    3. Neck pain  Left posterior lateral neck pain with tension headache.  Likely cervical osteoarthritis as well as \"tech neck\" concerns.  Proper ergonomics while crocheting and using electronic devices etc. reviewed.    4. Lymphedema  Chronic lymphedema.  Continues to follow with Dr. Sheela Alfred.  Leg wraps in place for an additional week and likely Velcro compression stockings beyond.  Albumin levels etc. normal on recent comprehensive metabolic panel.    5. Leg weakness, bilateral  Bilateral leg weakness associated with chronic lymphedema.  Did discuss potential for adrenal insufficiency due to chronic steroid use current budesonide 9 mg every other day described however had been on higher dose steroid for microscopic colitis management recently.  If cortisol level abnormal repeat a.m. cortisol level versus further testing.  Endocrinology referral was placed for further assessment as well.  Had seen Dr. Hammond with [facility] endocrinology May 18, 2017 however would like to see endocrinologist within [facility] at this time.  - Cortisol    6. Microscopic colitis  As above follow-up with gastroenterologist next week as scheduled otherwise continues budesonide 9 mg every other day apparently.    7. Adrenal insufficiency  Adrenal insufficiency concerns based on chronic steroid use.  Cortisol level pending.  Further ACTH stimulation testing if indicated with " endocrinology referral pending.  - Ambulatory referral to Endocrinology    8. Ptosis  Episode of left eyelid ptosis, transient.  Myasthenia gravis lab monitoring pending.  Referral to ophthalmologist for further testing to exclude myasthenia gravis etc.  - Ambulatory referral to Ophthalmology    9. Hyperlipidemia  Patient remains off statin therapy (simvastatin).  Recent CK level normal.  Will check future lipid cascade, fasting and determine whether need to restart statin medication.  - Lipid Cascade; Future    40 minutes total time with patient, > 50% with counseling and coordination of cares.       Subjective:    Dyana Nicole is seen today for several concerns.  Shortness of breath.  Both at rest where she may have to take a deep breath also with exertion.  Has a lymphedema followed by Dr. Sheela Alfred and has leg wraps in place currently for additional week then likely Velcro compression stockings.  Followed by Dr. Garcia as well cardiology with recent echocardiogram with normal ejection fraction without evidence for pericardial effusion etc.  Left posterior lateral neck pain.  Symptoms also feels like something gets in the way when she swallows.  Has had esophageal stricture in the past requiring dilation procedure.  Prior atrial tachycardia described with leg weakness and subsequent fall while at [employer].  Episode where her left upper eyelid was weak and told to have further evaluation for potential myasthenia gravis.  Hyperlipidemia and remains off simvastatin with recent CK level normal however.  Nonfasting today.  Microscopic colitis history and is completing budesonide taper currently using 9 mg every other day apparently.  Some tension type headaches described.  Vision not as good as it used to be with some blurring.  No eye pain.  No dysuria urgency or frequency.  Comprehensive review of systems as above otherwise all negative.     - Ray  3 daughters - Genesis ( at [facility])  Smoke -  quit in 60s after 1 year  EtOH: sober x 33 years  Surgeries: gallbladder, appy, hysterectomy and ? 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; left CTS release; bladder lift; right TKA; left rotator cuff  Hospitalizations:    Past Surgical History:   Procedure Laterality Date     Bladder lift       CARPAL TUNNEL RELEASE       CATARACT EXTRACTION, BILATERAL       CHOLECYSTECTOMY       HYSTERECTOMY       JOINT REPLACEMENT Bilateral     knees     MOHS SURGERY      Nose     ID ARTHROPLASTY TIBIAL PLATEAU      Description: Knee Replacement;  Recorded: 05/16/2013;     ID INJECT NERV BLCK,OTHR PERIPH NERV      Description: Peripheral Nerve Block Wrist Median Right;  Recorded: 05/16/2013;     REPLACEMENT TOTAL KNEE      L     REPLACEMENT TOTAL KNEE BILATERAL       ROTATOR CUFF REPAIR       TRUNK SKIN LESION EXCISIONAL BIOPSY Left 1/5/2018    Procedure: LEFT BUTTOCK EXPLORATION, EXCISION BUTTOCK MASS;  Surgeon: Lg Jameson MD;  Location: [facility];  Service:         Family History   Problem Relation Age of Onset     Aneurysm Father      AAA     Cancer Mother      pancrease        Past Medical History:   Diagnosis Date     Anemia     Created by Conversion      Arrhythmia      Cervical Spondylosis     Created by Conversion      Depression      Disease of thyroid gland     hypo     Fibromyalgia     Created by Conversion      GERD (gastroesophageal reflux disease)      High cholesterol      History of transfusion      Hyperlipidemia     Created by Conversion      Hypertension     Created by Conversion      Near syncope      Peripheral Neuropathy     Created by Conversion      Sleep apnea     mild     Tachycardia         Social History   Substance Use Topics     Smoking status: Former Smoker     Packs/day: 0.25     Years: 1.00     Quit date: 1/1/1961     Smokeless tobacco: Never Used     Alcohol use No        Current Outpatient Prescriptions    Medication Sig Dispense Refill     aspirin 81 MG EC tablet Take 81 mg by mouth at bedtime.        budesonide (ENTOCORT EC) 3 mg 24 hr capsule Take 9 mg by mouth every other day. As needed - pt using every other day       calcium carbonate-vitamin D3 (CALCIUM 600 + D,3,) 600 mg calcium- 200 unit cap Take 1 tablet by mouth daily.       celecoxib (CELEBREX) 200 MG capsule TAKE ONE CAPSULE BY MOUTH EVERY DAY 90 capsule 1     diphenoxylate-atropine (LOMOTIL) 2.5-0.025 mg per tablet Take 1 tablet by mouth 4 (four) times a day as needed for diarrhea.       estradiol (ESTRACE) 0.5 MG tablet Take 1 tablet (0.5 mg total) by mouth daily. 90 tablet 3     fluticasone (FLONASE) 50 mcg/actuation nasal spray 2 sprays into each nostril daily as needed. 16 g 11     furosemide (LASIX) 20 MG tablet Take 1 tablet (20 mg total) by mouth daily. 30 tablet 6     KRILL OIL ORAL Take 1,000 mg by mouth bedtime.        LACTOBAC CMB #3/FOS/PANTETHINE (PROBIOTIC & ACIDOPHILUS ORAL) Take 1 capsule by mouth once daily.        levothyroxine (SYNTHROID, LEVOTHROID) 50 MCG tablet Take 50 mcg by mouth at bedtime.       metoprolol tartrate (LOPRESSOR) 50 MG tablet Take 1 tablet (50 mg total) by mouth 2 (two) times a day. 60 tablet 11     MV-MINERALS/FA/OMEGA 3,6,9 #3 (WOMEN'S 50+ ADVANCED ORAL) Take 1 tablet by mouth daily.       omeprazole (PRILOSEC) 20 MG capsule TAKE ONE CAPSULE BY MOUTH TWICE A  capsule 2     sertraline (ZOLOFT) 100 MG tablet Take 1 tablet (100 mg total) by mouth daily. 90 tablet 3     traZODone (DESYREL) 50 MG tablet Take 25 mg by mouth at bedtime.       simvastatin (ZOCOR) 20 MG tablet Take 20 mg by mouth at bedtime.       No current facility-administered medications for this visit.           Objective:    Vitals:    06/07/18 1620   BP: 116/60   Pulse: (!) 56   SpO2: 96%   Weight: 196 lb (88.9 kg)      Body mass index is 35.85 kg/(m^2).    Alert.  No apparent distress.  Chest appears clear.  Cardiac exam regular.  Abdomen  benign.  Significant bilateral lower extremity lymphedema with leg wraps noted.  Ambulates slowly and uses cane to assist with ambulation.  No evidence for ptosis with HEENT exam otherwise appearing have normal cranial nerve exam.  Oropharynx normal.  Neck supple without cervical lymphadenopathy.  No rash.        Lab Results   Component Value Date    WBC 8.1 06/07/2018    HGB 12.5 06/07/2018    HCT 36.7 06/07/2018    MCV 91 06/07/2018     06/07/2018          XR CHEST 2 VIEWS  6/7/2018 4:41 PM     INDICATION: Shortness of breath  COMPARISON: CT 04/20/2017     FINDINGS: The lungs are clear. There is no pleural effusion or pneumothorax. The cardiomediastinal silhouette is normal. The limited visualized portions of the upper abdomen are grossly normal.     IMPRESSION:   The lungs are clear.        Echo 5/30/18 Summary     Mild right atrial enlargement    Left ventricle ejection fraction is normal. The calculated left ventricular ejection fraction is 55% without wall motion abnormality.    Normal right ventricular size and systolic function.    Mild mitral regurgitation    Mild to moderate tricuspid regurgitation with borderline elevation in right ventricular pressures    When compared to the previous study dated 9/3/2016, no significant interval change.           EXAM DATE:         05/31/2018  [facility]  MRI LUMBAR SPINE W/O CONTRAST  5/31/2018 9:15 AM     INDICATION: Bilateral leg weakness.  TECHNIQUE: Without IV contrast.  CONTRAST: None.  COMPARISON: CT 12/2/2015. MRI 8/14/2013.     FINDINGS:     Nomenclature presumes 5 lumbar type vertebral bodies. The most caudal surgically  treated intervertebral disc levels will be designated L4-L5 consistent with the  nomenclature used on 12/2/2015.     There are postoperative changes of the lumbar spine which include anterior  interbody fusion between L2-L3 and L4-L5. There are buttressing anterior screws  at these levels. There has also been posterior  fusion from L2-L5 with bilateral  iliac extension. There is no L5 pedicle screw on the right.     Vertebral body heights are unremarkable. The imaged portions of the bony pelvis  appear intact. Marrow signal is heterogeneous, but without specific evidence of  a marrow replacing process. No definite evidence of osseous edema is  demonstrated.     The conus tip is identified at T12-L1. No signal abnormalities of the cord or  conus are demonstrated.     There is mild L1-L2 retrolisthesis, which is new from prior. There is also  anterior/inferior height loss of the L1 vertebral body, which is new from prior.  No evidence of superimposed edema to suggest an acute process.     Postoperative changes are noted in the paraspinal soft tissues. There is a  partially imaged T2 hyperintense/T1 hypointense 7.0 cm subcutaneous fluid  collection in the left gluteal region. No abdominal aortic aneurysm is  demonstrated.     T12-L1: Normal disc height and signal. No herniation. No facet arthropathy. No  spinal canal stenosis. No right neural foraminal stenosis. No left neural  foraminal stenosis.     L1-L2: Moderate severe intervertebral disc height loss, particularly anteriorly.  There is a dorsal disc osteophyte complex. Mild facet arthropathy. Mild spinal  canal stenosis. Mild right neural foraminal stenosis. Mild left neural foraminal  stenosis.     L2-L3: Anterior interbody fusion has been performed. No significant posterior  disc abnormality. Mild to moderate facet arthropathy. Mild spinal canal  stenosis. No right neural foraminal stenosis. No left neural foraminal stenosis.  There is bilateral lateral recess narrowing.     L3-L4: Anterior interbody fusion has been performed. No posterior disc  abnormality. Ligamentum flavum thickening and moderate facet arthropathy. No  significant spinal canal stenosis. No right neural foraminal stenosis. No left  neural foraminal stenosis.     L4-L5: Anterior interbody fusion without  posterior disc abnormality. Moderate  left and mild right facet arthropathy. No significant spinal canal stenosis. On  the right, there is a small synovial cyst or disc herniation that effaces the  right lateral recess and contributes to mild right neural foraminal stenosis. No  significant left neural foraminal stenosis.     L5-S1: Normal disc height and signal. No herniation. No facet arthropathy. No  spinal canal stenosis. No right neural foraminal stenosis. No left neural  foraminal stenosis.     CONCLUSION:  1.  Postoperative changes of anterior and posterior fusion from L2-L5. There is  bilateral iliac extension of the posterior fusion. Please note there is  transitional lumbosacral anatomy and careful confirmation of the targeted levels  is recommended before any planned intervention.  2.  Unremarkable appearance of the imaged cord and cauda equina nerve roots. No  sites of high-grade spinal canal stenosis are demonstrated.  3.  No sites of compressive neural foraminal stenosis demonstrated.  4.  At L4-L5, there is a small synovial cyst or disc herniation that effaces the  right lateral recess and contributes to mild right neural foraminal stenosis.  5.  Marked interval height loss of the L1-L2 vertebral disc space and  anteroinferior L1 vertebral body, without edema to specifically suggest an acute  process. There is new mild retrolisthesis and focal angular kyphosis at this  level. Mild spinal canal stenosis.  6.  Partially imaged 7 cm fluid collection in the subcutaneous fat overlying the  left sacroiliac joint/gluteal region.            This note has been dictated using voice recognition software and as a result may contain minor grammatical errors and unintended word substitutions.

## 2021-06-18 NOTE — PROGRESS NOTES
[facility] Daily Progress     Patient Name: Dyana Nicole  Date: 5/31/2018  Visit #: 2  PTA visit #:  -      Date of evaluation: 5/29/2018  Referral Diagnosis:   Associated Diagnoses   Bilateral leg weakness [R29.898]       Leg swelling [M79.89]       Pain in both lower extremities [M79.604, M79.605]       Venous insufficiency of both lower extremities [I87.2]       Venous hypertension of both lower extremities [I87.303]       Fat deposits [E65]       Class 1 obesity without serious comorbidity in adult [E66.9]       Vitamin D deficiency [E55.9]             Referring provider: Sheela Alfred, *    Visit Diagnosis:     ICD-10-CM    1. Leg swelling M79.89    2. Venous insufficiency of both lower extremities I87.2    3. Venous hypertension of both lower extremities I87.303    4. Lymphedema I89.0    5. Bilateral leg weakness R29.898        Diagnoses and all orders for this visit:    Leg swelling    Venous insufficiency of both lower extremities    Venous hypertension of both lower extremities    Lymphedema    Bilateral leg weakness          Assessment:     Good tolerance to left leg bandaging.  Skin is softer.  Patient demonstrates understanding/independence with home program.  Patient is benefitting from skilled physical therapy and is making steady progress toward functional goals.  Patient is appropriate to continue with skilled physical therapy intervention, as indicated by initial plan of care.    Goal Status:  No Data Recorded  Patient will have a decreased volume in : bilateral;LE;by 5%;to decrease risk of infection;for better fit of clothing;for improved body image;for ease of movement;in 4 weeks;Progressing toward  Patient will have decreased fibrosis, scar tissue for improved lymphatic mobilitiy : in 4 weeks;Progressing toward  Patient will perform, verbalize self-management of: skin care;self-monitoring;exercise;massage;self-compression;compression wear;compression care;infection  "prevention;in 4 weeks;Progressing toward    Plan / Patient Education:     Continue with initial plan of care. Continue with manual lymph drainage, medical compression bandages, exercises, skin care, and patient education.Coordinate compression garment fitting.      Subjective:     Pain Ratin  \"the bandages itch\".        Objective:     Caregiver present: Yes, .    Observation of swelling: left leg is better.     Volume measurements taken:  No      Skin condition is:  better    Compression:  Bandages were worn to treatment.    Medication for infection:  No    Treatment Today     Patient signed ABN for the bandaging supplies: yes.   Provided 03 rolls of comprilan, 01 rolls of artiflex. 01 set per limb.        TREATMENT MINUTES COMMENTS   Evaluation     Self-care/ Home management 30 Medical compression bandaging to bilateral lower extremities from forefoot to below the ness: Applied  Eucerin skin lotion, stockinet, rosidal soft 01 rolls, comprilan 03 rolls.Used a small piece of comperm LF size \"J\" on top to protect bandages.  Instructed to remove them if uncomfortable.     Manual therapy 25 Manual therapy: manual lymph drainage for bilateral lower extremities lymphedema  Deep abdominal breath, neck effleurage, short neck, shoulder collectors, bilateral axilla, bilateral inguinal, anterior inguinal to axilla anastomosis on right side and left side, left lower extremity evacuation, right lower extremity evacuation, abdomen, neck effleurage.     Neuromuscular Re-education     Therapeutic Activity     Therapeutic Exercises 5 Instructed and performed lymphedema exercises.   Gait training     Modality__________________                Total 60    Blank areas are intentional and mean the treatment did not include these items.       Ann Welsh PT, CLNEGRITA-GRISELDA  2018    "

## 2021-06-18 NOTE — PROGRESS NOTES
Assessment/Plan:    I spent over 40 minutes with the patient with greater than 50% spent discussing symptoms, treatment options, and coordination of care.     1. Leg weakness, bilateral  Remains stable, has not worsened.  Discussed likelihood of leg weakness being result of chronic lymphedema.  Previously discussed potential for adrenal insufficiency, however cortisol level is normal.  Patient met with endocrinology earlier today for further assessment as well.  Referral placed to neurology for further evaluation per patient request.  - Ambulatory referral to Neurology    2. Lymphedema  Chronic lymphedema.  Continues to follow with Dr. Alfred for management.  Patient has a Velcro compression stockings in place.  Recent comprehensive metabolic panel is normal.    3. Dysphagia  History of dysphasia with esophageal stricture dilation procedure in the past.  She is following up with gastroenterology for management.  She is scheduled for an upper endoscopy next week.    4. Adrenal insufficiency (H)  Chronic steroid use.  Most recent cortisol level is normal.  Patient had followup evaluation by endocrinology this morning and will continue to follow for management.     5. Ptosis  Patient had recent episode of ptosis.  Currently asymptomatic.  She has been evaluated by opthamology who recommended that she be referred to neurology for further evaluation and management.  Referral placed today.  - Ambulatory referral to Neurology    The following are part of a depression follow up plan for the patient:  mental health screening assessment    Subjective:    Dyana Nicole is a 78 year old female seen today for a follow-up visit of multiple concerns.  She was last seen in clinic on June 7, 2018 by Dr. Pugh.  At that time she was having shortness of breath.  Lab workup and chest x-ray at that time was negative.  Today she reports that symptoms have improved somewhat but still has some shortness of breath at times.  She  follows with Dr. Garcia in cardiology regularly.  Recent echocardiogram was normal with normal ejection fraction.  She has also been concerned about some leg weakness.  She does have history of lymphedema.  Followed by Dr. Sheela Alfred for this.  She has been wearing leg wraps for management, more recently Velcro compression stockings.  She has some difficulty swallowing.  Has had esophageal strictures in the past, requiring dilation procedure.  She has endoscopy scheduled with gastroenterology this upcoming week.  Patient also notes that she had episode of ptosis.  She was referred to ophthalmology at her last visit.  Had a meeting with them a few weeks ago and he suggested that she be evaluated by a neurologist for possible myasthenia gravis.  Had acetylcholine receptor binding antibody lab ordered at previous visit.  This was 0.4 which is at the upper limit of normal/negative.  She wonders if she may still have myasthenia gravis diagnosis and  would like to be further evaluated.   Notes that several of her symptoms seem consistent with the diagnosis. She would like referral to neurology placed today.  Symptoms have persisted but not worsened since her last visit.  She denies any new concerns today.  Review of systems is as stated in HPI, and the remainder of the 10 system review is otherwise unremarkable.    Past Medical History, Family History, and Social History reviewed.  lives [facility]  emergency/ Nguyễn Nicole , cell  [facility] [place]  [facility]  non smoker lives in [place], [place] October-April vacation in [place] sees Dr. Eaton     - Ray  3 daughters - Genesis ( at [facility])  Smoke - quit in 60s after 1 year  EtOH: sober x 33 years  Surgeries: gallbladder, appy, hysterectomy and ? left ovary; right rotator cuff, right carpal  tunnel relaease; bilateral cataract; lumbar fusion, basal cell on nose; left CTS release; bladder lift; right TKA; left rotator cuff  Hospitalizations:    Past Surgical History:   Procedure Laterality Date     Bladder lift       CARPAL TUNNEL RELEASE       CATARACT EXTRACTION, BILATERAL       CHOLECYSTECTOMY       HYSTERECTOMY       JOINT REPLACEMENT Bilateral     knees     MOHS SURGERY      Nose     MD ARTHROPLASTY TIBIAL PLATEAU      Description: Knee Replacement;  Recorded: 05/16/2013;     MD INJECT NERV BLCK,OTHR PERIPH NERV      Description: Peripheral Nerve Block Wrist Median Right;  Recorded: 05/16/2013;     REPLACEMENT TOTAL KNEE      L     REPLACEMENT TOTAL KNEE BILATERAL       ROTATOR CUFF REPAIR       TRUNK SKIN LESION EXCISIONAL BIOPSY Left 1/5/2018    Procedure: LEFT BUTTOCK EXPLORATION, EXCISION BUTTOCK MASS;  Surgeon: Lg Jameson MD;  Location: [facility] OR;  Service:         Family History   Problem Relation Age of Onset     Aneurysm Father      AAA     Cancer Mother      pancrease        Past Medical History:   Diagnosis Date     Anemia     Created by Conversion      Arrhythmia      Cervical Spondylosis     Created by Conversion      Depression      Disease of thyroid gland     hypo     Fibromyalgia     Created by Conversion      GERD (gastroesophageal reflux disease)      High cholesterol      History of transfusion      Hyperlipidemia     Created by Conversion      Hypertension     Created by Conversion      Near syncope      Peripheral Neuropathy     Created by Conversion      Sleep apnea     mild     Tachycardia         Social History   Substance Use Topics     Smoking status: Former Smoker     Packs/day: 0.25     Years: 1.00     Quit date: 1/1/1961     Smokeless tobacco: Never Used     Alcohol use No        Current Outpatient Prescriptions   Medication Sig Dispense Refill     aspirin 81 MG EC tablet Take 81 mg by mouth at bedtime.        budesonide (ENTOCORT EC) 3 mg 24 hr capsule  Take 9 mg by mouth every other day. As needed - pt using every other day       calcium carbonate-vitamin D3 (CALCIUM 600 + D,3,) 600 mg calcium- 200 unit cap Take 1 tablet by mouth daily.       celecoxib (CELEBREX) 200 MG capsule TAKE ONE CAPSULE BY MOUTH EVERY DAY 90 capsule 1     diphenoxylate-atropine (LOMOTIL) 2.5-0.025 mg per tablet Take 1 tablet by mouth 4 (four) times a day as needed for diarrhea.       estradiol (ESTRACE) 0.5 MG tablet Take 1 tablet (0.5 mg total) by mouth daily. 90 tablet 3     fluticasone (FLONASE) 50 mcg/actuation nasal spray 2 sprays into each nostril daily as needed. 16 g 11     furosemide (LASIX) 20 MG tablet Take 1 tablet (20 mg total) by mouth daily. 30 tablet 6     KRILL OIL ORAL Take 1,000 mg by mouth bedtime.        LACTOBAC CMB #3/FOS/PANTETHINE (PROBIOTIC & ACIDOPHILUS ORAL) Take 1 capsule by mouth once daily.        levothyroxine (SYNTHROID, LEVOTHROID) 50 MCG tablet Take 50 mcg by mouth at bedtime.       metoprolol tartrate (LOPRESSOR) 50 MG tablet Take 1 tablet (50 mg total) by mouth 2 (two) times a day. 60 tablet 11     MV-MINERALS/FA/OMEGA 3,6,9 #3 (WOMEN'S 50+ ADVANCED ORAL) Take 1 tablet by mouth daily.       omeprazole (PRILOSEC) 20 MG capsule TAKE ONE CAPSULE BY MOUTH TWICE A  capsule 2     sertraline (ZOLOFT) 100 MG tablet Take 1 tablet (100 mg total) by mouth daily. 90 tablet 3     simvastatin (ZOCOR) 20 MG tablet Take 20 mg by mouth at bedtime.       traZODone (DESYREL) 50 MG tablet Take 25 mg by mouth at bedtime.       No current facility-administered medications for this visit.           Objective:    Vitals:    06/25/18 0959   BP: 144/68   Patient Site: Left Arm   Patient Position: Sitting   Cuff Size: Adult Large   Pulse: 66   Weight: 196 lb 12.8 oz (89.3 kg)      Body mass index is 36 kg/(m^2).      General Appearance:  Alert, cooperative, no distress, appears stated age   HEENT:   No evidence of ptosis.  HEENT exam otherwise appears to be normal.  No  acute findings.   Neck: Supple, symmetrical, no adenopathy.   Lungs:   Clear to auscultation bilaterally, respirations unlabored.  No expiratory wheeze or inspiratory crackles noted.   Heart:  Regular rate and rhythm, S1, S2 normal, no murmur, rub or gallop   Abdomen:   Soft, non-tender, positive bowel sounds, no masses, no organomegaly   Extremities:  Significant bilateral lower extremity lymphedema.  Has Velcro compression stockings in place.  Extremities otherwise normal.     Skin: Warm, dry.  Skin color, texture, turgor normal, no rashes or lesions   Neurologic: Nonfocal.  Uses cane to assist with ambulation.       This note has been dictated using voice recognition software. Any grammatical or context distortions are unintentional and inherent to the use of this software.

## 2021-06-18 NOTE — PROGRESS NOTES
[facility] Daily Progress     Patient Name: Dyana Nicole  Date: 2018  Visit #: 6  PTA visit #:  4      Date of evaluation: 2018  Referral Diagnosis:   Associated Diagnoses   Bilateral leg weakness [R29.898]       Leg swelling [M79.89]       Pain in both lower extremities [M79.604, M79.605]       Venous insufficiency of both lower extremities [I87.2]       Venous hypertension of both lower extremities [I87.303]       Fat deposits [E65]       Class 1 obesity without serious comorbidity in adult [E66.9]       Vitamin D deficiency [E55.9]             Referring provider: Sheela Alfred, *    Visit Diagnosis:   No diagnosis found.    There are no diagnoses linked to this encounter.      Assessment:   Volume loss is stable.  Pt is ready for garment fit  Low legs appear smaller to pt and are near symmetrical  Patient demonstrates understanding/independence with home program.  Patient is benefitting from skilled physical therapy and is making steady progress toward functional goals.  Patient is appropriate to continue with skilled physical therapy intervention, as indicated by initial plan of care.    Goal Status:  No Data Recorded  Patient will have a decreased volume in : bilateral;LE;by 5%;to decrease risk of infection;for better fit of clothing;for improved body image;for ease of movement;in 4 weeks;Progressing toward  Patient will have decreased fibrosis, scar tissue for improved lymphatic mobilitiy : in 4 weeks;Progressing toward  Patient will perform, verbalize self-management of: skin care;self-monitoring;exercise;massage;self-compression;compression wear;compression care;infection prevention;in 4 weeks;Progressing toward    Plan / Patient Education:     Continue with initial plan of care.   Continue x 1 to assess effectiveness of home program  Pt to schedule garment fit for  Friday and wear bandages to [facility]  Have pt complete an LLIS at next visit  Subjective:     Pain Ratin  "  Legs look much smaller    Objective:     Caregiver present: Yes, .    Observation of swelling: B low legs visibly smaller  Ankle ledge nearly gone on R, reduced on L    Volume measurements taken:  yes  Lower Extremity Lymphedema  5/29/2018 6/14/2018   R Big Toe (cm) 7.6 7   R 10cm from tip of second toe (cm) 20.9 20   R 20cm from tip of second toe (cm) 21.7 27   R 30cm from tip of second toe (cm) 39.3 38.1   R 40cm from tip of second toe (cm) 48.3 46.4   R 50cm from tip of second toe (cm) 49.9 44.6   R 60cm from tip of second toe (cm) 54.8 52.5   R 70cm from tip of second toe (cm) 58.4 59   Right Lower Extremity Total Estimated Volume (cm3) 9305.14 8722.75   Right LE change of volume from last visit (%) - -6.26   Right LE change of volume from initial (%) - -6.26   L Big Toe (cm) 7.5 7   L 10cm from tip of second toe (cm) 21.2 20.5   L 20cm from tip of second toe (cm) 27 28   L 30cm from tip of second toe (cm) 39 39.2   L 40cm from tip of second toe (cm) 47.7 47.7   L 50cm from tip of second toe (cm) 49.8 47   L 60cm from tip of second toe (cm) 55.2 52   L 70cm from tip of second toe (cm) 59.4 57.5   Left Lower Extremity Total Estimated Volume (cm3) 9541.75 9008.63   Left LE change of volume from last visit (%) - -5.59   Left LE change of volume from initial (%) - -5.59   Swelling Description Left>Right Left>Right   Lower Extremity Swelling Comparison (%) 2.54 3.28         Skin condition is:  better , intact, soft    Compression:  Bandaging on    Medication for infection:  No    Treatment Today         TREATMENT MINUTES COMMENTS   Evaluation     Self-care/ Home management 35   Bandages were removed and rerolled.  Medical compression bandaging to bilateral lower extremities from forefoot to below the knees: Applied  Eucerin skin lotion, stockinet, rosidal soft 01 rolls, comprilan 03 rolls.Used a small piece of comperm LF size \"J\" on top to protect bandages.  Instructed to remove them if " uncomfortable.  Reviewed washing,drying instructions. Pt given 4 Comperm G and 4 Tetragrip F tubes to wear when washing bandages. Reviewed ongoing self care   Manual therapy 25 No manual therapy today. LE volumes was re measured and shared with patient. Questions were answered     Neuromuscular Re-education     Therapeutic Activity     Therapeutic Exercises  Reviewed briefly, questions were answered regarding Lymphedema exercises.   Gait training     Modality__________________                Total 55    Blank areas are intentional and mean the treatment did not include these items.       Kandis Reed [facility],CLT6/14/2018

## 2021-06-19 ENCOUNTER — HEALTH MAINTENANCE LETTER (OUTPATIENT)
Age: 81
End: 2021-06-19

## 2021-06-19 NOTE — PROGRESS NOTES
[facility] Daily Progress     Patient Name: Dyana Nicole  Date: 7/30/2018  Visit #: 4/12 7/13/18-10/12/18  PTA visit #:     Referral Diagnosis: leg weakness/difficulty walking  Referring provider: Nancy Jesus*  Visit Diagnosis:     ICD-10-CM    1. Leg weakness, bilateral R29.898    2. Difficulty walking R26.2          Assessment:     Patient is benefitting from skilled physical therapy and is making steady progress toward functional goals.  Patient is appropriate to continue with skilled physical therapy intervention, as indicated by initial plan of care.   Pt did not want too many new exercises today as she has not had time to work on the other new ones from last visit.      Goal Status:  Pt. will demonstrate/verbalize independence in self-management of condition in : 12 weeks  Pt. will show improved balance for safer : ambulation;standing;for dressing/grooming;for household ambulation;for community ambulation;in 6 weeks  Pt. will be able to walk : 30 minutes;on even surfaces;for household mobility;for community mobility;with less difficulty;in 6 weeks  Patient will stand : 30 minutes;with less difficultty;for home chores;in 6 weeks  Patient will increase : LEFS score;Tinnetti score;by _ points;for improved quality of function;for improved quality of life;in 6 weeks  by ___ points: 15 and 5 points   Patient will have a decreased volume in : bilateral;LE;by 5%;to decrease risk of infection;for better fit of clothing;for improved body image;for ease of movement;in 4 weeks;Met  Patient will have decreased fibrosis, scar tissue for improved lymphatic mobilitiy : in 4 weeks;Met  Patient will perform, verbalize self-management of: skin care;self-monitoring;exercise;massage;self-compression;compression wear;compression care;infection prevention;in 4 weeks;Met    Plan / Patient Education:     Progress strengthening for core and LE for HEP  Strengthening and proprioception exercises in  gym    Educate on floor<>stand transfers in 1-2 weeks.      Subjective:     Pain Ratin     I have had to spend a lot of time caring for my  so not as much time for me.  We see his MD today so hopefully he cans tart doing more on his own so I get time for me.    Objective:     Poor glut activation with ex    Exercises:  Exercise #1: seated: hip flexion, sit to stand, knee extension, DF/PF  Comment #1: PGR-pt stopped due to causing back pain  Exercise #2: clamshell  X 20  Comment #2: squeeze buttocks with sit<>stand  X frequently through the day  Exercise #3: sitting: marching, heel and toe raises -hold raising toes due to foot pain  Comment #3: bridges   Hold 10 seconds X 6-12 reps  Exercise #4: 1 leg stance.  Work up to 10-30 seconds  Comment #4: Nu-step seat 6, arms 9, workload 5    3'  Exercise #5: marching  x 20, no hands  Comment #5: TG:  level 19, squats   X 20    Treatment Today   2018   TREATMENT MINUTES COMMENTS   Evaluation     Self-care/ Home management     Manual therapy     Neuromuscular Re-education     Therapeutic Activity     Therapeutic Exercises 25 See above or flow sheet   Gait training     Modality__________________                Total 25    Blank areas are intentional and mean the treatment did not include these items.       Janet Barry, PT  2018

## 2021-06-19 NOTE — PROGRESS NOTES
[facility] Daily Progress     Patient Name: Dyana Nicole  Date: 7/25/2018  Visit #: 3/12  7/13/18-10/12/18  PTA visit #:     Referral Diagnosis: leg weakness/difficulty walking  Referring provider: Nancy Jesus*  Visit Diagnosis:     ICD-10-CM    1. Leg weakness, bilateral R29.898    2. Difficulty walking R26.2          Assessment:     Patient is benefitting from skilled physical therapy and is making steady progress toward functional goals.  Patient is appropriate to continue with skilled physical therapy intervention, as indicated by initial plan of care.   Was able to perform a bridge today with no back pain    Goal Status:  Pt. will demonstrate/verbalize independence in self-management of condition in : 12 weeks  Pt. will show improved balance for safer : ambulation;standing;for dressing/grooming;for household ambulation;for community ambulation;in 6 weeks  Pt. will be able to walk : 30 minutes;on even surfaces;for household mobility;for community mobility;with less difficulty;in 6 weeks  Patient will stand : 30 minutes;with less difficultty;for home chores;in 6 weeks  Patient will increase : LEFS score;Tinnetti score;by _ points;for improved quality of function;for improved quality of life;in 6 weeks  by ___ points: 15 and 5 points   Patient will have a decreased volume in : bilateral;LE;by 5%;to decrease risk of infection;for better fit of clothing;for improved body image;for ease of movement;in 4 weeks;Met  Patient will have decreased fibrosis, scar tissue for improved lymphatic mobilitiy : in 4 weeks;Met  Patient will perform, verbalize self-management of: skin care;self-monitoring;exercise;massage;self-compression;compression wear;compression care;infection prevention;in 4 weeks;Met    Plan / Patient Education:     Progress strengthening for core and LE for HEP  Strengthening and proprioception exercises in gym    Educate on floor<>stand transfers in 1-2 weeks.      Subjective:      Pain Ratin     I have more responsibilities at home since my  had surgery.  This is different  I did a lot of walking yesterday.  I felt ok last night, but today I am a little stiff.  Still getting used to wearing the wraps on my legs    Objective:     Poor glut activation with ex    Treatment Today   2018   TREATMENT MINUTES COMMENTS   Evaluation     Self-care/ Home management     Manual therapy     Neuromuscular Re-education     Therapeutic Activity     Therapeutic Exercises 25    See above or flow sheet  Edu pt on importance of doing exercises on a regular basis to help stay strong and she will think about joinig the [employer] or utilizing the gym at her co-op   Gait training     Modality__________________                Total 25    Blank areas are intentional and mean the treatment did not include these items.       Janet Barry, PT  2018
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
## 2021-06-19 NOTE — PROGRESS NOTES
[facility] Daily Progress -Discharge    Patient Name: Dyana Nicole  Date: 7/2/2018  Visit #: 7  PTA visit #:  4      Date of evaluation: 5/29/2018  Referral Diagnosis:   Associated Diagnoses   Bilateral leg weakness [R29.898]       Leg swelling [M79.89]       Pain in both lower extremities [M79.604, M79.605]       Venous insufficiency of both lower extremities [I87.2]       Venous hypertension of both lower extremities [I87.303]       Fat deposits [E65]       Class 1 obesity without serious comorbidity in adult [E66.9]       Vitamin D deficiency [E55.9]             Referring provider: Sheela Alfred, *    Visit Diagnosis:     ICD-10-CM    1. Leg weakness, bilateral R29.898    2. Venous hypertension of both lower extremities I87.303    3. Venous insufficiency of both lower extremities I87.2    4. Lymphedema I89.0        Diagnoses and all orders for this visit:    Leg weakness, bilateral    Venous hypertension of both lower extremities    Venous insufficiency of both lower extremities    Lymphedema          Assessment:     Patient was seen for complete decongestive physical therapy  As per  bilateral lower extremities protocol with manual lymph drainage,medical compression bandages patient education and exercises, patient was able to tolerate bandaging well, patient had good softening of thick fibrotic areas, skin is intact but very dry. Instructed on skin care and precautions, patient was able to verbalize instructions.  Patient was instructed and performed lymphedema exercises and written instructions were provided and patient demonstrates good understanding of them.  Patient had good edema reduction, refer to circumferential measurements chart.  Patient went to orthotist to be fitted for compression garments, velcro knee high garments.      Goal Status:  Patient will have a decreased volume in : bilateral;LE;by 5%;to decrease risk of infection;for better fit of clothing;for improved body  image;for ease of movement;in 4 weeks;Met  Patient will have decreased fibrosis, scar tissue for improved lymphatic mobilitiy : in 4 weeks;Met  Patient will perform, verbalize self-management of: skin care;self-monitoring;exercise;massage;self-compression;compression wear;compression care;infection prevention;in 4 weeks;Met    Plan / Patient Education:     Discharge from physical therapy all goals met.Patient will continue to wear her velcro garments during the day time and off at night, will continue with her lymphedema eercises and her walks as possible, patient has a schedule appoinment with neurologist.    Subjective:     Pain Ratin   My legs feel good, less heavy.    Objective:     Caregiver present: Yes, .  Lymphedema Life Impact Score (%): 22 at discharge, on initial evaluation was 83%    Observation of swelling: decreased edema.    Volume measurements taken:  yes  Lower Extremity Lymphedema  2018   R Big Toe (cm) 7.6 7 7.5   R 10cm from tip of second toe (cm) 20.9 20 20.9   R 20cm from tip of second toe (cm) 21.7 27 26   R 30cm from tip of second toe (cm) 39.3 38.1 38   R 40cm from tip of second toe (cm) 48.3 46.4 47   R 50cm from tip of second toe (cm) 49.9 44.6 49.5   R 60cm from tip of second toe (cm) 54.8 52.5 54.8   R 70cm from tip of second toe (cm) 58.4 59 57.8   Right Lower Extremity Total Estimated Volume (cm3) 9305.14 8722.75 9248.54   Right LE change of volume from last visit (%) - -6.26 6.03   Right LE change of volume from initial (%) - -6.26 -0.61   L Big Toe (cm) 7.5 7 7.2   L 10cm from tip of second toe (cm) 21.2 20.5 20.5   L 20cm from tip of second toe (cm) 27 28 25   L 30cm from tip of second toe (cm) 39 39.2 36.6   L 40cm from tip of second toe (cm) 47.7 47.7 46.8   L 50cm from tip of second toe (cm) 49.8 47 49.3   L 60cm from tip of second toe (cm) 55.2 52 52.5   L 70cm from tip of second toe (cm) 59.4 57.5 55.7   Left Lower Extremity Total Estimated  Volume (cm3) 9541.75 9008.63 8797.54   Left LE change of volume from last visit (%) - -5.59 -2.34   Left LE change of volume from initial (%) - -5.59 -7.8   Swelling Description Left>Right Left>Right Right>Left   Lower Extremity Swelling Comparison (%) 2.54 3.28 5.13         Skin condition is:   intact, soft.    Compression: velcro garments.      Medication for infection:  No    Treatment Today         TREATMENT MINUTES COMMENTS   Evaluation     Self-care/ Home management 45 Review skin care and precautions.  Instructed on donning/doffing of compression garments.  Circumferential measurements were taken.  Instructed on home program       Manual therapy       Neuromuscular Re-education     Therapeutic Activity     Therapeutic Exercises 5 Reviewed briefly, questions were answered regarding Lymphedema exercises.  Instructed on a walking program as able.   Gait training     Modality__________________                Total 50    Blank areas are intentional and mean the treatment did not include these items.       Ann Welsh PT,MICHELLE-GRISELDA  7/2/2018

## 2021-06-19 NOTE — PROGRESS NOTES
[facility]   Balance Initial Evaluation    Patient Name: Dyana Nicole  Date of evaluation: 7/13/2018  Referral Diagnosis: Gait/balance  Referring provider: Nancy Jesus*   Visit Diagnosis:     ICD-10-CM    1. Leg weakness, bilateral R29.898    2. Difficulty walking R26.2    3. Lymphedema I89.0        Assessment:      Pt is a 79 y/o female being referred for gait/balance instabilities and LE weakness. She does have lymphedema as well which she was previously seen in PT for and this is improved but not resolved. She con't to have weakness and fatigues quickly. She is a high risk for falls via Tinnetti Balance Assessment (18/28). She did use a walking stick today as well. She would benefit from skilled PT to improve her functional endurance and gait stability and meet all stated goals.     Goals:  Pt. will demonstrate/verbalize independence in self-management of condition in : 12 weeks  Pt. will show improved balance for safer : ambulation;standing;for dressing/grooming;for household ambulation;for community ambulation;in 6 weeks  Pt. will be able to walk : 30 minutes;on even surfaces;for household mobility;for community mobility;with less difficulty;in 6 weeks  Patient will stand : 30 minutes;with less difficultty;for home chores;in 6 weeks  Patient will increase : LEFS score;Tinnetti score;by _ points;for improved quality of function;for improved quality of life;in 6 weeks  by ___ points: 15 and 5 points   Patient will have a decreased volume in : bilateral;LE;by 5%;to decrease risk of infection;for better fit of clothing;for improved body image;for ease of movement;in 4 weeks;Met  Patient will have decreased fibrosis, scar tissue for improved lymphatic mobilitiy : in 4 weeks;Met  Patient will perform, verbalize self-management of: skin care;self-monitoring;exercise;massage;self-compression;compression wear;compression care;infection prevention;in 4 weeks;Met    Patient's expectations/goals  "are realistic.    Barriers to Learning or Achieving Goals:  Co-morbidities or other medical factors.  lymphedema       Plan / Patient Instructions:        Plan of Care:   Authorization / Certification Start Date: 07/13/18  Authorization / Certification End Date: 10/12/18  Communication with: Referral Source  Patient Related Instruction: Nature of Condition;Treatment plan and rationale;Self Care instruction;Basis of treatment;Body mechanics;Posture;Expected outcome  Times per Week: 1  Number of Weeks: 12  Number of Visits: 12  Therapeutic Exercise: ROM;Stretching;Strengthening;Lymphedema  Neuromuscular Reeducation: posture;balance/proprioception;core  Manual Therapy: soft tissue mobilization;myofascial release;joint mobilization;muscle energy;strain counterstrain    Plan for next visit: progress HEP as tolerated.      Subjective:         Social information:   Living Situation:lives in Co-op that does have a second story with stairs.    Occupation:retired   Work Status:NA   Equipment Available: does have walker and walking stick     History of Present Illness:    Dyana is a 78 y.o. female who presents to therapy today with complaints of weakness in her legs and unsteady gait. She does use a walking stick/walker to help her with stability. .   She does have lymphedema and has been seen in PT for this in the last few months. She does feel like this is better but it didn't seem to help with her balance. She also tried another balance clinic but that didn't help either.   She does get some shooting pain in her legs but that is better after the lymph treatment. This does go down to the ankle (R>L). She is scheduled for an EMG next week as well.   There is some tingling (feels like \"bugs crawling\") in her legs at times. Her big toes will get numb as well.   She is doing the lymphatic exercises at home but not doing balance ex. She feels like she is just \"so tired\" when she does any amount of exertion.   Date of " onset/duration of symptoms is 3/2018 is when she started to seek out doctors. Onset was gradual. Symptoms are not improving. She denies history of similar symptoms. She describes their previous level of function as not limited  Function: walking >2-3' and she will need to take a break if she can, going up stairs is very difficult and is only able to do them on a good day, going down she will only do with her  in front of her, getting out of a chair is difficult,     Pain Ratin  Pain rating at best: 3  Pain rating at worst: 9  Pain description: aching, dull, pain and sharp     Patient reports benefit from:  lymphatic wraps and waist high bracing       Objective:      Note: Items left blank indicates the item was not performed or not indicated at the time of the evaluation.    Patient Outcome Measures :    Lower Extremity Functional Scale (_/80): 30   Scores range from 0-80, where a score of 80 represents maximum function. The minimal clinically important difference is a positive change of 9 points.    Balance Examination  1. Leg weakness, bilateral     2. Difficulty walking     3. Lymphedema       Involved side: Bilateral    Assistive Device:  walking stick    Gait Observation:  Wide KRYSTAL with use of walking stick    LE Strength: Hip flexion: 4-/3+   Knee flexion: 4+/4-   Knee ext: 4/3+   DF: 4/3+    Balance Assessment:    Tinnetti:  Tinnetti Total Score (calculated): 18 / 28  Sit <->  30 sec:  8 with using hands to help  Eyes closed; 30 sec with wide KRYSTAL.     Treatment Today   2018   TREATMENT MINUTES COMMENTS   Evaluation 20 low   Self-care/ Home management     Manual therapy     Neuromuscular Re-education     Therapeutic Activity     Therapeutic Exercises 25 Ed on POC/Rx/balance strategies   HEP: sit to stand, seated hip flexion/knee ext/DF/PF   Gait training     Modality__________________                Total 45    Blank areas are intentional and mean the treatment did not include these  items.              Lg Moore  7/13/2018  11:02 AM

## 2021-06-19 NOTE — PROGRESS NOTES
[facility] Daily Progress     [facility] Discharge Summary  Patient Name: Dayna Nicole  Date: 8/28/2018  Referral Diagnosis: Gait/Balance  Referring provider: Nancy Jesus*  Visit Diagnosis:   1. Leg weakness, bilateral     2. Difficulty walking         Goals:  Pt. will demonstrate/verbalize independence in self-management of condition in : 12 weeks  Pt. will show improved balance for safer : ambulation;standing;for dressing/grooming;for household ambulation;for community ambulation;in 6 weeks  Pt. will be able to walk : 30 minutes;on even surfaces;for household mobility;for community mobility;with less difficulty;in 6 weeks  Patient will stand : 30 minutes;with less difficultty;for home chores;in 6 weeks  Patient will increase : LEFS score;Tinnetti score;by _ points;for improved quality of function;for improved quality of life;in 6 weeks  by ___ points: 15 and 5 points   Patient will have a decreased volume in : bilateral;LE;by 5%;to decrease risk of infection;for better fit of clothing;for improved body image;for ease of movement;in 4 weeks;Met  Patient will have decreased fibrosis, scar tissue for improved lymphatic mobilitiy : in 4 weeks;Met  Patient will perform, verbalize self-management of: skin care;self-monitoring;exercise;massage;self-compression;compression wear;compression care;infection prevention;in 4 weeks;Met    Patient was seen for 6 visits from 7/13/18 to 8/14/18 with 1 missed appointments.  Spoke with pt today and she said her lymphedema is getting worse and she needs to focus her efforts on that versus for her gait and balance.  She wanted to be done with therapy and will continue her exercises at home.  She will schedule an appt with Dr. Alfred for her lymphedema.    Therapy will be discontinued at this time.  The patient will need a new referral to resume.    Thank you for your referral.  Janet Barry  8/28/2018  11:25 AM    Patient Name: Dyana BENNETT  Corey  Date: 8/14/2018  Visit #: 6/12 7/13/18-10/12/18  PTA visit #:     Referral Diagnosis: leg weakness/difficulty walking  Referring provider: Nancy Jesus*  Visit Diagnosis:     ICD-10-CM    1. Leg weakness, bilateral R29.898    2. Difficulty walking R26.2          Assessment:     Patient is benefitting from skilled physical therapy and is making steady progress toward functional goals.  Patient is appropriate to continue with skilled physical therapy intervention, as indicated by initial plan of care.   Patient was excited she was able to get to the gym a couple times since she was here last but disappointed she couldn't get there more.  She will try to get there a few days per week to work on strengthening and her balance.  She is noticing everything is getting a little easier since she is starting to gain strength.  She did not feel the trip to the [place] was an option for her but as she is slowly working on her strengthening she feels she is stronger and that over the next year she should be able to do it.    Goal Status: continued progress toward goals  Pt. will demonstrate/verbalize independence in self-management of condition in : 12 weeks  Pt. will show improved balance for safer : ambulation;standing;for dressing/grooming;for household ambulation;for community ambulation;in 6 weeks  Pt. will be able to walk : 30 minutes;on even surfaces;for household mobility;for community mobility;with less difficulty;in 6 weeks  Patient will stand : 30 minutes;with less difficultty;for home chores;in 6 weeks  Patient will increase : LEFS score;Tinnetti score;by _ points;for improved quality of function;for improved quality of life;in 6 weeks  by ___ points: 15 and 5 points   Patient will have a decreased volume in : bilateral;LE;by 5%;to decrease risk of infection;for better fit of clothing;for improved body image;for ease of movement;in 4 weeks;Met  Patient will have decreased fibrosis, scar tissue for  improved lymphatic mobilitiy : in 4 weeks;Met  Patient will perform, verbalize self-management of: skin care;self-monitoring;exercise;massage;self-compression;compression wear;compression care;infection prevention;in 4 weeks;Met    Plan / Patient Education:     Progress strengthening for core and LE for HEP  Strengthening and proprioception exercises in gym    Educate on floor<>stand transfers in 1-2 weeks.      Subjective:     Pain Ratin     I didn't get to the workout room as often as I would have liked but I did get down there  I want to take a trip to the [place] with my [employer] 2019 so I want to make sure I am strong enough to do it  My things bother me with the lymphedema so I think I may make an appt with Dr. Alfred to see what I can do.    Objective:     Poor glut activation with ex    Exercises:  Exercise #1: seated: hip flexion, sit to stand, knee extension, DF/PF  Comment #1: PGR-pt stopped due to causing back pain  Exercise #2: clamshell  X 20  Comment #2: squeeze buttocks with sit<>stand  X frequently through the day  Exercise #3: sitting: marching, heel and toe raises -hold raising toes due to foot pain  Comment #3: bridges   Hold 10 seconds X 6-12 reps  Exercise #4: 1 leg stance.  Work up to 10-30 seconds  Comment #4: Nu-step seat 6, arms 9, workload 5    3'  Exercise #5: marching  x 20, no hands  Comment #5: TG:  level 19, squats   X 20  Exercise #6: side stepping  X 30' each direction  Comment #6: mini squats  X 10-20  Exercise #7: SLR and sidelying hip abd  work up to 20 reps  Comment #7: standing on airex   60 seconds  Exercise #8: toe tap  on steps X 20    How to take pulse  Range to exercise: 65-85% of max HR (pt on meds so also discussed perceived exertion to know when she is at a good workout pace for the Nu-Step or walking)    Treatment Today   2018   TREATMENT MINUTES COMMENTS   Evaluation     Self-care/ Home management     Manual therapy     Neuromuscular  Re-education     Therapeutic Activity     Therapeutic Exercises 25 See above or flow sheet   Gait training     Modality__________________                Total 25    Blank areas are intentional and mean the treatment did not include these items.       Janet Barry, PT  8/14/2018

## 2021-06-19 NOTE — PROGRESS NOTES
"Date of Service: 2018     Date last seen by Dr. Alfred:  2018    PCP: True Pugh MD    Impression:   1.  Bilateral leg swelling-improved  2.  Bilateral leg pain-improved  3.  Increasing urinary incontinence  4.  Obesity     Plan:        1.  Patient, daughter and her  present.  Questions were answered.  I doubt this pain is coming from her back. I believe, based on her exam, this is due to venous insufficiency with hypertension along with lipedema causing pain and swelling.        2.  Venous insufficiency study of legs bilaterally-normal and reviewed.         3. Continue compression and exercise.         4.  ESR/CR/Vit D-slight increase in ESR, other labs WNL-reviewed        5.  L/S spine-MRI-degenerative disease, s/p fusion with some impingement at L4-5 right-reviewed        6.  As weight is contributing will send to bariatrics non-surgical for info on how to loose weight.  She is very interested.  Referral written.        7.  Still having bladder and slight bowel incontinence. Recommend urology.  To ask Dr. Pugh for a referral.           8.   Follow up 8 months or when needed.      Time spent with patient 25 minutes with greater than 50% time in consultation, education and coordination of care, excluding procedures.     ---------------------------------------------------------------------------------------------------------------------  Chief Complaint: leg aching with falls     History of Present Illness:  Dyana Nicole returns to the [facility] for her pain and swelling with weakness in the legs and falls felt to be due to venous insufficiency with hypertension along with lipedema with known back problems (fusion ). With increasing bladder incontinency L/S MRI was ordered.  Of note was \"At L4-L5, there is a small synovial cyst or disc herniation that effaces the right lateral recess and contributes to mild right neural foraminal " "stenosis.\"  Venous insuffiency study was done to check venous status.  This was negative. Labs were done to check for contributing nutritional deficiencies which looked overall good. She was sent to therapy and then to get compression.  She is here for follow up.  She is very pleased with the therapy and feels the legs are more comfortable and the swelling is down.  Her cardiologist increased her Lasix and this has been helpful.  She is moving better and does not feel as unstable.  She wears the velcro compression which works well.  She is starting PT fro exercise now.      Past Medical History:   Diagnosis Date     Anemia     Created by Conversion      Arrhythmia      Cervical Spondylosis     Created by Conversion      Depression      Disease of thyroid gland     hypo     Fibromyalgia     Created by Conversion      GERD (gastroesophageal reflux disease)      High cholesterol      History of transfusion      Hyperlipidemia     Created by Conversion      Hypertension     Created by Conversion      Near syncope      Peripheral Neuropathy     Created by Conversion      Sleep apnea     mild     Tachycardia        Past Surgical History:   Procedure Laterality Date     Bladder lift       CARPAL TUNNEL RELEASE       CATARACT EXTRACTION, BILATERAL       CHOLECYSTECTOMY       HYSTERECTOMY       JOINT REPLACEMENT Bilateral     knees     MOHS SURGERY      Nose     MI ARTHROPLASTY TIBIAL PLATEAU      Description: Knee Replacement;  Recorded: 05/16/2013;     MI INJECT NERV BLCK,OTHR PERIPH NERV      Description: Peripheral Nerve Block Wrist Median Right;  Recorded: 05/16/2013;     REPLACEMENT TOTAL KNEE      L     REPLACEMENT TOTAL KNEE BILATERAL       ROTATOR CUFF REPAIR       TRUNK SKIN LESION EXCISIONAL BIOPSY Left 1/5/2018    Procedure: LEFT BUTTOCK EXPLORATION, EXCISION BUTTOCK MASS;  Surgeon: Lg Jameson MD;  Location: [facility];  Service:          Current Outpatient Prescriptions:      aspirin 81 MG EC " tablet, Take 81 mg by mouth at bedtime. , Disp: , Rfl:      budesonide (ENTOCORT EC) 3 mg 24 hr capsule, Take 9 mg by mouth every other day. As needed - pt using every other day, Disp: , Rfl:      calcium carbonate-vitamin D3 (CALCIUM 600 + D,3,) 600 mg calcium- 200 unit cap, Take 1 tablet by mouth daily., Disp: , Rfl:      celecoxib (CELEBREX) 200 MG capsule, TAKE ONE CAPSULE BY MOUTH EVERY DAY, Disp: 90 capsule, Rfl: 1     diphenoxylate-atropine (LOMOTIL) 2.5-0.025 mg per tablet, Take 1 tablet by mouth 4 (four) times a day as needed for diarrhea., Disp: , Rfl:      estradiol (ESTRACE) 0.5 MG tablet, Take 1 tablet (0.5 mg total) by mouth daily., Disp: 90 tablet, Rfl: 3     fluticasone (FLONASE) 50 mcg/actuation nasal spray, 2 sprays into each nostril daily as needed., Disp: 16 g, Rfl: 11     furosemide (LASIX) 20 MG tablet, Take 1 tablet (20 mg total) by mouth daily., Disp: 30 tablet, Rfl: 6     KRILL OIL ORAL, Take 1,000 mg by mouth bedtime. , Disp: , Rfl:      LACTOBAC CMB #3/FOS/PANTETHINE (PROBIOTIC & ACIDOPHILUS ORAL), Take 1 capsule by mouth once daily. , Disp: , Rfl:      levothyroxine (SYNTHROID, LEVOTHROID) 50 MCG tablet, Take 50 mcg by mouth at bedtime., Disp: , Rfl:      metoprolol tartrate (LOPRESSOR) 50 MG tablet, Take 1 tablet (50 mg total) by mouth 2 (two) times a day., Disp: 180 tablet, Rfl: 1     MV-MINERALS/FA/OMEGA 3,6,9 #3 (WOMEN'S 50+ ADVANCED ORAL), Take 1 tablet by mouth daily., Disp: , Rfl:      omeprazole (PRILOSEC) 20 MG capsule, TAKE ONE CAPSULE BY MOUTH TWICE A DAY, Disp: 180 capsule, Rfl: 2     sertraline (ZOLOFT) 100 MG tablet, Take 1 tablet (100 mg total) by mouth daily., Disp: 90 tablet, Rfl: 3     simvastatin (ZOCOR) 20 MG tablet, TAKE ONE TABLET BY MOUTH AT BEDTIME, Disp: 90 tablet, Rfl: 3     traZODone (DESYREL) 50 MG tablet, Take 25 mg by mouth at bedtime., Disp: , Rfl:      simvastatin (ZOCOR) 20 MG tablet, Take 20 mg by mouth at bedtime., Disp: , Rfl:     Allergies   Allergen  Reactions     Penicillins Hives     Pollen      Venom-Honey Bee Swelling       Social History     Social History     Marital status:      Spouse name: N/A     Number of children: N/A     Years of education: N/A     Occupational History     Not on file.     Social History Main Topics     Smoking status: Former Smoker     Packs/day: 0.25     Years: 1.00     Quit date: 1/1/1961     Smokeless tobacco: Never Used     Alcohol use No     Drug use: No     Sexual activity: No     Other Topics Concern     Not on file     Social History Narrative    Lives in senior living with  of over 50 years. 3 daughters       Family History   Problem Relation Age of Onset     Aneurysm Father      AAA     Cancer Mother      pancrease       Review of Systems:  Dyana Nicole no new numbess, tingling or weakness, redness or rashes, fevers, new masses, abdominal bloating or discomfort, unexplained weight loss, increased pain, new ulcers, shortness of breath and chest pain  Full 12 point review of systems was completed.    Imaging:    I personally reviewed the following imaging today and those on care everywhere, if indicated    No results found.  OUTPATIENT  5/24/2018 10:20 AM     EXAM: BILATERAL LOWER EXTREMITY DEEP AND SUPERFICIAL VENOUS DUPLEX ULTRASOUND WITH PHYSIOLOGIC TESTING     INDICATION: Symptomatic varicose veins. Assess for incompetent veins.     TECHNIQUE: Supine and upright ultrasound of the deep and superficial veins with Valsalva and compression augmentation maneuvers. Duplex imaging is performed utilizing gray-scale, two-dimensional images, color-flow imaging, Doppler waveform analysis, and   spectral Doppler imaging.      INCOMPETENCY CRITERIA: Deep vein reflux reported when greater than 1,000 ms flow reversal.  Superficial vein reflux reported when greater than 500 ms flow reversal.  vein reflux reported as greater than 350 ms flow reversal.     DEEP VEIN FINDINGS:     RIGHT LEG: The common femoral,  profunda femoral, femoral, popliteal, and visualized calf veins are patent and compressible.     LEFT LEG:  The common femoral, profunda femoral, femoral, popliteal, and visualized calf veins are patent and compressible.      RIGHT SUPERFICIAL VEIN FINDINGS:  GREAT SAPHENOUS VEIN: Competent from the saphenofemoral junction to the mid calf.     SMALL SAPHENOUS VEIN: Competent from the saphenopopliteal junction to the mid calf.     No incompetent perforating veins or abnormal accessory veins identified.     LEFT SUPERFICIAL VEIN FINDINGS:  GREAT SAPHENOUS VEIN: Competent from the saphenofemoral junction to the mid calf.     SMALL SAPHENOUS VEIN: Competent from the saphenopopliteal junction to the mid calf.     No incompetent perforating veins or abnormal accessory veins identified.     IMPRESSION:   CONCLUSION:   1.  No deep venous thrombosis of either lower extremity.  2.  RIGHT LEG: The right superficial venous system is patent, without incompetence.  3.  LEFT LEG: The left superficial venous system is patent, without incompetence.  EXAM DATE:         05/31/2018     [facility]  MRI LUMBAR SPINE W/O CONTRAST  5/31/2018 9:15 AM     INDICATION: Bilateral leg weakness.  TECHNIQUE: Without IV contrast.  CONTRAST: None.  COMPARISON: CT 12/2/2015. MRI 8/14/2013.     FINDINGS:     Nomenclature presumes 5 lumbar type vertebral bodies. The most caudal surgically  treated intervertebral disc levels will be designated L4-L5 consistent with the  nomenclature used on 12/2/2015.     There are postoperative changes of the lumbar spine which include anterior  interbody fusion between L2-L3 and L4-L5. There are buttressing anterior screws  at these levels. There has also been posterior fusion from L2-L5 with bilateral  iliac extension. There is no L5 pedicle screw on the right.     Vertebral body heights are unremarkable. The imaged portions of the bony pelvis  appear intact. Marrow signal is heterogeneous, but  without specific evidence of  a marrow replacing process. No definite evidence of osseous edema is  demonstrated.     The conus tip is identified at T12-L1. No signal abnormalities of the cord or  conus are demonstrated.     There is mild L1-L2 retrolisthesis, which is new from prior. There is also  anterior/inferior height loss of the L1 vertebral body, which is new from prior.  No evidence of superimposed edema to suggest an acute process.     Postoperative changes are noted in the paraspinal soft tissues. There is a  partially imaged T2 hyperintense/T1 hypointense 7.0 cm subcutaneous fluid  collection in the left gluteal region. No abdominal aortic aneurysm is  demonstrated.     T12-L1: Normal disc height and signal. No herniation. No facet arthropathy. No  spinal canal stenosis. No right neural foraminal stenosis. No left neural  foraminal stenosis.     L1-L2: Moderate severe intervertebral disc height loss, particularly anteriorly.  There is a dorsal disc osteophyte complex. Mild facet arthropathy. Mild spinal  canal stenosis. Mild right neural foraminal stenosis. Mild left neural foraminal  stenosis.     L2-L3: Anterior interbody fusion has been performed. No significant posterior  disc abnormality. Mild to moderate facet arthropathy. Mild spinal canal  stenosis. No right neural foraminal stenosis. No left neural foraminal stenosis.  There is bilateral lateral recess narrowing.     L3-L4: Anterior interbody fusion has been performed. No posterior disc  abnormality. Ligamentum flavum thickening and moderate facet arthropathy. No  significant spinal canal stenosis. No right neural foraminal stenosis. No left  neural foraminal stenosis.     L4-L5: Anterior interbody fusion without posterior disc abnormality. Moderate  left and mild right facet arthropathy. No significant spinal canal stenosis. On  the right, there is a small synovial cyst or disc herniation that effaces the  right lateral recess and contributes  to mild right neural foraminal stenosis. No  significant left neural foraminal stenosis.     L5-S1: Normal disc height and signal. No herniation. No facet arthropathy. No  spinal canal stenosis. No right neural foraminal stenosis. No left neural  foraminal stenosis.     CONCLUSION:  1.  Postoperative changes of anterior and posterior fusion from L2-L5. There is  bilateral iliac extension of the posterior fusion. Please note there is  transitional lumbosacral anatomy and careful confirmation of the targeted levels  is recommended before any planned intervention.  2.  Unremarkable appearance of the imaged cord and cauda equina nerve roots. No  sites of high-grade spinal canal stenosis are demonstrated.  3.  No sites of compressive neural foraminal stenosis demonstrated.  4.  At L4-L5, there is a small synovial cyst or disc herniation that effaces the  right lateral recess and contributes to mild right neural foraminal stenosis.  5.  Marked interval height loss of the L1-L2 vertebral disc space and  anteroinferior L1 vertebral body, without edema to specifically suggest an acute  process. There is new mild retrolisthesis and focal angular kyphosis at this  level. Mild spinal canal stenosis.  6.  Partially imaged 7 cm fluid collection in the subcutaneous fat overlying the  left sacroiliac joint/gluteal region    Labs:    I personally reviewed the following labs today and those on care everywhere, if indicated    Lab Results   Component Value Date    SEDRATE 26 (H) 05/17/2018         Lab Results   Component Value Date    CRP 0.2 05/17/2018           Lab Results   Component Value Date    CREATININE 0.88 06/11/2018      Lab Results   Component Value Date    HGBA1C 5.4 05/20/2014           Lab Results   Component Value Date    BUN 24 06/11/2018              Lab Results   Component Value Date    ALBUMIN 3.9 06/11/2018       Vitamin D, Total (25-Hydroxy)   Date Value Ref Range Status   05/17/2018 45.2 30.0 - 80.0 ng/mL Final        Lab Results   Component Value Date    TSH 1.88 09/13/2017     Lab Results   Component Value Date    WBC 8.1 06/07/2018    HGB 12.5 06/07/2018    HCT 36.7 06/07/2018    MCV 91 06/07/2018     06/07/2018         Physical Exam:  Vitals:    07/19/18 1339   BP: 110/70   Pulse: 64   Resp: 20   Temp: 98.1  F (36.7  C)      BMI 36.03    Circumferential measures:    Vasc Edema 5/17/2018 7/19/2018   Right just above MTP 21 21.1   Right Ankle 26.5 25.5   Right Widest Calf 49.5 47   Right Thigh Up 10cm 57.0 55   Left - just above MTP 21.0 22.3   Left Ankle 26.0 25   Left Widest Calf 49.5 47.8   Left Thigh Up 10cm 55.0 54     Circumferential measures have decreased greatly.    General:  78 y.o. female in no apparent distress.      Psych: Alert and oriented x 3.  Cooperative. Affect normal.    Musculoskeletal:  Normal range of motion in  knees and ankles bilaterally throughout .  There is no active joint synovitis, erythema, swelling or joint laxity.       Neurological:  Sensation is intact to pin prick and light touch in both legs.  Strength testing is normal in ankle dorsiflexion and great toe extension bilaterally.       Vascular: Dorsalis pedis and posterior tibialis pulses are strong and equal bilaterally. There are significant telangietasias, medial ankle venous flares, venous varicosities  and spider veins.     Integumentary: Skin of the legs is uniformly warm and dry and with negative Stemmer's Sign.  Nails are normal.  She has a pear shaped body with ledge at the ankles and feet spared.    Sheela Alfred MD, ABWMS, FACCWS, [facility]  Medical Director Wound Care and Lymphedema  [facility]  [phone]

## 2021-06-19 NOTE — PROGRESS NOTES
[facility] Daily Progress     Patient Name: Dyana Nicole  Date: 7/18/2018  Visit #: 2  PTA visit #:     Referral Diagnosis: leg weakness/difficulty walking  Referring provider: Nancy Jesus*  Visit Diagnosis:     ICD-10-CM    1. Leg weakness, bilateral R29.898    2. Difficulty walking R26.2    3. Lymphedema I89.0          Assessment:     Patient is benefitting from skilled physical therapy and is making steady progress toward functional goals.  Patient is appropriate to continue with skilled physical therapy intervention, as indicated by initial plan of care.   She had a good understanding of pain and making sure that she tries to think about it differently. She did have poor glut activation which will contribute to her fatigue and overuse of her quadriceps with functional activities.     Goal Status:  Pt. will demonstrate/verbalize independence in self-management of condition in : 12 weeks  Pt. will show improved balance for safer : ambulation;standing;for dressing/grooming;for household ambulation;for community ambulation;in 6 weeks  Pt. will be able to walk : 30 minutes;on even surfaces;for household mobility;for community mobility;with less difficulty;in 6 weeks  Patient will stand : 30 minutes;with less difficultty;for home chores;in 6 weeks  Patient will increase : LEFS score;Tinnetti score;by _ points;for improved quality of function;for improved quality of life;in 6 weeks  by ___ points: 15 and 5 points   Patient will have a decreased volume in : bilateral;LE;by 5%;to decrease risk of infection;for better fit of clothing;for improved body image;for ease of movement;in 4 weeks;Met  Patient will have decreased fibrosis, scar tissue for improved lymphatic mobilitiy : in 4 weeks;Met  Patient will perform, verbalize self-management of: skin care;self-monitoring;exercise;massage;self-compression;compression wear;compression care;infection prevention;in 4 weeks;Met    Plan / Patient  Education:     Progress HEP as tolerated  Plan to con't with manual therapy to decrease fascial tension/tone to normalize ROM/decrease inflammation/decrease mm tone and improve proprioception.      Subjective:     Pain Ratin   She is having a hard time in trying to activate her gluts when standing. She is also trying to do it more during the day as well.   Her legs are still heavy and with stamina being up and moving around.       Objective:     Poor glut activation with ex    Treatment Today   2018   TREATMENT MINUTES COMMENTS   Evaluation     Self-care/ Home management     Manual therapy     Neuromuscular Re-education     Therapeutic Activity     Therapeutic Exercises 55 Clam, PGR, ed on glut activation with standing/sit to stand.   Ed on TNE and pain threshold   Gait training     Modality__________________                Total 55    Blank areas are intentional and mean the treatment did not include these items.       Lg Moore  2018

## 2021-06-19 NOTE — PROGRESS NOTES
Pt wants to get all her medications to help her with depression and sleep   Have you seen your PCP: Yes  What medical conditions do we need to know about: She has been diagnose with lymphedema   Are you seeing a therapist: No  Are you taking your medications: Yes  Any concerns about your sleep: Pt wakes up a lot to use the bathroom   Any concerns about your Appetite: too good, eating more than often. Pt has joined weight watchers   How is your Mood: Good  Any Pain scale 0-10, ten being the worst: 0   Any Anxiety scale 0-5, five being the worst: 0/5   Any Depression scale 0-5, five being the worst: 2/5  Any Suicidal or homicidal ideation: Pt denies both SI/HI    MNPMP    None             Correct pharmacy verified with patient and confirmed in snapshot? [x] yes []no    Charge captured ? [x] yes  [] no    Medications Phoned  to Pharmacy [] yes [x]no  Name of Pharmacist:  List Medications, including dose, quantity and instructions      Medication Prescriptions given to patient   [] yes  [x] no   List the name of the drug the prescription was written for.       Medications ordered this visit were e-scribed.  Verified by order class [] yes  [x] no    Medication changes or discontinuations were communicated to patient's pharmacy: [] yes  [x] no    UA collected [] yes  [x] no    [place] Prescription Monitoring Program Reviewed? [x] yes  [] no    Referrals were made to:      Future appointment was made: [] yes  [x] no    Dictation completed at time of chart check: [] yes  [x] no    I have checked the documentation for today s encounters and the above information has been reviewed and completed.

## 2021-06-19 NOTE — PROGRESS NOTES
[facility] Daily Progress     Patient Name: Dyana Nicole  Date: 8/7/2018  Visit #: 5/12 7/13/18-10/12/18  PTA visit #:     Referral Diagnosis: leg weakness/difficulty walking  Referring provider: Nancy Jesus*  Visit Diagnosis:     ICD-10-CM    1. Leg weakness, bilateral R29.898    2. Difficulty walking R26.2          Assessment:     Patient is benefitting from skilled physical therapy and is making steady progress toward functional goals.  Patient is appropriate to continue with skilled physical therapy intervention, as indicated by initial plan of care.   Patient continues to be disappointed that her  still requires so much care.  Suggested she talk with him and tell him she needs 1 hour per day to work on her exercises too and she thought this was a good idea.  She struggles with the 1 leg stance so will continue to work on this to make improvements in her foot/leg strength.    Goal Status:  Pt. will demonstrate/verbalize independence in self-management of condition in : 12 weeks  Pt. will show improved balance for safer : ambulation;standing;for dressing/grooming;for household ambulation;for community ambulation;in 6 weeks  Pt. will be able to walk : 30 minutes;on even surfaces;for household mobility;for community mobility;with less difficulty;in 6 weeks  Patient will stand : 30 minutes;with less difficultty;for home chores;in 6 weeks  Patient will increase : LEFS score;Tinnetti score;by _ points;for improved quality of function;for improved quality of life;in 6 weeks  by ___ points: 15 and 5 points   Patient will have a decreased volume in : bilateral;LE;by 5%;to decrease risk of infection;for better fit of clothing;for improved body image;for ease of movement;in 4 weeks;Met  Patient will have decreased fibrosis, scar tissue for improved lymphatic mobilitiy : in 4 weeks;Met  Patient will perform, verbalize self-management of: skin  care;self-monitoring;exercise;massage;self-compression;compression wear;compression care;infection prevention;in 4 weeks;Met    Plan / Patient Education:     Progress strengthening for core and LE for HEP  Strengthening and proprioception exercises in gym    Educate on floor<>stand transfers in 1-2 weeks.      Subjective:     Pain Ratin     Still spend a lot of time caring for my .  I am finding this a big challenge    Objective:     Poor glut activation with ex    Exercises:  Exercise #1: seated: hip flexion, sit to stand, knee extension, DF/PF  Comment #1: PGR-pt stopped due to causing back pain  Exercise #2: clamshell  X 20  Comment #2: squeeze buttocks with sit<>stand  X frequently through the day  Exercise #3: sitting: marching, heel and toe raises -hold raising toes due to foot pain  Comment #3: bridges   Hold 10 seconds X 6-12 reps  Exercise #4: 1 leg stance.  Work up to 10-30 seconds  Comment #4: Nu-step seat 6, arms 9, workload 5    3'  Exercise #5: marching  x 20, no hands  Comment #5: TG:  level 19, squats   X 20  Exercise #6: side stepping  X 30' each direction  Comment #6: mini squats  X 10-20    How to take pulse  Range to exercise: 65-85% of max HR (pt on meds so also discussed perceived exertion to know when she is at a good workout pace for the Nu-Step or walking)    Treatment Today   2018   TREATMENT MINUTES COMMENTS   Evaluation     Self-care/ Home management     Manual therapy     Neuromuscular Re-education     Therapeutic Activity     Therapeutic Exercises 25 See above or flow sheet  Edu on Pulse, exertion level with perceived exertion and max HR.   Gait training     Modality__________________                Total 25    Blank areas are intentional and mean the treatment did not include these items.       Janet Barry, PT  2018

## 2021-06-20 NOTE — PROGRESS NOTES
"[facility] Daily Progress     Patient Name: Dyana Nicole  Date: 10/5/2018  Visit #: 2  Referral Diagnosis: Obesity, Class II, BMI 35-39.9, with comorbidity  Referring provider: Parveen Hopkins MD  Visit Diagnosis:     ICD-10-CM    1. Morbid obesity (H) E66.01    2. Leg weakness, bilateral R29.898    3. Difficulty walking R26.2          Assessment:     Patient is benefitting from skilled physical therapy and is making steady progress toward functional goals.  Patient is appropriate to continue with skilled physical therapy intervention, as indicated by initial plan of care.    Encouraged pt to \"move\" while she is sitting watching TV in addition to all of the exercises.  She is pleased and feels stronger already and isn't using the cane as much    Goal Status:  Pt. will demonstrate/verbalize independence in self-management of condition in : 12 weeks  Pt. will show improved balance for safer : ambulation;standing;for dressing/grooming;for household ambulation;for community ambulation;in 6 weeks  Pt. will be able to walk : 30 minutes;on even surfaces;for household mobility;for community mobility;with less difficulty;in 6 weeks  Patient will stand : 30 minutes;with less difficultty;for home chores;in 6 weeks  Patient will increase : LEFS score;Tinnetti score;by _ points;for improved quality of function;for improved quality of life;in 6 weeks  by ___ points: 15 and 5 points   Pt will: Patient will learn/review HEP to gain LE strength and balance within 1 month to help her with her goal of losing weight and improving activity level.    Plan / Patient Education:     Finish reviewing exercises previously issued      Subjective:     Pain Ratin    I am moving more and I feel better  I walk the halls at the home and overall try to do more  I have not done as many sit to stands as I shoulde      Objective:     Exercises:  Exercise #1: Nu-step  seat 7, arms 9, workload 6  5'  Comment #1: sit to stand  Exercise " #2: glute squeezes   hold 5-10 seconds few times per day  Comment #2: sitting: marching, knee extension  x 10  Exercise #3: sitting hip iftikhar hip add with pillow btwn knees.  Hold 10 seconds x 5-10 reps  Comment #3: clamshells  X 20  Exercise #4: bridges:  hold 10 seconds x 6-12 reps  Comment #4: SLR  X 20  Exercise #5: sidelying hip abd:  X 20  Comment #5: TG:  level 19, squats   X 20  Exercise #6: side stepping  X 30' each direction  Comment #6: mini squats  X 10-20  Exercise #7: SLR and sidelying hip abd  work up to 20 reps  Comment #7: standing on airex   60 seconds  Exercise #8: toe tap  on steps X 20    Treatment Today     TREATMENT MINUTES COMMENTS   Evaluation     Self-care/ Home management     Manual therapy     Neuromuscular Re-education     Therapeutic Activity     Therapeutic Exercises 25 See flow sheet or above   Gait training     Modality__________________                Total 25    Blank areas are intentional and mean the treatment did not include these items.       Janet Barry, PT  10/5/2018

## 2021-06-20 NOTE — PROGRESS NOTES
Progress Note    Reason for Visit:  Chief Complaint     Adrenal Problem          Progress Note:    HPI:       This patient seen saltation at the request of the primary care physician because of concerns about the adrenal gland insufficiency.    The patient has microscopic colitis and she has been on enterocort 3 mg daily she is gradually weaning herself off it.  Right now she takes it every 2 days.    She had serum cortisol which was at 4.1.    She does feel fatigued and tired she has not of the signs or symptoms of adrenal insufficiency.    She was in [place] a few months ago and she blacked out.    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    She has fatigue and tiredness and has weakness in her legs.    She had hysterectomy over 20 years ago and is taking currently estradiol HRT 0.5 mg half a tablet daily for hot flashes she takes Lasix 20 mg    She has hypothyroidism on Synthroid 50 mcg for the last 5 years.    She is  with 3 children.    Does not smoke or drink.    No significant family history her father had an hilar mother had pancreatic cancer  Component      Latest Ref Rng & Units 6/25/2018   Cortisol, 60 min Post-dose      ug/dL 25.2   Time of Cortrosyn Injection       6/25/18 at 0755   Cortisol, Pre-dose      ug/dL 15.6   Cortisol, 30 min Post-dose      ug/dL 22.3     Component      Latest Ref Rng & Units 5/17/2018 5/31/2018 6/7/2018 6/11/2018   Creatinine      0.60 - 1.10 mg/dL  0.91 0.83 0.88   GFR MDRD Af Amer      >60 mL/min/1.73m2  >60 >60 >60   GFR MDRD Non Af Amer      >60 mL/min/1.73m2  60 (L) >60 >60   Bilirubin, Total       0.0 - 1.0 mg/dL  0.3  0.3   Calcium      8.5 - 10.5 mg/dL  9.5 9.6 9.4   Protein, Total      6.0 - 8.0 g/dL  6.8  6.6   ALBUMIN      3.5 - 5.0 g/dL  3.9  3.9   Alkaline Phosphatase      45 - 120 U/L  81  78   AST      0 - 40 U/L  24  26   ALT      0 - 45 U/L  20  21   TSH      0.30 - 5.00 uIU/mL       Vitamin D, Total (25-Hydroxy)      30.0 - 80.0 ng/mL 45.2      Cortisol      ug/dL   4.1        [facility]  1. HEAD MRI WITHOUT AND WITH IV CONTRAST  2. HEAD MRA WITHOUT IV CONTRAST  3. NECK MRA WITHOUT AND WITH IV CONTRAST  9/13/2017 6:36 PM     INDICATION: Dizziness, difficulty ambulating.  TECHNIQUE:   1. Head MRI without and with intravenous contrast.  2. 3D time-of-flight head MRA without intravenous contrast.  3. Neck MRA without and with IV contrast.  CONTRAST: 8.5 mL Gadavist.  COMPARISON: 6/29/2015 MRI/MRA head and neck examination. More recent MRI examinations of the head, last dated 4/20/2017.     FINDINGS:  HEAD MRI: No acute infarct/restricted diffusion. No mass lesion, hemorrhage, or extra-axial fluid collections. Mild generalized cerebral and cerebellar atrophy.  Scattered foci of nonspecific T2/FLAIR hyperintense signal in the cerebral white matter and   juaquin consistent with moderate chronic small vessel ischemic changes. No abnormal contrast enhancement identified.     No significant interval change in a 4 x 4.5 mm intrinsically T1 bright suprasellar lesion, which is slightly increased in conspicuity as compared to 7/22/2013 examination (previously 3 mm). The major intracranial vascular flow voids are maintained. The   orbits are unremarkable. The calvarium and skull base are unremarkable.  The paranasal sinuses are clear. The mastoid air cells are clear.      HEAD MRA: No aneurysm. No significant intracranial stenosis. Balanced vertebral arteries supply a normal basilar artery.  The major intracranial arterial anatomy is within normal limits.     NECK MRA:   RIGHT CAROTID: No measurable  stenosis of the right ICA based on NASCET criteria.     LEFT CAROTID: No measurable stenosis of the left ICA based on NASCET criteria.     VERTEBRAL ARTERIES: Balanced vertebral arteries are patent in the neck and into the head.      AORTIC ARCH: Classic aortic arch anatomy with no significant stenosis at the origin of the great vessels.     IMPRESSION:   CONCLUSION:  HEAD MRI:   1.  No acute infarct or hemorrhage.  2.  Stable age-related changes as detailed above.  3.  No interval change in 4 x 4.5 mm intrinsically T1 bright suprasellar lesion, slightly increased in conspicuity as compared to 7/22/2013 examination (previously 3 mm). Differential considerations include proteinaceous pituitary microadenoma or   nonneoplastic cyst versus adamantinomatous craniopharyngioma.     HEAD MRA:   1.  Normal Head MRA.  2.  No aneurysm, high flow AVM or significant stenosis identified.     NECK MRA:  1.  No significant stenosis in the neck vessels based on NASCET criteria.  2.  No evidence for dissection or pseudoaneurysm.   Result History   MR Brain COW Carotid With and Without Contrast (Order #[id number]) on 9/13/2017 - Order Result History Report         Review of Systems:    Nervous System: No headache, dizziness, fainting or memory loss. No tingling sensation of hand or feet.  Ears: No hearing loss or ringing in the ears  Eyes: No blurring of vision, redness, itching or dryness.  Nose: No nosebleed or loss of smell  Mouth: No mouth sores or loss of taste  Throat: No hoarseness or difficulty swallowing  Neck: No enlarged thyroid or lymph nodes.  Heart: No chest pain, palpitation or irregular heartbeat. No swelling of hands or feet  Lungs: No shortness of breath, cough, night sweats, wheezing or hemoptysis.  Gastrointestinal: No nausea or vomiting, constipation or diarrhea.  No acid reflux, abdominal pain or blood in stools.  Kidney/Bladdr: No polyuria, polydipsia, nocturia or hematuria.  Genital/Sexual: No loss of  libido  Skin: No rash, hair loss or hirsutism.  No abnormal striae  Muscles/Joints/Bones: No morning stiffness, muscle aches and pain or loss of height.    Current Medications:  Current Outpatient Prescriptions   Medication Sig     aspirin 81 MG EC tablet Take 81 mg by mouth at bedtime.      budesonide (ENTOCORT EC) 3 mg 24 hr capsule Take 3 mg by mouth every other day. As needed - pt using every other day     calcium carbonate-vitamin D3 (CALCIUM 600 + D,3,) 600 mg calcium- 200 unit cap Take 1 tablet by mouth daily.     celecoxib (CELEBREX) 200 MG capsule TAKE ONE CAPSULE BY MOUTH EVERY DAY     diphenoxylate-atropine (LOMOTIL) 2.5-0.025 mg per tablet Take 1 tablet by mouth 4 (four) times a day as needed for diarrhea.     estradiol (ESTRACE) 0.5 MG tablet Take 1 tablet (0.5 mg total) by mouth daily. (Patient taking differently: Take 0.25 mg by mouth daily. )     fluticasone (FLONASE) 50 mcg/actuation nasal spray 2 sprays into each nostril daily as needed.     furosemide (LASIX) 20 MG tablet Take 1 tablet (20 mg total) by mouth daily.     KRILL OIL ORAL Take 1,000 mg by mouth bedtime.      LACTOBAC CMB #3/FOS/PANTETHINE (PROBIOTIC & ACIDOPHILUS ORAL) Take 1 capsule by mouth once daily.      levothyroxine (SYNTHROID, LEVOTHROID) 50 MCG tablet Take 50 mcg by mouth at bedtime.     metoprolol tartrate (LOPRESSOR) 50 MG tablet Take 1 tablet (50 mg total) by mouth 2 (two) times a day.     MV-MINERALS/FA/OMEGA 3,6,9 #3 (WOMEN'S 50+ ADVANCED ORAL) Take 1 tablet by mouth daily.     naltrexone (DEPADE) 50 mg tablet Start half tab daily for 10 days then increase to half tab twice daily (breakfast and supper).     omeprazole (PRILOSEC) 20 MG capsule TAKE ONE CAPSULE BY MOUTH TWICE A DAY     sertraline (ZOLOFT) 100 MG tablet Take 1 tablet (100 mg total) by mouth daily.     simvastatin (ZOCOR) 20 MG tablet TAKE ONE TABLET BY MOUTH AT BEDTIME     traZODone (DESYREL) 50 MG tablet Take 25 mg by mouth at bedtime.       Patients Active  Problems:  Patient Active Problem List   Diagnosis     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     Insomnia     Heartburn     Arthritis     Cervical Spondylosis     Generalized Osteoarthritis Of The Hand     Lumbar Spondylosis     Trochanteric Bursitis     Lower Back Pain     Osteopenia     Colitis     Fibromyalgia     Anemia     Hypertension     Orthostatic Hypotension     Dizziness     Recurrent Major Depression In Partial Remission     Hyperlipidemia     Peripheral Neuropathy     Walk Is Wobbly Or Unsteady (Ataxia)     Unable To Restrain Bowel Movement     Hypothyroidism     Obstructive Sleep Apnea     Arthralgia     Generalized anxiety disorder     Major depression     Exertional dyspnea     Pituitary mass (H)     Essential hypertension with goal blood pressure less than 140/90     Rapid palpitations     Leg swelling     Pain in both lower extremities     Venous insufficiency of both lower extremities     Venous hypertension of both lower extremities     Fat deposits     Class 2 obesity due to excess calories in adult     Vitamin D deficiency     Fatigue due to excessive exertion     Adrenal insufficiency (H)     Leg weakness, bilateral     Dysphagia     Ptosis     Lymphedema     Obesity (BMI 35.0-39.9) with comorbidity (H)     Atrial tachycardia (H)     Left ventricular outflow obstruction     Hypertensive left ventricular hypertrophy, without heart failure       History:   reports that she quit smoking about 57 years ago. She has a 0.25 pack-year smoking history. She has never used smokeless tobacco. She reports that she does not drink alcohol or use illicit drugs.   reports that she quit smoking about 57 years ago. She has a 0.25 pack-year smoking history. She has never used smokeless tobacco. She reports that she does not drink alcohol or use illicit drugs.  History   Smoking Status     Former Smoker     Packs/day: 0.25     Years: 1.00     Quit date: 1/1/1961   Smokeless Tobacco     Never Used       reports that she quit smoking about 57 years ago. She has a 0.25 pack-year smoking history. She has never used smokeless tobacco. She reports that she does not drink alcohol or use illicit drugs.  History   Sexual Activity     Sexual activity: No     Past Medical History:   Diagnosis Date     Anemia     Created by Conversion      Arrhythmia      Cervical Spondylosis     Created by Conversion      Depression      Disease of thyroid gland     hypo     Fibromyalgia     Created by Conversion      GERD (gastroesophageal reflux disease)      High cholesterol      History of transfusion      Hyperlipidemia     Created by Conversion      Hypertension     Created by Conversion      Near syncope      Peripheral Neuropathy     Created by Conversion      Sleep apnea     mild     Tachycardia      Family History   Problem Relation Age of Onset     Aneurysm Father      AAA     Cancer Mother      pancrease     Past Medical History:   Diagnosis Date     Anemia     Created by Conversion      Arrhythmia      Cervical Spondylosis     Created by Conversion      Depression      Disease of thyroid gland     hypo     Fibromyalgia     Created by Conversion      GERD (gastroesophageal reflux disease)      High cholesterol      History of transfusion      Hyperlipidemia     Created by Conversion      Hypertension     Created by Conversion      Near syncope      Peripheral Neuropathy     Created by Conversion      Sleep apnea     mild     Tachycardia      Past Surgical History:   Procedure Laterality Date     Bladder lift       CARPAL TUNNEL RELEASE       CATARACT EXTRACTION, BILATERAL       CHOLECYSTECTOMY       HYSTERECTOMY       JOINT REPLACEMENT Bilateral     knees     MOHS SURGERY      Nose     FL ARTHROPLASTY TIBIAL PLATEAU      Description: Knee Replacement;  Recorded: 05/16/2013;     FL INJECT NERV JOE STEWART PERIPH NERV      Description: Peripheral Nerve Block Wrist Median Right;  Recorded: 05/16/2013;     REPLACEMENT TOTAL KNEE      L  "    REPLACEMENT TOTAL KNEE BILATERAL       ROTATOR CUFF REPAIR       TRUNK SKIN LESION EXCISIONAL BIOPSY Left 1/5/2018    Procedure: LEFT BUTTOCK EXPLORATION, EXCISION BUTTOCK MASS;  Surgeon: Lg Jameson MD;  Location: [facility];  Service:        Vitals   height is 5' 2.5\" (1.588 m) and weight is 195 lb (88.5 kg). Her blood pressure is 118/78.         Exam  General appearance: The patient looked well, not in acute distress.  Eyes: no evidence of thyroid eye disease.   Retinal exam: No evidence of diabetic retinopathy.  Mouth and Throat: Normal  Neck: No evidence of thyromegaly, enlarged lymph node or tenderness  Chest: Trachea is central. Chest is clear to auscultation and percussion. Breat sounds are normal.  Cardiovascular exam: JVP is not raised. Heart sounds are normal, no murmurs or rub  Peripheral pulses are palpable.   Abdomen: No masses or tenderness.    Back: No vertebral tenderness or kyphosis.  Extremities: No evidence of leg edema.   Skin: Normal to touch.  No abnormal striae  Neurologic exam:  Visual fields are intact by confrontation, grossly intact. No evidence of peripheral neuropathy.  Detailed foot exam normal.        Diagnosis:  No diagnosis found.    Orders:   No orders of the defined types were placed in this encounter.        Assessment and Plan: Possible adrenal insufficiency the patient had an ACTH stimulation test baseline serum cortisol was 15.6 at 30 minutes 22.3 at 60 minutes is 25.2 this clearly rules out adrenal insufficiency she does not have any of the signs or symptoms of adrenal insufficiency the blackout is not related to her adrenal insufficiency.    She has been taking Entocort every 2 days for her colitis and she will continue with that    Hormonal replacement therapy I strongly advised the patient to do mammogram she has not done it for years I told her to gradually wean herself off the estradiol over 2 weeks.    I will put her on Neurontin 100 mg at bedtime to " help with hot flashes patient is already on Zoloft 100 mg    Hypothyroidism she feels fatigue and tired she takes 50 mcg will check thyroid function tests and if there is a room will increase the Synthroid probably to 75.    Weight loss she takes naltrexone 50 mg half a tablet twice a day.  She takes Prilosec    Hyperlipidemia on Zocor 20 mg    Pituitary tumor very small probably nonfunctioning pituitary tumor but would check the pituitary gland hormones including prolactin.    Sodium was normal at 138 potassium 4.5 creatinine 0.88 this is again again his adrenal insufficiency.    Vitamin D is 45.2 we will monitor that    She has leg cramps advised her to take magnesium oxide 400 mg Monday Wednesday Friday.    She will return to clinic in 6 months.    I have reviewed and ordered clinical lab test    I have reviewed and ordered radiology tests.    I have reviewed and ordered her medication as required.    I have reviewed her test results and advised with the performing physician.    I have reviewed the patient's old records.    I have reviewed and summarized the patient old records.    I did spend 60 minutes with the patient more than 50% was spent on counseling and managing her care.

## 2021-06-20 NOTE — PROGRESS NOTES
[facility]:  Non-Surgical Weight Loss Intake Appointment   Date of visit: 9/14/2018  Physician: Parveen Hopkins MD  Primary Care is True Pugh MD.  Dyana Nicole   78 y.o.  female  Dyana is a 78 y.o. year old female who is here today for consultation regarding non-surgical weight loss.   she was referred to the [facility] by Dr. Alfred where she's followed for her lymphedema/vascular needs.    Weight History:   Wt Readings from Last 3 Encounters:   09/14/18 195 lb (88.5 kg)   08/30/18 198 lb (89.8 kg)   07/19/18 197 lb (89.4 kg)     Body mass index is 35.1 kg/(m^2).       Assessment and Plan   Assessment: Dyana Nicole is a 78 y.o.,female presenting for assistance with medical weight loss.  Identified issues contributing to her excess weight include:     Living a relatively sedentary lifestyle with a love of baked goods, dining out 3-4 days weekly and minimal physical acitivity that has been further limited by her lymphedema issues, she's gradually gained weight as she's aged. She describes a lifetime of yo-yo dieting and trying numerous commercial and meal replacement programs in her lifetime but with a tendency to immediately revert back to old eating patterns when done and experiencing weight regain as a result.     She's been on steroids for colitis issues in the past and this can have impact on her cravings/consumption patterns and satiety.    She's had some imabalance and fall issues and would benefit from a PT regimen to work on strength/balance and to help optimize her metabolism. Referral placed today.       Plan:  1.  Behavior Goals: 3 daily meals plan, ideally with a large breakfast, medium lunch        and smaller dinner. Avoidance of sugared-sweetened beverages and processed        carbohydrate snacks.  Work towards pre-planning meals to avoid falling back into old             habits/obesogenic habits.  Daily Food Tracking and morning weight  recommended.  [facility]       [facility].    2.  Diet Goals: Recommend a 60 g/protein per day.Calorie daily restriction.  Increased protein intake to insure at        least 20-30 grams of protein per meal.      3.  Exercise/Activity Goals: PT referral to help w/ imbalance/walking and strengthening.  Long term goal of increasing endurance to allow for at least            200 minutes weekly of moderate exercise to maintain weight loss.      4.  Recommendation regarding weight loss medication:  Trial of naltrexone started today. Titrate to half tab two times a day w/ meals.  She's currently in a longterm down tapering of steroid which should help her weight loss goals.  Metoprolol is necessary to help w/ her palpitations/tachycardia history.  This Makes bupropion/stimulants an unwise choice and with her Mitral valve disease history I'd not favor Belviq.    5.  Referral to Dietician for further education and customization of diet plan.    6.  Stress Reduction: good. But dealing w/ early dementia in her . Can revert to food as coping mechanism and will pay attention to cravings/stress eating.    7.  Intake Labs:  Ordered.     8.   Hypothyroid: on meds, last checked a year ago, will make sure she's euthyroid.    9. Steroid use/glucose intolerance will check A1c and see if any additional tools may be helpful.        1. Obesity, Class II, BMI 35-39.9, with comorbidity  Ambulatory referral to Physical Therapy    Thyroid Stimulating Hormone (TSH)    Comprehensive Metabolic Panel    Vitamin B12    naltrexone (DEPADE) 50 mg tablet   2. HTN (hypertension)     3. Hypothyroidism  Thyroid Stimulating Hormone (TSH)   4. Low vitamin D level  Vitamin D, Total (25-Hydroxy)   5. Glucose intolerance (impaired glucose tolerance)  Glycosylated Hemoglobin A1c   6. Imbalance            No Follow-up on file.     Weight and Lifestyle History    Sleep history:  Goes to bed around 9:30pm, watches TV in  bed, takes trazadone to fall asleep. Will wake twice nightly to urinate (leaky bladder issues that she's following up with urologist).  Will wake around 4am somedays and not be able to get back to sleep, stometimes will fall back asleep and wake between 6-8am. Once awake, heads to kitchen for breakfast, pills and coffee.  Breakfast is protein bar: [employer]. Takes coffee w/ 2 artificial sweeteners. Once done with breakfast, makes bed and showers. Rest of morning may go visit/coffee or joan in chair and plays games on ipad.  Will go through until Lunch around noon (WW dinners lately,  is diabetic) [place] on wheat, sometimes bologna/pickles and liu on bread. Tuna w/ Liu on bread. Egg salad.  In the afternoons, is sitting around similarly. Likes to shop some afternoons, may use a walking stick. Afternoon snack usually (sweets/baked goods).  Dinner is around 5pm (protein/veggie and deserts baked goods).  May eat out 3-4 days weekly ( sit down restaurants: [employer] often at their grill-petite filet w/ broccoli).   Hours per night: see above  Snoring/apnea/insomnia? Some insomnia and frequent nocturia  Restful/refreshed? 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 no  Sleep study previously? no  TV in bedroom? yes  Sleep aids/pills? Yes, trazodone      STOP-BANG score for sleep apnea : na  For general population   JANICE - Low Risk : Yes to 0 - 2 questions  JANICE - Intermediate Risk : Yes to 3 - 4 questions  JANICE - High Risk : Yes to 5 - 8 questions  or Yes to 2 or more of 4 STOP questions + male gender  or Yes to 2 or more of 4 STOP questions + BMI > 35kg/m2  or Yes to 2 or more of 4 STOP questions + neck circumference 17 inches / 43cm in male or 16 inches / 41cm in female    Weight History:  In what way is your excess weight affecting your wellness/health? Lymphedema issues, trouble getting around  Heaviest weight: na  Any Previous weight loss, what was the most lost and method: throughout her life, many  times w/ meal replacements  Birth weIight, High School graduation weight: na  Lowest adult weight: na  Maternal health/smoking/weight: na  When did you start gaining weight and what were the circumstances? na  Is anyone else in your immediate family overweight? na  Finally,  Is there a weight you desire to be, what is your goal weight that would define successful weight loss for you? na   I would like to lose weight so I can  :  Be independent as long as possible.   .     Barriers to weight loss:  Is anyone else at home/work/family a barrier to your weight loss?  eating/snacks  Work schedule/location? na  Current stressors include: 's health  Mobility problems: may use walking stick if shopping.    Counseled on health benefits of weight loss, goals for metabolic/diabetic risk reduction, HTN reduction, improved sleep and mobility, cancer reduction, longevity.    Fitness History:  Were you ever an athlete?na     Which sports? na  When was the last time you walked/hiked a mile?na  Do you have any limitations for activity?na  Do you have access to a gym, pool, club, fitness center, or ?na                Do you have any fitness goals/dreams that could motivate your weight loss?nan    On a scale from 0-10,  how willing are you to start some sort of movement/exercise regimen? an    Counseled on physiologic benefits of exercise and for long term weight maintenance, goal working towards 200-300 minutes weekly.     Food History:  Who buys groceries?  She does  Do you eat at dinner table, is the TV on or off, family present? no  Any soda, how much? Rare diet soda, increased lately again.  Meals per day? 3  Snacks per day? 2-3   Breakfast typically is: see above  Lunch typically is: see above  Dinner  is: see above  Weekly Fast Food/dining out: 3-4 x weekly  Snacks consist of: baked goods mostly    Food sensitivities/allergies/intolerances/avoidances: no  Water per day? unsure    Any binging  behavior?no  Any induced vomiting/purging? no  Any history of anorexia/bulimia? no  Any night eating issues? no  Stress induced eating? yes    Goal Setting:  Short Term Goals10% weight loss goal of 19 lbs, under 176 lbs.  Long Term Goals TBD, improved mobility, less edema.         Patient Profile   Social History     Social History Narrative    Lives in senior living with  of over 50 years. 3 daughters     Family History   Problem Relation Age of Onset     Aneurysm Father      AAA     Cancer Mother      pancrease          Past Medical History   Patient Active Problem List   Diagnosis     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     Insomnia     Heartburn     Arthritis     Cervical Spondylosis     Generalized Osteoarthritis Of The Hand     Lumbar Spondylosis     Trochanteric Bursitis     Lower Back Pain     Osteopenia     Colitis     Fibromyalgia     Anemia     Hypertension     Orthostatic Hypotension     Dizziness     Recurrent Major Depression In Partial Remission     Hyperlipidemia     Peripheral Neuropathy     Walk Is Wobbly Or Unsteady (Ataxia)     Unable To Restrain Bowel Movement     Hypothyroidism     Obstructive Sleep Apnea     Arthralgia     Generalized anxiety disorder     Major depression     Exertional dyspnea     Pituitary mass (H)     Essential hypertension with goal blood pressure less than 140/90     Rapid palpitations     Leg swelling     Pain in both lower extremities     Venous insufficiency of both lower extremities     Venous hypertension of both lower extremities     Fat deposits     Class 2 obesity due to excess calories in adult     Vitamin D deficiency     Fatigue due to excessive exertion     Adrenal insufficiency (H)     Leg weakness, bilateral     Dysphagia     Ptosis     Lymphedema     Obesity (BMI 35.0-39.9) with comorbidity (H)     Atrial tachycardia (H)     Left ventricular outflow obstruction     Hypertensive left ventricular hypertrophy, without heart failure      Oxycodone; Penicillins; Pollen; and Venom-honey bee  Current Outpatient Prescriptions   Medication Sig Note     aspirin 81 MG EC tablet Take 81 mg by mouth at bedtime.       budesonide (ENTOCORT EC) 3 mg 24 hr capsule Take 3 mg by mouth every other day. As needed - pt using every other day 9/14/2018: Uses for colitis. Weaning down now on every other day.     calcium carbonate-vitamin D3 (CALCIUM 600 + D,3,) 600 mg calcium- 200 unit cap Take 1 tablet by mouth daily.      celecoxib (CELEBREX) 200 MG capsule TAKE ONE CAPSULE BY MOUTH EVERY DAY      diphenoxylate-atropine (LOMOTIL) 2.5-0.025 mg per tablet Take 1 tablet by mouth 4 (four) times a day as needed for diarrhea.      estradiol (ESTRACE) 0.5 MG tablet Take 1 tablet (0.5 mg total) by mouth daily. (Patient taking differently: Take 0.25 mg by mouth daily. )      fluticasone (FLONASE) 50 mcg/actuation nasal spray 2 sprays into each nostril daily as needed.      furosemide (LASIX) 20 MG tablet Take 1 tablet (20 mg total) by mouth daily.      KRILL OIL ORAL Take 1,000 mg by mouth bedtime.       LACTOBAC CMB #3/FOS/PANTETHINE (PROBIOTIC & ACIDOPHILUS ORAL) Take 1 capsule by mouth once daily.       levothyroxine (SYNTHROID, LEVOTHROID) 50 MCG tablet Take 50 mcg by mouth at bedtime.      metoprolol tartrate (LOPRESSOR) 50 MG tablet Take 1 tablet (50 mg total) by mouth 2 (two) times a day.      MV-MINERALS/FA/OMEGA 3,6,9 #3 (WOMEN'S 50+ ADVANCED ORAL) Take 1 tablet by mouth daily.      omeprazole (PRILOSEC) 20 MG capsule TAKE ONE CAPSULE BY MOUTH TWICE A DAY      sertraline (ZOLOFT) 100 MG tablet Take 1 tablet (100 mg total) by mouth daily.      simvastatin (ZOCOR) 20 MG tablet TAKE ONE TABLET BY MOUTH AT BEDTIME      traZODone (DESYREL) 50 MG tablet Take 25 mg by mouth at bedtime.        Past Surgical History  She has a past surgical history that includes pr inject nerv blck,othr periph nerv; pr arthroplasty tibial plateau; Cholecystectomy; Hysterectomy; Joint  "replacement (Bilateral); Trunk skin lesion excisional biopsy (Left, 1/5/2018); Rotator cuff repair; Carpal tunnel release; Bladder lift; Mohs surgery; Replacement total knee; Cataract extraction, bilateral; and Replacement total knee bilateral.     Examination   /58 (Patient Site: Right Arm, Patient Position: Sitting, Cuff Size: Adult Large)  Pulse (!) 59  Ht 5' 2.5\" (1.588 m)  Wt 195 lb (88.5 kg)  SpO2 96%  Breastfeeding? No  BMI 35.1 kg/m2  Height: 5' 2.5\" (1.588 m) (9/14/2018  8:47 AM)  Initial Weight: 195 lbs (9/14/2018  8:47 AM)  Weight: 195 lb (88.5 kg) (9/14/2018  8:47 AM)  Weight loss from initial: 0 (9/14/2018  8:47 AM)  % Weight loss: 0 % (9/14/2018  8:47 AM)  BMI (Calculated): 35.1 (9/14/2018  8:47 AM)  SpO2: 96 % (9/14/2018  8:47 AM)  Waist Circumference (In): 37.75 Inches (9/14/2018  8:47 AM)  Hip Circumference (In): 45.5 Inches (9/14/2018  8:47 AM)  Neck Circumference (In): 11.5 Inches (9/14/2018  8:47 AM)  General:  Alert and ambulatory, motivated for change. Determined affect  HEENT:  No conjunctival pallor, moist mucous Membranes, neck is thick  Pulmonary:  Normal respiratory effort, no cough, no audible wheezes/crackles.  CV:  Regular rate and Rhythm, no murmurs, pulses 2 plus  Abdominal: obese  Extremities: velcro wraps on legs. No tremor. Good handshake.  Skin:  No pallor/jaundice  Pscyh/Mood: pleasant, motivated with the right amount of stubbornness.                                    LABS: ordered D levels were good in May.    Last recorded labs include:  Lab Results   Component Value Date    WBC 8.1 06/07/2018    HGB 12.5 06/07/2018    HCT 36.7 06/07/2018    MCV 91 06/07/2018     06/07/2018      Lab Results   Component Value Date    KKLMCENI90WL 45.2 05/17/2018    Lab Results   Component Value Date    HGBA1C 5.4 05/20/2014      Lab Results   Component Value Date    CHOL 307 (H) 06/11/2018    No results found for: PTH      No results found for: FERRITIN   Lab Results "   Component Value Date    HDL 63 2018      Lab Results   Component Value Date    CVYUQUJM25 1234 (H) 2015    No results found for: 72322   Lab Results   Component Value Date    LDLCALC 195 (H) 2018    Lab Results   Component Value Date    TSH 1.88 2017    No results found for: FOLATE   Lab Results   Component Value Date    TRIG 246 (H) 2018    Lab Results   Component Value Date    ALT 21 2018    AST 26 2018    ALKPHOS 78 2018    BILITOT 0.3 2018    No results found for: TESTOSTERONE     No components found for: CHOLHDL No results found for: 7597   @resusfast(vitamin a: 1)@       Other Notables/imaging:     Counselin minutes spent in direct consultation with the patient regarding conditions contributing to excess weight accumulation, with over 50% of the time spent in counseling, goal setting and initiating a plan to lose their excess weight.    Parveen Hopkins MD  [facility].  2018

## 2021-06-20 NOTE — PROGRESS NOTES
[facility] Daily Progress     Patient Name: Dyana Nicole  Date: 10/9/2018  Visit #: 3  Referral Diagnosis: Obesity, Class II, BMI 35-39.9, with comorbidity  Referring provider: Parveen Hopkins MD  Visit Diagnosis:     ICD-10-CM    1. Morbid obesity (H) E66.01    2. Leg weakness, bilateral R29.898    3. Difficulty walking R26.2          Assessment:     Patient is benefitting from skilled physical therapy and is making steady progress toward functional goals.  Patient is appropriate to continue with skilled physical therapy intervention, as indicated by initial plan of care.    Corrected technique with sidelying hip abduction.  Other exercises with HEP she was able to demonstrate good technique and feel in appropriate muscle groups.    Goal Status:  Pt. will demonstrate/verbalize independence in self-management of condition in : 12 weeks  Pt. will show improved balance for safer : ambulation;standing;for dressing/grooming;for household ambulation;for community ambulation;in 6 weeks  Pt. will be able to walk : 30 minutes;on even surfaces;for household mobility;for community mobility;with less difficulty;in 6 weeks  Patient will stand : 30 minutes;with less difficultty;for home chores;in 6 weeks  Patient will increase : LEFS score;Tinnetti score;by _ points;for improved quality of function;for improved quality of life;in 6 weeks  by ___ points: 15 and 5 points   Pt will: Patient will learn/review HEP to gain LE strength and balance within 1 month to help her with her goal of losing weight and improving activity level.    Plan / Patient Education:     Finish reviewing exercises previously issued  Add more as able      Subjective:     Pain Ratin    I am not feeling up to par.  I am feeling a little sick.  Maybe it is half the pill that I am taking.  I got my fit bit out and I am trying to move more, which can be a challenge.  We have had a lot of sitting activities recently.    Objective:  "    Exercises:  Exercise #1: Nu-step  seat 7, arms 9, workload 6  5'  Comment #1: sit to stand  Exercise #2: glute squeezes   hold 5-10 seconds few times per day  Comment #2: sitting: marching, knee extension  x 10  Exercise #3: sitting hip iftikhar hip add with pillow btwn knees.  Hold 10 seconds x 5-10 reps  Comment #3: clamshells  X 20  Exercise #4: bridges:  hold 10 seconds x 6-12 reps  Comment #4: SLR  X 20  Exercise #5: sidelying hip abd:  X 20  Comment #5: UBE 3 minutes forward and 3 minutes backward  Exercise #6: 1 leg stance   work up to 30=60 seconds  Comment #6: toe tapping  8\" step,  X 20  Exercise #7: mini squat  X 20 reps  Comment #7: standing on airex   60 seconds  Exercise #8: toe tap  on steps X 20    Treatment Today     TREATMENT MINUTES COMMENTS   Evaluation     Self-care/ Home management     Manual therapy     Neuromuscular Re-education     Therapeutic Activity     Therapeutic Exercises 55 See flow sheet or above   Gait training     Modality__________________                Total 55    Blank areas are intentional and mean the treatment did not include these items.       Janet Barry, PT  10/9/2018    "

## 2021-06-20 NOTE — PROGRESS NOTES
Assessment/Plan:    1. Menopausal syndrome (hot flashes)  Postmenopausal hot flashes reviewed.  Is on sertraline 100 mg with anticipated benefit.  Addition of gabapentin 100 mg at bedtime.  We will continue attempts at weaning from estradiol half milligrams currently using half tablet daily and decrease to every other day dosing times 1 week then every third day dosing times 1 week then discontinue.    2. Leg cramping  Leg cramping improved.  Did recommend magnesium oxide 400 mg Monday, Wednesday and Friday.    3. Lymphedema  Continued management with compression stockings etc. for lymphedema and followed by Dr. Sheela Alfred.    4. Hot flashes  As above.  - estradiol (ESTRACE) 0.5 MG tablet; use 1/2 tablet by mouth every other day x 7 days, then every 3rd day x 7 days then stop  Dispense: 90 tablet; Refill: 3    5. Leg weakness, bilateral  Leg weakness improved with increased activity.  We will continue to encourage activity as able and maintain weight loss through use of naltrexone 50 mg using half tablet twice daily.    6. Screening for osteoporosis  Bone density scan to be completed.  - DXA Bone Density Scan; Future    7. Disorder of bone   As above.  - DXA Bone Density Scan; Future    8. Encounter for screening mammogram for breast cancer  Screening mammogram to be completed with estradiol use for hormone replacement.  - Mammo Screening Bilateral; Future    9. Urge incontinence of urine  Referred to urology.  - Ambulatory referral to Urology    10. Class 1 obesity due to excess calories without serious comorbidity with body mass index (BMI) of 34.0 to 34.9 in adult  Dietary and exercise modifications reviewed.  Continue naltrexone for weight loss benefits with weight goal less than 180 pounds initially.    11.  Depression with anxiety, stable.    PHQ 9 questionnaire 11 out of 27.  Continue use of sertraline 100 mg daily.        Subjective:    Dyana DONALD Connory is seen today for follow-up evaluation.  Multiple  updates.  Has lymphedema concerns.  Using compression pants etc.  Legs better.  Still some right knee swelling.  Told to use magnesium oxide on Monday Wednesday and Friday due to leg cramps.  Only 2 episodes in the last month.  Using naltrexone to suppress appetite 50 mg using half tablet twice daily.  Was prescribed gabapentin 100 mg at bedtime to help with hot flashes and remains on sertraline 100 mg daily.  Uses Tylenol Extra Strength typically 2 tablets on as-needed basis if neck pain or headache noted with benefits.  Did have flu shot in [employer] immunization September 28, 2018.  Followed by Dr. Barry and was seen September 20, 2018 with questions of mild adrenal insufficiency.  Microscopic colitis stable.  Comprehensive review of systems as above otherwise all negative.     - Ray  3 daughters - Genesis ( at [facility])  Smoke - quit in 60s after 1 year  EtOH: sober x 33 years  Surgeries: gallbladder, appy, hysterectomy and ? 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; left CTS release; bladder lift; right TKA; left rotator cuff  Hospitalizations:    Past Surgical History:   Procedure Laterality Date     Bladder lift       CARPAL TUNNEL RELEASE       CATARACT EXTRACTION, BILATERAL       CHOLECYSTECTOMY       HYSTERECTOMY       JOINT REPLACEMENT Bilateral     knees     MOHS SURGERY      Nose     ME ARTHROPLASTY TIBIAL PLATEAU      Description: Knee Replacement;  Recorded: 05/16/2013;     ME INJECT NERV JOE STEWART PERIPH NERV      Description: Peripheral Nerve Block Wrist Median Right;  Recorded: 05/16/2013;     REPLACEMENT TOTAL KNEE      L     REPLACEMENT TOTAL KNEE BILATERAL       ROTATOR CUFF REPAIR       TRUNK SKIN LESION EXCISIONAL BIOPSY Left 1/5/2018    Procedure: LEFT BUTTOCK EXPLORATION, EXCISION BUTTOCK MASS;  Surgeon: Lg Jameson MD;  Location: [facility] Main OR;  Service:         Family History   Problem  Relation Age of Onset     Aneurysm Father      AAA     Cancer Mother      pancrease        Past Medical History:   Diagnosis Date     Anemia     Created by Conversion      Arrhythmia      Cervical Spondylosis     Created by Conversion      Depression      Disease of thyroid gland     hypo     Fibromyalgia     Created by Conversion      GERD (gastroesophageal reflux disease)      High cholesterol      History of transfusion      Hyperlipidemia     Created by Conversion      Hypertension     Created by Conversion      Near syncope      Peripheral Neuropathy     Created by Conversion      Sleep apnea     mild     Tachycardia         Social History   Substance Use Topics     Smoking status: Former Smoker     Packs/day: 0.25     Years: 1.00     Quit date: 1/1/1961     Smokeless tobacco: Never Used     Alcohol use No        Current Outpatient Prescriptions   Medication Sig Dispense Refill     aspirin 81 MG EC tablet Take 81 mg by mouth at bedtime.        budesonide (ENTOCORT EC) 3 mg 24 hr capsule Take 3 mg by mouth every other day. As needed - pt using every other day       calcium carbonate-vitamin D3 (CALCIUM 600 + D,3,) 600 mg calcium- 200 unit cap Take 1 tablet by mouth daily.       celecoxib (CELEBREX) 200 MG capsule TAKE ONE CAPSULE BY MOUTH EVERY DAY 90 capsule 1     diphenoxylate-atropine (LOMOTIL) 2.5-0.025 mg per tablet Take 1 tablet by mouth 4 (four) times a day as needed for diarrhea.       estradiol (ESTRACE) 0.5 MG tablet use 1/2 tablet by mouth every other day x 7 days, then every 3rd day x 7 days then stop 90 tablet 3     fluticasone (FLONASE) 50 mcg/actuation nasal spray 2 sprays into each nostril daily as needed. 16 g 11     furosemide (LASIX) 20 MG tablet Take 1 tablet (20 mg total) by mouth daily. 30 tablet 6     gabapentin (NEURONTIN) 100 MG capsule Take 100 mg by mouth at bedtime. 90 capsule 1     KRILL OIL ORAL Take 1,000 mg by mouth bedtime.        LACTOBAC CMB #3/FOS/PANTETHINE (PROBIOTIC &  ACIDOPHILUS ORAL) Take 1 capsule by mouth once daily.        levothyroxine (SYNTHROID, LEVOTHROID) 50 MCG tablet Take 50 mcg by mouth at bedtime.       magnesium oxide (MAGOX) 400 mg (241.3 mg magnesium) tablet Take 1 tab on Monday, Wednesday and Friday. 90 tablet 1     metoprolol tartrate (LOPRESSOR) 50 MG tablet Take 1 tablet (50 mg total) by mouth 2 (two) times a day. 180 tablet 1     MV-MINERALS/FA/OMEGA 3,6,9 #3 (WOMEN'S 50+ ADVANCED ORAL) Take 1 tablet by mouth daily.       naltrexone (DEPADE) 50 mg tablet Start half tab daily for 10 days then increase to half tab twice daily (breakfast and supper). 60 tablet 0     omeprazole (PRILOSEC) 20 MG capsule TAKE ONE CAPSULE BY MOUTH TWICE A  capsule 2     sertraline (ZOLOFT) 100 MG tablet Take 1 tablet (100 mg total) by mouth daily. 90 tablet 3     simvastatin (ZOCOR) 20 MG tablet TAKE ONE TABLET BY MOUTH AT BEDTIME 90 tablet 3     traZODone (DESYREL) 50 MG tablet Take 25 mg by mouth at bedtime.       No current facility-administered medications for this visit.           Objective:    Vitals:    10/02/18 1404   BP: 110/70   Pulse: 69   SpO2: 97%   Weight: 191 lb (86.6 kg)      Body mass index is 34.38 kg/(m^2).    Alert.  No apparent distress.  Chest clear.  Cardiac exam regular.  Lymphedema findings of bilateral lower extremities, improved.      This note has been dictated using voice recognition software and as a result may contain minor grammatical errors and unintended word substitutions.

## 2021-06-20 NOTE — LETTER
Letter by Evi Carrillo RN at      Author: Evi Carrillo RN Service: -- Author Type: --    Filed:  Encounter Date: 5/18/2020 Status: (Other)       5/18/2020        Dyana Nicole  [address]  [place] [address]    This letter provides a written record that you were tested for COVID-19 on 5/17/20.     Your result was negative.    This means that we didnt find the virus that causes COVID-19 in your sample. A test may show negative when you do actually have the virus. This can happen when the virus is in the early stages of infection, before you feel illness symptoms.    Even if you dont have symptoms, they may still appear. For safety, its very important to follow these rules.    Keep yourself away from others (self-isolation):      Stay home. Dont go to work, school or anywhere else.     Stay in your own room (and use your own bathroom), if you can.    Stay away from others in your home. No hugging, kissing or shaking hands. No visitors.    Clean high touch surfaces often (doorknobs, counters, handles, etc.). Use a household cleaning spray or wipes.    Cover your mouth and nose with a mask, tissue or washcloth to avoid spreading germs.    Wash your hands and face often with soap and water.    Stay in self-isolation until you meet ALL of the guidelines below:    1. You have had no fever for at least 72 hours (that is 3 full days of no fever without the use of medicine that reduces fevers), AND  2. other symptoms (such as cough, shortness of breath) have gotten better, AND  3. at least 10 days have passed since your symptoms first appeared.    Going back to work  Check with your employer for any guidelines to follow for going back to work.    Employers: This document serves as formal notice that your employee tested negative for COVID-19, as of the testing date shown above.    For questions regarding this letter or your Negative COVID-19 result, call [phone] between 8A to 6:30P (M-F) and  10A to 6:30P (weekends).

## 2021-06-20 NOTE — PROGRESS NOTES
[facility]   Initial Evaluation        [facility] Certification Request    October 1, 2018      Patient: Dyana Nicole  MR Number: [id number]  YOB: 1940  Date of Visit: 10/1/2018      Dear Dr. Hopkins    Thank you for this referral.   We are seeing Dyana Nicole for Physical Therapy of obesity.    Medicare and/or Medicaid requires physician review and approval of the treatment plan. Please review the plan of care and verify that you agree with the therapy plan of care by co-signing this note.      Patient will be in [place] until October 20, 2018 then retuning to [place].  She may require a new referral so she can continue her physical therapy in [place].    If you have any questions or concerns, please don't hesitate to call.    Sincerely,      Janet Barry        Physician recommendation:     ___ Follow therapist's recommendation        ___ Modify therapy      *Physician co-signature indicates they certify the need for these services furnished within this plan and while under their care.        Patient Name: Dyana Nicole  Date of evaluation: 10/1/2018  Referral Diagnosis: Obesity, Class II, BMI 35-39.9, with comorbidity  Referring provider: Parveen Hopkins MD  Visit Diagnosis:     ICD-10-CM    1. Morbid obesity (H) E66.01    2. Leg weakness, bilateral R29.898    3. Difficulty walking R26.2        Assessment:      Pt. is appropriate for skilled PT intervention as outlined in the Plan of Care (POC).  Pt. is a good candidate for skilled PT services to improve pain levels and function.   Patient is back in physical therapy to continue her strengthening.  She attended physical therapy in Spring/summer 2018 and was discharged to start lymphedema physical therapy.  She feels she is doing a good job managing that with compression shorts and wraps on her legs.  She feels overall her legs are still weak and is in need of continued physical therapy to work on them.  She admits to  not following through with many of the exercises but does the ones in bed only.  Functional limitations are described as occurring with: standing, , walking, kneeling/squatting, getting out of a chair, unsteadiness with walking.  She would benefit from leg strengthening and working on her gait/imbalance to improve her overall function.  She leaves for [place] near the end of this month and may need to continue her therapy there.  Will see her for strengthening/review until she leaves.  May not have time for a lot of balance type training with the couple weeks she has left in [place], so will review previous program and add as able.  She will benefit from continued therapy in [place] to work on her functional limitations.    Goals:  Patient will learn/review HEP to gain LE strength and balance within 1 month to help her with her goal of losing weight and improving activity level.    Patient's expectations/goals are realistic.    Barriers to Learning or Achieving Goals:  Co-morbidities or other medical factors.  obesity, lymphedema, fibromyalgia, HTN, osteopenis, hyperlipidemia, exertional dyspnea, atrial tachycardia       Plan / Patient Instructions:        Plan of Care:   Authorization / Certification Start Date: 10/01/18  Authorization / Certification End Date: 11/01/18  Authorization / Certification Number of Visits: 8  Communication with: Referral Source  Patient Related Instruction: Nature of Condition;Treatment plan and rationale;Self Care instruction;Basis of treatment  Times per Week: 1-2  Number of Weeks: 4  Number of Visits: 8  Therapeutic Exercise: ROM;Stretching;Strengthening  Neuromuscular Reeducation: balance/proprioception;core    Plan for next visit: Review previous exercises from therapy in the spring                     She will be leaving for [place] on October 20 for the winter.                     She will continue her physical therapy in [place]                    Prefers to be called [facility]     Subjective:          Social information:   Living Situation:lives with others , stairs  with railing and has an elevator.  She lives in a co-operative    Equipment Available: SE cane and walker with a platform to sit.  She uses the walker on occasion.                        Doesn't always use SEC in house, which she feels is why she fell.    History of Present Illness:    Dyana is a 78 y.o. female who presents to therapy today with complaints of obesity and afraid she may fall. Date of onset/duration of symptoms is 2018. Onset was gradual.  Her legs were very weak and noticed that back this winter when she was in [place].  When she got back to [place] she came to physical therapy for her unsteadiness and weakness.  During that time her lymphedema was bad so stopped coming to physical therapy and transitioned to lymphedema therapy.  She recently saw a bariatric MD who referred her back to physical therapy to help with her activity level, and will also address the unsteadiness on her feet again.  She feels her legs are still very weak but found the lymphedema therapy a success.  The fluid in her legs is manageable.    Pain Ratin}  Pain rating at best: 0  Pain rating at worst: 0  Pain description: weakness    Functional limitations are described as occurring with:   standing, , walking, kneeling/squatting, getting out of a chair, unsteadiness with walking   Stephanie's goal is to increase her walking time/distance to 3X around her [place] in [place] (~ 1 mile)    Patient reports benefit from:  Tylenol       Objective:        Examination  1. Morbid obesity (H)     2. Leg weakness, bilateral     3. Difficulty walking       Involved side: Bilateral  Posture Observation:      General standing posture is fair.    Hip/Knee Strength  Date: 10/1/18     Hip/Knee Strength (/5) MMT MMT MMT    Right Left Right Left Right Left   Hip Flexion 4 4       Hip Abduction 4 4       Hip Adduction 3+ 3+       Hip Extension         Hip External Rotation          Hip Internal Rotation         Knee Extension 4+ 4+       Knee Flexion 4+ 4+         APTA: 9, no hands,  Pauses and slower as she ascends  1 leg stance: R=17 seconds, L=6 seconds  Squat: hips deviate to left  Able to turn 360 degrees Left and Right with good quality  Able to toe tap on step 8 times with no hands  Heel to toe with stand by to Min A    Start of HEP:  Nu-Step,   Sit to stand    Treatment Today     TREATMENT MINUTES COMMENTS   Evaluation 30 obesity   Self-care/ Home management     Manual therapy     Neuromuscular Re-education     Therapeutic Activity     Therapeutic Exercises 25 See above for start/review of HEP  Edu pt on importance of doing a HEP throughout the day and every day.  Reviewed some nutritional/water intake aspects.    Many questions   Gait training     Modality__________________                Total 55    Blank areas are intentional and mean the treatment did not include these items.     PT Evaluation Code: (Please list factors)  Patient History/Comorbidities:   obesity, lymphedema, fibromyalgia, HTN, osteopenis, hyperlipidemia, exertional dyspnea, atrial tachycardia  Examination: decrease APTA, decrease leg strength, unsteadiness on feet, see above or flow sheet  Clinical Presentation: stable  Clinical Decision Making: low    Patient History/  Comorbidities Examination  (body structures and functions, activity limitations, and/or participation restrictions) Clinical Presentation Clinical Decision Making (Complexity)   No documented Comorbidities or personal factors 1-2 Elements Stable and/or uncomplicated Low   1-2 documented comorbidities or personal factor 3 Elements Evolving clinical presentation with changing characteristics Moderate   3-4 documented comorbidities or personal factors 4 or more Unstable and unpredictable High            Janet Barry, PT  10/1/2018  10:30 AM

## 2021-06-21 NOTE — PROGRESS NOTES
"[facility] Daily Progress     Patient Name: Dyana Nicole  Date: 10/12/2018  Visit #: 4  Referral Diagnosis: Obesity, Class II, BMI 35-39.9, with comorbidity  Referring provider: Parveen Hopkins MD  Visit Diagnosis:     ICD-10-CM    1. Morbid obesity (H) E66.01    2. Leg weakness, bilateral R29.898    3. Difficulty walking R26.2          Assessment:     Patient is benefitting from skilled physical therapy and is making steady progress toward functional goals.  Patient is appropriate to continue with skilled physical therapy intervention, as indicated by initial plan of care.    Pt felt the addition of the midback and shoulder exercises were good to help with her posture, strengthen and move.    Goal Status:  No Data Recorded  Pt will: Patient will learn/review HEP to gain LE strength and balance within 1 month to help her with her goal of losing weight and improving activity level.    Plan / Patient Education:     Finish reviewing exercises previously issued  Add more as able  Patient may have 1 visit remaining until she leaves for [place]    Subjective:     Pain Ratin    I am not feeling up to par.  I am feeling a little sick.  Maybe it is half the pill that I am taking.  I got my fit bit out and I am trying to move more, which can be a challenge.  We have had a lot of sitting activities recently.    Objective:     Exercises:  Exercise #1: Nu-step  seat 7, arms 9, workload 6  5'  Comment #1: sit to stand  Exercise #2: glute squeezes   hold 5-10 seconds few times per day  Comment #2: sitting: marching, knee extension  x 10  Exercise #3: sitting hip iftikhar hip add with pillow btwn knees.  Hold 10 seconds x 5-10 reps  Comment #3: clamshells  X 20  Exercise #4: bridges:  hold 10 seconds x 6-12 reps  Comment #4: SLR  X 20  Exercise #5: sidelying hip abd:  X 20  Comment #5: UBE 3 minutes forward and 3 minutes backward  Exercise #6: 1 leg stance   work up to 30=60 seconds  Comment #6: toe tapping  8\" " step,  X 20  Exercise #7: mini squat  X 20 reps  Comment #7: trampoline:  marching  X 20, 1 hand for support  Exercise #8: TG  level 19, squat  X 20  Comment #8: hurdles:  4 small and 4 medium, 2 hands for assist, X 2 leading with each foot  Exercise #9: airex, tandem standing, each foot forward 30 seconds with no hands for support  Comment #9: green tband:  scapular row  X 20  Exercise #10: green tband: shoulder ext   X 20  Comment #10: green tband:   shoulder diagonal  X 15  Exercise #11: green tband:  shoulder ER and IR  X 15-20 reps    Treatment Today     TREATMENT MINUTES COMMENTS   Evaluation     Self-care/ Home management     Manual therapy     Neuromuscular Re-education     Therapeutic Activity     Therapeutic Exercises 55 See flow sheet or above   Gait training     Modality__________________                Total 55    Blank areas are intentional and mean the treatment did not include these items.       Janet Barry, PT  10/12/2018

## 2021-06-21 NOTE — PROGRESS NOTES
"6 month follow up - bilateral leg swelling - compression; Wearing Knu copper leggings daily (loose). Velcros were \"sliding down\" - has had physical therapy which has helped.  "

## 2021-06-21 NOTE — PROGRESS NOTES
"[facility] Daily Progress /Discharge    Patient Name: Dyana Nicole  Date: 10/16/2018  Visit #: 5  Referral Diagnosis: Obesity, Class II, BMI 35-39.9, with comorbidity  Referring provider: Parveen Hopkins MD  Visit Diagnosis:     ICD-10-CM    1. Morbid obesity (H) E66.01    2. Leg weakness, bilateral R29.898    3. Difficulty walking R26.2          Assessment:       Goal Status:  No Data Recorded  Pt will: Patient will learn/review HEP to gain LE strength and balance within 1 month to help her with her goal of losing weight and improving activity level.   Patient feels she has achieved what she could in one month.  She feels stronger, and has lost a couple pounds.  She will continue to work on her goals in [place].    Plan / Patient Education:     Patient will be returning to [place] for a month so will continue her physical therapy down there.  She is discharged from this clinic    Subjective:     Pain Ratin    I saw the nutritionist yesterday and felt good about the visit    Objective:       1 leg stance: R=25+ seconds, L=20 seconds  APTA: 9 with no hands (after end of session after exercising 45 minutes with tired legs)    Exercises:  Exercise #1: Nu-step  seat 7, arms 9, workload 6  5'  Comment #1: sit to stand  Exercise #2: glute squeezes   hold 5-10 seconds few times per day  Comment #2: sitting: marching, knee extension  x 10  Exercise #3: sitting hip iftihkar hip add with pillow btwn knees.  Hold 10 seconds x 5-10 reps  Comment #3: clamshells  X 20  Exercise #4: bridges:  hold 10 seconds x 6-12 reps  Comment #4: SLR  X 20  Exercise #5: sidelying hip abd:  X 20  Comment #5: UBE 3 minutes forward and 3 minutes backward  Exercise #6: 1 leg stance   work up to 30=60 seconds  Comment #6: toe tapping  8\" step,  X 20  Exercise #7: mini squat  X 20 reps  Comment #7: trampoline:  marching  X 20, 1 hand for support  Exercise #8: TG  level 19, squat  X 20  Comment #8: hurdles:  4 small and 4 medium, 2 hands " for assist, X 2 leading with each foot  Exercise #9: airex, tandem standing, each foot forward 30 seconds with no hands for support  Comment #9: green tband:  scapular row  X 20  Exercise #10: green tband: shoulder ext   X 20  Comment #10: green tband:   shoulder diagonal  X 15  Exercise #11: green tband:  shoulder ER and IR  X 15-20 reps    Treatment Today     TREATMENT MINUTES COMMENTS   Evaluation     Self-care/ Home management     Manual therapy     Neuromuscular Re-education     Therapeutic Activity     Therapeutic Exercises 52 See flow sheet or above  Pt was tired and requested to be done early   Gait training     Modality__________________                Total 52    Blank areas are intentional and mean the treatment did not include these items.       Janet Barry, PT  10/16/2018

## 2021-06-21 NOTE — PROGRESS NOTES
Date of Service: 2018     Date last seen by Dr. Alfred:  18    PCP: True Pugh MD    Impression:   1. Bilateral leg swelling-under good control  2. Lipedema  3. 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 Bilateral leg pain-stable  5. Obesity     Plan:  1.        Patient and daughter present.  Questions were answered.  Based on her exam her swelling is due to venous insufficiency with hypertension along with lipedema causing pain and swelling.  2.       Continue compression and exercise. Using body armour compression which is working well.  Discussed regular updates.   3.       Continue to work with bariatrics.   4.       To [facility] to review pool exercises as they have been very helpful in past and she needs to be updated.     5.       Follow up 1 year, or when needed.      Time spent with patient 25 minutes with greater than 50% time in consultation, education and coordination of care, excluding procedures.     ---------------------------------------------------------------------------------------------------------------------  Chief Complaint: leg aching with falls     History of Present Illness:  Dyana Nicole returns to the [facility] for her swelling felt to be due to venous insufficiency with hypertension along with lipedema with known back problems (fusion ). Venous insuffiency study was done to check venous status.  This was negative. Labs were done to check for contributing nutritional deficiencies which looked overall good. She was sent to therapy and then to get compression.  The therapy helped.  She is continuing to wear her compression and this is working well for her.  She is with her daughter.  There is no new numbness, tingling or weakness.  Bowel and bladder are stable.  There is been no unexplained weight loss, nausea, shortness of breath or chest pain.  There have been no signs of infection in  the legs.  She  is not having any significant pain in the legs now.  She would like to get back into more aggressive exercise program as she has forgotten how to do her pool exercises which worked well for her in the past.    Past Medical History:   Diagnosis Date     Anemia     Created by Conversion      Arrhythmia      Cervical Spondylosis     Created by Conversion      Depression      Disease of thyroid gland     hypo     Fibromyalgia     Created by Conversion      GERD (gastroesophageal reflux disease)      High cholesterol      History of transfusion      Hyperlipidemia     Created by Conversion      Hypertension     Created by Conversion      Near syncope      Peripheral Neuropathy     Created by Conversion      Skin cancer, basal cell      Sleep apnea     mild     Tachycardia        Past Surgical History:   Procedure Laterality Date     Bladder lift       CARPAL TUNNEL RELEASE       CATARACT EXTRACTION, BILATERAL       CHOLECYSTECTOMY       HYSTERECTOMY       JOINT REPLACEMENT Bilateral     knees     MOHS SURGERY      Nose     OOPHORECTOMY Left     1 removed, 1 remains     OR ARTHROPLASTY TIBIAL PLATEAU      Description: Knee Replacement;  Recorded: 05/16/2013;     OR INJECT NERV BLCK,JOE PERIPH NERV      Description: Peripheral Nerve Block Wrist Median Right;  Recorded: 05/16/2013;     REPLACEMENT TOTAL KNEE      L     REPLACEMENT TOTAL KNEE BILATERAL       ROTATOR CUFF REPAIR       TRUNK SKIN LESION EXCISIONAL BIOPSY Left 1/5/2018    Procedure: LEFT BUTTOCK EXPLORATION, EXCISION BUTTOCK MASS;  Surgeon: Lg Jameson MD;  Location: [facility];  Service:          Current Outpatient Medications:      aspirin 81 MG EC tablet, Take 81 mg by mouth at bedtime. , Disp: , Rfl:      budesonide (ENTOCORT EC) 3 mg 24 hr capsule, Take 3 mg by mouth every other day. As needed - pt using every other day, Disp: , Rfl:      calcium carbonate-vitamin D3 (CALCIUM 600 + D,3,) 600 mg calcium- 200 unit cap, Take 1  tablet by mouth daily., Disp: , Rfl:      celecoxib (CELEBREX) 200 MG capsule, TAKE ONE CAPSULE BY MOUTH EVERY DAY, Disp: 90 capsule, Rfl: 1     diphenoxylate-atropine (LOMOTIL) 2.5-0.025 mg per tablet, Take 1 tablet by mouth 4 (four) times a day as needed for diarrhea., Disp: , Rfl:      fluticasone (FLONASE) 50 mcg/actuation nasal spray, 2 sprays into each nostril daily as needed., Disp: 16 g, Rfl: 11     furosemide (LASIX) 20 MG tablet, Take 1 tablet (20 mg total) by mouth daily., Disp: 30 tablet, Rfl: 6     gabapentin (NEURONTIN) 100 MG capsule, Take 100 mg by mouth at bedtime., Disp: 90 capsule, Rfl: 1     KRILL OIL ORAL, Take 1,000 mg by mouth bedtime. , Disp: , Rfl:      LACTOBAC CMB #3/FOS/PANTETHINE (PROBIOTIC & ACIDOPHILUS ORAL), Take 1 capsule by mouth once daily. , Disp: , Rfl:      levothyroxine (SYNTHROID, LEVOTHROID) 50 MCG tablet, Take 50 mcg by mouth at bedtime., Disp: , Rfl:      magnesium oxide (MAGOX) 400 mg (241.3 mg magnesium) tablet, Take 1 tab on Monday, Wednesday and Friday., Disp: 90 tablet, Rfl: 1     metoprolol tartrate (LOPRESSOR) 50 MG tablet, Take 1 tablet (50 mg total) by mouth 2 (two) times a day., Disp: 180 tablet, Rfl: 1     MV-MINERALS/FA/OMEGA 3,6,9 #3 (WOMEN'S 50+ ADVANCED ORAL), Take 1 tablet by mouth daily., Disp: , Rfl:      omeprazole (PRILOSEC) 20 MG capsule, TAKE ONE CAPSULE BY MOUTH TWICE A DAY, Disp: 180 capsule, Rfl: 2     sertraline (ZOLOFT) 100 MG tablet, Take 1 tablet (100 mg total) by mouth daily., Disp: 90 tablet, Rfl: 3     simvastatin (ZOCOR) 20 MG tablet, TAKE ONE TABLET BY MOUTH AT BEDTIME, Disp: 90 tablet, Rfl: 3     traZODone (DESYREL) 50 MG tablet, Take 25 mg by mouth at bedtime., Disp: , Rfl:     Allergies   Allergen Reactions     Oxycodone      hallucinations     Penicillins Hives     Pollen      Venom-Honey Bee Swelling       Social History     Socioeconomic History     Marital status:      Spouse name: Not on file     Number of children: Not on  file     Years of education: Not on file     Highest education level: Not on file   Social Needs     Financial resource strain: Not on file     Food insecurity - worry: Not on file     Food insecurity - inability: Not on file     Transportation needs - medical: Not on file     Transportation needs - non-medical: Not on file   Occupational History     Not on file   Tobacco Use     Smoking status: Former Smoker     Packs/day: 0.25     Years: 1.00     Pack years: 0.25     Last attempt to quit: 1961     Years since quittin.9     Smokeless tobacco: Never Used   Substance and Sexual Activity     Alcohol use: No     Drug use: No     Sexual activity: No   Other Topics Concern     Not on file   Social History Narrative    Lives in senior living with  of over 50 years. 3 daughters       Family History   Problem Relation Age of Onset     Aneurysm Father         AAA     Cancer Mother         pancrease       Review of Systems:    Dyana Nicole no new numbess, tingling or weakness, redness or rashes, fevers, new masses, abdominal bloating or discomfort, unexplained weight loss, increased pain, new ulcers, shortness of breath and chest pain  Full 12 point review of systems was completed.    Imaging:    I personally reviewed the following imaging today and those on care everywhere, if indicated    Dxa Bone Density Scan    Result Date: 10/17/2018  10/16/2018 RE: Dyana Nicole YOB: 1940 Dear True Pugh, Patient Profile: 78 y.o. female, postmenopausal, is here for the follow up bone density test. History of fractures - Yes;  Wrist. Family history of osteoporosis - None.  Family history of hip fracture: None. Smoking history - No. Osteoporosis treatment past -  Yes;  HRT. Osteoporosis treatment current - No.  Chronic medical problems - Chronic low back problems, Inflammatory bowel disease and Spine surgery. High risk medications -  Thyroid;  Yes, Currently. Assessment: 1. The spine bone density  L1-L4 was not done because of the significant artifacts. 2. Femoral bone densities show left femoral neck T- score -0.3 and right femoral neck T-score -0.8, stable compared to 2013. 3. Left forearm bone density with T-score -1.2. 78 y.o. female with LOW BONE DENSITY (OSTEOPENIA), based on the lowest T-score in the forearm. Recommendations: Appropriate calcium, vitamin D supplements, along with balance and weight bearing exercise recommended with follow up bone density scan in 2 years. Bone densitometry was performed on your patient using our ServiceNow iDXA densitometer. The results are summarized and a copy of the actual scans are included for your review. In conformity with the International Society of Clinical Densitometry's most recent position statement for DXA interpretation (2015), the diagnosis will be made on the lowest measured T-score of the lumbar spine, femoral neck, total proximal femur or 33% radius. Note the change in terminology for diagnostic classification from OSTEOPENIA to LOW BONE MASS. All trending for sequential exams will be done using multiple vertebrae or the total proximal femur. Fracture risk is based on the WHO Fracture Risk Assessment Tool (FRAX). If additional information is needed or if you would like to discuss the results, please do not hesitate to call me. Thank you for referring this patient to [facility]. We are happy to be of service in support of you and your practice. If you have any questions or suggestions to improve our service, please call me at [phone]. Sincerely, Phi Palmer M.D. DUYENCTESS. [facility]opor[facility], [facility]     Mammo Screening Bilateral    Result Date: 10/16/2018  BILATERAL FULL FIELD DIGITAL SCREENING MAMMOGRAM Performed on: 10/16/18 Compared to: 05/11/2015 Mammo Screening Bilateral, and 05/09/2014 Mammo Screening Bilateral Findings: The breasts are heterogeneously dense, which may obscure small masses. There is no  radiographic evidence of malignancy. This study was evaluated with the assistance of Computer-Aided Detection. Continue routine screening mammogram as recommended. ACR BI-RADS Category 1: Negative     OUTPATIENT  5/24/2018 10:20 AM     EXAM: BILATERAL LOWER EXTREMITY DEEP AND SUPERFICIAL VENOUS DUPLEX ULTRASOUND WITH PHYSIOLOGIC TESTING     INDICATION: Symptomatic varicose veins. Assess for incompetent veins.     TECHNIQUE: Supine and upright ultrasound of the deep and superficial veins with Valsalva and compression augmentation maneuvers. Duplex imaging is performed utilizing gray-scale, two-dimensional images, color-flow imaging, Doppler waveform analysis, and   spectral Doppler imaging.      INCOMPETENCY CRITERIA: Deep vein reflux reported when greater than 1,000 ms flow reversal.  Superficial vein reflux reported when greater than 500 ms flow reversal.  vein reflux reported as greater than 350 ms flow reversal.     DEEP VEIN FINDINGS:     RIGHT LEG: The common femoral, profunda femoral, femoral, popliteal, and visualized calf veins are patent and compressible.     LEFT LEG:  The common femoral, profunda femoral, femoral, popliteal, and visualized calf veins are patent and compressible.      RIGHT SUPERFICIAL VEIN FINDINGS:  GREAT SAPHENOUS VEIN: Competent from the saphenofemoral junction to the mid calf.     SMALL SAPHENOUS VEIN: Competent from the saphenopopliteal junction to the mid calf.     No incompetent perforating veins or abnormal accessory veins identified.     LEFT SUPERFICIAL VEIN FINDINGS:  GREAT SAPHENOUS VEIN: Competent from the saphenofemoral junction to the mid calf.     SMALL SAPHENOUS VEIN: Competent from the saphenopopliteal junction to the mid calf.     No incompetent perforating veins or abnormal accessory veins identified.     IMPRESSION:   CONCLUSION:   1.  No deep venous thrombosis of either lower extremity.  2.  RIGHT LEG: The right superficial venous system is patent, without  incompetence.  3.  LEFT LEG: The left superficial venous system is patent, without incompetence.  EXAM DATE:         05/31/2018     [facility]  MRI LUMBAR SPINE W/O CONTRAST  5/31/2018 9:15 AM     INDICATION: Bilateral leg weakness.  TECHNIQUE: Without IV contrast.  CONTRAST: None.  COMPARISON: CT 12/2/2015. MRI 8/14/2013.     FINDINGS:     Nomenclature presumes 5 lumbar type vertebral bodies. The most caudal surgically  treated intervertebral disc levels will be designated L4-L5 consistent with the  nomenclature used on 12/2/2015.     There are postoperative changes of the lumbar spine which include anterior  interbody fusion between L2-L3 and L4-L5. There are buttressing anterior screws  at these levels. There has also been posterior fusion from L2-L5 with bilateral  iliac extension. There is no L5 pedicle screw on the right.     Vertebral body heights are unremarkable. The imaged portions of the bony pelvis  appear intact. Marrow signal is heterogeneous, but without specific evidence of  a marrow replacing process. No definite evidence of osseous edema is  demonstrated.     The conus tip is identified at T12-L1. No signal abnormalities of the cord or  conus are demonstrated.     There is mild L1-L2 retrolisthesis, which is new from prior. There is also  anterior/inferior height loss of the L1 vertebral body, which is new from prior.  No evidence of superimposed edema to suggest an acute process.     Postoperative changes are noted in the paraspinal soft tissues. There is a  partially imaged T2 hyperintense/T1 hypointense 7.0 cm subcutaneous fluid  collection in the left gluteal region. No abdominal aortic aneurysm is  demonstrated.     T12-L1: Normal disc height and signal. No herniation. No facet arthropathy. No  spinal canal stenosis. No right neural foraminal stenosis. No left neural  foraminal stenosis.     L1-L2: Moderate severe intervertebral disc height loss, particularly  anteriorly.  There is a dorsal disc osteophyte complex. Mild facet arthropathy. Mild spinal  canal stenosis. Mild right neural foraminal stenosis. Mild left neural foraminal  stenosis.     L2-L3: Anterior interbody fusion has been performed. No significant posterior  disc abnormality. Mild to moderate facet arthropathy. Mild spinal canal  stenosis. No right neural foraminal stenosis. No left neural foraminal stenosis.  There is bilateral lateral recess narrowing.     L3-L4: Anterior interbody fusion has been performed. No posterior disc  abnormality. Ligamentum flavum thickening and moderate facet arthropathy. No  significant spinal canal stenosis. No right neural foraminal stenosis. No left  neural foraminal stenosis.     L4-L5: Anterior interbody fusion without posterior disc abnormality. Moderate  left and mild right facet arthropathy. No significant spinal canal stenosis. On  the right, there is a small synovial cyst or disc herniation that effaces the  right lateral recess and contributes to mild right neural foraminal stenosis. No  significant left neural foraminal stenosis.     L5-S1: Normal disc height and signal. No herniation. No facet arthropathy. No  spinal canal stenosis. No right neural foraminal stenosis. No left neural  foraminal stenosis.     CONCLUSION:  1.  Postoperative changes of anterior and posterior fusion from L2-L5. There is  bilateral iliac extension of the posterior fusion. Please note there is  transitional lumbosacral anatomy and careful confirmation of the targeted levels  is recommended before any planned intervention.  2.  Unremarkable appearance of the imaged cord and cauda equina nerve roots. No  sites of high-grade spinal canal stenosis are demonstrated.  3.  No sites of compressive neural foraminal stenosis demonstrated.  4.  At L4-L5, there is a small synovial cyst or disc herniation that effaces the  right lateral recess and contributes to mild right neural foraminal  stenosis.  5.  Marked interval height loss of the L1-L2 vertebral disc space and  anteroinferior L1 vertebral body, without edema to specifically suggest an acute  process. There is new mild retrolisthesis and focal angular kyphosis at this  level. Mild spinal canal stenosis.  6.  Partially imaged 7 cm fluid collection in the subcutaneous fat overlying the  left sacroiliac joint/gluteal region    Labs:    I personally reviewed the following labs today and those on care everywhere, if indicated    Lab Results   Component Value Date    SEDRATE 26 (H) 05/17/2018         Lab Results   Component Value Date    CRP 0.2 05/17/2018           Lab Results   Component Value Date    CREATININE 0.77 09/20/2018      Lab Results   Component Value Date    HGBA1C 5.4 09/20/2018           Lab Results   Component Value Date    BUN 29 (H) 09/20/2018              Lab Results   Component Value Date    ALBUMIN 4.0 09/20/2018       Vitamin D, Total (25-Hydroxy)   Date Value Ref Range Status   09/20/2018 43.1 30.0 - 80.0 ng/mL Final       Lab Results   Component Value Date    TSH 1.93 09/20/2018     Lab Results   Component Value Date    WBC 8.1 06/07/2018    HGB 12.5 06/07/2018    HCT 36.7 06/07/2018    MCV 91 06/07/2018     06/07/2018         Physical Exam:  Vitals:    11/26/18 0938   BP: 110/58   Pulse: 62   Resp: 16   Temp: 98.1  F (36.7  C)      BMI 34.00 (decreased)    Circumferential measures:    Vasc Edema 5/17/2018 7/19/2018 11/26/2018   Right just above MTP 21 21.1 21.1   Right Ankle 26.5 25.5 26.2   Right Widest Calf 49.5 47 48.6   Right Thigh Up 10cm 57.0 55 54.7   Left - just above MTP 21.0 22.3 21.2   Left Ankle 26.0 25 26   Left Widest Calf 49.5 47.8 48.8   Left Thigh Up 10cm 55.0 54 54.2     Circumferential measures have decreased greatly.    General:  78 y.o. female in no apparent distress.      Psych: Alert and oriented x 3.  Cooperative. Affect normal.    Musculoskeletal:  Normal range of motion in  knees and ankles  bilaterally throughout .  There is no active joint synovitis, erythema, swelling or joint laxity.       Neurological:  Sensation is intact to pin prick and light touch in both legs.  Strength testing is normal in hip flexion, knee flexion, knee extension, ankle dorsiflexion and great toe extension bilaterally.       Vascular: Dorsalis pedis and posterior tibialis pulses are strong and equal bilaterally. There are significant telangietasias, medial ankle venous flares, venous varicosities  and spider veins.     Integumentary: Skin of the legs is uniformly warm and dry and with negative Stemmer's Sign.  Nails are normal.  She has a pear shaped body with ledge at the ankles and feet spared.  There is pain to palpation of the proximal thighs bilaterally.    Sheela Alfred MD, ABWMS, FACCWS, [facility]  Medical Director Wound Care and Lymphedema  [facility]  [phone]

## 2021-06-21 NOTE — PROGRESS NOTES
Request for PT in [place], external referral placed, can follow up if able on her own while in [place].  Referral mailed to patient.  Parveen Hopkins MD

## 2021-06-21 NOTE — PROGRESS NOTES
Non-surgical Weight Loss Initial Diet Evaluation     Assessment:  Pt is a 78 y.o. female being seen today for non-surgical RD nutritional evaluation. Today we reviewed current eating habits and level of physical activity, and instructed on the changes that are required for successful weight loss outcomes.    Personal Goals: Pt would like to improve walking and use walker less especially on trip next November to [place]. She is working on falling lese   -life currently more challenging as her  is recently diagnosed with early dementia - looking into support groups     Pt Active Problem List Diagnosis:  Patient Active Problem List   Diagnosis     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     Insomnia     Heartburn     Arthritis     Cervical Spondylosis     Generalized Osteoarthritis Of The Hand     Lumbar Spondylosis     Trochanteric Bursitis     Lower Back Pain     Osteopenia     Colitis     Fibromyalgia     Anemia     Hypertension     Orthostatic Hypotension     Dizziness     Recurrent Major Depression In Partial Remission     Hyperlipidemia     Peripheral Neuropathy     Walk Is Wobbly Or Unsteady (Ataxia)     Unable To Restrain Bowel Movement     Hypothyroidism     Obstructive Sleep Apnea     Arthralgia     Generalized anxiety disorder     Major depression     Exertional dyspnea     Pituitary mass (H)     Essential hypertension with goal blood pressure less than 140/90     Rapid palpitations     Leg swelling     Pain in both lower extremities     Venous insufficiency of both lower extremities     Venous hypertension of both lower extremities     Fat deposits     Class 2 obesity due to excess calories in adult     Vitamin D deficiency     Fatigue due to excessive exertion     Adrenal insufficiency (H)     Leg weakness, bilateral     Dysphagia     Ptosis     Lymphedema     Obesity (BMI 35.0-39.9) with comorbidity (H)     Atrial tachycardia (H)     Left ventricular outflow obstruction     Hypertensive  "left ventricular hypertrophy, without heart failure     Pt's Initial Weight: 195 lbs  Weight: 190 lb (86.2 kg)  Weight loss from initial: 5  % Weight loss: 2.56 %  BMI: Body mass index is 34.2 kg/(m^2).  IBW: 113 lbs    Estimated RMR (Dunbar-St Jeor equation): 1314 calories  Protein requirements (.5grams to .9grams per pound IBW, 20-30% of calories, minimum of 60-80gm per day):   grams     Vitamins/Mineral Supplementation: in chart    Biggest struggle with weight loss:yo-yo dieting whole life and never learned \"how to eat\"    Who does the grocery shopping for your household? Self  Who prepares your meals at home? Self    Diet Recall/Time:   Pt is tracking bfast, lunch, dinner and snacks. She is eating more protein at meals    +working on continuing variety to prevent boredom      Protein: 60-90g    Recommended limiting eating out to no more than 2x/week.  Patient and I reviewed the importance of eating three consistent meals per day; as well as meal timing to be spaced 4-5 hours apart.  Snack choices: 100-150 calories (1-2x/day if physically hungry), incorporating a fruit/vegetable w/ protein source.    Portion Sizes problematic? no per patient/diet recall  Encouraged slowing meal times down, 20-30 minutes, chewing to applesauce consistency.   To aid in proper portion control and slow meal time down discussed consuming meals off smaller plates, use toddler/children utensils and set utensils down after each bite.    Protein, vegetables/fruits, carbohydrates:   Reviewed lean protein sources today. Recommended consuming 20gm protein at 3 meals daily.  The patient and I discussed the importance of including lean/low fat protein at each meal and limiting carbohydrate intake to less than 25% of plate volume.     Beverages (Type/Oz. per day)  Water: 60oz    Discussed the importance of adequate hydration and the goal of 64+ oz of fluid daily.   The patient understands the importance of avoiding all alcoholic and " sweetened drinks, and instead choosing 64 oz plain water.    Exercise  Starting to walk more around complex    Pt's understands that 45-60 minutes of daily activity is an important part of weight loss success.   Encouraged pt to incorporate upper body strength training exercise, even if its lifting soup cans while watching tv at night, doing push ups/sit-ups, and abdominal work.    PES statement:    1. (NI-1.3)Excessive energy intake related to Food and nutrition related knowledge deficit concerning excessive energy/oral intake as evidenced by Intake of high caloric density foods at meals and/or snacks; large portions; frequent grazing; Estimated intake that exceeds estimated daily energy intake;Binge eating patterns and BMI 34.2    2. (NC-3.3.5) Obese, class III, BMI ?40 related to physical inactivity as evidenced by Infrequent, low-duration and or low intensity physical activity; and Large amounts of sedentary activities; no structured physical activity regimen    Intervention  Discussion:  1. Educated pt on Eat Better, Move More, Live Well: Non-surgical Weight Loss Handout  2. Recommended to consume 20gm protein at 3 meals daily.  grams daily total.  3. Plate Method: The patient and I discussed the importance of including lean/low  fat protein at each meal and limiting carbohydrate intake to less  than 25% of plate volume.  Instructions/Goals:   1. Include 20gm protein at each meal.  2. Increase vegetable/fruit intake, by having a vegetable or fruit with each meal daily. Recommended pt to increase vegetable/fruit intake to 4-5 servings daily.  3. Increase fluid intake to 64oz daily: choose plain or calorie/alcohol-free beverages.  4. Incorporate daily structured activity, 45-60 minutes most days of the week  5. Read food labels more consistently: keeping total fat grams <10, total sugar grams <10, fiber >3gm per serving.  6. Practice plate method: 1/2 plate lean/low fat protein source, vegetable/fruit, <25%  of plate complex carbohydrates.  7. Practice eating off of smaller plates/bowls, chewing to applesauce consistency, taking 20-30 minutes to eat in a calm/relaxed environment without distractions of tv/email/cell phone.    Handouts Provided:  Eat Better, Move More, Live Well: Non-surgical Weight Loss Handout  Plate Method  Protein Supplement List    Monitor/Evaluation:    Pt will f/u in one month with bariatrician, and f/u in two months with RD.    Time In: 11:00p  Time Out: 11:45a      ABN signed: Yes

## 2021-06-22 NOTE — TELEPHONE ENCOUNTER
----- Message -----  From: Pk Garcia DO  Sent: 1/9/2019   1:42 PM  To: Zan Coburn RN    Can continue to hold metoprolol at this time.

## 2021-06-22 NOTE — TELEPHONE ENCOUNTER
Please call the patient to schedule for a follow up visit to review her sleep study in 2 weeks. Thank you.

## 2021-06-22 NOTE — TELEPHONE ENCOUNTER
Called and discussed with the patient. Patient reports that her blood pressure is fluctuating but  Lower blood pressure is improved. Since holding Metoprolol, lowest blood pressure has been 98/58, higher blood pressure during day yesterday 146/75. No dizziness or lightheadedness. Denies palpitations, has some shortness of breath but hard to tell if related to low hemoglobin. Patient reports that she will see Dr. Pugh this Friday 1/4/18 at 1440. Patient will call back if any further questions. Pt verbalized concerns that she is leaving for [place] on Sunday 1/6. RA,RN

## 2021-06-22 NOTE — TELEPHONE ENCOUNTER
Dr. Noonan    I got [facility] set up for a Sleep Study on 1/4/19, but she leaves 1/6/19 to 4/10/19. Here is her [place] information to call when you have the results of her Sleep Study in:    [address] [address] phone number- [phone]    Thank you.

## 2021-06-22 NOTE — TELEPHONE ENCOUNTER
Dr. Noonan    I called Park and she said that she is already in [place]. She said that you could give her a call to review her Sleep Study on Thursday (1/10/19) from 8am-10am. You can give her a call at [phone]. Thank you.

## 2021-06-22 NOTE — PROGRESS NOTES
"TCM DISCHARGE FOLLOW UP CALL    Discharge Date:  12/30/2018  Reason for hospital stay (discharge diagnosis)::  GI Bleed, UTI  Are you feeling better, the same or worse since your discharge?:  Patient is feeling better (Feels stronger since home.  No bleeding, had a BM since home & \"stool was solid initially & softer towards the end.\"  No abdominal pain or fevers.  Feels tired when up for any length of time, O2 sats \"went down to 83%, P 88, & /75.\" Keeps a log)  Do you feel like you have a plan in the event of a health emergency?: Yes (, otherwise calls her daughter.  RN informed pt of 24/7 nurse triage)    As part of your discharge plan, were  home care services ordered for you?: No    Did you receive any new medications, or was there a change to your medications?: Yes    Are you taking those medications, or do you have any established regiment?:  RN reviewed new/changed d/c medications w/pt from d/c paperwork.  Pt is taking levofloxacin as prescribed, has one tablet left, & confirmed stopping Celebrex as instructed.  Pt asked about taking iron for low hemoglobin; RN advised discussing w/Dr. Pugh at upcoming appt as to whether or not to take iron supplements, and if so how much should she take.  Pt verbalized understanding & agrees w/plan.  Do you have any follow up visits scheduled with your PCP or Specialist?:  Yes, with PCP and Yes, with Specialist (Dr. Pugh 1/4/19)  (RN) Is PCP appt scheduled soon enough (within 14 days of discharge date)?: Yes    Who are you seeing and when is it scheduled?:  Dr. Noonan (Sleep Med) 1/3/19  RN NOTES::  Bring log of BP, HR, & O2 sats to appt 1/4/19.  RN advised pt rest when tired, and if O2 sats do not return to normal (>92%) after resting a few minutes she needs to be seen.  Continue monitoring BP, HR, & O2 sats & bring log to appt 1/4/19.  RN advised eating iron rich foods such as dark green leafy vegetables, lean proteins.      Michelle Fermin RN Care " Manager, Population Health

## 2021-06-22 NOTE — TELEPHONE ENCOUNTER
===View-only below this line===    ----- Message -----  From: Pk Garcia DO  Sent: 12/31/2018   4:25 PM  To: Zan Coburn RN    Agree with PMD regarding holding metoprolol at this time. We may need to restart in future at lower dose (25 mg two times a day) if palpitations return.     Thanks,     Wero

## 2021-06-22 NOTE — PROGRESS NOTES
Dear Dr. Pugh, [facility]  [address] [facility] [address]  [place] [address]    Thank you for the opportunity to participate in the care of . Dyana Nicole.    She is a 78 y.o. female who comes to the clinic for the transfer of care of her obstructive sleep apnea.  The patient's last sleep study was a home sleep study performed here at [facility] on 0 9/01/15.  She was found to have an apnea hypopnea index of 7.5 events per hour with the lowest O2 sat of 79%.  Her sleep physician at that time offered her the option of nonintervention which she agreed to since she did not want CPAP therapy.  However during a recent hospitalization last Sunday for GI bleed, she was found to have cyclical desaturations on monitoring with the lowest O2 sat of 69%.  Upon further questioning she admits that she continues to suffer from excessive daytime sleepiness since her back surgery in 2015.  Her  continues to witness her having snoring and pauses in her breathing during sleep.  She would be interested in resuming pressure therapy if indicated.  She is more interested to see if she qualifies for oxygen bleeding in.  Unfortunately the patient is about to leave for her vacation home in [place] this Sunday.  The patient's review of systems is otherwise significant for weight fluctuations, visual changes, hearing changes as well as tachycardia.  She will address all these issues with her primary care doctor on the upcoming visit.     Past Medical History  Past Medical History:   Diagnosis Date     Anemia     Created by Conversion      Arrhythmia      Cervical Spondylosis     Created by Conversion      Depression      Disease of thyroid gland     hypo     Fibromyalgia     Created by Conversion      GERD (gastroesophageal reflux disease)      High cholesterol      History of transfusion      Hyperlipidemia     Created by Conversion      Hypertension     Created by Conversion      Near syncope      Peripheral Neuropathy      Created by Conversion      Skin cancer, basal cell      Sleep apnea     mild     Tachycardia         Past Surgical History  Past Surgical History:   Procedure Laterality Date     Bladder lift       CARPAL TUNNEL RELEASE       CATARACT EXTRACTION, BILATERAL       CHOLECYSTECTOMY       HYSTERECTOMY       JOINT REPLACEMENT Bilateral     knees     MOHS SURGERY      Nose     OOPHORECTOMY Left     1 removed, 1 remains     WA ARTHROPLASTY TIBIAL PLATEAU      Description: Knee Replacement;  Recorded: 05/16/2013;     WA INJECT NERV BLCK,OTHR PERIPH NERV      Description: Peripheral Nerve Block Wrist Median Right;  Recorded: 05/16/2013;     REPLACEMENT TOTAL KNEE      L     REPLACEMENT TOTAL KNEE BILATERAL       ROTATOR CUFF REPAIR       TRUNK SKIN LESION EXCISIONAL BIOPSY Left 1/5/2018    Procedure: LEFT BUTTOCK EXPLORATION, EXCISION BUTTOCK MASS;  Surgeon: Lg Jameson MD;  Location: [facility];  Service:         Meds  Current Outpatient Medications   Medication Sig Dispense Refill     aspirin 81 MG EC tablet Take 81 mg by mouth at bedtime.        biotin 5 mg Tab Take 5,000 mcg by mouth daily.       calcium carbonate-vitamin D3 (CALCIUM 600 + D,3,) 600 mg calcium- 200 unit cap Take 1 tablet by mouth 2 (two) times a day.              estrogens, conjugated, (PREMARIN) 0.3 MG tablet Take 1 tablet (0.3 mg total) by mouth daily. 90 tablet 3     fluticasone (FLONASE) 50 mcg/actuation nasal spray 2 sprays into each nostril daily as needed. 16 g 11     furosemide (LASIX) 20 MG tablet Take 1 tablet (20 mg total) by mouth daily. 90 tablet 2     KRILL OIL ORAL Take 300 mg by mouth at bedtime.              LACTOBAC CMB #3/FOS/PANTETHINE (PROBIOTIC & ACIDOPHILUS ORAL) Take 1 capsule by mouth once daily. 1.5 billion units             levothyroxine (SYNTHROID, LEVOTHROID) 50 MCG tablet Take 50 mcg by mouth Daily at 6:00 am.              magnesium oxide (MAGOX) 400 mg (241.3 mg magnesium) tablet Take 1 tab on Monday, Wednesday  and Friday. 90 tablet 1     metoprolol tartrate (LOPRESSOR) 50 MG tablet Take 1 tablet (50 mg total) by mouth 2 (two) times a day. 180 tablet 1     multivitamin with minerals (THERA-M) 9 mg iron-400 mcg Tab tablet Take 1 tablet by mouth every evening.       omeprazole (PRILOSEC) 20 MG capsule TAKE ONE CAPSULE BY MOUTH TWICE A  capsule 2     sertraline (ZOLOFT) 100 MG tablet Take 1 tablet (100 mg total) by mouth daily. 90 tablet 3     simvastatin (ZOCOR) 20 MG tablet TAKE ONE TABLET BY MOUTH AT BEDTIME 90 tablet 3     solifenacin (VESICARE) 5 MG tablet Take 5 mg by mouth every evening. 7PM             traZODone (DESYREL) 50 MG tablet Take 25 mg by mouth at bedtime.       No current facility-administered medications for this visit.         Allergies  Oxycodone; Penicillins; Pollen; and Venom-honey bee     Social History  Social History     Socioeconomic History     Marital status:      Spouse name: Not on file     Number of children: Not on file     Years of education: Not on file     Highest education level: Not on file   Social Needs     Financial resource strain: Not on file     Food insecurity - worry: Not on file     Food insecurity - inability: Not on file     Transportation needs - medical: Not on file     Transportation needs - non-medical: Not on file   Occupational History     Not on file   Tobacco Use     Smoking status: Former Smoker     Packs/day: 0.25     Years: 1.00     Pack years: 0.25     Last attempt to quit: 1961     Years since quittin.0     Smokeless tobacco: Never Used   Substance and Sexual Activity     Alcohol use: No     Drug use: No     Sexual activity: No   Other Topics Concern     Not on file   Social History Narrative    Lives in senior living with  of over 50 years. 3 daughters        Family History  Family History   Problem Relation Age of Onset     Aneurysm Father         AAA     Cancer Mother         pancrease        Review of Systems:  Constitutional:  Negative except as noted in HPI.   Eyes: Negative except as noted in HPI.   ENT: Negative except as noted in HPI.   Cardiovascular: Negative except as noted in HPI.   Respiratory: Negative except as noted in HPI.   Gastrointestinal: Negative except as noted in HPI.   Genitourinary: Negative except as noted in HPI.   Musculoskeletal: Negative except as noted in HPI.   Integumentary: Negative except as noted in HPI.   Neurological: Negative except as noted in HPI.   Psychiatric: Negative except as noted in HPI.   Endocrine: Negative except as noted in HPI.   Hematologic/Lymphatic: Negative except as noted in HPI.      STOP BANG 8/12/2015 1/3/2019   Do you snore loudly (louder than talking or loud enough to be heard through closed doors)? 0 1   Do you often feel tired, fatigued, or sleepy during daytime? 0 1   Has anyone observed you stop breathing in your sleep? 0 0   Do you have or are you being treated for high blood pressure? 0 1   BMI more than 35 kg/m2 1 1   Age over 50 years old? 1 1   Neck circumference greater than 16 inches? 0 0   Gender male? 0 0   Total Score 2 5     Epworths Sleepiness Scale 8/12/2015 1/3/2019   Sitting and reading 0 1   Watching TV 0 0   Sitting, inactive in a public place (e.g. a theatre or a meeting) 0 0   As a passenger in a car for an hour without a break 0 1   Lying down to rest in the afternoon when circumstances permit 0 3   Sitting and talking to someone 0 0   Sitting quietly after a lunch without alcohol 0 1   In a car, while stopped for a few minutes in traffic 0 0   Total score 0 6     Rooming 8/12/2015 1/3/2019   Usual bedtime 10:00pm 10pm   Sleep Latency 30 min 15-20 minutes   Awakenings 3 several   Wake Up Time 7:00am 8am   Weekends - same   Energy Drinks 0 -   Coffee 2 cups -   Cola 16oz -   Difficulty falling asleep No No   Difficulty staying asleep Yes Yes   Excessive daytime tiredness No Yes   Excessive daytime sleepiness No No   Dozing off while driving No No   Shift  "Worker No No   Sleep Walking? No No   Sleep Talking? No Yes   Kicking or punching? No No   Restless legs symptoms No No       Physical Exam:  /64   Pulse (!) 112   Ht 5' 2\" (1.575 m)   Wt 187 lb (84.8 kg)   SpO2 97%   BMI 34.20 kg/m    BMI:Body mass index is 34.2 kg/m .   GEN: NAD, obese  Head: Normocephalic.  EYES: PERRLA, EOMI  ENT: Oropharynx is clear, mallampatti class 4+ airway.   Nasal mucosa is moist without erythema  Neck : Thyroid is within normal limits.  CV: Regular rate and rhythm, S1 & S2 positive.  LUNGS: Bilateral breathsounds heard.   ABDOMEN: Positive bowel sounds in all quadrants, soft, no rebound or guarding  MUSCULOSKELETAL: Bilateral trace leg swelling  SKIN: warm, dry, no rashes  Neurological: Alert, oriented to time, place, and person.  Psych: normal mood, normal affect     Labs/Studies:     Lab Results   Component Value Date    WBC 9.6 12/27/2018    HGB 8.4 (L) 12/30/2018    HCT 29.0 (L) 12/27/2018    MCV 97 12/27/2018     12/27/2018         Chemistry        Component Value Date/Time     12/27/2018 1414    K 3.8 12/29/2018 0451     12/27/2018 1414    CO2 23 12/27/2018 1414    BUN 21 12/27/2018 1414    CREATININE 0.71 12/27/2018 1414     12/27/2018 1414        Component Value Date/Time    CALCIUM 7.8 (L) 12/27/2018 1414    ALKPHOS 61 12/27/2018 1414    AST 23 12/27/2018 1414    ALT 17 12/27/2018 1414    BILITOT 0.4 12/27/2018 1414            No results found for: FERRITIN  Lab Results   Component Value Date    TSH 1.93 09/20/2018         Assessment and Plan:  In summary Dyana Nicole is a 78 y.o. year old female here for transfer of care.  1.  Obstructive sleep apnea/sleep-related hypoxia  I educated the patient on the underlying pathophysiology of obstructive sleep apnea.  We discussed the path forward including home CPAP titration versus in lab titration.  Due to the fact that she wishes to be evaluated to see if she qualifies for oxygen bleeding in to " CPAP, I strongly recommend that we proceed with an in lab titration study.  After we have obtained the study we would be happy to mail the results of the study to her in [place] where we can determine if she wishes to use the DME here in [place] or [place].  2.  Other sleep disturbance    Patient verbalized understanding of these issues, agrees with the plan and all questions were answered today. Patient was given an opportuntity to voice any other symptoms or concerns not listed above. Patient did not have any other symptoms or concerns.      Strongly advised the patient to bring her CPAP machine with her on the next clinical visit with me.     Markos Noonan DO  Board Certified in Internal Medicine and Sleep Medicine  [facility].    (Note created with Dragon voice recognition and unintended spelling errors and word substitutions may occur)

## 2021-06-22 NOTE — PROGRESS NOTES
Assessment/Plan:    1. Anemia  Transitional care management completed.  Medication reconciliation as noted below.  Repeat CBC with hemoglobin decreasing slightly from 8.4 down to 8.1.  Has not had any recurrent concerns for bright red blood per rectum and question of diverticular bleed etiology versus post polypectomy bleed from recent colonoscopy December 18, 2018.  Patient will utilize ferrous sulfate 325 mg twice daily plus vitamin C 500 mg twice daily and reassess with CBC in next 1-2 weeks to ensure stable or desired improvement continue to follow until anemia resolves.  Due to persistent anemia and blood pressure 118/60 will remain off metoprolol tartrate 50 mg twice daily for additional 2 weeks then resume half pill twice daily times 2 weeks then 1 tablet twice daily.  - HM2(CBC w/o Differential)    2. Snoring  With history of obstructive sleep apnea, mild and prior CPAP use for complete consultation yesterday and has sleep study this evening to determine if needing to resume CPAP due to nocturnal desaturation.    3. Lower GI bleed  As above, lower GI bleed with history of microscopic colitis.  No recurrent concerns for lower GI bleed.  We will continue to monitor closely.  Remains off budesonide.    4. JANICE on CPAP  As above, repeat sleep study this evening to determine if need to resume CPAP with nocturnal desaturations present.    5. Acute cystitis without hematuria  Noted cystitis with Klebsiella pneumoniae and is completing Levaquin currently.    6. Recurrent major depressive disorder, remission status unspecified (H)  Did discuss potential concerns for diarrhea with sertraline 100 mg daily.  Patient's daughter Ivy is hesitant to have her stop medication at this time or change medication prior to returning to [place].  Patient will return in April for reassessment otherwise would consider change to escitalopram 20 mg daily with less GI side effects.    7. Microscopic colitis, unspecified microscopic  colitis type  Okay to remain off budesonide with recent colon biopsies without evidence for microscopic colitis.    40 minutes total time with patient, > 50% with counseling and coordination of cares.         Subjective:    Dyana Nicole is seen today for hospital follow-up.  Transitional care management.  Medication reconciliation as noted below.  Noted rectal bleeding December 26, 2018.  Painless.  No history of similar concern.  Had 2 significant bloody bowel movements with presyncopal type symptoms associated with this.  Presented to [facility] emergency department December 26, 2018 for further evaluation.  Lower GI bleed.  Question of diverticular etiology.  Subsequently transferred to [facility] December 27, 2018 through December 30, 2018 where she completed tagged red blood cell study..  Described vasovagal syncopal episode while in bathroom at [facility] before transfer to [facility].  Prior hemoglobin closer to 12 with hemoglobin cristel 7.8 on December 29, 2018 then 8.4 at time of discharge December 30, 2018.  take red blood cell study did not show any evidence for active bleeding.  Patient had recent colonoscopy December 18, 2018 with described small polyp biopsy x2 without evidence for ongoing microscopic colitis.  Since hospital discharge has not had any recurrent bright red blood per rectum.  States bowel movement this morning was normal and color and consistency.  No longer on budesonide since hospitalization.  Celebrex was held initially due to gastrointestinal bleed.  Has been using Tylenol arthritis recently.  This seems to be helping manage pain involving knees, hands and hip.  Does not feel that she will need to restart Celebrex.  Was noted also to have urinary tract infection with Klebsiella pneumoniae.  Treated with Levaquin.  Antibiotic sensitivities confirm appropriate antibiotic choice.  No dysuria urgency or frequency.  No back pain.  No abdominal distention.  Due to  desaturations during hospitalization was recommended to have follow-up sleep study.  History of obstructive sleep apnea with prior CPAP but due to mild symptoms discontinued subsequently.  Did have consult yesterday and has sleep study scheduled for this evening.  Not using iron supplement at this time.        Past Surgical History:   Procedure Laterality Date     Bladder lift       CARPAL TUNNEL RELEASE       CATARACT EXTRACTION, BILATERAL       CHOLECYSTECTOMY       HYSTERECTOMY       JOINT REPLACEMENT Bilateral     knees     MOHS SURGERY      Nose     OOPHORECTOMY Left     1 removed, 1 remains     PA ARTHROPLASTY TIBIAL PLATEAU      Description: Knee Replacement;  Recorded: 05/16/2013;     PA INJECT NERV BLCK,OTHR PERIPH NERV      Description: Peripheral Nerve Block Wrist Median Right;  Recorded: 05/16/2013;     REPLACEMENT TOTAL KNEE      L     REPLACEMENT TOTAL KNEE BILATERAL       ROTATOR CUFF REPAIR       TRUNK SKIN LESION EXCISIONAL BIOPSY Left 1/5/2018    Procedure: LEFT BUTTOCK EXPLORATION, EXCISION BUTTOCK MASS;  Surgeon: Lg Jameson MD;  Location: [facility];  Service:         Family History   Problem Relation Age of Onset     Aneurysm Father         AAA     Cancer Mother         pancrease        Past Medical History:   Diagnosis Date     Anemia     Created by Conversion      Arrhythmia      Cervical Spondylosis     Created by Conversion      Depression      Disease of thyroid gland     hypo     Fibromyalgia     Created by Conversion      GERD (gastroesophageal reflux disease)      High cholesterol      History of transfusion      Hyperlipidemia     Created by Conversion      Hypertension     Created by Conversion      Near syncope      Peripheral Neuropathy     Created by Conversion      Skin cancer, basal cell      Sleep apnea     mild     Tachycardia         Social History     Tobacco Use     Smoking status: Former Smoker     Packs/day: 0.25     Years: 1.00     Pack years: 0.25     Last  attempt to quit: 1961     Years since quittin.0     Smokeless tobacco: Never Used   Substance Use Topics     Alcohol use: No     Drug use: No        Current Outpatient Medications   Medication Sig Dispense Refill     aspirin 81 MG EC tablet Take 81 mg by mouth at bedtime.        biotin 5 mg Tab Take 5,000 mcg by mouth daily.       calcium carbonate-vitamin D3 (CALCIUM 600 + D,3,) 600 mg calcium- 200 unit cap Take 1 tablet by mouth 2 (two) times a day.              estrogens, conjugated, (PREMARIN) 0.3 MG tablet Take 1 tablet (0.3 mg total) by mouth daily. 90 tablet 3     fluticasone (FLONASE) 50 mcg/actuation nasal spray 2 sprays into each nostril daily as needed. 16 g 11     furosemide (LASIX) 20 MG tablet Take 1 tablet (20 mg total) by mouth daily. 90 tablet 2     KRILL OIL ORAL Take 300 mg by mouth at bedtime.              LACTOBAC CMB #3/FOS/PANTETHINE (PROBIOTIC & ACIDOPHILUS ORAL) Take 1 capsule by mouth once daily. 1.5 billion units             levothyroxine (SYNTHROID, LEVOTHROID) 50 MCG tablet Take 50 mcg by mouth Daily at 6:00 am.              magnesium oxide (MAGOX) 400 mg (241.3 mg magnesium) tablet Take 1 tab on Monday, Wednesday and Friday. 90 tablet 1     metoprolol tartrate (LOPRESSOR) 50 MG tablet Take 1 tablet (50 mg total) by mouth 2 (two) times a day. 180 tablet 1     multivitamin with minerals (THERA-M) 9 mg iron-400 mcg Tab tablet Take 1 tablet by mouth every evening.       omeprazole (PRILOSEC) 20 MG capsule TAKE ONE CAPSULE BY MOUTH TWICE A  capsule 2     sertraline (ZOLOFT) 100 MG tablet Take 1 tablet (100 mg total) by mouth daily. 90 tablet 3     simvastatin (ZOCOR) 20 MG tablet TAKE ONE TABLET BY MOUTH AT BEDTIME 90 tablet 3     solifenacin (VESICARE) 5 MG tablet Take 5 mg by mouth every evening. 7PM             traZODone (DESYREL) 50 MG tablet Take 25 mg by mouth at bedtime.       No current facility-administered medications for this visit.           Objective:    Vitals:     01/04/19 1428   BP: 118/60   Pulse: 90   SpO2: 96%   Weight: 186 lb (84.4 kg)      Body mass index is 34.02 kg/m .    Alert.  Transfers slowly however independently.  Cooperative.  Appropriate eye contact.  HEENT exam appears normal with moist mucous membranes.  Neck supple.  Chest clear.  Cardiac exam regular.  No significant tachycardia currently.  Abdomen benign with positive bowel sounds without guarding or rebound.  No abdominal distention.  Extremities warm and dry.  Neurologic exam nonfocal.      This note has been dictated using voice recognition software and as a result may contain minor grammatical errors and unintended word substitutions.

## 2021-06-22 NOTE — TELEPHONE ENCOUNTER
Spoke with the patient. She stopped/held Metoprolol post hospitalization for GI Bleed for the past 2 weeks. Reasoning was low hemoglobin/hypotension/dizziness.  Pt reports GI bleeding resolved, and is in [place] until April unless medically necessary/concerns.   Patient is calling to verbalize concerns that her resting heart rate is now increasin/7/19  117/55   HR 85  19  129/67   HR 92  19  125/72   HR 90    Patient has oximeter and O2 sats range 94-97% on RA.  Denies palpitations. Denies any further dizziness. Hgb last checked was 8.1, on iron replacement. Is having some JUSTICE, mid walk she reports she will have to take deep breaths. She completed a sleep study and is awaiting call back from provider with recommendations.  Dr. Garcia patient wondering if her Metoprolol should be resumed? Recommendations? RA,RN

## 2021-06-22 NOTE — TELEPHONE ENCOUNTER
Spoke with the patient and reviewed recommendations. Patient will call back clinic if she starts any palpitations. CMM,Rn

## 2021-06-22 NOTE — PROGRESS NOTES
Assessment/Plan:    1. Hot flashes  Recurrent hot flashes since discontinuation of estrogen replacement therapy.  Patient elects to resume Premarin 0.3 mg daily after reviewing risks versus benefits at length.  Patient has had prior hysterectomy and likely left oophorectomy previously.  - estrogens, conjugated, (PREMARIN) 0.3 MG tablet; Take 1 tablet (0.3 mg total) by mouth daily.  Dispense: 90 tablet; Refill: 3    2. Urge incontinence of urine  Urge incontinence of urine.  Using Vesicare also has Estrace jyoti.  Follow-up with urologist as needed.    3. Laceration of blood vessel of right ring finger, subsequent encounter  Right ring finger laceration.  Healing well.  Sutures removed x2.  Distal CMS appears intact.    [facility] once she knows where she needs her prescription sent.  25 minutes total time with patient, > 50% with counseling and coordination of cares.         Subjective:    Dyana Nicole is seen today for several concerns.  Recurrent hot flashes.  Had been on estrogen replacement the past however discontinued based on risk for side effects including cancer.  Patient states 10-12 episodes a day of hot flashes and wants to resume estrogen replacement.  Also has urge incontinence of urine.  Has bladder sling described.  Schedule colonoscopy to see if further concerns are etiology otherwise was started on Vesicare as well as an Estrace salve.  Had laceration November 24, 2018 as well on a veggie slicer.  Seen through ER.  Sutures placed and needs to have removed.  No complication.  Comprehensive review of systems as above otherwise all negative.    lives [facility] off [address] [facility]  emergency/ Nguyễn Nicole , cell  [facility]  [facility]  non smoker lives in [place], [place] October-April  vacation in [place] sees Dr. Eaton     - Ray  3 daughters - Genesis ( at [facility])  Smoke - quit in 60s after 1 year  EtOH: sober x 33 years  Surgeries: gallbladder, appy, hysterectomy and ? 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; left CTS release; bladder lift; right TKA; left rotator cuff  Hospitalizations:    Past Surgical History:   Procedure Laterality Date     Bladder lift       CARPAL TUNNEL RELEASE       CATARACT EXTRACTION, BILATERAL       CHOLECYSTECTOMY       HYSTERECTOMY       JOINT REPLACEMENT Bilateral     knees     MOHS SURGERY      Nose     OOPHORECTOMY Left     1 removed, 1 remains     MD ARTHROPLASTY TIBIAL PLATEAU      Description: Knee Replacement;  Recorded: 05/16/2013;     MD INJECT NERV BLCK,OTHR PERIPH NERV      Description: Peripheral Nerve Block Wrist Median Right;  Recorded: 05/16/2013;     REPLACEMENT TOTAL KNEE      L     REPLACEMENT TOTAL KNEE BILATERAL       ROTATOR CUFF REPAIR       TRUNK SKIN LESION EXCISIONAL BIOPSY Left 1/5/2018    Procedure: LEFT BUTTOCK EXPLORATION, EXCISION BUTTOCK MASS;  Surgeon: Lg Jameson MD;  Location: [facility] Main OR;  Service:         Family History   Problem Relation Age of Onset     Aneurysm Father         AAA     Cancer Mother         pancrease        Past Medical History:   Diagnosis Date     Anemia     Created by Conversion      Arrhythmia      Cervical Spondylosis     Created by Conversion      Depression      Disease of thyroid gland     hypo     Fibromyalgia     Created by Conversion      GERD (gastroesophageal reflux disease)      High cholesterol      History of transfusion      Hyperlipidemia     Created by Conversion      Hypertension     Created by Conversion      Near syncope      Peripheral Neuropathy     Created by Conversion      Skin cancer, basal cell      Sleep apnea     mild     Tachycardia         Social History     Tobacco Use      Smoking status: Former Smoker     Packs/day: 0.25     Years: 1.00     Pack years: 0.25     Last attempt to quit: 1961     Years since quittin.9     Smokeless tobacco: Never Used   Substance Use Topics     Alcohol use: No     Drug use: No        Current Outpatient Medications   Medication Sig Dispense Refill     aspirin 81 MG EC tablet Take 81 mg by mouth at bedtime.        budesonide (ENTOCORT EC) 3 mg 24 hr capsule Take 3 mg by mouth every other day. As needed - pt using every other day       calcium carbonate-vitamin D3 (CALCIUM 600 + D,3,) 600 mg calcium- 200 unit cap Take 1 tablet by mouth daily.       celecoxib (CELEBREX) 200 MG capsule TAKE ONE CAPSULE BY MOUTH EVERY DAY 90 capsule 1     fluticasone (FLONASE) 50 mcg/actuation nasal spray 2 sprays into each nostril daily as needed. 16 g 11     furosemide (LASIX) 20 MG tablet Take 1 tablet (20 mg total) by mouth daily. 30 tablet 6     KRILL OIL ORAL Take 1,000 mg by mouth bedtime.        LACTOBAC CMB #3/FOS/PANTETHINE (PROBIOTIC & ACIDOPHILUS ORAL) Take 1 capsule by mouth once daily.        levothyroxine (SYNTHROID, LEVOTHROID) 50 MCG tablet Take 50 mcg by mouth at bedtime.       magnesium oxide (MAGOX) 400 mg (241.3 mg magnesium) tablet Take 1 tab on Monday, Wednesday and Friday. 90 tablet 1     metoprolol tartrate (LOPRESSOR) 50 MG tablet Take 1 tablet (50 mg total) by mouth 2 (two) times a day. 180 tablet 1     MV-MINERALS/FA/OMEGA 3,6,9 #3 (WOMEN'S 50+ ADVANCED ORAL) Take 1 tablet by mouth daily.       omeprazole (PRILOSEC) 20 MG capsule TAKE ONE CAPSULE BY MOUTH TWICE A  capsule 2     sertraline (ZOLOFT) 100 MG tablet Take 1 tablet (100 mg total) by mouth daily. 90 tablet 3     simvastatin (ZOCOR) 20 MG tablet TAKE ONE TABLET BY MOUTH AT BEDTIME 90 tablet 3     solifenacin (VESICARE) 5 MG tablet Take 10 mg by mouth daily.       traZODone (DESYREL) 50 MG tablet Take 25 mg by mouth at bedtime.       estradiol (ESTRACE) 0.01 % (0.1 mg/gram)  vaginal cream Insert 2 g into the vagina daily.       estrogens, conjugated, (PREMARIN) 0.3 MG tablet Take 1 tablet (0.3 mg total) by mouth daily. 90 tablet 3     No current facility-administered medications for this visit.           Objective:    Vitals:    12/04/18 1242   BP: 100/60   Pulse: 73   SpO2: 94%   Weight: 185 lb (83.9 kg)      Body mass index is 33.84 kg/m .    Alert.  No apparent distress.  Chest clear.  Cardiac exam regular.  Extremities warm and dry.  Right ring finger appears to be healing well with 2 simple interrupted sutures in place without wound dehiscence or signs of secondary infection.      This note has been dictated using voice recognition software and as a result may contain minor grammatical errors and unintended word substitutions.

## 2021-06-22 NOTE — TELEPHONE ENCOUNTER
----- Message from Ezekiel Mcwilliams sent at 1/9/2019 11:33 AM CST -----  Contact: Park  General phone call:    Caller: Park  Primary cardiologist: Dr. Garcia  Detailed reason for call: Patient was in the ED in [place] and is wanting to talk to Dr. Garcia about some medication changes that they made  New or active symptoms? Active  Best phone number: [phone]  Best time to contact: anytime  Ok to leave a detailed message? yes  Device? no    Additional Info:

## 2021-06-23 ENCOUNTER — RECORDS - HEALTHEAST (OUTPATIENT)
Dept: ADMINISTRATIVE | Facility: OTHER | Age: 81
End: 2021-06-23

## 2021-06-23 NOTE — TELEPHONE ENCOUNTER
----- Message from Markos Noonan DO sent at 1/8/2019  8:50 AM CST -----  I called Park and she said that she is already in [place]. She said that you could give her a call to review her Sleep Study on Thursday (1/10/19) from 8am-10am. You can give her a call at [phone]. Thank you.

## 2021-06-23 NOTE — TELEPHONE ENCOUNTER
I spoke with the patient on the phone and informed her that her titration study revealed that CPAP was effective in treating her sleep disordered breathing.  She did not qualify for oxygen bleed in.  Since she is currently in [place] and will not return until April, which she would like to hold off on getting CPAP until she returns to [place].  She would like to use [employer] as the durable medical equipment company.    The prescription is in the chart.    Contact information for [employer] company:    [facility] Tel: [phone]    Thank you.

## 2021-06-23 NOTE — PROGRESS NOTES
This patient has Medicare as her primary insurance and had a HST done in 2015. This is a conflict of interest per Medicare and we are not able to dispense any cpap equipment to the patient. Please connect with the patient to see where else she would like her cpap rx sent to.

## 2021-06-23 NOTE — TELEPHONE ENCOUNTER
Attempted to contact patient using the number listed in the message below, but it keeps telling me that they are unable to complete the call. Please send a letter to the patient and advise her to schedule an earlier follow up visit with me to review the study when she returns from [place]. Thank you.

## 2021-06-23 NOTE — TELEPHONE ENCOUNTER
Dr. Ann-Marie Nick called and said that she has been waiting to hear from . I read the message below and read her the phone, and she said that the phone number was wrong. The correct phone number is [phone]? Thank you.

## 2021-06-23 NOTE — PROGRESS NOTES
I spoke with Park today 1/22/19. I let her know that [facility] is unable to accept her insurance due to HST. She will not be back until April to get set up, but will call her insurance to find in network DME, and will call us back with this information so we can send order, and so it will  be ready for when she comes back.

## 2021-06-25 NOTE — TELEPHONE ENCOUNTER
Per 6/1/2021 OV note:  2. Left lumbar radiculopathy  Left lumbar radiculopathy described to level of foot.  Has titrated gabapentin 100 mg up to 2 capsules 3 times daily with mild associated increased dizziness described which she will continue to monitor closely.  Patient frustrated with ongoing concerns and states would be willing to take stronger pain medication if necessary to help manage symptoms.  Celebrex 200 mg once daily continues currently as well.       Gabapentin 200 mg three times a day pended for approval.

## 2021-06-25 NOTE — PROGRESS NOTES
Progress Notes by Pk Garcia DO at 2017  8:50 AM     Author: Pk Garcia DO Service: -- Author Type: Physician    Filed: 2017  9:39 AM Encounter Date: 2017 Status: Signed    : Pk Garcia DO (Physician)           Click to link to [facility]     [facility] HEART CARE NOTE    Thank you, Dr. Pugh, for asking the [facility] team to see Ms. Dyana Nicole to evaluate rapid palpitations and exertional dyspnea.      Assessment/Recommendations   Assessment:    1.  History of syncope related to likely orthostatic hypotension (no further events since .  Orthostatic hypotension was clearly documented on vital at OSH in [place].  Off lasix.   2.  Sigmoid septum with LVOT gradient.     3   Tachyarrhythmia, ? Atrial tachycardiac vs. paroxysmal atrial fibrillation.  Outside records documented possibility that the patient had a paroxysmal event of atrial fibrillation.  There is no twelve-lead or telemetry strip to document event.      4.  Exertional dyspnea, improving.   5.  Hypertension  6.  Hyperlipidemia    Plan:  1. Continue adequate hydration  2. Continue exercise.   3.  Continue Toprol XL to 50 mg daily to decrease LVOT gradient.    4.  Discussed the fact that she should avoid driving until 6 months free of episodes of syncope (2017).  5.  Recommend to consider Sleep study again. History of Sleep apnea in past.  STOP:3. BAN  [place]: 11    Follow-up 2017.  If no recurrent symptoms patient can resume driving given likely related to orthostatic  hypotension from dehydration.         History of Present Illness    Ms. Dyana Nicole is a 77 y.o. female prior histoy of syncope (2017) who presents in cardiology clinic for ongoing management.     Patient was discharge from [facility] in early 2017.  Echocardiogram demonstrated septal thickening but no significant valvular heart disease.   Normal ventricular ejection fraction.  NM stress testing was negative for ischemia. There was possible atrial fibrillation event but no supportive ECG or stripes.  .    Since last follow the patient has maintained on metoprolol 50 mg daily.  She notes improvement in her dyspnea on exertion.  She notes no further near syncope or syncopal episodes.  She has been restarting her exercise routine including walking half a mile without significant dyspnea.  Patient with strongly like to resume driving but again she has been only 3 months without syncope.  Again the patient had a clear orthostatic hypotension on arrival to the hospital in [place].  She was on Lasix therapy at the time.  That has been stopped and she has had no recurrent symptoms.  I have advised her to maintain without driving for the next 3 months to complete 6 months of monitoring period.     ECHO (9/3/2016):   Summary  Normal left ventricular size and systolic function.  Left ventricular ejection fraction is visually estimated to be 60%.  E/A flow reversal noted. Suggestive of diastolic dysfunction.  Normal right ventricular size and systolic function.  Moderate tricuspid regurgitation.  Mild mitral regurgitation is present.  Mitral valve leaflets are mildly thickened with preserved leaflet  mobility.  Findings appear similar to 11/2013    Holter Monitor:   IMPRESSION:  1. Essentially normal multiday patient activated monitor.  2. The patient did report multiple symptomatic episodes and these corresponded with  sinus rhythm and/or sinus tachycardia. The patient's episode of reported syncope (was near syncope per patient)  corresponded to sinus rhythm.   3. The patient's symptoms and monitor findings suggest that 1 possible underlying  mechanism would be vasodepressor syncope.    NM STRESS: March 4th 2017: Negative for ischemia.       Physical Examination Review of Systems   Vitals:    05/09/17 0852   BP: 116/72   Pulse: 72   Resp: 16     Body mass index  is 32.12 kg/(m^2).  Wt Readings from Last 3 Encounters:   05/09/17 175 lb 9.6 oz (79.7 kg)   04/03/17 179 lb (81.2 kg)   03/28/17 178 lb 6.4 oz (80.9 kg)       General Appearance:   no distress, normal body habitus   ENT/Mouth: membranes moist, no oral lesions or bleeding gums.      EYES:  no scleral icterus, normal conjunctivae   Neck: no carotid bruits or thyromegaly   Chest/Lungs:   lungs are clear to auscultation, no rales or wheezing, no sternal scar, equal chest wall expansion    Cardiovascular:   Regular. Normal first and second heart sounds. Mild systolic murmur with valsalva (improved). No dastolic murmurs, rubs, or gallops; the carotid, radial and posterior tibial pulses are intact, Jugular venous pressure normal, no edema bilaterally.    Abdomen:  no  tenderness; bowel sounds are present   Extremities: no cyanosis or clubbing   Skin: no xanthelasma, warm.    Neurologic: normal gait, no tremors     Psychiatric: alert and oriented x3, calm     General: Weight Loss  Eyes: Visual Distubance  Ears/Nose/Throat: WNL  Lungs: Shortness of Breath  Heart: Shortness of Breath with activity  (improving)  Stomach: Diarrhea  Bladder: WNL  Muscle/Joints: Joint Pain  Skin: WNL  Nervous System: Loss of Balance  Mental Health: Confusion     Blood: WNL     Medical History  Surgical History Family History Social History   Past Medical History:   Diagnosis Date   ? Anemia     Created by Conversion    ? Arrhythmia    ? Cervical Spondylosis     Created by Conversion    ? Depression    ? Disease of thyroid gland     hypo   ? Fibromyalgia     Created by Conversion    ? Hyperlipidemia     Created by Conversion    ? Hypertension     Created by Conversion    ? Near syncope    ? Peripheral Neuropathy     Created by Conversion    ? Sleep apnea     mild   ? Tachycardia     Past Surgical History:   Procedure Laterality Date   ? CHOLECYSTECTOMY     ? HYSTERECTOMY     ? JOINT REPLACEMENT Bilateral     knees   ? ND ARTHROPLASTY TIBIAL  PLATEAU      Description: Knee Replacement;  Recorded: 05/16/2013;   ? MT INJECT NERV BLCK,OTHR PERIPH NERV      Description: Peripheral Nerve Block Wrist Median Right;  Recorded: 05/16/2013;    Family History   Problem Relation Age of Onset   ? Aneurysm Father     Social History     Social History   ? Marital status:      Spouse name: N/A   ? Number of children: N/A   ? Years of education: N/A     Occupational History   ? Not on file.     Social History Main Topics   ? Smoking status: Former Smoker     Quit date: 1/1/1961   ? Smokeless tobacco: Never Used   ? Alcohol use No   ? Drug use: No   ? Sexual activity: Not on file     Other Topics Concern   ? Not on file     Social History Narrative          Medications  Allergies   Current Outpatient Prescriptions   Medication Sig Dispense Refill   ? aspirin 81 MG EC tablet Take 81 mg by mouth daily.     ? BIOTIN ORAL Take 5,000 mcg by mouth once daily.      ? budesonide (ENTOCORT EC) 3 mg 24 hr capsule Take 6 mg by mouth.     ? calcium carbonate-vitamin D3 (CALCIUM 600 + D,3,) 600 mg calcium- 200 unit cap Take 1 tablet by mouth daily.     ? celecoxib (CELEBREX) 200 MG capsule TAKE ONE CAPSULE BY MOUTH EVERY DAY 90 capsule 1   ? estradiol (ESTRACE) 0.5 MG tablet Take 1 tablet (0.5 mg total) by mouth daily. 30 tablet 11   ? fluticasone (FLONASE) 50 mcg/actuation nasal spray 2 sprays into each nostril daily as needed.      ? KRILL OIL ORAL Take 1,000 mg by mouth bedtime.      ? LACTOBAC CMB #3/FOS/PANTETHINE (PROBIOTIC & ACIDOPHILUS ORAL) Take 1 capsule by mouth once daily.      ? levothyroxine (SYNTHROID, LEVOTHROID) 50 MCG tablet TAKE ONE TABLET BY MOUTH EVERY DAY 90 tablet 1   ? metoprolol succinate (TOPROL-XL) 50 MG 24 hr tablet Take 1 tablet (50 mg total) by mouth daily. 30 tablet 5   ? MV-MINERALS/FA/OMEGA 3,6,9 #3 (WOMEN'S 50+ ADVANCED ORAL) Take 1 tablet by mouth daily.     ? omeprazole (PRILOSEC) 20 MG capsule TAKE ONE CAPSULE BY MOUTH TWICE A DAY (Patient  taking differently: TAKE ONE CAPSULE DAILY) 180 capsule 2   ? simvastatin (ZOCOR) 20 MG tablet Take 1 tablet (20 mg total) by mouth bedtime. 90 tablet 3   ? traZODone (DESYREL) 50 MG tablet TAKE 1/2 TABLET BY MOUTH AT BEDTIME 45 tablet 1   ? venlafaxine (EFFEXOR-XR) 150 MG 24 hr capsule Take 1 capsule (150 mg total) by mouth daily. 90 capsule 5   ? venlafaxine (EFFEXOR-XR) 75 MG 24 hr capsule TAKE ONE CAPSULE BY MOUTH THREE TIMES A DAY (Patient taking differently: TAKE ONE CAPSULE BY MOUTH TWO TIMES A DAY) 270 capsule 0     No current facility-administered medications for this visit.       Allergies   Allergen Reactions   ? Penicillins Hives   ? Pollen    ? Venom-Honey Bee Swelling         Lab Results    Chemistry/lipid CBC Cardiac Enzymes/BNP/TSH/INR   Lab Results   Component Value Date    CHOL 193 09/03/2016    HDL 66 09/03/2016    LDLCALC 111 09/03/2016    TRIG 79 09/03/2016    CREATININE 0.74 11/02/2016    BUN 16 11/02/2016    K 4.6 11/02/2016     11/02/2016     11/02/2016    CO2 28 11/02/2016    Lab Results   Component Value Date    WBC 11.6 (H) 11/02/2016    HGB 13.4 11/02/2016    HCT 40.0 11/02/2016    MCV 90 11/02/2016     11/02/2016    Lab Results   Component Value Date    TROPONINI <0.01 09/03/2016    BNP 69 09/28/2016    TSH 2.15 09/03/2016    INR 1.00 05/31/2015

## 2021-06-25 NOTE — PROGRESS NOTES
Assessment:   Dyana Nicole (SHONNA Nick) is a 81 y.o. y.o. female with past medical history significant for atrial tachycardia, hypertension, hypothyroidism, hyperlipidemia, anxiety/depression, GERD, colitis, osteoarthritis, skin cancer who presents today for follow-up regarding subacute left-sided buttock pain with radiation down the left leg.  The pain started after a fall in December 2020 and her pain has progressively worsened.  At her last visit on May 4 of 2021, the left hip was thought to be playing a role in her pain secondary to primary osteoarthritis of the right hip.  She underwent a intra-articular left hip joint injection on 5-13-21.  At this time, she states that she had only short-term relief but no long term relief.  She is having a significant amount of pain with bearing weight on the left leg which may be from chronic left SI joint pain.  Her case is complicated by what is thought to be compensatory muscular pain in the right leg as well as IT band syndrome in the left thigh.  Her MRI did not show significant nerve root compression.  She does have an L1-5 spinal fusion.    She continues to have some mild bowel incontinence (likely related to diarrhea) in addition to positional bladder incontinence, likely related to pelvic floor dysfunction.  Her MRI of the lumbar spine did not show any significant stenosis, and this is not thought to be of spine etiology.    [facility] evaluation on 5-4-21.    PSP:  Dr. Vila       Plan:     A shared decision making plan was used.  The patient's values and choices were respected.  The following represents what was discussed and decided upon by the physician and the patient.  Her , Nguyễn, was present for the entire encounter.      1.  DIAGNOSTIC TESTS: The patient's MRI of the lumbar spine from April 24 of 2021 was personally reviewed today by the physician with the physician performing her own interpretation.  She does have an L1-5 fusion.  There is  transitional anatomy with a lumbarized S1, though to match the previous imaging count, the last surgical segment is called L4-5.  There is no significant central canal stenosis.  No significant neuroforaminal stenosis.  There is a slight spondylolisthesis above the level of her fusion.  -Patient's x-rays of the bilateral hips from April 24 of 2021 were reviewed and summarized as follows: Mild joint space narrowing of both hips.  Otherwise normal without fracture or dislocation.  -I did call simple radiology and personally spoke to an [facility] radiologist.  He recommends an MRI of the left hip, and states that the protocol will also evaluate the left SI joint, as he does not feel that a separate MRI of the pelvis is needed.  He does not feel that an MR arthrogram is needed, his MR arthrogram look specifically at the labrum and in her age group, an MR arthrogram is not going to be very helpful.  I did place the order and she will be called to schedule.  I did send her my chart message notifying her that the order is placed.  She can call the clinic if she has any difficulty scheduling this.  2.  PHYSICAL THERAPY: She has completed several sessions of physical therapy.  She is encouraged to continue doing her home exercise program on a regular basis.  She may benefit from more physical therapy based on what the MR of the hip shows.  3.  MEDICATIONS: She is having side effects with taking gabapentin 200 mg 3 times a day.  At this time she is recommended to decrease down to 100 mg 3 times a day.  She states that she needs a new prescription for this.  I did provide a new prescription for her after running a PDMP check.    -She can continue to take the Celebrex 200 mg once a day as needed for pain.  4.  INTERVENTIONS: No injections are recommended at this time until she can have further work-up.  She may benefit from a left SI joint injection if her MRI of the hip is negative.  -Last intra-articular hip joint injection was on  May 13 of 2021.  - Her second dose of the COVID vaccine was on 3-31-21.  5.  PATIENT EDUCATION:    -I did emphasize that she needs to discuss the bowel and bladder issues with her primary care physician.  She recently saw him but states that she did not think to mention it to him.  I did send him a message reaching out, asking if she should have a GI referral for the chronic diarrhea in the slight bowel incontinence and/or urology referral for the bladder incontinence that is consistent with pelvic floor dysfunction.  I did encourage her to call Dr. Pugh's office to schedule another appointment to discuss these issues.  -I did explain that while she is tender over the left greater trochanteric bursa, and may have an element of bursitis, bursitis does not cause pain that radiates all the way down the leg into the ankle.  This may be from referred pain from the SI joint, or could be from lumbar radicular pain, but given the lack of nerve root compression seen on her MRI, the lumbar radicular pain seems less likely.  -Did explain that there is some concern that there is something happening in the hip which may be contributing to SI joint pain.  Again further work-up is necessary at this time.  She verbalized understanding.  -She did ask for help with my chart and being able to see her notes.  I explained that I am not able to help her, but I did give her the my chart support line at [phone] (this was since her message in my chart).  -All of her and her 's questions were answered to their satisfaction.  6.  FOLLOW-UP: The patient will be contacted with the results of her MRI.  If there are any questions/concerns or any significant worsening of pain prior to that time, the patient is asked to call the clinic via the nurse navigation line or via [employer].     A total of 45 minutes was spent in the care of this patient.    Subjective:     Dyana Nicole (AKA Park) is a 81 y.o. female who presents today for  follow-up regarding severe left buttock and left thigh pain..  Since her last visit  on 5-4-21, he has undergone a left intra-articular hip joint injection on May 13 of 2021.  Reports that she had very good relief but only short-term.  She is wondering why the relief did not last longer, especially given that her diagnostic injection was very helpful, and she did have good relief initially with the combination of the corticosteroid and the local anesthetic.  She has been journaling her experiences and she states that essentially the pain is the same as it was prior to the injection.  She still has pain under the cheek on the left side.  She then gets pain radiating down the lateral aspect of her thigh down the lateral aspect of the lower leg to the ankle.  Reports that it is most painful from the knee and sometimes from the thigh down to the ankle when she puts weight on it paren pain is rated at a 7 or an 8 out of 10).  If she walks for any distance, the pain will increase, up to a 9 or 10 out of 10.  Reports that she does not have any pain when she sits, unless she sits for prolonged period of time and then she will get pain under her left cheek that she rates at a 5 or 6 out of 10.  She cannot lay on her left side in bed, which is her preferred side to sleep.  She sleeps on a sleep number bed and she reports that her setting is 35 which is quite soft.  She can stand on her left leg alone to put her right leg into her pants.  Is just not stable enough.  She is having trouble with the gabapentin.  She has increased to 2 capsules 3 times a day but she feels lightheaded and off balance.  She is wondering why the injection did not work.  She is also wondering if there is any place in my chart where she can review her notes.  She cannot find them.  She says that she is not really feeling any better, but also not any worse.    She states that the left gluteal pain is really only when she sits for any length of time.   She does have most of the pain tends to go down the lateral thigh and into the ankle.  Again her pain is worse with activities and laying down for long periods of time.  She does feel better with sitting, as long as is not for too long.  She has had side effects to the gabapentin and was taking 200 mg 3 times a day.  She is also taking Tylenol as needed.    Past medical history is reviewed and is unchanged for any new medical diagnoses in the interim.      Family history is reviewed and is unchanged in the interim.      Review of Systems:  Positive for weakness in the left leg greater than the right, mild bowel incontinence (she reports that she has little dribbles, though she has had some explosions), bladder incontinence it typically occurs when she goes from sit to stand.  Reports that she is soaking through a pad 3 times a day), difficulty with hand skills..  Pertinent negatives include no fevers, chills, unexplained weight loss, rips, stumbles, falls.  All others reviewed and are negative.     Objective:   CONSTITUTIONAL:  Vital signs as above.  No acute distress.  The patient is well nourished and well groomed.    PSYCHIATRIC:  The patient is awake, alert, oriented to person, place and time.  The patient is answering questions appropriately with clear speech.  Normal affect.  SKIN:  Skin over the face, posterior torso, bilateral upper and lower extremities is clean, dry, intact without rashes.  MUSCULOSKELETAL: Gait is very antalgic, and she clearly has pain with putting weight on the left leg/foot..  She is not able to perform single-leg stance on the left leg.  She does have tenderness over the left greater trochanteric bursa and over the left IT band.  She does not have any pain with left Fabere's test or left Gaenslen's test.  She does point to pain over the left SI joint as well and she has mild tenderness there.  NEUROLOGICAL: 2/4 patellar, medial hamstring, achilles reflexes which are symmetric  bilaterally.  No ankle clonus bilaterally.  Sensation to light touch is intact in the bilateral L4, L5, and S1 dermatomes.

## 2021-06-25 NOTE — TELEPHONE ENCOUNTER
Refill Approved    Rx renewed per Medication Renewal Policy. Medication was last renewed on 6/9/20.    Jersey Chaudhari, [facility] Triage/Med Refill 6/9/2021     Requested Prescriptions   Pending Prescriptions Disp Refills     simvastatin (ZOCOR) 20 MG tablet [Pharmacy Med Name: Simvastatin Oral Tablet 20 MG] 90 tablet 0     Sig: TAKE 1 TABLET BY MOUTH AT BEDTIME       Statins Refill Protocol (Hmg CoA Reductase Inhibitors) Passed - 6/8/2021  3:17 PM        Passed - PCP or prescribing provider visit in past 12 months      Last office visit with prescriber/PCP: 6/1/2021 True Pugh MD OR same dept: 6/1/2021 True Pugh MD OR same specialty: 6/1/2021 True Pugh MD  Last physical: 9/13/2019 Last MTM visit: Visit date not found   Next visit within 3 mo: Visit date not found  Next physical within 3 mo: Visit date not found  Prescriber OR PCP: True Pugh MD  Last diagnosis associated with med order: 1. Hyperlipidemia  - simvastatin (ZOCOR) 20 MG tablet [Pharmacy Med Name: Simvastatin Oral Tablet 20 MG]; TAKE 1 TABLET BY MOUTH AT BEDTIME  Dispense: 90 tablet; Refill: 0    If protocol passes may refill for 12 months if within 3 months of last provider visit (or a total of 15 months).

## 2021-06-25 NOTE — TELEPHONE ENCOUNTER
Fax from [facility] pharmacy stating that patient is taking Gabapentin 100 mg capsules- patient states that she takes 2 tablets three times a day- Rx states for 1 tablet three times a day- requesting new Rx be sent if what patient states is correct.

## 2021-06-25 NOTE — PROGRESS NOTES
Assessment/Plan:    1. Bursitis of left hip, unspecified bursa  Evidence for left hip trochanteric bursitis on exam today with significant point tenderness left lateral trochanteric bursa.  Patient defers current intervention stating that she will see Dr. Vila as scheduled later this afternoon and hopes to receive injection at that time if indicated.    2. Left lumbar radiculopathy  Left lumbar radiculopathy described to level of foot.  Has titrated gabapentin 100 mg up to 2 capsules 3 times daily with mild associated increased dizziness described which she will continue to monitor closely.  Patient frustrated with ongoing concerns and states would be willing to take stronger pain medication if necessary to help manage symptoms.  Celebrex 200 mg once daily continues currently as well.    3. Hypothyroidism, unspecified type  Remains on levothyroxine 50 mcg daily for thyroid replacement with prior TSH 2.16 September 16, 2020.  Reassess at annual wellness visit in mid September 2021.    4. Hyperlipidemia, unspecified hyperlipidemia type  History of hyperlipidemia with most recent lipid cascade however November 17, 2020 with excellent HDL results at 78 and total cholesterol 220.  Continues simvastatin 20 mg at bedtime and will check fasting labs at annual wellness visit this fall.    5. Essential hypertension with goal blood pressure less than 140/90  Hypertension appears well controlled also with continued use of metoprolol tartrate 75 mg a.m. and 50 mg p.m.          Subjective:    Dyana Nicole is seen today for follow-up assessment.  Left hip pain more lateral aspect.  Often painful while sleeping.  When she first gets up also quite sore as well as with activities.  Had lower back issues after a fall months ago that has persisted and does cause radiation described to the level of left foot with prior left buttocks and posterior thigh involvement as well.  No associated rash.  Did resume gabapentin 100 mg capsule  3 times daily and has titrated to 200 mg 3 times daily currently with mild associated dizziness.  Does have underlying history of hypertension, hyperlipidemia and hypothyroidism continues current management as noted.  Otherwise denies recent illness.  Did receive COVID-19 vaccine series.  Comprehensive review of systems as above otherwise all negative.       - Ray   3 daughters - Genesis ( at [facility])[place]   Smoke - quit in 60s after 1 year   EtOH: sober x 36 years   Surgeries: gallbladder, appy, hysterectomy and ? 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; left CTS release; bladder lift; right TKA; left rotator cuff   Hospitalizations:         Past Surgical History:   Procedure Laterality Date     Bladder lift       CARPAL TUNNEL RELEASE       CATARACT EXTRACTION, BILATERAL       CHOLECYSTECTOMY       HYSTERECTOMY       JOINT REPLACEMENT Bilateral     knees     MOHS SURGERY      Nose     OOPHORECTOMY Left     1 removed, 1 remains     [facility] ARTHROPLASTY TIBIAL PLATEAU      Description: Knee Replacement;  Recorded: 05/16/2013;     [facility] INJECT NERV BLCK,OTHR PERIPH NERV      Description: Peripheral Nerve Block Wrist Median Right;  Recorded: 05/16/2013;     REPLACEMENT TOTAL KNEE      L     REPLACEMENT TOTAL KNEE BILATERAL       ROTATOR CUFF REPAIR       TRUNK SKIN LESION EXCISIONAL BIOPSY Left 1/5/2018    Procedure: LEFT BUTTOCK EXPLORATION, EXCISION BUTTOCK MASS;  Surgeon: Lg Jameson MD;  Location: [facility] Main OR;  Service:         Family History   Problem Relation Age of Onset     Aneurysm Father         AAA     Cancer Mother         pancrease        Past Medical History:   Diagnosis Date     Anemia     Created by Conversion      Arrhythmia      Cervical Spondylosis     Created by Conversion      Depression      Disease of thyroid gland     hypo     Fibromyalgia     Created by Conversion      GERD (gastroesophageal  reflux disease)      High cholesterol      History of transfusion      Hyperlipidemia     Created by Conversion      Hypertension     Created by Conversion      Near syncope      Peripheral Neuropathy     Created by Conversion      Skin cancer, basal cell      Sleep apnea     mild     Tachycardia         Social History     Tobacco Use     Smoking status: Former Smoker     Packs/day: 0.25     Years: 1.00     Pack years: 0.25     Quit date: 1961     Years since quittin.4     Smokeless tobacco: Never Used   Substance Use Topics     Alcohol use: No     Drug use: No        Current Outpatient Medications   Medication Sig Dispense Refill     aspirin 81 MG EC tablet Take 81 mg by mouth at bedtime.        calcium carbonate-vitamin D3 (CALCIUM 600 + D,3,) 600 mg calcium- 200 unit cap Take 1 tablet by mouth 2 (two) times a day.              celecoxib (CELEBREX) 200 MG capsule TAKE ONE CAPSULE BY MOUTH ONE TIME DAILY  90 capsule 3     estrogens, conjugated, (PREMARIN) 0.3 MG tablet Take 1 tablet (0.3 mg total) by mouth daily. 90 tablet 3     fluticasone propionate (FLONASE) 50 mcg/actuation nasal spray INSTILL 2 SPRAYS IN EACH NOSTRIL DAILY AS NEEDED 48 g 3     furosemide (LASIX) 20 MG tablet TAKE ONE TABLET BY MOUTH ONE TIME DAILY  90 tablet 3     gabapentin (NEURONTIN) 100 MG capsule Take 100 mg by mouth 3 (three) times a day. (Patient taking differently: Take 100 mg by mouth 2 (two) times a day. ) 90 capsule 2     LACTOBAC CMB #3/FOS/PANTETHINE (PROBIOTIC & ACIDOPHILUS ORAL) Take 1 capsule by mouth once daily. 1.5 billion units             levothyroxine (SYNTHROID, LEVOTHROID) 50 MCG tablet TAKE ONE TABLET BY MOUTH ONE TIME DAILY AT 6AM 90 tablet 3     metoprolol tartrate (LOPRESSOR) 50 MG tablet Take 75 mg in AM and 50 mg in  tablet 3     multivitamin with minerals (THERA-M) 9 mg iron-400 mcg Tab tablet Take 1 tablet by mouth every evening.       omeprazole (PRILOSEC) 20 MG capsule TAKE ONE CAPSULE BY MOUTH  TWICE DAILY  180 capsule 3     simvastatin (ZOCOR) 20 MG tablet TAKE 1 TABLET BY MOUTH AT BEDTIME 90 tablet 3     traZODone (DESYREL) 50 MG tablet Take 25 mg by mouth at bedtime. Pt taking 75mg at bedtime       venlafaxine (EFFEXOR) 75 MG tablet Take 75 mg by mouth daily.        No current facility-administered medications for this visit.           Objective:    Vitals:    06/01/21 1113   BP: 110/60   Pulse: 66   SpO2: 93%   Weight: 190 lb (86.2 kg)      Body mass index is 34.75 kg/m .    Alert.  No apparent distress at rest however does transfer slowly due to hip discomfort.  Significant left lateral hip tenderness over trochanteric process of left femur.  DTRs symmetric at bilateral patella 2+ with near absent Achilles DTRs symmetric bilateral ankle.  No obvious rash.      This note has been dictated using voice recognition software and as a result may contain minor grammatical errors and unintended word substitutions.

## 2021-06-25 NOTE — PROGRESS NOTES
Progress Notes by Pk Garcia DO at 3/15/2017  8:30 AM     Author: Pk Garcia DO Service: -- Author Type: Physician    Filed: 3/15/2017 12:37 PM Encounter Date: 3/15/2017 Status: Signed    : Pk Garcia DO (Physician)           Click to link to [facility] HEART CARE NOTE    Thank you, Dr. Pugh, for asking the [facility] Care team to see Ms. Dyana Nicole to evaluate rapid palpitations and exertional dyspnea.      Assessment/Recommendations   Assessment:    1.  Recurrent syncope related to orthostatic hypotension.  Orthostatic hypotension was clearly documented on vital at OSH.  It is unclear why the patient's Lasix were not stopped at the outside hospital.  Recommended that she stop her Lasix at the last follow-up.  Patient has sigmoid septum.  She has a dynamic outflow murmur today  2.  Questionable history of paroxysmal atrial fibrillation.  On review of outside records there is a possibility that the patient had a paroxysmal event of atrial fibrillation.  There is no twelve-lead or telemetry strip documenting this.  There is no duration of the event.  She was not started on beta-blocker or calcium channel blocker and was also not started on anticoagulation.   3.  Exertional dyspnea.  Improving with exercise routine. PFT normal.  E/A reversal on echo but no clinical signs of CHF.    4.  Hypertension  5.  Hyperlipidemia    Plan:  1. Discontinue lasix .  If patient had significant dehydration sigmoid septum could result worsening LVOT gradient  2. Continue adequate hydration  3. Continue exercise.   4. Start Toprol XL 25 mg daily to decrease LVOT gradient.    5.  Discussed the fact that she should avoid driving until 6 months free of episodes of syncope.  6.  Recommend Sleep study. History of Sleep apnea in past.  STOP:3. BAN  [place]: 11  7.  Long discussion regarding anticoagulation with patient.  Given questionable episode of  atrial fibrillation with no clear documentation of this event at this point I would not recommend starting anticoagulation also given recurrent syncopal episodes.  If we do document further episodes of atrial fibrillation will recommend starting anticoagulation.  If she continues to have syncope would recommend watchman device.  She understands stroke risk associated with atrial fibrillation.      Follow-up in 4 weeks.         History of Present Illness    Ms. Dyana Nicole is a 77 y.o. female recurrent syncope who presents in cardiology clinic for further evaluation of her recent hospitalization in [facility].  Discharge summary, neurology consultation, echocardiogram, and stress testing from [facility] were reviewed.  Echocardiogram demonstrated mild septal thickening but no significant valvular heart disease.  Normal ventricular ejection fraction.  This testing was negative for ischemia.    In review of documentation of the patient's hospitalization there are telemetry strips none of these documented atrial fibrillation.  Supposedly she underwent consultation by cardiology but no consultation note was provided.  She was not started on AV blocking agents or anticoagulation during this hospitalization.   It was noted that she had orthostatic hypotension during hospitalization the first reasons unclear her Lasix therapy was not discontinued.  She currently has no clear indication to be on diuretic therapy.       Going to the patient in [place] she was in her normal state of health when she was standing at the kitchen sink and developed lightheadedness symptoms and her  noted that she was a little bit confused this was followed by a syncopal episode.  Really the patient continues to note dyspnea with exertion.  She also notes lightheadedness with standing for prolonged period of time.  She notes that her symptoms are worse with dehydration.      ECHO (9/3/2016):   Summary  Normal left ventricular size  and systolic function.  Left ventricular ejection fraction is visually estimated to be 60%.  E/A flow reversal noted. Suggestive of diastolic dysfunction.  Normal right ventricular size and systolic function.  Moderate tricuspid regurgitation.  Mild mitral regurgitation is present.  Mitral valve leaflets are mildly thickened with preserved leaflet  mobility.  Findings appear similar to 11/2013    Holter Monitor:   IMPRESSION:  1. Essentially normal multiday patient activated monitor.  2. The patient did report multiple symptomatic episodes and these corresponded with  sinus rhythm and/or sinus tachycardia. The patient's episode of reported syncope (was near syncope per patient)  corresponded to sinus rhythm.   3. The patient's symptoms and monitor findings suggest that 1 possible underlying  mechanism would be vasodepressor syncope.    NM stress:   CONCLUSION:   1. Lexiscan stress nuclear study is negative for inducible myocardial   ischemia or infarction.   2. Normal left ventricular ejection fraction of greater than 70%.     Alex Vogel MD  9/22/2016 3:40 PM         Physical Examination Review of Systems   Vitals:    03/15/17 0832   BP: 134/78   Pulse: 76   Resp: 16     Body mass index is 32.96 kg/(m^2).  Wt Readings from Last 3 Encounters:   03/15/17 180 lb 3.2 oz (81.7 kg)   12/14/16 176 lb (79.8 kg)   11/10/16 194 lb (88 kg)       General Appearance:   no distress, normal body habitus   ENT/Mouth: membranes moist, no oral lesions or bleeding gums.      EYES:  no scleral icterus, normal conjunctivae   Neck: no carotid bruits or thyromegaly   Chest/Lungs:   lungs are clear to auscultation, no rales or wheezing, no sternal scar, equal chest wall expansion    Cardiovascular:   Regular. Normal first and second heart sounds with worsening systolic murmur with squatting to rapid standing and valsalva. No  systolic  murmurs, rubs, or gallops; the carotid, radial and posterior tibial pulses are intact, Jugular  venous pressure normal, no edema bilaterally    Abdomen:  no organomegaly, masses, bruits, or tenderness; bowel sounds are present   Extremities: no cyanosis or clubbing   Skin: no xanthelasma, warm.    Neurologic: normal gait, normal  bilateral, no tremors     Psychiatric: alert and oriented x3, calm     General: Weight Loss  Eyes: WNL  Ears/Nose/Throat: WNL  Lungs: Cough, Shortness of Breath  Heart: Shortness of Breath with activity, Irregular Heartbeat, Fainting (palpitations)    Stomach: Diarrhea, Heartburn  Bladder: Frequent Urination at Night  Muscle/Joints: Joint Pain, Muscle Weakness  Skin: WNL  Nervous System: Falls, Daytime Sleepiness, Dizziness, Loss of Balance  Mental Health: Confusion, Depression     Blood: Easy Bruising     Medical History  Surgical History Family History Social History   Past Medical History:   Diagnosis Date   ? Anemia     Created by Conversion    ? Arrhythmia    ? Cervical Spondylosis     Created by Conversion    ? Depression    ? Disease of thyroid gland     hypo   ? Fibromyalgia     Created by Conversion    ? Hyperlipidemia     Created by Conversion    ? Hypertension     Created by Conversion    ? Near syncope    ? Peripheral Neuropathy     Created by Conversion    ? Sleep apnea     mild   ? Tachycardia     Past Surgical History:   Procedure Laterality Date   ? CHOLECYSTECTOMY     ? HYSTERECTOMY     ? JOINT REPLACEMENT Bilateral     knees   ? [facility] ARTHROPLASTY TIBIAL PLATEAU      Description: Knee Replacement;  Recorded: 05/16/2013;   ? [facility] INJECT NERV BLCK,OTHR PERIPH NERV      Description: Peripheral Nerve Block Wrist Median Right;  Recorded: 05/16/2013;    Family History   Problem Relation Age of Onset   ? Aneurysm Father     Social History     Social History   ? Marital status:      Spouse name: N/A   ? Number of children: N/A   ? Years of education: N/A     Occupational History   ? Not on file.     Social History Main Topics   ? Smoking status: Former Smoker     Quit  date: 1/1/1961   ? Smokeless tobacco: Never Used   ? Alcohol use No   ? Drug use: No   ? Sexual activity: Not on file     Other Topics Concern   ? Not on file     Social History Narrative          Medications  Allergies   Current Outpatient Prescriptions   Medication Sig Dispense Refill   ? aspirin 81 MG EC tablet Take 81 mg by mouth daily.     ? BIOTIN ORAL Take 5,000 mcg by mouth once daily.      ? calcium carbonate-vitamin D3 (CALCIUM 600 + D,3,) 600 mg calcium- 200 unit cap Take 1 tablet by mouth daily.     ? celecoxib (CELEBREX) 200 MG capsule TAKE ONE CAPSULE BY MOUTH EVERY DAY 90 capsule 1   ? estradiol (ESTRACE) 0.5 MG tablet Take 1 tablet (0.5 mg total) by mouth daily. 30 tablet 11   ? fluticasone (FLONASE) 50 mcg/actuation nasal spray 2 sprays into each nostril daily as needed.      ? furosemide (LASIX) 20 MG tablet TAKE ONE TABLET BY MOUTH EVERY DAY 90 tablet 1   ? KRILL OIL ORAL Take 1,000 mg by mouth bedtime.      ? LACTOBAC CMB #3/FOS/PANTETHINE (PROBIOTIC & ACIDOPHILUS ORAL) Take 1 capsule by mouth once daily.      ? levothyroxine (SYNTHROID, LEVOTHROID) 50 MCG tablet Take 50 mcg by mouth Daily at 6:00 am.     ? MV-MINERALS/FA/OMEGA 3,6,9 #3 (WOMEN'S 50+ ADVANCED ORAL) Take 1 tablet by mouth daily.     ? omeprazole (PRILOSEC) 20 MG capsule TAKE ONE CAPSULE BY MOUTH TWICE A  capsule 2   ? simvastatin (ZOCOR) 20 MG tablet Take 1 tablet (20 mg total) by mouth bedtime. 90 tablet 3   ? traZODone (DESYREL) 50 MG tablet TAKE 1/2 TABLET BY MOUTH AT BEDTIME 45 tablet 1   ? venlafaxine (EFFEXOR-XR) 75 MG 24 hr capsule TAKE ONE CAPSULE BY MOUTH THREE TIMES A  capsule 0   ? levothyroxine (SYNTHROID, LEVOTHROID) 50 MCG tablet TAKE ONE TABLET BY MOUTH EVERY DAY 90 tablet 1     No current facility-administered medications for this visit.       Allergies   Allergen Reactions   ? Penicillins Hives   ? Pollen    ? Venom-Honey Bee Swelling         Lab Results    Chemistry/lipid CBC Cardiac  Enzymes/BNP/TSH/INR   Lab Results   Component Value Date    CHOL 193 09/03/2016    HDL 66 09/03/2016    LDLCALC 111 09/03/2016    TRIG 79 09/03/2016    CREATININE 0.74 11/02/2016    BUN 16 11/02/2016    K 4.6 11/02/2016     11/02/2016     11/02/2016    CO2 28 11/02/2016    Lab Results   Component Value Date    WBC 11.6 (H) 11/02/2016    HGB 13.4 11/02/2016    HCT 40.0 11/02/2016    MCV 90 11/02/2016     11/02/2016    Lab Results   Component Value Date    TROPONINI <0.01 09/03/2016    BNP 69 09/28/2016    TSH 2.15 09/03/2016    INR 1.00 05/31/2015

## 2021-06-26 NOTE — PROGRESS NOTES
Progress Notes by Pk Garcia DO at 5/25/2018 11:30 AM     Author: Pk Garcia DO Service: -- Author Type: Physician    Filed: 5/25/2018  1:27 PM Encounter Date: 5/25/2018 Status: Signed    : Pk Garcia DO (Physician)           Click to link to [facility]     [facility] HEART CARE NOTE      Assessment/Recommendations   Assessment:    1.  Significant generalized muscle aches and fatigue.  Times are more consistent with a neuromuscular issue would consider polymyalgia rheumatica or myasthenia gravis.  TSH: normal in April 2018.   2.  Sigmoid septum with LVOT gradient, improved with metoprolol.  Patient had prior epiode of synope.   3   Palpitations.  Improved with beta blocker therapy likely atrial tachycardiac   4.  Hypertension  5.  Hyperlipidemia    Plan:  1.  Transthoracic echocardiogram ordered  2.  Patient to follow-up with PMD next week.    3.  Continue metoprolol 50 mg two times a day.   4.  No change in symptoms with holding statin.         History of Present Illness    Ms. Dyana Nicole is a 78 y.o. female history of SVT and syncope who presents in cardiology clinic in follow-up.      His last evaluation the patient has returned from [place] has noticed multiple symptoms.  Her main concern is her severe muscle fatigue and pain.  She states that she has diffuse muscle aches and pain.  States her muscle fatigue is severe in the afternoon and improved in the morning.  States when she is doing her needle work her eyelids become droopy and she has difficulty with crocheting after 20-30 minutes.     She denies any chest pain symptoms.  She has no pitting edema.  She has no orthopnea symptoms.  Had a recent mechanical fall at a store.  No clear syncopal episodes.  He does have occasional palpitations related atrial tachycardia.    ECHO (9/3/2016):   Summary  Normal left ventricular size and systolic function.  Left ventricular ejection fraction is visually  estimated to be 60%.  E/A flow reversal noted. Suggestive of diastolic dysfunction.  Normal right ventricular size and systolic function.  Moderate tricuspid regurgitation.  Mild mitral regurgitation is present.  Mitral valve leaflets are mildly thickened with preserved leaflet  mobility.  Findings appear similar to 11/2013    Holter Monitor: 6/27/2017  24-hour Holter monitor was applied June 28, 2017  Baseline transmission showed normal sinus rhythm with an average heart rate is 72 bpm.  Heart rate ranged between  bpm  Normal baseline electrocardiographic intervals were noted  No paroxysmal bradycardia, tachycardia or high-grade heart block  Rare ventricular ectopy-3 single PVCs  Rare supraventricular ectopy at 100 PACs.  Many of these PACs are actually late cycle type beats and very little early atrial ectopy is seen.  There is no atrial tachycardia, no atrial fibrillation  Symptoms of dyspnea on exertion are noted and occurred during sinus rhythm generally with heart rates in the 80-90 bpm range     Essentially normal Holter monitor with very little noncomplex singular ectopic beats    NM STRESS: 7/6/2017    1.The patient's exercise capacity is normal.    2.The exercise nuclear stress test is negative for inducible myocardial ischemia or infarction.    3.The left ventricular ejection fraction is 74%.    4.When compared to the images of 9/22/2016, there has been no significant change.     Physical Examination Review of Systems   Vitals:    05/25/18 1137   BP: 102/56   Pulse: 64   Resp: 18     Body mass index is 35.12 kg/(m^2).  Wt Readings from Last 3 Encounters:   05/25/18 192 lb (87.1 kg)   05/17/18 190 lb 3.2 oz (86.3 kg)   04/23/18 190 lb (86.2 kg)       General Appearance:   no distress, obese body habitus   ENT/Mouth: membranes moist, no oral lesions or bleeding gums.      EYES:  no scleral icterus, normal conjunctivae   Neck: no carotid bruits or thyromegaly   Chest/Lungs:   lungs are clear to  auscultation, no rales.    Cardiovascular:   Regular. Normal first and second heart sounds. Faint early peaking systolic murmur (no change).  No diastolic murmurs, rubs, or gallops; the carotid, radial and posterior tibial pulses are intact, Jugular venous pressure normal, no pitting edema bilaterally.     Abdomen:  no  tenderness; bowel sounds are present   Extremities: no cyanosis or clubbing   Skin: no xanthelasma, warm.    Neurologic: normal gait, no tremors     Psychiatric: alert and oriented x3, calm     General: WNL  Eyes: WNL  Ears/Nose/Throat: WNL  Lungs: WNL  Heart: WNL   Stomach: WNL  Bladder: WNL  Muscle/Joints: WNL  Skin: WNL  Nervous System: WNL  Mental Health: WNL     Blood: WNL       Medical History  Surgical History Family History Social History   Past Medical History:   Diagnosis Date   ? Anemia     Created by Conversion    ? Arrhythmia    ? Cervical Spondylosis     Created by Conversion    ? Depression    ? Disease of thyroid gland     hypo   ? Fibromyalgia     Created by Conversion    ? GERD (gastroesophageal reflux disease)    ? High cholesterol    ? History of transfusion    ? Hyperlipidemia     Created by Conversion    ? Hypertension     Created by Conversion    ? Near syncope    ? Peripheral Neuropathy     Created by Conversion    ? Sleep apnea     mild   ? Tachycardia     Past Surgical History:   Procedure Laterality Date   ? Bladder lift     ? CARPAL TUNNEL RELEASE     ? CATARACT EXTRACTION, BILATERAL     ? CHOLECYSTECTOMY     ? HYSTERECTOMY     ? JOINT REPLACEMENT Bilateral     knees   ? MOHS SURGERY      Nose   ? SD ARTHROPLASTY TIBIAL PLATEAU      Description: Knee Replacement;  Recorded: 05/16/2013;   ? SD INJECT NERV JOE STEWART PERIPH NERV      Description: Peripheral Nerve Block Wrist Median Right;  Recorded: 05/16/2013;   ? REPLACEMENT TOTAL KNEE      L   ? REPLACEMENT TOTAL KNEE BILATERAL     ? ROTATOR CUFF REPAIR     ? TRUNK SKIN LESION EXCISIONAL BIOPSY Left 1/5/2018    Procedure:  LEFT BUTTOCK EXPLORATION, EXCISION BUTTOCK MASS;  Surgeon: Lg Jameson MD;  Location: [facility] Main OR;  Service:     Family History   Problem Relation Age of Onset   ? Aneurysm Father      AAA   ? Cancer Mother      pancrease    Social History     Social History   ? Marital status:      Spouse name: N/A   ? Number of children: N/A   ? Years of education: N/A     Occupational History   ? Not on file.     Social History Main Topics   ? Smoking status: Former Smoker     Packs/day: 0.25     Years: 1.00     Quit date: 1/1/1961   ? Smokeless tobacco: Never Used   ? Alcohol use No   ? Drug use: No   ? Sexual activity: No     Other Topics Concern   ? Not on file     Social History Narrative    Lives in senior living with  of over 50 years. 3 daughters          Medications  Allergies   Current Outpatient Prescriptions   Medication Sig Dispense Refill   ? aspirin 81 MG EC tablet Take 81 mg by mouth at bedtime.      ? budesonide (ENTOCORT EC) 3 mg 24 hr capsule Take 9 mg by mouth every other day. As needed - pt using every other day     ? calcium carbonate-vitamin D3 (CALCIUM 600 + D,3,) 600 mg calcium- 200 unit cap Take 1 tablet by mouth daily.     ? celecoxib (CELEBREX) 200 MG capsule TAKE ONE CAPSULE BY MOUTH EVERY DAY 90 capsule 1   ? diphenoxylate-atropine (LOMOTIL) 2.5-0.025 mg per tablet Take 1 tablet by mouth 4 (four) times a day as needed for diarrhea.     ? estradiol (ESTRACE) 0.5 MG tablet Take 1 tablet (0.5 mg total) by mouth daily. 30 tablet 5   ? fluticasone (FLONASE) 50 mcg/actuation nasal spray 2 sprays into each nostril daily as needed. 16 g 11   ? KRILL OIL ORAL Take 1,000 mg by mouth bedtime.      ? LACTOBAC CMB #3/FOS/PANTETHINE (PROBIOTIC & ACIDOPHILUS ORAL) Take 1 capsule by mouth once daily.      ? levothyroxine (SYNTHROID, LEVOTHROID) 50 MCG tablet Take 50 mcg by mouth at bedtime.     ? metoprolol tartrate (LOPRESSOR) 50 MG tablet Take 1 tablet (50 mg total) by mouth 2 (two) times  a day. 60 tablet 11   ? MV-MINERALS/FA/OMEGA 3,6,9 #3 (WOMEN'S 50+ ADVANCED ORAL) Take 1 tablet by mouth daily.     ? omeprazole (PRILOSEC) 20 MG capsule TAKE ONE CAPSULE BY MOUTH TWICE A  capsule 2   ? sertraline (ZOLOFT) 100 MG tablet Take 1 tablet (100 mg total) by mouth daily. 90 tablet 3   ? traZODone (DESYREL) 50 MG tablet Take 25 mg by mouth at bedtime.     ? celecoxib (CELEBREX) 200 MG capsule Take 200 mg by mouth daily.     ? estradiol (ESTRACE) 0.5 MG tablet Take 1 tablet (0.5 mg total) by mouth daily. 90 tablet 3   ? simvastatin (ZOCOR) 20 MG tablet Take 20 mg by mouth at bedtime.       No current facility-administered medications for this visit.       Allergies   Allergen Reactions   ? Penicillins Hives   ? Pollen    ? Venom-Honey Bee Swelling         Lab Results    Chemistry/lipid CBC Cardiac Enzymes/BNP/TSH/INR   Lab Results   Component Value Date    CHOL 193 09/03/2016    HDL 66 09/03/2016    LDLCALC 111 09/03/2016    TRIG 79 09/03/2016    CREATININE 0.80 04/12/2018    BUN 21 04/12/2018    K 4.8 04/12/2018     04/12/2018     04/12/2018    CO2 27 04/12/2018    Lab Results   Component Value Date    WBC 7.0 01/03/2018    HGB 12.7 01/03/2018    HCT 38.6 01/03/2018    MCV 92 01/03/2018     01/03/2018    Lab Results   Component Value Date    CKTOTAL 87 04/12/2018    TROPONINI <0.01 09/13/2017    BNP 69 09/28/2016    TSH 1.88 09/13/2017    INR 1.00 05/31/2015

## 2021-06-26 NOTE — PROGRESS NOTES
Progress Notes by Pk Garcia DO at 8/30/2018  1:30 PM     Author: Pk Garcia DO Service: -- Author Type: Physician    Filed: 8/30/2018  2:52 PM Encounter Date: 8/30/2018 Status: Signed    : Pk Garcia DO (Physician)           Click to link to [facility] Heart Care     [facility] HEART CARE NOTE      Assessment/Recommendations   Assessment:  .   1.  Sigmoid septum with LVOT gradient, stable with metoprolol.  No recent syncope, occasional orthostatic symptoms  2.   Palpitations secondary to atrial tachycardia, resovled with beta blocker therapy  3.  Hypertension  4.  Hyperlipidemia, recent lipid levels were quite elevated.  Unclear if she was fasting.  Her symptoms never improved with holding statin  5.  Chronic lymphedema, continue compression.  BNP: normal.   6. Tricuspid egurgitation, mild to moderate    Plan:  1.  Continue metoprolol therapy.  Continue aspirin therapy.  Continue Zocor for hyperlipidemia  2.  Patient has ongoing workup with neurology and also endocrinology in the near future for mild diffuse weakness.         History of Present Illness    Ms. yDana Nicole is a 78 y.o. female left ventricular outflow gradient, history of palpitations secondary to atrial tachycardia, hypertension, hyperlipidemia, chronic lymphedema who presents to cardiology clinic in follow-up.    Evaluation patient has undergone evaluation by neurology who did not find any clear evidence of neuromuscular disease which could explain her chronic fatigue and weakness.  She has endocrinology evaluation coming up soon for possible adrenal insufficiency I did review her most recent cosyntropin stim test which did not show significant abnormally.    For the patient she continues to deny any active palpitations which have improved and metoprolol therapy.  She has not had any syncopal episode.  She had one episode of near syncope with rapidly standing a few weeks ago.  He did not lose  consciousness.  She denies any anginal chest pain, orthopnea or PND symptoms.    Main concern remains swelling in her lower extremity.  This time I think it is likely lymphedema and not related to her tricuspid regurgitation which is mild to moderate.      ECHO (9/3/2016):   Summary  Normal left ventricular size and systolic function.  Left ventricular ejection fraction is visually estimated to be 60%.  E/A flow reversal noted. Suggestive of diastolic dysfunction.  Normal right ventricular size and systolic function.  Moderate tricuspid regurgitation.  Mild mitral regurgitation is present.  Mitral valve leaflets are mildly thickened with preserved leaflet  mobility.  Findings appear similar to 11/2013    Holter Monitor: 6/27/2017  24-hour Holter monitor was applied June 28, 2017  Baseline transmission showed normal sinus rhythm with an average heart rate is 72 bpm.  Heart rate ranged between  bpm  Normal baseline electrocardiographic intervals were noted  No paroxysmal bradycardia, tachycardia or high-grade heart block  Rare ventricular ectopy-3 single PVCs  Rare supraventricular ectopy at 100 PACs.  Many of these PACs are actually late cycle type beats and very little early atrial ectopy is seen.  There is no atrial tachycardia, no atrial fibrillation  Symptoms of dyspnea on exertion are noted and occurred during sinus rhythm generally with heart rates in the 80-90 bpm range     Essentially normal Holter monitor with very little noncomplex singular ectopic beats    NM STRESS: 7/6/2017    1.The patient's exercise capacity is normal.    2.The exercise nuclear stress test is negative for inducible myocardial ischemia or infarction.    3.The left ventricular ejection fraction is 74%.    4.When compared to the images of 9/22/2016, there has been no significant change.     Physical Examination Review of Systems   Vitals:    08/30/18 1318   BP: 116/64   Pulse: 64   Resp: 16     Body mass index is 36.21 kg/(m^2).  Wt  Readings from Last 3 Encounters:   08/30/18 198 lb (89.8 kg)   07/19/18 197 lb (89.4 kg)   07/11/18 194 lb (88 kg)       General Appearance:   no distress, obese body habitus   ENT/Mouth: membranes moist, no oral lesions or bleeding gums.      EYES:  no scleral icterus, normal conjunctivae   Neck: no carotid bruits or thyromegaly   Chest/Lungs:   lungs are clear to auscultation, no rales.    Cardiovascular:   Regular. Normal first and second heart sounds. 2/6early peaking systolic murmur.  No diastolic murmurs, rubs, or gallops; the carotid, radial and posterior tibial pulses are intact, Jugular venous pressure normal, no pitting edema bilaterally.  Notable bilateral lymphedema.   Abdomen:  no  tenderness; bowel sounds are present   Extremities: no cyanosis or clubbing   Skin: no xanthelasma, warm.    Neurologic: normal gait, no tremors     Psychiatric: alert and oriented x3, calm     General: WNL  Eyes: WNL  Ears/Nose/Throat: WNL  Lungs: WNL  Heart: WNL  Stomach: WNL  Bladder: WNL  Muscle/Joints: WNL  Skin: WNL  Nervous System: WNL  Mental Health: WNL     Blood: WNL       Medical History  Surgical History Family History Social History   Past Medical History:   Diagnosis Date   ? Anemia     Created by Conversion    ? Arrhythmia    ? Cervical Spondylosis     Created by Conversion    ? Depression    ? Disease of thyroid gland     hypo   ? Fibromyalgia     Created by Conversion    ? GERD (gastroesophageal reflux disease)    ? High cholesterol    ? History of transfusion    ? Hyperlipidemia     Created by Conversion    ? Hypertension     Created by Conversion    ? Near syncope    ? Peripheral Neuropathy     Created by Conversion    ? Sleep apnea     mild   ? Tachycardia     Past Surgical History:   Procedure Laterality Date   ? Bladder lift     ? CARPAL TUNNEL RELEASE     ? CATARACT EXTRACTION, BILATERAL     ? CHOLECYSTECTOMY     ? HYSTERECTOMY     ? JOINT REPLACEMENT Bilateral     knees   ? MOHS SURGERY      Nose   ?  MO ARTHROPLASTY TIBIAL PLATEAU      Description: Knee Replacement;  Recorded: 05/16/2013;   ? MO INJECT NERV BLCK,OTHR PERIPH NERV      Description: Peripheral Nerve Block Wrist Median Right;  Recorded: 05/16/2013;   ? REPLACEMENT TOTAL KNEE      L   ? REPLACEMENT TOTAL KNEE BILATERAL     ? ROTATOR CUFF REPAIR     ? TRUNK SKIN LESION EXCISIONAL BIOPSY Left 1/5/2018    Procedure: LEFT BUTTOCK EXPLORATION, EXCISION BUTTOCK MASS;  Surgeon: Lg Jameson MD;  Location: [facility] Main OR;  Service:     Family History   Problem Relation Age of Onset   ? Aneurysm Father      AAA   ? Cancer Mother      pancrease    Social History     Social History   ? Marital status:      Spouse name: N/A   ? Number of children: N/A   ? Years of education: N/A     Occupational History   ? Not on file.     Social History Main Topics   ? Smoking status: Former Smoker     Packs/day: 0.25     Years: 1.00     Quit date: 1/1/1961   ? Smokeless tobacco: Never Used   ? Alcohol use No   ? Drug use: No   ? Sexual activity: No     Other Topics Concern   ? Not on file     Social History Narrative    Lives in senior living with  of over 50 years. 3 daughters          Medications  Allergies   Current Outpatient Prescriptions   Medication Sig Dispense Refill   ? aspirin 81 MG EC tablet Take 81 mg by mouth at bedtime.      ? budesonide (ENTOCORT EC) 3 mg 24 hr capsule Take 3 mg by mouth every other day. As needed - pt using every other day     ? calcium carbonate-vitamin D3 (CALCIUM 600 + D,3,) 600 mg calcium- 200 unit cap Take 1 tablet by mouth daily.     ? celecoxib (CELEBREX) 200 MG capsule TAKE ONE CAPSULE BY MOUTH EVERY DAY 90 capsule 1   ? diphenoxylate-atropine (LOMOTIL) 2.5-0.025 mg per tablet Take 1 tablet by mouth 4 (four) times a day as needed for diarrhea.     ? estradiol (ESTRACE) 0.5 MG tablet Take 1 tablet (0.5 mg total) by mouth daily. (Patient taking differently: Take 0.25 mg by mouth daily. ) 90 tablet 3   ?  fluticasone (FLONASE) 50 mcg/actuation nasal spray 2 sprays into each nostril daily as needed. 16 g 11   ? furosemide (LASIX) 20 MG tablet Take 1 tablet (20 mg total) by mouth daily. 30 tablet 6   ? KRILL OIL ORAL Take 1,000 mg by mouth bedtime.      ? LACTOBAC CMB #3/FOS/PANTETHINE (PROBIOTIC & ACIDOPHILUS ORAL) Take 1 capsule by mouth once daily.      ? levothyroxine (SYNTHROID, LEVOTHROID) 50 MCG tablet Take 50 mcg by mouth at bedtime.     ? metoprolol tartrate (LOPRESSOR) 50 MG tablet Take 1 tablet (50 mg total) by mouth 2 (two) times a day. 180 tablet 1   ? MV-MINERALS/FA/OMEGA 3,6,9 #3 (WOMEN'S 50+ ADVANCED ORAL) Take 1 tablet by mouth daily.     ? omeprazole (PRILOSEC) 20 MG capsule TAKE ONE CAPSULE BY MOUTH TWICE A  capsule 2   ? sertraline (ZOLOFT) 100 MG tablet Take 1 tablet (100 mg total) by mouth daily. 90 tablet 3   ? simvastatin (ZOCOR) 20 MG tablet TAKE ONE TABLET BY MOUTH AT BEDTIME 90 tablet 3   ? traZODone (DESYREL) 50 MG tablet Take 25 mg by mouth at bedtime.     ? simvastatin (ZOCOR) 20 MG tablet Take 20 mg by mouth at bedtime.       No current facility-administered medications for this visit.       Allergies   Allergen Reactions   ? Oxycodone      hallucinations   ? Penicillins Hives   ? Pollen    ? Venom-Honey Bee Swelling         Lab Results    Chemistry/lipid CBC Cardiac Enzymes/BNP/TSH/INR   Lab Results   Component Value Date    CHOL 307 (H) 06/11/2018    HDL 63 06/11/2018    LDLCALC 195 (H) 06/11/2018    TRIG 246 (H) 06/11/2018    CREATININE 0.88 06/11/2018    BUN 24 06/11/2018    K 4.5 06/11/2018     06/11/2018     06/11/2018    CO2 24 06/11/2018    Lab Results   Component Value Date    WBC 8.1 06/07/2018    HGB 12.5 06/07/2018    HCT 36.7 06/07/2018    MCV 91 06/07/2018     06/07/2018    Lab Results   Component Value Date    CKTOTAL 87 04/12/2018    TROPONINI <0.01 09/13/2017     06/07/2018    TSH 1.88 09/13/2017    INR 1.00 05/31/2015

## 2021-06-26 NOTE — PROGRESS NOTES
Progress Notes by Pk Garcia DO at 1/24/2018  2:10 PM     Author: Pk Garcia DO Service: -- Author Type: Physician    Filed: 1/24/2018  2:09 PM Encounter Date: 1/24/2018 Status: Signed    : Pk Garcia DO (Physician)           Click to link to [facility] Heart Care     [facility] HEART CARE NOTE      Assessment/Recommendations   Assessment:    1.  History of syncope related to likely orthostatic hypotension (no further events since March 3rd 2017).  Orthostatic hypotension was clearly documented on vital at OSH in [place].  Remains off lasix.   2.  Sigmoid septum with LVOT gradient, improved with metoprolol    3   Palpitations resolved with beta blocker therapy.  Possible atrial tachycardiac vs. paroxysmal atrial fibrillation.   4.  Hypertension  5.  Hyperlipidemia    Plan:  1. Continue adequate oral hydration  2.  Continue Toprol XL to 50 mg daily and 25 mg in PM  3.  Patient to follow-up after return from [place] in March 2018.     Follow-up 6 month       History of Present Illness    Ms. Dyana Nicole is a 77 y.o. female history of SVT and syncope who presents in cardiology clinic in follow-up.      Assessment the patient is done well.  She has noticed some episodes of shortness of breath particularly with ambling stairs.  Otherwise she has no cardiac symptoms.  Her palpitations have resolved after adjustment of p.m. dose of metoprolol.  She said no further episodes of syncope.  She does gets mild lightheadedness with rapid standing which she monitors closely.  They are traveling to [place] next week.  They will be in [place] until March 2018.    She is accompanied today by her  and her daughter.    She is compliant with her current medication therapy.  She recently stopped Effexor and started on Zoloft.    ECHO (9/3/2016):   Summary  Normal left ventricular size and systolic function.  Left ventricular ejection fraction is visually estimated to be 60%.  E/A  flow reversal noted. Suggestive of diastolic dysfunction.  Normal right ventricular size and systolic function.  Moderate tricuspid regurgitation.  Mild mitral regurgitation is present.  Mitral valve leaflets are mildly thickened with preserved leaflet  mobility.  Findings appear similar to 11/2013    Holter Monitor: 6/27/2017  24-hour Holter monitor was applied June 28, 2017  Baseline transmission showed normal sinus rhythm with an average heart rate is 72 bpm.  Heart rate ranged between  bpm  Normal baseline electrocardiographic intervals were noted  No paroxysmal bradycardia, tachycardia or high-grade heart block  Rare ventricular ectopy-3 single PVCs  Rare supraventricular ectopy at 100 PACs.  Many of these PACs are actually late cycle type beats and very little early atrial ectopy is seen.  There is no atrial tachycardia, no atrial fibrillation  Symptoms of dyspnea on exertion are noted and occurred during sinus rhythm generally with heart rates in the 80-90 bpm range     Essentially normal Holter monitor with very little noncomplex singular ectopic beats    NM STRESS: 7/6/2017    1.The patient's exercise capacity is normal.    2.The exercise nuclear stress test is negative for inducible myocardial ischemia or infarction.    3.The left ventricular ejection fraction is 74%.    4.When compared to the images of 9/22/2016, there has been no significant change.     Physical Examination Review of Systems   Vitals:    01/24/18 1343   BP: 110/58   Pulse: 68   Resp: 18     Body mass index is 35.67 kg/(m^2).  Wt Readings from Last 3 Encounters:   01/24/18 195 lb (88.5 kg)   01/02/18 195 lb (88.5 kg)   01/03/18 198 lb (89.8 kg)       General Appearance:   no distress, normal body habitus   ENT/Mouth: membranes moist, no oral lesions or bleeding gums.      EYES:  no scleral icterus, normal conjunctivae   Neck: no carotid bruits or thyromegaly   Chest/Lungs:   lungs are clear to auscultation, no rales.     Cardiovascular:   Regular. Normal first and second heart sounds. Faint early peaking systolic murmur.  No diastolic murmurs, rubs, or gallops; the carotid, radial and posterior tibial pulses are intact, Jugular venous pressure normal, no edema bilaterally.     Abdomen:  no  tenderness; bowel sounds are present   Extremities: no cyanosis or clubbing   Skin: no xanthelasma, warm.    Neurologic: normal gait, no tremors     Psychiatric: alert and oriented x3, calm     General: Weight Gain  Eyes: WNL  Ears/Nose/Throat: WNL  Lungs: WNL  Heart: Shortness of Breath with activity   Stomach: Diarrhea  Bladder: WNL  Muscle/Joints: Joint Pain  Skin: WNL  Nervous System: Loss of Balance  Mental Health: WNL     Blood: Easy Bruising       Medical History  Surgical History Family History Social History   Past Medical History:   Diagnosis Date   ? Anemia     Created by Conversion    ? Arrhythmia    ? Cervical Spondylosis     Created by Conversion    ? Depression    ? Disease of thyroid gland     hypo   ? Fibromyalgia     Created by Conversion    ? GERD (gastroesophageal reflux disease)    ? High cholesterol    ? History of transfusion    ? Hyperlipidemia     Created by Conversion    ? Hypertension     Created by Conversion    ? Near syncope    ? Peripheral Neuropathy     Created by Conversion    ? Sleep apnea     mild   ? Tachycardia     Past Surgical History:   Procedure Laterality Date   ? CHOLECYSTECTOMY     ? HYSTERECTOMY     ? JOINT REPLACEMENT Bilateral     knees   ? NC ARTHROPLASTY TIBIAL PLATEAU      Description: Knee Replacement;  Recorded: 05/16/2013;   ? NC INJECT NERV NOLVIACK,JOE PERIPH NERV      Description: Peripheral Nerve Block Wrist Median Right;  Recorded: 05/16/2013;   ? TRUNK SKIN LESION EXCISIONAL BIOPSY Left 1/5/2018    Procedure: LEFT BUTTOCK EXPLORATION, EXCISION BUTTOCK MASS;  Surgeon: Lg Jameson MD;  Location: [facility];  Service:     Family History   Problem Relation Age of Onset   ?  Aneurysm Father     Social History     Social History   ? Marital status:      Spouse name: N/A   ? Number of children: N/A   ? Years of education: N/A     Occupational History   ? Not on file.     Social History Main Topics   ? Smoking status: Former Smoker     Quit date: 1/1/1961   ? Smokeless tobacco: Never Used   ? Alcohol use No   ? Drug use: No   ? Sexual activity: Not on file     Other Topics Concern   ? Not on file     Social History Narrative          Medications  Allergies   Current Outpatient Prescriptions   Medication Sig Dispense Refill   ? aspirin 81 MG EC tablet Take 81 mg by mouth at bedtime.      ? BIOTIN ORAL Take 5,000 mcg by mouth once daily.      ? budesonide (ENTOCORT EC) 3 mg 24 hr capsule Take 3 mg by mouth every other day. As needed - pt using every other day     ? calcium carbonate-vitamin D3 (CALCIUM 600 + D,3,) 600 mg calcium- 200 unit cap Take 1 tablet by mouth daily.     ? celecoxib (CELEBREX) 200 MG capsule Take 200 mg by mouth daily.     ? diphenoxylate-atropine (LOMOTIL) 2.5-0.025 mg per tablet Take 1 tablet by mouth 4 (four) times a day as needed for diarrhea.     ? estradiol (ESTRACE) 0.5 MG tablet TAKE ONE TABLET BY MOUTH EVERY DAY 30 tablet 2   ? fluticasone (FLONASE) 50 mcg/actuation nasal spray 2 sprays into each nostril daily as needed.      ? KRILL OIL ORAL Take 1,000 mg by mouth bedtime.      ? LACTOBAC CMB #3/FOS/PANTETHINE (PROBIOTIC & ACIDOPHILUS ORAL) Take 1 capsule by mouth once daily.      ? levothyroxine (SYNTHROID, LEVOTHROID) 50 MCG tablet TAKE ONE TABLET BY MOUTH EVERY DAY 90 tablet 2   ? metoprolol tartrate (LOPRESSOR) 50 MG tablet Take 1 tablet (50 mg total) by mouth 2 (two) times a day. 60 tablet 11   ? MV-MINERALS/FA/OMEGA 3,6,9 #3 (WOMEN'S 50+ ADVANCED ORAL) Take 1 tablet by mouth daily.     ? omeprazole (PRILOSEC) 20 MG capsule TAKE ONE CAPSULE BY MOUTH TWICE A  capsule 2   ? sertraline (ZOLOFT) 100 MG tablet TAKE ONE-HALF TABLET BY MOUTH DAILY  FOR 2 WEEKS, THEN TAKE 1 TABLET DAILY 30 tablet 2   ? simvastatin (ZOCOR) 20 MG tablet Take 20 mg by mouth at bedtime.     ? traZODone (DESYREL) 50 MG tablet Take 25 mg by mouth at bedtime.     ? estradiol (ESTRACE) 0.5 MG tablet Take 0.5 mg by mouth daily.     ? levothyroxine (SYNTHROID, LEVOTHROID) 50 MCG tablet Take 50 mcg by mouth at bedtime.     ? omeprazole (PRILOSEC) 20 MG capsule Take 20 mg by mouth 2 (two) times a day before meals.       No current facility-administered medications for this visit.       Allergies   Allergen Reactions   ? Penicillins Hives   ? Pollen    ? Venom-Honey Bee Swelling         Lab Results    Chemistry/lipid CBC Cardiac Enzymes/BNP/TSH/INR   Lab Results   Component Value Date    CHOL 193 09/03/2016    HDL 66 09/03/2016    LDLCALC 111 09/03/2016    TRIG 79 09/03/2016    CREATININE 0.86 01/03/2018    BUN 19 01/03/2018    K 5.2 (H) 01/03/2018     01/03/2018     01/03/2018    CO2 28 01/03/2018    Lab Results   Component Value Date    WBC 7.0 01/03/2018    HGB 12.7 01/03/2018    HCT 38.6 01/03/2018    MCV 92 01/03/2018     01/03/2018    Lab Results   Component Value Date    TROPONINI <0.01 09/13/2017    BNP 69 09/28/2016    TSH 1.88 09/13/2017    INR 1.00 05/31/2015

## 2021-06-26 NOTE — PATIENT INSTRUCTIONS - HE
Park,     I need to speak to the radiologist to find out what would be the best MRI to order to that would look at both the SI joint and the hip.  Once they have spoken to the radiologist, I will place the order and then they will call you to schedule the MRI.  If no one calls you to schedule the MRI, please call my office at [phone] or send me a [employer] message.  I will call you with the results of the MRI to discuss further treatment options based on what it shows.    Please contact Dr. Pugh's office to discuss the bowel and bladder problems that you have been having.  These are not related to your spine.    Please decrease the gabapentin to 1 capsule (100 mg) 3 times a day.    Please not hesitate to send me a MyChart message or call the nurse line at [phone] if you have any questions or concerns prior to us contacting you.

## 2021-06-27 NOTE — PROGRESS NOTES
Progress Notes by Pk Garcia DO at 6/26/2019  2:10 PM     Author: Pk Garcia DO Service: -- Author Type: Physician    Filed: 6/26/2019  2:44 PM Encounter Date: 6/26/2019 Status: Signed    : Pk Garcia DO (Physician)           Click to link to [facility] Heart Care     [facility] HEART CARE NOTE      Assessment/Recommendations   Assessment:  .   1.  Sigmoid septum with LVOT gradient.   2.  Palpitations secondary to atrial tachycardia  3.  Hypertension  4.  Hyperlipidemia  5.  Chronic lymphedema, continue compression.  BNP: normal in past.    6.  Tricuspid egurgitation, mild to moderate    Plan:  1.  Resume prior dose of metoprolol   2.  Continue Zocor for hyperlipidemia  3.  Maintain good hydration with LVOT gradient.         History of Present Illness    Ms. Dyana Nicole is a 79 y.o. female left ventricular outflow gradient, history of palpitations secondary to atrial tachycardia, hypertension, hyperlipidemia, chronic lymphedema who presents to cardiology clinic in follow-up.    Since her last evaluation patient had a GI bleed this winter.  This resulted in her stopping her metoprolol therapy.  In March she called the clinic and noticed worsening palpitations off her beta-blocker therapy.  Is also noticed some dyspnea with ambling stairs and lower dose of beta-blocker therapy.  Today her exam demonstrated a worsening LVOT gradient.  She most recent echo demonstrated normal aortic valve function.  Hemoglobin has improved back to near normal levels.  She is off her iron supplementation.  She is recently started back on her naproxen for her arthritis issues and notes improvement in her ability to walk and ambulate.      Accompanied today with her daughter.    ECHO (9/3/2016):   Summary  Normal left ventricular size and systolic function.  Left ventricular ejection fraction is visually estimated to be 60%.  E/A flow reversal noted. Suggestive of diastolic  dysfunction.  Normal right ventricular size and systolic function.  Moderate tricuspid regurgitation.  Mild mitral regurgitation is present.  Mitral valve leaflets are mildly thickened with preserved leaflet  mobility.  Findings appear similar to 11/2013    Holter Monitor: 6/27/2017  24-hour Holter monitor was applied June 28, 2017  Baseline transmission showed normal sinus rhythm with an average heart rate is 72 bpm.  Heart rate ranged between  bpm  Normal baseline electrocardiographic intervals were noted  No paroxysmal bradycardia, tachycardia or high-grade heart block  Rare ventricular ectopy-3 single PVCs  Rare supraventricular ectopy at 100 PACs.  Many of these PACs are actually late cycle type beats and very little early atrial ectopy is seen.  There is no atrial tachycardia, no atrial fibrillation  Symptoms of dyspnea on exertion are noted and occurred during sinus rhythm generally with heart rates in the 80-90 bpm range     Essentially normal Holter monitor with very little noncomplex singular ectopic beats    NM STRESS: 7/6/2017    1.The patient's exercise capacity is normal.    2.The exercise nuclear stress test is negative for inducible myocardial ischemia or infarction.    3.The left ventricular ejection fraction is 74%.    4.When compared to the images of 9/22/2016, there has been no significant change.     Physical Examination Review of Systems   Vitals:    06/26/19 1407   BP: 118/70   Pulse: 76   Resp: 16     Body mass index is 35.67 kg/m .  Wt Readings from Last 3 Encounters:   06/26/19 195 lb (88.5 kg)   05/03/19 189 lb (85.7 kg)   01/04/19 186 lb (84.4 kg)       General Appearance:   no distress, obese body habitus   ENT/Mouth: membranes moist, no oral lesions or bleeding gums.      EYES:  no scleral icterus, normal conjunctivae   Neck: no carotid bruits or thyromegaly   Chest/Lungs:   lungs are clear to auscultation, no rales.    Cardiovascular:   Regular. Normal first and second heart  sounds. 3/6 early peaking systolic murmur (slightly worse).  No diastolic murmurs, rubs, or gallops; the carotid, radial and posterior tibial pulses are intact, Jugular venous pressure normal, no pitting edema bilaterally. Notable bilateral lymphedema.   Abdomen:  no  tenderness; bowel sounds are present   Extremities: no cyanosis or clubbing   Skin: no xanthelasma, warm.    Neurologic: normal gait, no tremors     Psychiatric: alert and oriented x3, calm     General: Weight Gain  Eyes: WNL  Ears/Nose/Throat: Hearing Loss  Lungs: Shortness of Breath, Snoring  Heart: Shortness of Breath with activity, Leg Swelling  Stomach: WNL  Bladder: Frequent Urination at Night  Muscle/Joints: Joint Pain, Muscle Weakness  Skin: WNL  Nervous System: Loss of Balance  Mental Health: Confusion     Blood: Easy Bruising       Medical History  Surgical History Family History Social History   Past Medical History:   Diagnosis Date   ? Anemia     Created by Conversion    ? Arrhythmia    ? Cervical Spondylosis     Created by Conversion    ? Depression    ? Disease of thyroid gland     hypo   ? Fibromyalgia     Created by Conversion    ? GERD (gastroesophageal reflux disease)    ? High cholesterol    ? History of transfusion    ? Hyperlipidemia     Created by Conversion    ? Hypertension     Created by Conversion    ? Near syncope    ? Peripheral Neuropathy     Created by Conversion    ? Skin cancer, basal cell    ? Sleep apnea     mild   ? Tachycardia     Past Surgical History:   Procedure Laterality Date   ? Bladder lift     ? CARPAL TUNNEL RELEASE     ? CATARACT EXTRACTION, BILATERAL     ? CHOLECYSTECTOMY     ? HYSTERECTOMY     ? JOINT REPLACEMENT Bilateral     knees   ? MOHS SURGERY      Nose   ? OOPHORECTOMY Left     1 removed, 1 remains   ? NC ARTHROPLASTY TIBIAL PLATEAU      Description: Knee Replacement;  Recorded: 05/16/2013;   ? NC INJECT NERV JOE STEWART PERIPH NERV      Description: Peripheral Nerve Block Wrist Median Right;   Recorded: 2013;   ? REPLACEMENT TOTAL KNEE      L   ? REPLACEMENT TOTAL KNEE BILATERAL     ? ROTATOR CUFF REPAIR     ? TRUNK SKIN LESION EXCISIONAL BIOPSY Left 2018    Procedure: LEFT BUTTOCK EXPLORATION, EXCISION BUTTOCK MASS;  Surgeon: Lg Jameson MD;  Location: [facility] Main OR;  Service:     Family History   Problem Relation Age of Onset   ? Aneurysm Father         AAA   ? Cancer Mother         pancrease    Social History     Socioeconomic History   ? Marital status:      Spouse name: Not on file   ? Number of children: Not on file   ? Years of education: Not on file   ? Highest education level: Not on file   Occupational History   ? Not on file   Social Needs   ? Financial resource strain: Not on file   ? Food insecurity:     Worry: Not on file     Inability: Not on file   ? Transportation needs:     Medical: Not on file     Non-medical: Not on file   Tobacco Use   ? Smoking status: Former Smoker     Packs/day: 0.25     Years: 1.00     Pack years: 0.25     Last attempt to quit: 1961     Years since quittin.5   ? Smokeless tobacco: Never Used   Substance and Sexual Activity   ? Alcohol use: No   ? Drug use: No   ? Sexual activity: Never   Lifestyle   ? Physical activity:     Days per week: Not on file     Minutes per session: Not on file   ? Stress: Not on file   Relationships   ? Social connections:     Talks on phone: Not on file     Gets together: Not on file     Attends Latter day service: Not on file     Active member of club or organization: Not on file     Attends meetings of clubs or organizations: Not on file     Relationship status: Not on file   ? Intimate partner violence:     Fear of current or ex partner: Not on file     Emotionally abused: Not on file     Physically abused: Not on file     Forced sexual activity: Not on file   Other Topics Concern   ? Not on file   Social History Narrative    Lives in senior living with  of over 50 years. 3 daughters           Medications  Allergies   Current Outpatient Medications   Medication Sig Dispense Refill   ? aspirin 81 MG EC tablet Take 81 mg by mouth at bedtime.      ? biotin 5 mg Tab Take 5,000 mcg by mouth daily.     ? calcium carbonate-vitamin D3 (CALCIUM 600 + D,3,) 600 mg calcium- 200 unit cap Take 1 tablet by mouth 2 (two) times a day.            ? estrogens, conjugated, (PREMARIN) 0.3 MG tablet Take 1 tablet (0.3 mg total) by mouth daily. 90 tablet 3   ? fluticasone propionate (FLONASE) 50 mcg/actuation nasal spray INSTILL 2 SPRAYS IN EACH NOSTRIL DAILY AS NEEDED 48 g 3   ? furosemide (LASIX) 20 MG tablet Take 1 tablet (20 mg total) by mouth daily. 90 tablet 2   ? KRILL OIL ORAL Take 300 mg by mouth at bedtime.            ? LACTOBAC CMB #3/FOS/PANTETHINE (PROBIOTIC & ACIDOPHILUS ORAL) Take 1 capsule by mouth once daily. 1.5 billion units           ? levothyroxine (SYNTHROID, LEVOTHROID) 50 MCG tablet Take 1 tablet (50 mcg total) by mouth Daily at 6:00 am. 90 tablet 0   ? metoprolol tartrate (LOPRESSOR) 50 MG tablet Take 1 tablet (50 mg total) by mouth 2 (two) times a day. (Patient taking differently: Take 25 mg by mouth 2 (two) times a day.       ) 180 tablet 1   ? multivitamin with minerals (THERA-M) 9 mg iron-400 mcg Tab tablet Take 1 tablet by mouth every evening.     ? naproxen (NAPROSYN) 500 MG tablet Take 1 tablet (500 mg total) by mouth 2 (two) times a day with meals. 60 tablet 2   ? omeprazole (PRILOSEC) 20 MG capsule Take 1 capsule (20 mg total) by mouth 2 (two) times a day. 180 capsule 2   ? sertraline (ZOLOFT) 100 MG tablet TAKE ONE TABLET BY MOUTH EVERY DAY 90 tablet 3   ? simvastatin (ZOCOR) 20 MG tablet TAKE ONE TABLET BY MOUTH AT BEDTIME 90 tablet 3   ? traZODone (DESYREL) 50 MG tablet Take 25 mg by mouth at bedtime.     ? celecoxib (CELEBREX) 200 MG capsule Take 1 capsule (200 mg total) by mouth daily. 90 capsule 1   ? solifenacin (VESICARE) 5 MG tablet Take 1 tablet (5 mg total) by mouth every  evening. 7PM 90 tablet 0   ? traMADol (ULTRAM) 50 mg tablet Take 1 tablet (50 mg total) by mouth 3 (three) times a day as needed for pain. 60 tablet 0     No current facility-administered medications for this visit.       Allergies   Allergen Reactions   ? Oxycodone      hallucinations   ? Penicillins Hives   ? Pollen    ? Venom-Honey Bee Swelling         Lab Results    Chemistry/lipid CBC Cardiac Enzymes/BNP/TSH/INR   Lab Results   Component Value Date    CHOL 307 (H) 06/11/2018    HDL 63 06/11/2018    LDLCALC 195 (H) 06/11/2018    TRIG 246 (H) 06/11/2018    CREATININE 0.81 05/03/2019    BUN 23 05/03/2019    K 4.1 05/03/2019     05/03/2019     05/03/2019    CO2 25 05/03/2019    Lab Results   Component Value Date    WBC 6.1 05/03/2019    HGB 13.3 05/03/2019    HCT 41.0 05/03/2019    MCV 93 05/03/2019     05/03/2019    Lab Results   Component Value Date    CKTOTAL 87 04/12/2018    TROPONINI <0.01 12/27/2018     06/07/2018    TSH 1.69 05/03/2019    INR 1.06 12/27/2018

## 2021-06-28 ENCOUNTER — RECORDS - HEALTHEAST (OUTPATIENT)
Dept: ADMINISTRATIVE | Facility: OTHER | Age: 81
End: 2021-06-28

## 2021-06-28 ENCOUNTER — COMMUNICATION - HEALTHEAST (OUTPATIENT)
Dept: PHYSICAL MEDICINE AND REHAB | Facility: CLINIC | Age: 81
End: 2021-06-28

## 2021-06-28 DIAGNOSIS — M53.3 SACROILIAC JOINT PAIN: ICD-10-CM

## 2021-06-28 NOTE — PROGRESS NOTES
"Progress Notes by Randy Chavis PA-C at 9/30/2019 10:10 AM     Author: Randy Chavis PA-C Service: -- Author Type: Physician Assistant    Filed: 9/30/2019  6:14 PM Encounter Date: 9/30/2019 Status: Signed    : Randy Chavis PA-C (Physician Assistant)       Subjective:      Patient ID: Dyana Nicole is a 79 y.o. female.    Chief Complaint:    HPI     Dyana Nicole is a 79 y.o. female who presents today complaining of of a one-week history of a hematoma to the left medial calf.  Patient states that she was closing her car door and hit the left medial calf on the door.  She immediately had pain and swelling but no break in the skin.  She developed a traumatic hematoma.  She is had pain with flexion of the calf as well as ambulation from heel-to-toe off during gait.  She was concerned that she may \"have a blood clot\" and wanted to get the leg evaluated.  At this time she is denying any swelling of the leg.  She said she has had pain also in the anterior medial aspect of the thigh.  No heart palpitations, pleuritic chest pain dizziness syncope or presyncope.       Past Medical History:   Diagnosis Date   ? Anemia     Created by Conversion    ? Arrhythmia    ? Cervical Spondylosis     Created by Conversion    ? Depression    ? Disease of thyroid gland     hypo   ? Fibromyalgia     Created by Conversion    ? GERD (gastroesophageal reflux disease)    ? High cholesterol    ? History of transfusion    ? Hyperlipidemia     Created by Conversion    ? Hypertension     Created by Conversion    ? Near syncope    ? Peripheral Neuropathy     Created by Conversion    ? Skin cancer, basal cell    ? Sleep apnea     mild   ? Tachycardia        Past Surgical History:   Procedure Laterality Date   ? Bladder lift     ? CARPAL TUNNEL RELEASE     ? CATARACT EXTRACTION, BILATERAL     ? CHOLECYSTECTOMY     ? HYSTERECTOMY     ? JOINT REPLACEMENT Bilateral     knees   ? MOHS SURGERY      Nose   ? OOPHORECTOMY Left     1 removed, 1 " remains   ? FL ARTHROPLASTY TIBIAL PLATEAU      Description: Knee Replacement;  Recorded: 2013;   ? FL INJECT NERV BLCK,OTHR PERIPH NERV      Description: Peripheral Nerve Block Wrist Median Right;  Recorded: 2013;   ? REPLACEMENT TOTAL KNEE      L   ? REPLACEMENT TOTAL KNEE BILATERAL     ? ROTATOR CUFF REPAIR     ? TRUNK SKIN LESION EXCISIONAL BIOPSY Left 2018    Procedure: LEFT BUTTOCK EXPLORATION, EXCISION BUTTOCK MASS;  Surgeon: Lg Jameson MD;  Location: [facility] Main OR;  Service:        Family History   Problem Relation Age of Onset   ? Aneurysm Father         AAA   ? Cancer Mother         pancrease       Social History     Tobacco Use   ? Smoking status: Former Smoker     Packs/day: 0.25     Years: 1.00     Pack years: 0.25     Last attempt to quit: 1961     Years since quittin.7   ? Smokeless tobacco: Never Used   Substance Use Topics   ? Alcohol use: No   ? Drug use: No       Review of Systems  As above in HPI, otherwise balance of Review of Systems are negative.    Objective:     /80 (Patient Site: Right Arm, Patient Position: Sitting, Cuff Size: Adult Regular)   Pulse 67   Temp 97.7  F (36.5  C) (Oral)   Resp 20   Wt 195 lb 3 oz (88.5 kg)   SpO2 93%   Breastfeeding? No   BMI 36.28 kg/m      Physical Exam  General: Patient is resting comfortably no acute distress is afebrile  HEENT: Head is normocephalic atraumatic   eyes are PERRL EOMI sclera anicteric   Skin: Without rash non-diaphoretic  Musculoskeletal: Examination of the bilateral lower extremities show that the left lower extremity has a 5 cm area of ecchymoses.  There is minimal induration.  It is tender to palpation does reproduce her symptoms.  This is located on the medial aspect of the mid calf.  Patient has a positive Homans sign to palpation and states that is very painful.  She also has pain over the course of the nerve artery and vessel of the anterior medial aspect of the left thigh.  Please  note, to my inspection I do not note that there is any swelling of the left lower extremity as compared to the nonaffected right lower extremity.  Note is also made of spider varicosities and superficial varicosities but no induration or large problematic area.  No noted joint effusions at the knee or ankle.    Imaging    Us Venous Leg Left    Result Date: 9/30/2019  EXAM: US VENOUS LEG LEFT LOCATION: [facility] DATE/TIME: 9/30/2019 11:58 AM INDICATION: left calf hematoma and pain and pain on the ateromedial thigh. R/O hematoma COMPARISON: None. TECHNIQUE: Routine exam without and with compression, augmentation, and duplex utilizing 2D gray-scale imaging, Doppler interrogation with color-flow and spectral waveform analysis. FINDINGS: The common femoral, femoral, popliteal, and segmentally visualized calf veins were evaluated. The opposite CFV was also included in the evaluation. Left leg veins are negative for deep venous thrombosis. No popliteal cysts. No mass or focal fluid collection in the region of concern for hematoma.     1.  Left leg veins are negative for DVT.      I personally reviewed and discussed findings of the ultrasound with the ultrasound tech on the phone.  No noted DVT was relayed by the ultrasound tech.    Assessment:     Procedures    The primary encounter diagnosis was Traumatic hematoma of left lower leg, initial encounter. A diagnosis of Pain of left lower leg  was also pertinent to this visit.    Plan:     1. Traumatic hematoma of left lower leg, initial encounter  US Venous Leg Left   2. Pain of left lower leg   US Venous Leg Left       Had a conversation with the patient stating that I am advising her that there is no noted DVT in the left lower extremity.  Since her injury has been from 1 week ago we will continue with alternating ice and heat if she finds relief.  She should get resolution within the next 4 to 5 weeks.  If any new symptoms or concerns arise return to the walkin  care or with primary care provider.    Patient Instructions   You may apply ice topically 20 minutes on each hour while awake for the first day or 2.  Then you may alternate ice and heat as you find relief.  Do not fall asleep with ice or heating pads.    Return to see her primary care provider if any complication or new symptoms or concerns arise.      Patient Education     Hematoma  A hematoma is a collection of blood trapped outside of a blood vessel. It is what we think of as a bruise or a contusion. It is usually seen under the skin as a black and blue spot on your arm or leg, or a bump on your head after an injury. It can be almost anywhere on or in your body. It can also occur in an internal organ where it can be more serious.  A hematoma is caused by an injury with damage to small blood vessels. This causes blood to leak into the tissues. Blood forms a pocket under the skin that swells and looks like a purplish patch.  Gradually the blood in the hematoma is absorbed back into the body. The swelling and pain of the hematoma will go away. This takes from 1 to 4 weeks, depending on the size of the hematoma. The skin over the hematoma may turn bluish then brown and yellow as the blood is dissolved and absorbed. Usually, this only takes a couple of weeks but can last months.  Home care    Limit motion of the joints near the hematoma. If the hematoma is large and painful, avoid sports and other vigorous physical activity until the swelling and pain goes away.    Apply an ice pack (ice cubes in a plastic bag, wrapped in a thin towel or a frozen bag of peas) over the injured area for 20 minutes every 1 to 2 hours the first day. Continue with ice packs 3 to 4 times a day for the next 2 days. Continue the use of ice packs for relief of pain and swelling as needed.    If you need anything for pain, you can take acetaminophen, unless you were given a different pain medicine to use. Talk with your doctor before using  this medicine if you have chronic liver or kidney disease. Also talk with your doctor if you have had a stomach ulcer or digestive tract bleeding, or are taking blood-thinner medicines.  Follow-up care  Follow up with your healthcare provider, or as advised. If X-rays or a CT scan were done, you will be notified if there is a change in the reading, especially if it affects treatment.  When to seek medical advice  Call your healthcare provider right away f any of the following occur:    Redness around the hematoma    Increase in pain or warmth in the hematoma    Increase in size of the hematoma    Fever of 100.4 F (38 C) or higher, or as directed by your healthcare provider    If the hematoma is on the arm or leg, watch for:  ? Increased swelling or pain in the extremity  ? Numbness or tingling or blue color of the hand or foot  Date Last Reviewed: 11/5/2015 2000-2017 [employer]. [address] [place] [address], [place] [address]. All rights reserved. This information is not intended as a substitute for professional medical care. Always follow your healthcare professional's instructions.

## 2021-06-28 NOTE — PROGRESS NOTES
Left message on patient's voicemail of same. Progress Notes by Pk Garcia DO at 8/17/2017  1:30 PM     Author: Pk Garcia DO Service: -- Author Type: Physician    Filed: 8/17/2017  1:59 PM Encounter Date: 8/17/2017 Status: Signed    : Pk Garcia DO (Physician)           Click to link to [facility] Heart Care     [facility] HEART CARE NOTE      Assessment/Recommendations   Assessment:    1.  History of syncope related to likely orthostatic hypotension (no further events since March 3rd.  Orthostatic hypotension was clearly documented on vital at OSH in [place].  Off lasix.   2.  Sigmoid septum with LVOT gradient.     3   Palpitations (resovled) ? Atrial tachycardiac vs. paroxysmal atrial fibrillation.   4.  Hypertension  5.  Hyperlipidemia    Plan:  1. Continue adequate hydration  2. Continue exercise.   3.  Continue Toprol XL to 50 mg daily and 25 mg in PM  4.  Patient can return to driving     Follow-up 5 month       History of Present Illness    Ms. Dyana Nicole is a 77 y.o. female history of SVT, syncope who presents in cardiology clinic in follow-up.  Since initial evaluation the patient had no further episodes of syncope.  She was seen in June by Dr. Zarco for palpitations and metoprolol 25 mg was added in the p.m. which has resolved her palpitations.  She continues to remain active without cardiac symptoms including dyspnea on exertion, orthopnea, PND, palpitations, near-syncope or syncope.      She is compliant with her current medication regimen.  She remains well-hydrated.  An outpatient visit by her primary care provider was reviewed.  Recent stress test in July 2017 and recent Holter monitor in June 2017 was reviewed.    ECHO (9/3/2016):   Summary  Normal left ventricular size and systolic function.  Left ventricular ejection fraction is visually estimated to be 60%.  E/A flow reversal noted. Suggestive of diastolic dysfunction.  Normal right ventricular size and systolic function.  Moderate  tricuspid regurgitation.  Mild mitral regurgitation is present.  Mitral valve leaflets are mildly thickened with preserved leaflet  mobility.  Findings appear similar to 11/2013    Holter Monitor: 6/27/2017  24-hour Holter monitor was applied June 28, 2017  Baseline transmission showed normal sinus rhythm with an average heart rate is 72 bpm.  Heart rate ranged between  bpm  Normal baseline electrocardiographic intervals were noted  No paroxysmal bradycardia, tachycardia or high-grade heart block  Rare ventricular ectopy-3 single PVCs  Rare supraventricular ectopy at 100 PACs.  Many of these PACs are actually late cycle type beats and very little early atrial ectopy is seen.  There is no atrial tachycardia, no atrial fibrillation  Symptoms of dyspnea on exertion are noted and occurred during sinus rhythm generally with heart rates in the 80-90 bpm range     Essentially normal Holter monitor with very little noncomplex singular ectopic beats    NM STRESS: 7/6/2017    1.The patient's exercise capacity is normal.    2.The exercise nuclear stress test is negative for inducible myocardial ischemia or infarction.    3.The left ventricular ejection fraction is 74%.    4.When compared to the images of 9/22/2016, there has been no significant change.     Physical Examination Review of Systems   Vitals:    08/17/17 1331   BP: 118/64   Pulse: 68   Resp: 16     Body mass index is 33.65 kg/(m^2).  Wt Readings from Last 3 Encounters:   08/17/17 184 lb (83.5 kg)   08/16/17 184 lb (83.5 kg)   06/29/17 182 lb (82.6 kg)       General Appearance:   no distress, normal body habitus   ENT/Mouth: membranes moist, no oral lesions or bleeding gums.      EYES:  no scleral icterus, normal conjunctivae   Neck: no carotid bruits or thyromegaly   Chest/Lungs:   lungs are clear to auscultation, no rales or wheezing, no sternal scar, equal chest wall expansion    Cardiovascular:   Regular. Normal first and second heart sounds. No systolic  murmur.  No diastolic murmurs, rubs, or gallops; the carotid, radial and posterior tibial pulses are intact, Jugular venous pressure normal, no edema bilaterally.    Abdomen:  no  tenderness; bowel sounds are present   Extremities: no cyanosis or clubbing   Skin: no xanthelasma, warm.    Neurologic: normal gait, no tremors     Psychiatric: alert and oriented x3, calm     General: Weight Gain  Eyes: WNL  Ears/Nose/Throat: WNL  Lungs: WNL  Heart: Shortness of Breath with activity (minimal)  Stomach: Diarrhea  Bladder: WNL  Muscle/Joints: Muscle Pain  Skin: WNL  Nervous System: Loss of Balance  Mental Health: Depression     Blood: Easy Bleeding       Medical History  Surgical History Family History Social History   Past Medical History:   Diagnosis Date   ? Anemia     Created by Conversion    ? Arrhythmia    ? Cervical Spondylosis     Created by Conversion    ? Depression    ? Disease of thyroid gland     hypo   ? Fibromyalgia     Created by Conversion    ? GERD (gastroesophageal reflux disease)    ? High cholesterol    ? Hyperlipidemia     Created by Conversion    ? Hypertension     Created by Conversion    ? Near syncope    ? Peripheral Neuropathy     Created by Conversion    ? Sleep apnea     mild   ? Tachycardia     Past Surgical History:   Procedure Laterality Date   ? CHOLECYSTECTOMY     ? HYSTERECTOMY     ? JOINT REPLACEMENT Bilateral     knees   ? AK ARTHROPLASTY TIBIAL PLATEAU      Description: Knee Replacement;  Recorded: 05/16/2013;   ? AK INJECT NERV BLCK,OTHR PERIPH NERV      Description: Peripheral Nerve Block Wrist Median Right;  Recorded: 05/16/2013;    Family History   Problem Relation Age of Onset   ? Aneurysm Father     Social History     Social History   ? Marital status:      Spouse name: N/A   ? Number of children: N/A   ? Years of education: N/A     Occupational History   ? Not on file.     Social History Main Topics   ? Smoking status: Former Smoker     Quit date: 1/1/1961   ? Smokeless  tobacco: Never Used   ? Alcohol use No   ? Drug use: No   ? Sexual activity: Not on file     Other Topics Concern   ? Not on file     Social History Narrative          Medications  Allergies   Current Outpatient Prescriptions   Medication Sig Dispense Refill   ? aspirin 81 MG EC tablet Take 81 mg by mouth daily.     ? BIOTIN ORAL Take 5,000 mcg by mouth once daily.      ? budesonide (ENTOCORT EC) 3 mg 24 hr capsule Take 3 mg by mouth. As needed     ? calcium carbonate-vitamin D3 (CALCIUM 600 + D,3,) 600 mg calcium- 200 unit cap Take 1 tablet by mouth daily.     ? celecoxib (CELEBREX) 200 MG capsule TAKE ONE CAPSULE BY MOUTH EVERY DAY 90 capsule 1   ? diphenoxylate-atropine (LOMOTIL) 2.5-0.025 mg per tablet Take 1 tablet by mouth 4 (four) times a day as needed for diarrhea.     ? estradiol (ESTRACE) 0.5 MG tablet TAKE ONE TABLET BY MOUTH EVERY DAY 30 tablet 2   ? fluticasone (FLONASE) 50 mcg/actuation nasal spray 2 sprays into each nostril daily as needed.      ? KRILL OIL ORAL Take 1,000 mg by mouth bedtime.      ? LACTOBAC CMB #3/FOS/PANTETHINE (PROBIOTIC & ACIDOPHILUS ORAL) Take 1 capsule by mouth once daily.      ? levothyroxine (SYNTHROID, LEVOTHROID) 50 MCG tablet TAKE ONE TABLET BY MOUTH EVERY DAY 90 tablet 2   ? metoprolol succinate (TOPROL-XL) 50 MG 24 hr tablet Take 1 tablet (50 mg total) by mouth daily. 30 tablet 5   ? metoprolol tartrate (LOPRESSOR) 25 MG tablet Take 1 tablet (25 mg total) by mouth at bedtime. 30 tablet 11   ? MV-MINERALS/FA/OMEGA 3,6,9 #3 (WOMEN'S 50+ ADVANCED ORAL) Take 1 tablet by mouth daily.     ? omeprazole (PRILOSEC) 20 MG capsule TAKE ONE CAPSULE BY MOUTH TWICE A  capsule 1   ? simvastatin (ZOCOR) 20 MG tablet TAKE ONE TABLET BY MOUTH AT BEDTIME 90 tablet 2   ? traZODone (DESYREL) 50 MG tablet TAKE 1/2 TABLET BY MOUTH AT BEDTIME 45 tablet 1   ? venlafaxine (EFFEXOR XR) 75 MG 24 hr capsule Take 3 capsules (225 mg total) by mouth daily. 90 capsule 5     No current  facility-administered medications for this visit.       Allergies   Allergen Reactions   ? Penicillins Hives   ? Pollen    ? Venom-Honey Bee Swelling         Lab Results    Chemistry/lipid CBC Cardiac Enzymes/BNP/TSH/INR   Lab Results   Component Value Date    CHOL 193 09/03/2016    HDL 66 09/03/2016    LDLCALC 111 09/03/2016    TRIG 79 09/03/2016    CREATININE 0.74 11/02/2016    BUN 16 11/02/2016    K 4.6 11/02/2016     11/02/2016     11/02/2016    CO2 28 11/02/2016    Lab Results   Component Value Date    WBC 11.6 (H) 11/02/2016    HGB 13.4 11/02/2016    HCT 40.0 11/02/2016    MCV 90 11/02/2016     11/02/2016    Lab Results   Component Value Date    TROPONINI <0.01 09/03/2016    BNP 69 09/28/2016    TSH 2.72 06/29/2017    INR 1.00 05/31/2015

## 2021-06-29 NOTE — PROGRESS NOTES
"Progress Notes by Pk Garcia DO at 4/14/2020  3:10 PM     Author: Pk Garcia DO Service: -- Author Type: Physician    Filed: 4/14/2020  1:58 PM Encounter Date: 4/14/2020 Status: Signed    : Pk Garcia DO (Physician)           The patient has been notified of following:     \"This telephone visit will be conducted via a call between you and your physician/provider. We have found that certain health care needs can be provided without the need for a physical exam.  This service lets us provide the care you need with a phone conversation.  If a prescription is necessary we can send it directly to your pharmacy.  If lab work is needed we can place an order for that and you can then stop by our lab to have the test done at a later time. If during the course of the call the physician/provider feels a telephone visit is not appropriate, you will not be charged for this service.\" Verbal consent has been obtained for this service by care team member:         HEART CARE PHONE ENCOUNTER        The patient has chosen to have the visit conducted as a telephone visit, to reduce risk of exposure given the current status of Coronavirus in our community. This telephone visit is being conducted via a call between the patient and physician/provider. Health care needs are being provided without a physical exam.     Assessment/Recommendations   Assessment:    1.  Sigmoid septum with LVOT gradient.   2.  Palpitations secondary to atrial tachycardia  3.  Hypertension, controlled.   4.  Hyperlipidemia, . (2019)  5.  Chronic lymphedema, continue compression.    6.  Tricuspid regurgitation, mild to moderate     Plan:  1.  Continue metoprolol 50 mg daily.    2.  Continue Zocor for hyperlipidemia  3.  Maintain good hydration related to LVOT gradient.    Follow Up Plan:  6 months  (October 2020)  I have reviewed the note as documented.  This accurately captures the substance of my " conversation with the patient.    Total time of call between patient and provider was 13 minutes   Start Time:1:44 PM   Stop Time:1:57 PM       History of Present Illness/Subjective    Dyana Nicole is a 80 y.o. female who is being evaluated via a billable telephone visit.      Since last clinical visit the patient has done well.  Currently she notes no palpitations with the use of her metoprolol.  Previous visit she had noted more episodes of palpitations holding metoprolol therapy.  Blood pressure is well controlled today along with her heart rates.  She did return to [place] recently and has had no sick contacts.  She did quarantine herself for 14 days after returning from [place].  She currently denies any fever chills or sweats or cough.  Overall she feels excellent.  She is lost 10 pounds through weight watchers and being active around her home.  She is active including cleaning closets and doing quite a bit activities recently with no change in symptoms.  She denies any anginal chest pain, dyspnea exertion, orthopnea or PND symptoms.  She has mild lower extreme edema which is been chronic and stable.    I have reviewed and updated the patient's Past Medical History, Social History, Family History and Medication List.     Physical Examination not performed given phone encounter Review of Systems   TODAY  BP: 123/76 mmHg,   HR: 72 bpm  WT: 186 lb.  Review of Systems - History obtained from the patient  General ROS: negative  Psychological ROS: negative  Ophthalmic ROS: negative  ENT ROS: negative  Allergy and Immunology ROS: negative  Hematological and Lymphatic ROS: negative  Endocrine ROS: negative  Respiratory ROS: no cough, shortness of breath, or wheezing  Cardiovascular ROS: negative for - chest pain, dyspnea on exertion, edema, irregular heartbeat, loss of consciousness, murmur, orthopnea, palpitations, paroxysmal nocturnal dyspnea, rapid heart rate or shortness of breath  Gastrointestinal ROS: no  abdominal pain, change in bowel habits, or black or bloody stools  Genito-Urinary ROS: no dysuria, trouble voiding, or hematuria  Musculoskeletal ROS: negative  Neurological ROS: no TIA or stroke symptoms  Dermatological ROS: negative          Medical History  Surgical History Family History Social History   Past Medical History:   Diagnosis Date   ? Anemia     Created by Conversion    ? Arrhythmia    ? Cervical Spondylosis     Created by Conversion    ? Depression    ? Disease of thyroid gland     hypo   ? Fibromyalgia     Created by Conversion    ? GERD (gastroesophageal reflux disease)    ? High cholesterol    ? History of transfusion    ? Hyperlipidemia     Created by Conversion    ? Hypertension     Created by Conversion    ? Near syncope    ? Peripheral Neuropathy     Created by Conversion    ? Skin cancer, basal cell    ? Sleep apnea     mild   ? Tachycardia     Past Surgical History:   Procedure Laterality Date   ? Bladder lift     ? CARPAL TUNNEL RELEASE     ? CATARACT EXTRACTION, BILATERAL     ? CHOLECYSTECTOMY     ? HYSTERECTOMY     ? JOINT REPLACEMENT Bilateral     knees   ? MOHS SURGERY      Nose   ? OOPHORECTOMY Left     1 removed, 1 remains   ? [place] ARTHROPLASTY TIBIAL PLATEAU      Description: Knee Replacement;  Recorded: 05/16/2013;   ? [place] INJECT NERV BLCK,OTHR PERIPH NERV      Description: Peripheral Nerve Block Wrist Median Right;  Recorded: 05/16/2013;   ? REPLACEMENT TOTAL KNEE      L   ? REPLACEMENT TOTAL KNEE BILATERAL     ? ROTATOR CUFF REPAIR     ? TRUNK SKIN LESION EXCISIONAL BIOPSY Left 1/5/2018    Procedure: LEFT BUTTOCK EXPLORATION, EXCISION BUTTOCK MASS;  Surgeon: Lg Jameson MD;  Location: [facility] OR;  Service:     Family History   Problem Relation Age of Onset   ? Aneurysm Father         AAA   ? Cancer Mother         pancrease    Social History     Socioeconomic History   ? Marital status:      Spouse name: Not on file   ? Number of children: Not on file   ? Years  of education: Not on file   ? Highest education level: Not on file   Occupational History   ? Not on file   Social Needs   ? Financial resource strain: Not on file   ? Food insecurity     Worry: Not on file     Inability: Not on file   ? Transportation needs     Medical: Not on file     Non-medical: Not on file   Tobacco Use   ? Smoking status: Former Smoker     Packs/day: 0.25     Years: 1.00     Pack years: 0.25     Last attempt to quit: 1961     Years since quittin.3   ? Smokeless tobacco: Never Used   Substance and Sexual Activity   ? Alcohol use: No   ? Drug use: No   ? Sexual activity: Never   Lifestyle   ? Physical activity     Days per week: Not on file     Minutes per session: Not on file   ? Stress: Not on file   Relationships   ? Social connections     Talks on phone: Not on file     Gets together: Not on file     Attends Buddhist service: Not on file     Active member of club or organization: Not on file     Attends meetings of clubs or organizations: Not on file     Relationship status: Not on file   ? Intimate partner violence     Fear of current or ex partner: Not on file     Emotionally abused: Not on file     Physically abused: Not on file     Forced sexual activity: Not on file   Other Topics Concern   ? Not on file   Social History Narrative    Lives in senior living with  of over 50 years. 3 daughters          Medications  Allergies   Current Outpatient Medications   Medication Sig Dispense Refill   ? aspirin 81 MG EC tablet Take 81 mg by mouth at bedtime.      ? biotin 5 mg Tab Take 5,000 mcg by mouth daily.     ? budesonide (ENTOCORT EC) 3 mg 24 hr capsule      ? buPROPion (WELLBUTRIN XL) 300 MG 24 hr tablet Take 1 tablet (300 mg total) by mouth daily. 90 tablet 3   ? calcium carbonate-vitamin D3 (CALCIUM 600 + D,3,) 600 mg calcium- 200 unit cap Take 1 tablet by mouth 2 (two) times a day.            ? celecoxib (CELEBREX) 200 MG capsule Take 1 capsule (200 mg total) by mouth  daily. 90 capsule 1   ? estradiol (ESTRACE) 0.5 MG tablet use 1/2 tablet by mouth every other day x 7 days, then every 3rd day x 7 days then stop 90 tablet 3   ? estrogens, conjugated, (PREMARIN) 0.3 MG tablet Take 1 tablet (0.3 mg total) by mouth daily. 90 tablet 3   ? fluticasone propionate (FLONASE) 50 mcg/actuation nasal spray INSTILL 2 SPRAYS IN EACH NOSTRIL DAILY AS NEEDED 48 g 3   ? furosemide (LASIX) 20 MG tablet Take 1 tablet (20 mg total) by mouth daily. 90 tablet 3   ? LACTOBAC CMB #3/FOS/PANTETHINE (PROBIOTIC & ACIDOPHILUS ORAL) Take 1 capsule by mouth once daily. 1.5 billion units           ? levothyroxine (SYNTHROID, LEVOTHROID) 50 MCG tablet Take 1 tablet (50 mcg total) by mouth Daily at 6:00 am. 90 tablet 3   ? metoprolol tartrate (LOPRESSOR) 50 MG tablet Take 1 tablet (50 mg total) by mouth 2 (two) times a day. 180 tablet 1   ? multivitamin with minerals (THERA-M) 9 mg iron-400 mcg Tab tablet Take 1 tablet by mouth every evening.     ? omeprazole (PRILOSEC) 20 MG capsule Take 1 capsule (20 mg total) by mouth 2 (two) times a day. 180 capsule 1   ? simvastatin (ZOCOR) 20 MG tablet Take 1 tablet (20 mg total) by mouth at bedtime. 90 tablet 2   ? traZODone (DESYREL) 50 MG tablet Take 25 mg by mouth at bedtime.       No current facility-administered medications for this visit.     Allergies   Allergen Reactions   ? Oxycodone      hallucinations   ? Penicillins Hives   ? Pollen    ? Venom-Honey Bee Swelling         Lab Results    Chemistry/lipid CBC Cardiac Enzymes/BNP/TSH/INR   Lab Results   Component Value Date    CHOL 223 (H) 09/17/2019    HDL 85 09/17/2019    LDLCALC 118 09/17/2019    TRIG 99 09/17/2019    CREATININE 0.76 09/17/2019    BUN 18 09/17/2019    K 4.6 09/17/2019     09/17/2019     09/17/2019    CO2 29 09/17/2019    Lab Results   Component Value Date    WBC 6.1 05/03/2019    HGB 13.3 05/03/2019    HCT 41.0 05/03/2019    MCV 93 05/03/2019     05/03/2019    Lab Results    Component Value Date    CKTOTAL 87 04/12/2018    TROPONINI <0.01 12/27/2018     06/07/2018    TSH 2.92 09/17/2019    INR 1.06 12/27/2018        Pk Garcia

## 2021-06-30 NOTE — PROGRESS NOTES
Progress Notes by Pk Garcia DO at 4/29/2021  8:30 AM     Author: Pk Garcia DO Service: -- Author Type: Physician    Filed: 4/29/2021  9:20 AM Encounter Date: 4/29/2021 Status: Signed    : Pk Garcia DO (Physician)           HEART CARE  ENCOUNTER          Assessment/Recommendations   Assessment:    1.  Sigmoid septum with LVOT gradient, mild murmur today.   2.  Palpitations secondary to atrial tachycardia vs. AVNRT  3.  Hypertension, controlled.   4.  Hyperlipidemia, . (2019)  5.  Chronic lymphedema, continue compression.    6.  Tricuspid regurgitation, mild to moderate     Plan:   1.  Increase metoprolol to 75 mg in the a.m. and 50 mg in the p.m.  2.  Complete echocardiogram to reassess sigmoid septum and LVOT gradient and assess for other regurgitant valve disease which could be worsening her arrhythmia.  3.  Increase oral hydration  4.  Continue Zocor for hyperlipidemia  5.  Discussed EP evaluation consideration for antiarrhythmics versus ablation strategy if ongoing symptoms.  Discussed sotalol therapy.     Follow Up Plan:  3 months       History of Present Illness/Subjective    Dyana Nicole is a 81 y.o. female history of palpitations, LVOT gradient related sigmoid septum who presents to cardiology clinic for ongoing worsening palpitations.    The patient she was doing well up until this fall when she decrease her metoprolol to 25 mg two times a day with increasing the dose of metoprolol she did not notice significant provement in her palpitations..  With that she noticed worsening palpitations.  This prompted her to go back on a higher dose to 50 mg twice daily.  She was in [place] and overall she was doing quite well and she was having increased hydration.  Since coming back to [place] in early April she has had decrease in her water intake and increased in soda.  She is notes worsening palpitations.  Her metoprolol dose is currently at 50 mg twice  daily.  She does have lightheadedness with rapid palpitations with her heart rates above 100 bpm.  It can last for few minutes.  No syncopal episodes.  No falls.    She denies any chest pain with these events.  She does note some ongoing dyspnea with exertion.    This time she is not willing to have electrophysiology evaluation.  Discussed if ongoing symptoms we will need to consider this.    ECHO: 2018    Mild right atrial enlargement    Left ventricle ejection fraction is normal. The calculated left ventricular ejection fraction is 55% without wall motion abnormality.    Normal right ventricular size and systolic function.    Mild mitral regurgitation    Mild to moderate tricuspid regurgitation with borderline elevation in right ventricular pressures    When compared to the previous study dated 9/3/2016, no significant interval change.     NM STRESS: 2017    1.The patient's exercise capacity is normal.    2.The exercise nuclear stress test is negative for inducible myocardial ischemia or infarction.    3.The left ventricular ejection fraction is 74%.    4.When compared to the images of 9/22/2016, there has been no significant change.         Physical Examination  Review of Systems   Vitals:    04/29/21 0838   BP: 120/76   Pulse: 68   Resp: 16     Physical Exam   Constitutional: She is oriented to person, place, and time. She appears well-developed and well-nourished.   HENT:   Head: Normocephalic and atraumatic.   Eyes: Pupils are equal, round, and reactive to light. Conjunctivae and EOM are normal. Left eye exhibits no discharge.   Neck: Normal range of motion. Neck supple. No JVD present. No thyromegaly present.   Cardiovascular: Normal rate, regular rhythm and intact distal pulses.   Murmur heard.  Pulmonary/Chest: Effort normal and breath sounds normal. No respiratory distress. She has no wheezes. She has no rales.   Abdominal: Soft. Bowel sounds are normal.   Musculoskeletal:         General: No tenderness or  edema.   Neurological: She is alert and oriented to person, place, and time.   Skin: Skin is warm and dry.   Psychiatric: She has a normal mood and affect. Her behavior is normal.      General: Weight Gain  Eyes: WNL  Ears/Nose/Throat: WNL  Lungs: WNL  Heart: Shortness of Breath with activity, Irregular Heartbeat  Stomach: Heartburn  Bladder: WNL  Muscle/Joints: WNL  Skin: WNL  Nervous System: Falls, Loss of Balance  Mental Health: WNL     Blood: WNL       Medical History  Surgical History Family History Social History   Past Medical History:   Diagnosis Date   ? Anemia     Created by Conversion    ? Arrhythmia    ? Cervical Spondylosis     Created by Conversion    ? Depression    ? Disease of thyroid gland     hypo   ? Fibromyalgia     Created by Conversion    ? GERD (gastroesophageal reflux disease)    ? High cholesterol    ? History of transfusion    ? Hyperlipidemia     Created by Conversion    ? Hypertension     Created by Conversion    ? Near syncope    ? Peripheral Neuropathy     Created by Conversion    ? Skin cancer, basal cell    ? Sleep apnea     mild   ? Tachycardia     Past Surgical History:   Procedure Laterality Date   ? Bladder lift     ? CARPAL TUNNEL RELEASE     ? CATARACT EXTRACTION, BILATERAL     ? CHOLECYSTECTOMY     ? HYSTERECTOMY     ? JOINT REPLACEMENT Bilateral     knees   ? MOHS SURGERY      Nose   ? OOPHORECTOMY Left     1 removed, 1 remains   ? [place] ARTHROPLASTY TIBIAL PLATEAU      Description: Knee Replacement;  Recorded: 05/16/2013;   ? [place] INJECT NERV BLCK,OTHR PERIPH NERV      Description: Peripheral Nerve Block Wrist Median Right;  Recorded: 05/16/2013;   ? REPLACEMENT TOTAL KNEE      L   ? REPLACEMENT TOTAL KNEE BILATERAL     ? ROTATOR CUFF REPAIR     ? TRUNK SKIN LESION EXCISIONAL BIOPSY Left 1/5/2018    Procedure: LEFT BUTTOCK EXPLORATION, EXCISION BUTTOCK MASS;  Surgeon: Lg Jameson MD;  Location: [facility];  Service:     Family History   Problem Relation Age of Onset    ? Aneurysm Father         AAA   ? Cancer Mother         pancrease    Social History     Socioeconomic History   ? Marital status:      Spouse name: Not on file   ? Number of children: Not on file   ? Years of education: Not on file   ? Highest education level: Not on file   Occupational History   ? Not on file   Social Needs   ? Financial resource strain: Not on file   ? Food insecurity     Worry: Not on file     Inability: Not on file   ? Transportation needs     Medical: Not on file     Non-medical: Not on file   Tobacco Use   ? Smoking status: Former Smoker     Packs/day: 0.25     Years: 1.00     Pack years: 0.25     Quit date: 1961     Years since quittin.3   ? Smokeless tobacco: Never Used   Substance and Sexual Activity   ? Alcohol use: No   ? Drug use: No   ? Sexual activity: Never   Lifestyle   ? Physical activity     Days per week: Not on file     Minutes per session: Not on file   ? Stress: Not on file   Relationships   ? Social connections     Talks on phone: Not on file     Gets together: Not on file     Attends Oriental orthodox service: Not on file     Active member of club or organization: Not on file     Attends meetings of clubs or organizations: Not on file     Relationship status: Not on file   ? Intimate partner violence     Fear of current or ex partner: Not on file     Emotionally abused: Not on file     Physically abused: Not on file     Forced sexual activity: Not on file   Other Topics Concern   ? Not on file   Social History Narrative    Lives in senior living with  of over 50 years. 3 daughters          Medications  Allergies   Current Outpatient Medications   Medication Sig Dispense Refill   ? aspirin 81 MG EC tablet Take 81 mg by mouth at bedtime.      ? calcium carbonate-vitamin D3 (CALCIUM 600 + D,3,) 600 mg calcium- 200 unit cap Take 1 tablet by mouth 2 (two) times a day.            ? celecoxib (CELEBREX) 200 MG capsule TAKE ONE CAPSULE BY MOUTH ONE TIME DAILY  90  capsule 3   ? estrogens, conjugated, (PREMARIN) 0.3 MG tablet Take 1 tablet (0.3 mg total) by mouth daily. 90 tablet 3   ? fluticasone propionate (FLONASE) 50 mcg/actuation nasal spray INSTILL 2 SPRAYS IN EACH NOSTRIL DAILY AS NEEDED 48 g 3   ? furosemide (LASIX) 20 MG tablet TAKE ONE TABLET BY MOUTH ONE TIME DAILY  90 tablet 3   ? gabapentin (NEURONTIN) 100 MG capsule Take 100 mg by mouth 3 (three) times a day. 90 capsule 2   ? LACTOBAC CMB #3/FOS/PANTETHINE (PROBIOTIC & ACIDOPHILUS ORAL) Take 1 capsule by mouth once daily. 1.5 billion units           ? levothyroxine (SYNTHROID, LEVOTHROID) 50 MCG tablet TAKE ONE TABLET BY MOUTH ONE TIME DAILY AT 6AM 90 tablet 3   ? metoprolol tartrate (LOPRESSOR) 50 MG tablet Take 0.5 tablets (25 mg total) by mouth 2 (two) times a day. 90 tablet 3   ? multivitamin with minerals (THERA-M) 9 mg iron-400 mcg Tab tablet Take 1 tablet by mouth every evening.     ? omeprazole (PRILOSEC) 20 MG capsule TAKE ONE CAPSULE BY MOUTH TWICE DAILY  180 capsule 3   ? simvastatin (ZOCOR) 20 MG tablet TAKE 1 TABLET BY MOUTH AT BEDTIME 90 tablet 3   ? traZODone (DESYREL) 50 MG tablet Take 25 mg by mouth at bedtime. Pt taking 75mg at bedtime     ? venlafaxine (EFFEXOR) 75 MG tablet Take 75 mg by mouth daily.        No current facility-administered medications for this visit.     Allergies   Allergen Reactions   ? Oxycodone      hallucinations   ? Penicillins Hives   ? Pollen    ? Venom-Honey Bee Swelling         Lab Results    Chemistry/lipid CBC Cardiac Enzymes/BNP/TSH/INR   Lab Results   Component Value Date    CHOL 220 (H) 11/17/2020    HDL 78 11/17/2020    LDLCALC 121 11/17/2020    TRIG 105 11/17/2020    CREATININE 0.76 11/17/2020    BUN 19 11/17/2020    K 4.5 11/17/2020     11/17/2020     11/17/2020    CO2 31 11/17/2020    Lab Results   Component Value Date    WBC 6.3 05/11/2020    HGB 12.5 05/11/2020    HCT 39.4 05/11/2020    MCV 95 05/11/2020     05/11/2020    Lab Results    Component Value Date    CKTOTAL 87 04/12/2018    TROPONINI <0.01 05/11/2020     06/07/2018    TSH 2.18 09/16/2020    INR 1.06 12/27/2018        Pk Garcia

## 2021-07-03 NOTE — ADDENDUM NOTE
Addendum Note by Markos Layne DO at 1/10/2019 10:34 AM     Author: Markos Layne DO Service: -- Author Type: Physician    Filed: 1/10/2019 10:34 AM Encounter Date: 1/10/2019 Status: Signed    : Markos Layne DO (Physician)    Addended by: MARKOS LAYNE on: 1/10/2019 10:34 AM        Modules accepted: Orders

## 2021-07-03 NOTE — ADDENDUM NOTE
Addendum Note by Markos Layne DO at 5/6/2019 10:01 AM     Author: Markos Layne DO Service: -- Author Type: Physician    Filed: 5/6/2019 10:01 AM Encounter Date: 4/25/2019 Status: Signed    : Markos Layne DO (Physician)    Addended by: MARKOS LAYNE on: 5/6/2019 10:01 AM        Modules accepted: Orders

## 2021-07-06 ENCOUNTER — HOSPITAL ENCOUNTER (OUTPATIENT)
Dept: PHYSICAL MEDICINE AND REHAB | Facility: CLINIC | Age: 81
Discharge: HOME | End: 2021-07-06
Attending: PHYSICAL MEDICINE & REHABILITATION
Payer: MEDICARE

## 2021-07-06 VITALS — WEIGHT: 192 LBS | BODY MASS INDEX: 35.33 KG/M2 | HEIGHT: 62 IN

## 2021-07-06 VITALS
DIASTOLIC BLOOD PRESSURE: 60 MMHG | SYSTOLIC BLOOD PRESSURE: 110 MMHG | OXYGEN SATURATION: 93 % | BODY MASS INDEX: 34.75 KG/M2 | HEART RATE: 66 BPM | WEIGHT: 190 LBS

## 2021-07-06 VITALS — BODY MASS INDEX: 34.75 KG/M2 | WEIGHT: 190 LBS

## 2021-07-06 DIAGNOSIS — M53.3 SACROILIAC JOINT PAIN: ICD-10-CM

## 2021-07-06 RX ORDER — SENNOSIDES 8.6 MG
650 CAPSULE ORAL PRN
Status: SHIPPED | COMMUNITY
Start: 2021-07-06

## 2021-07-06 ASSESSMENT — MIFFLIN-ST. JEOR: SCORE: 1285.08

## 2021-07-06 NOTE — PATIENT INSTRUCTIONS - HE
Patient Instructions by Anca Franz RN at 7/6/2021  2:20 PM     Author: Anca Franz RN Service: -- Author Type: Registered Nurse    Filed: 7/6/2021  2:35 PM Date of Service: 7/6/2021  2:20 PM Status: Signed    : Anca Franz RN (Registered Nurse)          DISCHARGE INSTRUCTIONS    During office hours (8:00 a.m.- 4:00 p.m.) questions or concerns may be answered  by calling [facility] Nurs[facility] at  [phone].  Messages received after hours will be returned the following business day.      In the case of an emergency, please dial 911 or seek assistance at the nearest Emergency Room/Urgent Care facility.     All Patients:    ? You may experience an increase in your symptoms for the first 2 days (It may take anywhere between 2 days- 2 weeks for the steroid to have maximum effect).    ? You may use ice on the injection site, as frequently as 20 minutes each hour if needed.    ? You may take your pain medicine.    ? You may continue taking your regular medication after your injection. If you have had a Medial Branch Block you may resume pain medication once your pain diary is completed.    ? You may shower. No swimming, tub bath or hot tub for 48 hours.  You may remove your bandaid/bandage as soon as you are home.    ? You may resume light activities, as tolerated.    ? Resume your usual diet as tolerated.    ? It is strongly advised that you do not drive for 1-3 hours post injection.    ? If you have had oral sedation:  Do not drive for 8 hours post injection.      ? If you have had IV sedation:  Do not drive for 24 hours post injection.  Do not operate hazardous machinery or make important personal/business decisions for 24 hours.      POSSIBLE STEROID SIDE EFFECTS (If steroid/cortisone was used for your procedure)    -If you experience these symptoms, it should only last for a short period      Swelling of the legs                Skin redness (flushing)       Mouth (oral) irritation      Blood sugar (glucose) levels              Sweats                      Mood changes    Headache    Sleeplessness    Weakened immune system for up to 14 days, which could increase the risk of claribel the COVID-19 virus and/or experiencing more severe symptoms of the disease, if exposed.    Decreased effectiveness of the flu vaccine if given within 2 weeks of the steroid.         POSSIBLE PROCEDURE SIDE EFFECTS  -Call the [facility] if you are concerned    Increased Pain             Increased numbness/tingling        Nausea/Vomiting            Bruising/bleeding at site        Redness or swelling                                                Difficulty walking        Weakness             Fever greater than 100.5    *In the event of a severe headache after an epidural steroid injection that is relieved by lying down, please call the [facility] to speak with a clinical staff member*

## 2021-07-07 NOTE — TELEPHONE ENCOUNTER
Pt returned call. Results and recommendations given. Pt stated understanding. Pt would like to proceed with Left SI joint injection. Order placed. Injection requirements reviewed. Pt had her 2nd COVID vaccine on 3/31/2021. Transferred pt to scheduling to make appt.

## 2021-07-07 NOTE — TELEPHONE ENCOUNTER
PSP:  Dr. Vila  Last clinic visit: 6/1/21 OV; 5/13/21 Left hip joint injection  Reason for call: Results for MRI hip done last week  Clinical information:  Patient instructed to call 2 days after MRI if have not heard from us. States she had the MRI completed on 6/23 at [facility].   Advice given to patient: Explained covering provider would be notified. She will be called back with response.   Provider to address: MRI results, tx options

## 2021-07-07 NOTE — TELEPHONE ENCOUNTER
I reviewed the report of the MRI.  SI joints are normal.  There are findings consistent with trochanteric bursitis.  There is tendinopathy in several tendons in the hip, but no acute tears. There is mild wear and tear of the hip joint itself.  I will defer to Dr. Vila regarding next steps.

## 2021-07-07 NOTE — TELEPHONE ENCOUNTER
The patient can be offered a left SI joint injection or she is welcome to return for an in person visit to re-evaluate her and discuss her treatment options.

## 2021-07-10 VITALS — BODY MASS INDEX: 34.96 KG/M2 | HEIGHT: 62 IN | WEIGHT: 190 LBS

## 2021-07-28 ENCOUNTER — OFFICE VISIT (OUTPATIENT)
Dept: PHYSICAL MEDICINE AND REHAB | Facility: CLINIC | Age: 81
End: 2021-07-28
Payer: MEDICARE

## 2021-07-28 DIAGNOSIS — M67.959 TENDINOPATHY OF GLUTEAL REGION: ICD-10-CM

## 2021-07-28 DIAGNOSIS — M79.18 LEFT BUTTOCK PAIN: Primary | ICD-10-CM

## 2021-07-28 DIAGNOSIS — M76.32 IT BAND SYNDROME, LEFT: ICD-10-CM

## 2021-07-28 DIAGNOSIS — Z98.1 HISTORY OF LUMBAR FUSION: ICD-10-CM

## 2021-07-28 PROCEDURE — 99213 OFFICE O/P EST LOW 20 MIN: CPT | Performed by: PHYSICAL MEDICINE & REHABILITATION

## 2021-07-28 RX ORDER — SULINDAC 200 MG/1
200 TABLET ORAL
COMMUNITY
End: 2021-10-06

## 2021-07-28 RX ORDER — CHLORAL HYDRATE 500 MG
1 CAPSULE ORAL
COMMUNITY
End: 2021-10-06

## 2021-07-28 RX ORDER — BUDESONIDE 3 MG/1
6 CAPSULE, COATED PELLETS ORAL
COMMUNITY
End: 2021-10-06

## 2021-07-28 RX ORDER — SERTRALINE HYDROCHLORIDE 100 MG/1
100 TABLET, FILM COATED ORAL
COMMUNITY
End: 2021-10-06

## 2021-07-28 RX ORDER — DIPHENHYDRAMINE HYDROCHLORIDE 25 MG/1
5000 TABLET ORAL
COMMUNITY

## 2021-07-28 NOTE — PATIENT INSTRUCTIONS
Park,  An [facility] for physical [facility].  Someone will call you to schedule physical therapy or you can call [phone] to schedule physical therapy.  It will be very important for you to do your physical therapy exercises on a regular basis to decrease your pain and prevent future flares of pain.    At this point she'll need to follow-up with me as needed.  Please do not hesitate to contact me through [employer] if you have any questions, concerns, or wish to give any updates.  I'm happy to see you back at any time.  If you wish to schedule an appointment, please call [phone].    Thanks, Park!  I'm so glad you're feeling better!  Dr. Vila

## 2021-07-28 NOTE — PROGRESS NOTES
Assessment:   Dyana Nicole (AKANGEL LUIS Nick)  is a 81 year old y.o. female with past medical history significant for atrial tachycardia, hypertension, hypothyroidism, hyperlipidemia, anxiety/depression, GERD, colitis, osteoarthritis, skin cancer who presents today for follow-up regarding subacute left-sided buttock pain with radiation down the left leg that started after a fall in December 2020.  Since her last visit on June 1 of 2021, she has undergone an MRI of her hip, which showed tendinopathy of the gluteal and hamstring muscles with atrophy, specifically of the gluteal muscles.  Some changes of chondromalacia and greater trochanteric bursitis.  At this point she is feeling significantly better, and attributes this potentially to the hip joint injection in May.  She thinks maybe it just took her longer to respond to the injection..  She is without any red flag symptoms.    PSP:  Dr. Vila       Plan:     A shared decision making plan was used.  The patient's values and choices were respected.  The following represents what was discussed and decided upon by the physician and the patient.      1.  DIAGNOSTIC TESTS:   -Patient's MRI of the pelvis from June 30 of 2021 performed through [place] [facility] report was reviewed today by the physician and is summarized as follows: The patient does have tendinopathy of the gluteal tendons with no specific tear, though she does have significant fatty replacement atrophy of the gluteal muscles.  There is findings consistent with greater trochanteric bursitis.  Chondromalacia of the hips but no significant degenerative changes.  No evidence of avascular necrosis or other significant findings.  The images were discussed with the patient and the findings were explained.  - The patient's MRI of the lumbar spine from April 24 of 2021 was personally reviewed today by the physician with the physician performing her own interpretation.  There is an L1-5 fusion.  There is  transitional anatomy with a lumbarized S1, though to match the previous imaging count, the last surgical segment is called L4-5.  There is no significant central canal stenosis.  No significant foraminal stenosis.  There is a slight spondylolisthesis above the level of her fusion.  -Patient's x-rays of the bilateral hips from April 24 of 2021 report was reviewed and is summarized as follows: Mild joint space narrowing of both hips.  Otherwise normal without fracture or dislocation.  2.  PHYSICAL THERAPY: The patient has completed several sessions of physical therapy, but would benefit from further physical therapy specifically to focus on the tendinopathy of the glutes tendons as well as atrophy.. Patient would like to go to physical therapy so an order was provided to the [facility] rehab.  She is encouraged to continue doing her home exercise program on a regular basis.  3.  MEDICATIONS: At the last visit, the patient was recommended to decrease the gabapentin down to 100 mg 3 times a day.  At this time, is discontinued the medication.  -She can continue to take Celebrex 200 mg once a day as needed for pain.  -She can continue to take Tylenol as needed.  4.  INTERVENTIONS: No further injections are necessary at this time.  We could consider repeat intra-articular hip joint injection if needed.  -Her last intra-articular hip joint injection was on May 13 of 2021.  -Her second dose of the Covid vaccine was on March 31 of 2021.  5.  PATIENT EDUCATION:    -Discussed with the patient that it will be very important for her to do the physical therapy exercises over the long-term.  Consistency is key, and it will be better for her to do the exercises every day even if only for 10 to 15 minutes as opposed to spending an hour on the exercises only once or twice a week.  -All of her questions were answered to her satisfaction today.  She was in agreement with the treatment plan.  6.  FOLLOW-UP: The patient prefers to  follow-up as needed at this time.  If there are any questions/concerns or any significant worsening of pain, the patient is asked to call the clinic via the nurse navigation line or via [employer].     Subjective:     Dyana Nicole is a 81 year old female who presents today for follow-up regarding chronic left buttock pain with radiation down the lateral thigh.  Patient reports that she is feeling significantly better at this time prior approximately 95% relief).  She does think that this is secondary to the hip injection and thinks that maybe her body just took more time to respond to the injection.  She does have some slight pain mainly going down the distal lateral thigh just above the knee.  Overall though she is rating her pain today at a 2 out of 10.  At worst it is a 2 out of 10.  At best is a 0 out of 10.  Her pain is worse with sitting for too long.  She does feel better with moving around.  She denies any new symptoms at this time.  She is very interested in returning to physical therapy.  She is off the gabapentin and is managing with Tylenol arthritis at this time.    Past medical history is reviewed and is unchanged for any new medical diagnoses in the interim.      Family history is reviewed and is unchanged in the interim.      Review of Systems:  Positive for weakness in the legs.  Pertinent negatives include no numbness, tingling, headaches, fevers, chills, unexplained weight loss, bowel incontinence, bladder incontinence, trips, stumbles, falls.  All others reviewed and are negative.     Objective:   CONSTITUTIONAL:  Vital signs as above.  No acute distress.  The patient is well nourished and well groomed.    PSYCHIATRIC:  The patient is awake, alert, oriented to person, place and time.  The patient is answering questions appropriately with clear speech.  Normal affect.  SKIN:  Skin over the face, posterior torso, bilateral upper and lower extremities is clean, dry, intact without  gab.  MUSCULOSKELETAL:  Gait is non-antalgic.  The can transfer independently.  He has tenderness over the bilateral greater trochanteric bursa's, significantly worse over the left side compared to the right.    He has 4 -/5 strength of bilateral hip abductors.  The patient has 5/5 strength for the bilateral hip flexors, knee flexors/extensors, ankle dorsiflexors/plantar flexors, ankle evertors/invertors.    NEUROLOGICAL: Trace patellar, medial hamstring, achilles reflexes which are symmetric bilaterally.  No ankle clonus bilaterally.  Sensation to light touch is intact in the bilateral L4, L5, and S1 dermatomes.

## 2021-07-28 NOTE — LETTER
7/28/2021         RE: Dyana Nicole  [address]  [place] [address]        Dear Colleague,    Thank you for referring your patient, Dyana Nicole, to the [facility]. Please see a copy of my visit note below.    Assessment:   Dyana Nicole (AKANGEL LUIS Nick)  is a 81 year old y.o. female with past medical history significant for atrial tachycardia, hypertension, hypothyroidism, hyperlipidemia, anxiety/depression, GERD, colitis, osteoarthritis, skin cancer who presents today for follow-up regarding subacute left-sided buttock pain with radiation down the left leg that started after a fall in December 2020.  Since her last visit on June 1 of 2021, she has undergone an MRI of her hip, which showed tendinopathy of the gluteal and hamstring muscles with atrophy, specifically of the gluteal muscles.  Some changes of chondromalacia and greater trochanteric bursitis.  At this point she is feeling significantly better, and attributes this potentially to the hip joint injection in May.  She thinks maybe it just took her longer to respond to the injection..  She is without any red flag symptoms.    PSP:  Dr. Vila       Plan:     A shared decision making plan was used.  The patient's values and choices were respected.  The following represents what was discussed and decided upon by the physician and the patient.      1.  DIAGNOSTIC TESTS:   -Patient's MRI of the pelvis from June 30 of 2021 performed through [place] [facility] report was reviewed today by the physician and is summarized as follows: The patient does have tendinopathy of the gluteal tendons with no specific tear, though she does have significant fatty replacement atrophy of the gluteal muscles.  There is findings consistent with greater trochanteric bursitis.  Chondromalacia of the hips but no significant degenerative changes.  No evidence of avascular necrosis or other significant findings.  The images were  discussed with the patient and the findings were explained.  - The patient's MRI of the lumbar spine from April 24 of 2021 was personally reviewed today by the physician with the physician performing her own interpretation.  There is an L1-5 fusion.  There is transitional anatomy with a lumbarized S1, though to match the previous imaging count, the last surgical segment is called L4-5.  There is no significant central canal stenosis.  No significant foraminal stenosis.  There is a slight spondylolisthesis above the level of her fusion.  -Patient's x-rays of the bilateral hips from April 24 of 2021 report was reviewed and is summarized as follows: Mild joint space narrowing of both hips.  Otherwise normal without fracture or dislocation.  2.  PHYSICAL THERAPY: The patient has completed several sessions of physical therapy, but would benefit from further physical therapy specifically to focus on the tendinopathy of the glutes tendons as well as atrophy.. Patient would like to go to physical therapy so an order was provided to the [facility] rehab.  She is encouraged to continue doing her home exercise program on a regular basis.  3.  MEDICATIONS: At the last visit, the patient was recommended to decrease the gabapentin down to 100 mg 3 times a day.  At this time, is discontinued the medication.  -She can continue to take Celebrex 200 mg once a day as needed for pain.  -She can continue to take Tylenol as needed.  4.  INTERVENTIONS: No further injections are necessary at this time.  We could consider repeat intra-articular hip joint injection if needed.  -Her last intra-articular hip joint injection was on May 13 of 2021.  -Her second dose of the Covid vaccine was on March 31 of 2021.  5.  PATIENT EDUCATION:    -Discussed with the patient that it will be very important for her to do the physical therapy exercises over the long-term.  Consistency is key, and it will be better for her to do the exercises every day  even if only for 10 to 15 minutes as opposed to spending an hour on the exercises only once or twice a week.  -All of her questions were answered to her satisfaction today.  She was in agreement with the treatment plan.  6.  FOLLOW-UP: The patient prefers to follow-up as needed at this time.  If there are any questions/concerns or any significant worsening of pain, the patient is asked to call the clinic via the nurse navigation line or via [employer].     Subjective:     Dyana Nicole is a 81 year old female who presents today for follow-up regarding chronic left buttock pain with radiation down the lateral thigh.  Patient reports that she is feeling significantly better at this time prior approximately 95% relief).  She does think that this is secondary to the hip injection and thinks that maybe her body just took more time to respond to the injection.  She does have some slight pain mainly going down the distal lateral thigh just above the knee.  Overall though she is rating her pain today at a 2 out of 10.  At worst it is a 2 out of 10.  At best is a 0 out of 10.  Her pain is worse with sitting for too long.  She does feel better with moving around.  She denies any new symptoms at this time.  She is very interested in returning to physical therapy.  She is off the gabapentin and is managing with Tylenol arthritis at this time.    Past medical history is reviewed and is unchanged for any new medical diagnoses in the interim.      Family history is reviewed and is unchanged in the interim.      Review of Systems:  Positive for weakness in the legs.  Pertinent negatives include no numbness, tingling, headaches, fevers, chills, unexplained weight loss, bowel incontinence, bladder incontinence, trips, stumbles, falls.  All others reviewed and are negative.     Objective:   CONSTITUTIONAL:  Vital signs as above.  No acute distress.  The patient is well nourished and well groomed.    PSYCHIATRIC:  The patient is awake,  alert, oriented to person, place and time.  The patient is answering questions appropriately with clear speech.  Normal affect.  SKIN:  Skin over the face, posterior torso, bilateral upper and lower extremities is clean, dry, intact without rashes.  MUSCULOSKELETAL:  Gait is non-antalgic.  The can transfer independently.  He has tenderness over the bilateral greater trochanteric bursa's, significantly worse over the left side compared to the right.    He has 4 -/5 strength of bilateral hip abductors.  The patient has 5/5 strength for the bilateral hip flexors, knee flexors/extensors, ankle dorsiflexors/plantar flexors, ankle evertors/invertors.    NEUROLOGICAL: Trace patellar, medial hamstring, achilles reflexes which are symmetric bilaterally.  No ankle clonus bilaterally.  Sensation to light touch is intact in the bilateral L4, L5, and S1 dermatomes.               Again, thank you for allowing me to participate in the care of your patient.        Sincerely,        Daylin Wynn, DO

## 2021-08-19 DIAGNOSIS — M25.50 ARTHRALGIA OF MULTIPLE JOINTS: ICD-10-CM

## 2021-08-19 RX ORDER — CELECOXIB 200 MG/1
CAPSULE ORAL
Qty: 90 CAPSULE | Refills: 3 | Status: SHIPPED | OUTPATIENT
Start: 2021-08-19 | End: 2021-10-06

## 2021-08-25 ENCOUNTER — HOSPITAL ENCOUNTER (OUTPATIENT)
Dept: PHYSICAL THERAPY | Facility: REHABILITATION | Age: 81
End: 2021-08-25
Attending: PHYSICAL MEDICINE & REHABILITATION
Payer: MEDICARE

## 2021-08-25 ENCOUNTER — TELEPHONE (OUTPATIENT)
Dept: FAMILY MEDICINE | Facility: CLINIC | Age: 81
End: 2021-08-25

## 2021-08-25 DIAGNOSIS — I10 ESSENTIAL HYPERTENSION WITH GOAL BLOOD PRESSURE LESS THAN 140/90: ICD-10-CM

## 2021-08-25 DIAGNOSIS — R26.9 GAIT ABNORMALITY: Primary | ICD-10-CM

## 2021-08-25 DIAGNOSIS — I47.10 SUPRAVENTRICULAR TACHYCARDIA (H): ICD-10-CM

## 2021-08-25 DIAGNOSIS — M67.959 TENDINOPATHY OF GLUTEAL REGION: ICD-10-CM

## 2021-08-25 DIAGNOSIS — E03.9 HYPOTHYROIDISM, UNSPECIFIED TYPE: ICD-10-CM

## 2021-08-25 DIAGNOSIS — E78.5 HYPERLIPIDEMIA, UNSPECIFIED HYPERLIPIDEMIA TYPE: Primary | ICD-10-CM

## 2021-08-25 DIAGNOSIS — M76.32 IT BAND SYNDROME, LEFT: ICD-10-CM

## 2021-08-25 PROCEDURE — 97140 MANUAL THERAPY 1/> REGIONS: CPT | Mod: GP | Performed by: PHYSICAL THERAPIST

## 2021-08-25 PROCEDURE — 97161 PT EVAL LOW COMPLEX 20 MIN: CPT | Mod: GP | Performed by: PHYSICAL THERAPIST

## 2021-08-25 PROCEDURE — 97110 THERAPEUTIC EXERCISES: CPT | Mod: GP | Performed by: PHYSICAL THERAPIST

## 2021-08-25 NOTE — TELEPHONE ENCOUNTER
Reason for Call: Request for an order or referral:    Order or referral being requested: Orders for Annual Lab    Date needed: before my next appointment    Has the patient been seen by the PCP for this problem? Not Applicable    Additional comments: Patient called to request if provider would put in orders for her annual lab work. Her appt is scheduled for 3:30 pm, and she would like to come in fasting for the labs. Please call patient to assist in scheduling lab appt when orders have been placed.    Phone number Patient can be reached at:  Home number on file [phone] (home)    Best Time:  any    Can we leave a detailed message on this number?  YES    Call taken on 8/25/2021 at 11:32 AM by Nettie Garces

## 2021-08-25 NOTE — PROGRESS NOTES
[facility]          OUTPATIENT PHYSICAL THERAPY ORTHOPEDIC EVALUATION  PLAN OF TREATMENT FOR OUTPATIENT REHABILITATION  (COMPLETE FOR INITIAL CLAIMS ONLY)  Patient's Last Name, First Name, M.I.  YOB: 1940  Dyana Nicole    Provider s Name:  [facility]   Medical Record No.  [id number]   Start of Care Date:  08/25/21   Onset Date:  12/01/20   Type:     _X__PT   ___OT   ___SLP Medical Diagnosis:  Gluteal tendinopathy     PT Diagnosis:  gluteal tendonopathy, impaired gait   Visits from SOC:  1      _________________________________________________________________________________  Plan of Treatment/Functional Goals:  gait training, joint mobilization, manual therapy, neuromuscular re-education, ROM, strengthening, stretching     TENS     Goals  Goal Identifier: 1  Goal Description: Patient will be able to go from sit to stand without difficulty or increased pain   Target Date: 10/24/21    Goal Identifier: 2  Goal Description: Patient will be able to sit for longer than 30 minutes without increased L gluteal pain  Target Date: 11/23/21    Goal Identifier: 3  Goal Description: Patient will be able to stand for longer than 15 minutes without use of walker or increased leg symptoms  Target Date: 10/24/21      Therapy Frequency:  1 time/week  Predicted Duration of Therapy Intervention:  6    Mary Gaming, PT                 I CERTIFY THE NEED FOR THESE SERVICES FURNISHED UNDER        THIS PLAN OF TREATMENT AND WHILE UNDER MY CARE     (Physician co-signature of this document indicates review and certification of the therapy plan).                       Certification Date From:  08/25/21   Certification Date To:  11/23/21    Referring Provider:  Dr. Julito Wynn, DO    Initial Assessment        See Epic Evaluation Start of Care Date: 08/25/21                                                                        [facility]    Hip Initial Evaluation    Assessment:     Patient is a 81 year old female that presents with signs and symptoms consistent with bilateral leg symptoms secondary to L>R gluteal tendinopathy, gluteal weakness and poor core control. Patient demonstrates impairments including decreased hip range of motion, joint mobility and flexibility L>R, with TTP along lateral side of hip including gluteals, leading to impaired functional mobility. Patient's functional limitations include standing or sitting for 15-30 minutes, walking without her walker, and household duties. Today patient responded well to patient education and therapeutic exercise.    Treatment Today       Exercises:   Date 8/25/21   Exercise    Supine hip rolls 20 reps   SKTC, piriformis (supine and seated), QL  Hold 20-30 reps   Talked about seated pelvic posture                                               08/25/21 0900   General Information   Type of Visit Initial OP Ortho PT Evaluation   Start of Care Date 08/25/21   Referring Physician Dr. Julito Wynn, DO   Patient/Family Goals Statement Walk w/out walker   Date of Order 07/28/21   Certification Required? Yes   Medical Diagnosis Gluteal tendinopathy   Body Part(s)   Body Part(s) Hip   Presentation and Etiology   Pertinent history of current problem (include personal factors and/or comorbidities that impact the POC) Dyana is a 81 year old female who presents to therapy today with complaints of bilateral leg pains. Date of onset is 12/20/20 and onset was due to a fall, she fell on her back in her home. Patient reports her falls have gotten better with the walker, when she is without it, there is extreme pain for her. She wants to be able to walk without her walker around the apartment. Today she can feel achy of L calf, she can get sharp pains with ambulation R>L. She did have grab bars installed in her bathroom. After sitting for longer than 30 minutes, she was in so much pain that she couldn't get up  from the chair.   Impairments D. Decreased ROM;F. Decreased strength and endurance;G. Impaired balance;H. Impaired gait   Functional Limitations perform activities of daily living;perform desired leisure / sports activities  (Sitting is her biggest difficulty)   Symptom Location L>R legs bilaterally   How/Where did it occur With a fall   Onset date of current episode/exacerbation 12/01/20   Chronicity Chronic   Pain quality A. Sharp;B. Dull;C. Aching;D. Burning;E. Shooting   Frequency of pain/symptoms C. With activity   Pain/symptoms are: The same all the time   Pain/symptoms exacerbated by A. Sitting;B. Walking;I. Bending;K. Home tasks   Pain/symptoms eased by A. Sitting;K. Other   Pain eased by comment with her feet up, sometimes it gets to aching where she can lift her leg up and it goes away   Fall Risk Screen   Fall screen completed by PT   Have you fallen 2 or more times in the past year? Yes   Have you fallen and had an injury in the past year? Yes   Timed Up and Go score (seconds) Achy on the L side after walking without walker   Is patient a fall risk? Yes   Fall screen comments Feels most comfortable with walking with walker   Abuse Screen (yes response referral indicated)   Feels Unsafe at Home or Work/School no   Feels Threatened by Someone no   Does Anyone Try to Keep You From Having Contact with Others or Doing Things Outside Your Home? no   Physical Signs of Abuse Present no   Hip Objective Findings   Side (if bilateral, select both right and left) Right;Left   Gait/Locomotion trendelenburg gait on LLE when ambulating without walker   Hip ROM Comments limited L>R due to fear and decreased flexibility   Hip Flexibility Comments decreased L>R   Gluteal Tendopathy Test +   Palpation TTP along L gluteal and piriformis, along ITB   Right Hip Flexion Strength 4   Right Hip Abduction Strength 4   Right Knee Flexion Strength 4   Right Knee Extension Strength 4   Obers/ITB Flexibility severely decreased    Right Hamstring Flexibility decreased   Left Hip Flexion Strength 4   Left Hip Abduction Strength 4   Left Knee Flexion Strength 4   Left Knee Extension Strength 4   Left Hamstring Flexibility decreased   Planned Therapy Interventions   Planned Therapy Interventions gait training;joint mobilization;manual therapy;neuromuscular re-education;ROM;strengthening;stretching   Planned Modality Interventions   Planned Modality Interventions TENS   Clinical Impression   Criteria for Skilled Therapeutic Interventions Met yes, treatment indicated   PT Diagnosis gluteal tendonopathy, impaired gait   Influenced by the following impairments decreased mobility increased pain   Functional limitations due to impairments sitting or standing without pain, sit to stand transfers, rolling over in bed, waking up without pain   Clinical Presentation Stable/Uncomplicated   Clinical Decision Making (Complexity) Low complexity   Therapy Frequency 1 time/week   Predicted Duration of Therapy Intervention (days/wks) 6   Risk & Benefits of therapy have been explained Yes   Patient, Family & other staff in agreement with plan of care Yes   Clinical Impression Comments Patient is a 81 year old female that presents with signs and symptoms consistent with bilateral leg symptoms secondary to L>R gluteal tendinopathy, gluteal weakness and poor core control. Patient demonstrates impairments including decreased hip range of motion, joint mobility and flexibility L>R, with TTP along lateral side of hip including gluteals, leading to impaired functional mobility. Patient's functional limitations include standing or sitting for 15-30 minutes, walking without her walker, and household duties. Today patient responded well to patient education and therapeutic exercise.   ORTHO GOALS   PT Ortho Eval Goals 1;2;3   Ortho Goal 1   Goal Identifier 1   Goal Description Patient will be able to go from sit to stand without difficulty or increased pain    Target  Date 10/24/21   Ortho Goal 2   Goal Identifier 2   Goal Description Patient will be able to sit for longer than 30 minutes without increased L gluteal pain   Target Date 11/23/21   Ortho Goal 3   Goal Identifier 3   Goal Description Patient will be able to stand for longer than 15 minutes without use of walker or increased leg symptoms   Target Date 10/24/21   Total Evaluation Time   PT Eval, Low Complexity Minutes (67684) 20   Therapy Certification   Certification date from 08/25/21   Certification date to 11/23/21   Medical Diagnosis Gluteal tendinopathy

## 2021-08-31 ENCOUNTER — HOSPITAL ENCOUNTER (OUTPATIENT)
Dept: PHYSICAL THERAPY | Facility: REHABILITATION | Age: 81
End: 2021-08-31
Payer: MEDICARE

## 2021-08-31 DIAGNOSIS — M67.959 TENDINOPATHY OF GLUTEAL REGION: Primary | ICD-10-CM

## 2021-08-31 DIAGNOSIS — R26.9 GAIT ABNORMALITY: ICD-10-CM

## 2021-08-31 DIAGNOSIS — M76.32 IT BAND SYNDROME, LEFT: ICD-10-CM

## 2021-08-31 PROCEDURE — 97140 MANUAL THERAPY 1/> REGIONS: CPT | Mod: GP | Performed by: PHYSICAL THERAPIST

## 2021-08-31 PROCEDURE — 97110 THERAPEUTIC EXERCISES: CPT | Mod: GP | Performed by: PHYSICAL THERAPIST

## 2021-08-31 NOTE — PROGRESS NOTES
[facility]   Hip Daily Note    Assessment:     Today patient returns for 1st follow up, reports she is doing okay, noticing soreness but nothing more than normal. Tolerated manual therapy and therapeutic exercise today in clinic. 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 Patient demonstrates impairments including decreased hip range of motion, joint mobility and flexibility L>R, with TTP along lateral side of hip including gluteals, leading to impaired functional mobility. Patient's functional limitations include standing or sitting for 15-30 minutes, walking without her walker, and household duties. Today patient responded well to patient education and therapeutic exercise.    Treatment Today       Exercises:   Date 8/31/21 8/25/21   Exercise     Supine hip rolls  20 reps   SKTC, piriformis (supine and seated), QL   Hold 20-30 reps   Talked about seated pelvic posture     Supine SLR 5-10 reps    Supine bridge with hip adduction  5-10 reps    sidelying clamshells 5-10 reps    sidelying hip abd Too difficult

## 2021-09-07 ENCOUNTER — HOSPITAL ENCOUNTER (OUTPATIENT)
Dept: PHYSICAL THERAPY | Facility: REHABILITATION | Age: 81
End: 2021-09-07
Payer: MEDICARE

## 2021-09-07 DIAGNOSIS — M76.32 IT BAND SYNDROME, LEFT: ICD-10-CM

## 2021-09-07 DIAGNOSIS — M67.959 TENDINOPATHY OF GLUTEAL REGION: Primary | ICD-10-CM

## 2021-09-07 DIAGNOSIS — M54.2 NECK PAIN: ICD-10-CM

## 2021-09-07 DIAGNOSIS — R26.9 GAIT ABNORMALITY: ICD-10-CM

## 2021-09-07 PROCEDURE — 97110 THERAPEUTIC EXERCISES: CPT | Mod: GP | Performed by: PHYSICAL THERAPIST

## 2021-09-07 PROCEDURE — 97140 MANUAL THERAPY 1/> REGIONS: CPT | Mod: GP | Performed by: PHYSICAL THERAPIST

## 2021-09-07 NOTE — PROGRESS NOTES
[facility]   Hip Daily Note    Assessment:     Today patient returns for 2nd follow up, reports she is doing about the same, questioned PT today about her ITB and bursa, and if the bursitis is still there. Patient still very TTP at L bursa and ITB so most likely yes. Patient also questioning why she leans to the R when she walks, PT explained to patient that is due to her L tendinopathy and demonstrating trendelenburg gait.  Patient's last question is about how much progress she should be making and when to contact provider, PT recommended setting up appt with  within the next few weeks. Patient leaves for [place] in middle of October so would like to be better by then.       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 Patient demonstrates impairments including decreased hip range of motion, joint mobility and flexibility L>R, with TTP along lateral side of hip including gluteals, leading to impaired functional mobility. Patient's functional limitations include standing or sitting for 15-30 minutes, walking without her walker, and household duties. Today patient responded well to patient education and therapeutic exercise.    Treatment Today       Exercises:   Date 8/31/21 8/25/21   Exercise     Supine hip rolls  20 reps   SKTC, piriformis (supine and seated), QL   Hold 20-30 reps   Talked about seated pelvic posture     Supine SLR 5-10 reps    Supine bridge with hip adduction  5-10 reps    sidelying clamshells 5-10 reps    sidelying hip abd Too difficult

## 2021-09-15 ENCOUNTER — HOSPITAL ENCOUNTER (OUTPATIENT)
Dept: PHYSICAL THERAPY | Facility: REHABILITATION | Age: 81
End: 2021-09-15
Payer: MEDICARE

## 2021-09-15 DIAGNOSIS — M76.32 IT BAND SYNDROME, LEFT: ICD-10-CM

## 2021-09-15 DIAGNOSIS — M67.959 TENDINOPATHY OF GLUTEAL REGION: Primary | ICD-10-CM

## 2021-09-15 DIAGNOSIS — R26.9 GAIT ABNORMALITY: ICD-10-CM

## 2021-09-15 PROCEDURE — 97140 MANUAL THERAPY 1/> REGIONS: CPT | Mod: GP | Performed by: PHYSICAL THERAPIST

## 2021-09-15 NOTE — PROGRESS NOTES
[facility]   Hip Daily Note    Assessment:     Today patient returns for 3rd follow up, reports she is doing about the same, questioned PT today about her ITB and bursa, and if the bursitis is still there. Patient still very TTP at L bursa and ITB so most likely yes. Patient also questioning why she leans to the R when she walks, PT explained to patient that is due to her L tendinopathy and demonstrating trendelenburg gait.  Patient's last question is about how much progress she should be making and when to contact provider, PT recommended setting up appt with  within the next few weeks. Patient leaves for [place] in middle of October so would like to be better by then.       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 Patient demonstrates impairments including decreased hip range of motion, joint mobility and flexibility L>R, with TTP along lateral side of hip including gluteals, leading to impaired functional mobility. Patient's functional limitations include standing or sitting for 15-30 minutes, walking without her walker, and household duties. Today patient responded well to patient education and therapeutic exercise.    Treatment Today       Exercises:   Date 9/15/21 8/31/21 8/25/21   Exercise      Supine hip rolls   20 reps   SKTC, piriformis (supine and seated), QL    Hold 20-30 reps   Talked about seated pelvic posture      Supine SLR  5-10 reps    Supine bridge with hip adduction   5-10 reps    sidelying clamshells  5-10 reps    sidelying hip abd  Too difficult    Nustep 5 min

## 2021-09-17 ENCOUNTER — OFFICE VISIT (OUTPATIENT)
Dept: CARDIOLOGY | Facility: CLINIC | Age: 81
End: 2021-09-17
Payer: MEDICARE

## 2021-09-17 VITALS
RESPIRATION RATE: 12 BRPM | BODY MASS INDEX: 35.94 KG/M2 | HEIGHT: 62 IN | WEIGHT: 195.3 LBS | SYSTOLIC BLOOD PRESSURE: 112 MMHG | HEART RATE: 86 BPM | DIASTOLIC BLOOD PRESSURE: 70 MMHG

## 2021-09-17 DIAGNOSIS — I47.19 ATRIAL TACHYCARDIA (H): ICD-10-CM

## 2021-09-17 DIAGNOSIS — R00.2 PALPITATIONS: ICD-10-CM

## 2021-09-17 DIAGNOSIS — I10 ESSENTIAL HYPERTENSION WITH GOAL BLOOD PRESSURE LESS THAN 140/90: ICD-10-CM

## 2021-09-17 DIAGNOSIS — R06.09 EXERTIONAL DYSPNEA: ICD-10-CM

## 2021-09-17 DIAGNOSIS — I47.10 SVT (SUPRAVENTRICULAR TACHYCARDIA) (H): Primary | ICD-10-CM

## 2021-09-17 PROCEDURE — 99214 OFFICE O/P EST MOD 30 MIN: CPT | Performed by: INTERNAL MEDICINE

## 2021-09-17 ASSESSMENT — MIFFLIN-ST. JEOR: SCORE: 1309.12

## 2021-09-17 NOTE — LETTER
9/17/2021    True Pugh MD  [address]  [facility] [address]    RE: Dyana Nicole       Dear Colleague,    I had the pleasure of seeing Dyana Nicole in the [facility].      HEART CARE  ENCOUNTER          Assessment/Recommendations   Assessment:    1.  Sigmoid septum with LVOT gradient.  No gradient today with increase in metoprolol   2.  Palpitations secondary to atrial tachycardia vs. AVNRT (improved with increase in metoprolol)   3.  Hypertension, controlled.   4.  Hyperlipidemia, . (2019)  LDL Cholesterol Calculated   Date Value Ref Range Status   11/17/2020 121 <=129 mg/dL Final     5.  Chronic lymphedema, continue compression.    6.  Tricuspid regurgitation, mild to moderate     Plan:   1.  Continue metoprolol to 75 mg in the a.m. and 50 mg in the p.m.  2.  Continue to gradually increase exercise.  She is using a walker at this time for improved stability  3.  Continue with increased oral hydration  4.  Continue Zocor for hyperlipidemia    Follow Up Plan:  12 months         History of Present Illness/Subjective    Dyana Nicole is a 81 y.o. female history of palpitations, LVOT gradient related sigmoid septum who presents to cardiology clinic for cardiac arrhythmia, hypertension hyperlipidemia.    Since last clinic evaluation she did increase her metoprolol dose to 75 mg in morning and 50 mg in the p.m. is noted improvement in her palpitation symptoms.  She has had no recent palpitations.  She has started use a walker due to sciatic issues but has noted improvement in her ability to be stable and will likely start trying to increase her exercise level.    From a heart standpoint she denies any active chest pain.  Denies lightheadedness no syncope.  She feels significantly better on the higher dose of metoprolol.  Recent echocardiogram was reviewed which did not demonstrate any significant outflow tract gradient.  Compared to  2018 echo is unchanged.  Normal left ventricular ejection fraction.  Images were personally reviewed      ECHO: 2021 (5/26)  When compared to the previous study dated 5/30/2018, no significant change.    Normal left ventricular size, wall thickness and wall motion. Left ventricle ejection fraction is normal. The calculated left ventricular ejection fraction is 67%.    Normal right ventricular size and systolic function.    Mild to moderate tricuspid valve regurgitation with estimated pulmonary artery systolic pressure 45 mmHg.    ECHO: 2018    Mild right atrial enlargement    Left ventricle ejection fraction is normal. The calculated left ventricular ejection fraction is 55% without wall motion abnormality.    Normal right ventricular size and systolic function.    Mild mitral regurgitation    Mild to moderate tricuspid regurgitation with borderline elevation in right ventricular pressures    When compared to the previous study dated 9/3/2016, no significant interval change.     NM STRESS: 2017    1.The patient's exercise capacity is normal.    2.The exercise nuclear stress test is negative for inducible myocardial ischemia or infarction.    3.The left ventricular ejection fraction is 74%.    4.When compared to the images of 9/22/2016, there has been no significant change.         Physical Examination  Review of Systems   Vitals:    04/29/21 0838   BP: 120/76   Pulse: 68   Resp: 16     Physical Exam   Constitutional: She is oriented to person, place, and time. She appears well-developed and well-nourished.   HENT:   Head: Normocephalic and atraumatic.   Eyes: Pupils are equal, round, and reactive to light. Conjunctivae and EOM are normal. Left eye exhibits no discharge.   Neck: Normal range of motion. Neck supple. No JVD present. No thyromegaly present.   Cardiovascular: Normal rate, regular rhythm and intact distal pulses.  NO murmurs . No pitting edmea.   Pulmonary/Chest: Effort normal and breath sounds normal. No  respiratory distress. She has no wheezes. She has no rales.   Abdominal: Soft. Bowel sounds are normal.  Neurological: She is alert and oriented to person, place, and time. Gait stable with walker.   Skin: Skin is warm and dry.   Psychiatric: She has a normal mood and affect. Her behavior is normal.      General: Weight Gain  Eyes: WNL  Ears/Nose/Throat: WNL  Lungs: WNL  Heart: Shortness of Breath with activity (miminal, improved). Negative chest pain and palpitations  Stomach: Heartburn  Bladder: WNL  Muscle/Joints: WNL  Skin: WNL  Nervous System: Falls, Loss of Balance  Mental Health: WNL     Blood: WNL       Medical History  Surgical History Family History Social History   Past Medical History:   Diagnosis Date     Anemia     Created by Conversion      Arrhythmia      Cervical Spondylosis     Created by Conversion      Depression      Disease of thyroid gland     hypo     Fibromyalgia     Created by Conversion      GERD (gastroesophageal reflux disease)      High cholesterol      History of transfusion      Hyperlipidemia     Created by Conversion      Hypertension     Created by Conversion      Near syncope      Peripheral Neuropathy     Created by Conversion      Skin cancer, basal cell      Sleep apnea     mild     Tachycardia     Past Surgical History:   Procedure Laterality Date     Bladder lift       CARPAL TUNNEL RELEASE       CATARACT EXTRACTION, BILATERAL       CHOLECYSTECTOMY       HYSTERECTOMY       JOINT REPLACEMENT Bilateral     knees     MOHS SURGERY      Nose     OOPHORECTOMY Left     1 removed, 1 remains     OR ARTHROPLASTY TIBIAL PLATEAU      Description: Knee Replacement;  Recorded: 05/16/2013;     OR INJECT NERV JOE STEWART PERIPH NERV      Description: Peripheral Nerve Block Wrist Median Right;  Recorded: 05/16/2013;     REPLACEMENT TOTAL KNEE      L     REPLACEMENT TOTAL KNEE BILATERAL       ROTATOR CUFF REPAIR       TRUNK SKIN LESION EXCISIONAL BIOPSY Left 1/5/2018    Procedure: LEFT BUTTOCK  EXPLORATION, EXCISION BUTTOCK MASS;  Surgeon: Lg Jameson MD;  Location: [facility] Main OR;  Service:     Family History   Problem Relation Age of Onset     Aneurysm Father         AAA     Cancer Mother         pancrease    Social History     Socioeconomic History     Marital status:      Spouse name: Not on file     Number of children: Not on file     Years of education: Not on file     Highest education level: Not on file   Occupational History     Not on file   Social Needs     Financial resource strain: Not on file     Food insecurity     Worry: Not on file     Inability: Not on file     Transportation needs     Medical: Not on file     Non-medical: Not on file   Tobacco Use     Smoking status: Former Smoker     Packs/day: 0.25     Years: 1.00     Pack years: 0.25     Quit date: 1961     Years since quittin.3     Smokeless tobacco: Never Used   Substance and Sexual Activity     Alcohol use: No     Drug use: No     Sexual activity: Never   Lifestyle     Physical activity     Days per week: Not on file     Minutes per session: Not on file     Stress: Not on file   Relationships     Social connections     Talks on phone: Not on file     Gets together: Not on file     Attends Mosque service: Not on file     Active member of club or organization: Not on file     Attends meetings of clubs or organizations: Not on file     Relationship status: Not on file     Intimate partner violence     Fear of current or ex partner: Not on file     Emotionally abused: Not on file     Physically abused: Not on file     Forced sexual activity: Not on file   Other Topics Concern     Not on file   Social History Narrative    Lives in senior living with  of over 50 years. 3 daughters          Medications  Allergies   Current Outpatient Medications   Medication Sig Dispense Refill     aspirin 81 MG EC tablet Take 81 mg by mouth at bedtime.        calcium carbonate-vitamin D3 (CALCIUM 600 + D,3,) 600 mg  calcium- 200 unit cap Take 1 tablet by mouth 2 (two) times a day.              celecoxib (CELEBREX) 200 MG capsule TAKE ONE CAPSULE BY MOUTH ONE TIME DAILY  90 capsule 3     estrogens, conjugated, (PREMARIN) 0.3 MG tablet Take 1 tablet (0.3 mg total) by mouth daily. 90 tablet 3     fluticasone propionate (FLONASE) 50 mcg/actuation nasal spray INSTILL 2 SPRAYS IN EACH NOSTRIL DAILY AS NEEDED 48 g 3     furosemide (LASIX) 20 MG tablet TAKE ONE TABLET BY MOUTH ONE TIME DAILY  90 tablet 3     gabapentin (NEURONTIN) 100 MG capsule Take 100 mg by mouth 3 (three) times a day. 90 capsule 2     LACTOBAC CMB #3/FOS/PANTETHINE (PROBIOTIC & ACIDOPHILUS ORAL) Take 1 capsule by mouth once daily. 1.5 billion units             levothyroxine (SYNTHROID, LEVOTHROID) 50 MCG tablet TAKE ONE TABLET BY MOUTH ONE TIME DAILY AT 6AM 90 tablet 3     metoprolol tartrate (LOPRESSOR) 50 MG tablet Take 0.5 tablets (25 mg total) by mouth 2 (two) times a day. 90 tablet 3     multivitamin with minerals (THERA-M) 9 mg iron-400 mcg Tab tablet Take 1 tablet by mouth every evening.       omeprazole (PRILOSEC) 20 MG capsule TAKE ONE CAPSULE BY MOUTH TWICE DAILY  180 capsule 3     simvastatin (ZOCOR) 20 MG tablet TAKE 1 TABLET BY MOUTH AT BEDTIME 90 tablet 3     traZODone (DESYREL) 50 MG tablet Take 25 mg by mouth at bedtime. Pt taking 75mg at bedtime       venlafaxine (EFFEXOR) 75 MG tablet Take 75 mg by mouth daily.        No current facility-administered medications for this visit.     Allergies   Allergen Reactions     Oxycodone      hallucinations     Penicillins Hives     Pollen      Venom-Honey Bee Swelling         Lab Results    Chemistry/lipid CBC Cardiac Enzymes/BNP/TSH/INR   Lab Results   Component Value Date    CHOL 220 (H) 11/17/2020    HDL 78 11/17/2020    LDLCALC 121 11/17/2020    TRIG 105 11/17/2020    CREATININE 0.76 11/17/2020    BUN 19 11/17/2020    K 4.5 11/17/2020     11/17/2020     11/17/2020    CO2 31 11/17/2020    Lab  Results   Component Value Date    WBC 6.3 05/11/2020    HGB 12.5 05/11/2020    HCT 39.4 05/11/2020    MCV 95 05/11/2020     05/11/2020    Lab Results   Component Value Date    CKTOTAL 87 04/12/2018    TROPONINI <0.01 05/11/2020     06/07/2018    TSH 2.18 09/16/2020    INR 1.06 12/27/2018        Pk Garcia                                                    Thank you for allowing me to participate in the care of your patient.      Sincerely,     Pk Garcia DO     [facility]  cc:   Pk Garcia DO  [address] [place] [address]

## 2021-09-17 NOTE — PROGRESS NOTES
HEART CARE  ENCOUNTER          Assessment/Recommendations   Assessment:    1.  Sigmoid septum with LVOT gradient.  No gradient today with increase in metoprolol   2.  Palpitations secondary to atrial tachycardia vs. AVNRT (improved with increase in metoprolol)   3.  Hypertension, controlled.   4.  Hyperlipidemia, . (2019)  LDL Cholesterol Calculated   Date Value Ref Range Status   11/17/2020 121 <=129 mg/dL Final     5.  Chronic lymphedema, continue compression.    6.  Tricuspid regurgitation, mild to moderate     Plan:   1.  Continue metoprolol to 75 mg in the a.m. and 50 mg in the p.m.  2.  Continue to gradually increase exercise.  She is using a walker at this time for improved stability  3.  Continue with increased oral hydration  4.  Continue Zocor for hyperlipidemia    Follow Up Plan:  12 months         History of Present Illness/Subjective    Dyana Nicole is a 81 y.o. female history of palpitations, LVOT gradient related sigmoid septum who presents to cardiology clinic for cardiac arrhythmia, hypertension hyperlipidemia.    Since last clinic evaluation she did increase her metoprolol dose to 75 mg in morning and 50 mg in the p.m. is noted improvement in her palpitation symptoms.  She has had no recent palpitations.  She has started use a walker due to sciatic issues but has noted improvement in her ability to be stable and will likely start trying to increase her exercise level.    From a heart standpoint she denies any active chest pain.  Denies lightheadedness no syncope.  She feels significantly better on the higher dose of metoprolol.  Recent echocardiogram was reviewed which did not demonstrate any significant outflow tract gradient.  Compared to 2018 echo is unchanged.  Normal left ventricular ejection fraction.  Images were personally reviewed      ECHO: 2021 (5/26)  When compared to the previous study dated 5/30/2018, no significant change.    Normal left ventricular size, wall thickness  and wall motion. Left ventricle ejection fraction is normal. The calculated left ventricular ejection fraction is 67%.    Normal right ventricular size and systolic function.    Mild to moderate tricuspid valve regurgitation with estimated pulmonary artery systolic pressure 45 mmHg.    ECHO: 2018    Mild right atrial enlargement    Left ventricle ejection fraction is normal. The calculated left ventricular ejection fraction is 55% without wall motion abnormality.    Normal right ventricular size and systolic function.    Mild mitral regurgitation    Mild to moderate tricuspid regurgitation with borderline elevation in right ventricular pressures    When compared to the previous study dated 9/3/2016, no significant interval change.     NM STRESS: 2017    1.The patient's exercise capacity is normal.    2.The exercise nuclear stress test is negative for inducible myocardial ischemia or infarction.    3.The left ventricular ejection fraction is 74%.    4.When compared to the images of 9/22/2016, there has been no significant change.         Physical Examination  Review of Systems   Vitals:    04/29/21 0838   BP: 120/76   Pulse: 68   Resp: 16     Physical Exam   Constitutional: She is oriented to person, place, and time. She appears well-developed and well-nourished.   HENT:   Head: Normocephalic and atraumatic.   Eyes: Pupils are equal, round, and reactive to light. Conjunctivae and EOM are normal. Left eye exhibits no discharge.   Neck: Normal range of motion. Neck supple. No JVD present. No thyromegaly present.   Cardiovascular: Normal rate, regular rhythm and intact distal pulses.  NO murmurs . No pitting edmea.   Pulmonary/Chest: Effort normal and breath sounds normal. No respiratory distress. She has no wheezes. She has no rales.   Abdominal: Soft. Bowel sounds are normal.  Neurological: She is alert and oriented to person, place, and time. Gait stable with walker.   Skin: Skin is warm and dry.   Psychiatric: She  has a normal mood and affect. Her behavior is normal.      General: Weight Gain  Eyes: WNL  Ears/Nose/Throat: WNL  Lungs: WNL  Heart: Shortness of Breath with activity (miminal, improved). Negative chest pain and palpitations  Stomach: Heartburn  Bladder: WNL  Muscle/Joints: WNL  Skin: WNL  Nervous System: Falls, Loss of Balance  Mental Health: WNL     Blood: WNL       Medical History  Surgical History Family History Social History   Past Medical History:   Diagnosis Date     Anemia     Created by Conversion      Arrhythmia      Cervical Spondylosis     Created by Conversion      Depression      Disease of thyroid gland     hypo     Fibromyalgia     Created by Conversion      GERD (gastroesophageal reflux disease)      High cholesterol      History of transfusion      Hyperlipidemia     Created by Conversion      Hypertension     Created by Conversion      Near syncope      Peripheral Neuropathy     Created by Conversion      Skin cancer, basal cell      Sleep apnea     mild     Tachycardia     Past Surgical History:   Procedure Laterality Date     Bladder lift       CARPAL TUNNEL RELEASE       CATARACT EXTRACTION, BILATERAL       CHOLECYSTECTOMY       HYSTERECTOMY       JOINT REPLACEMENT Bilateral     knees     MOHS SURGERY      Nose     OOPHORECTOMY Left     1 removed, 1 remains     MA ARTHROPLASTY TIBIAL PLATEAU      Description: Knee Replacement;  Recorded: 05/16/2013;     MA INJECT NERV BLCK,OTHR PERIPH NERV      Description: Peripheral Nerve Block Wrist Median Right;  Recorded: 05/16/2013;     REPLACEMENT TOTAL KNEE      L     REPLACEMENT TOTAL KNEE BILATERAL       ROTATOR CUFF REPAIR       TRUNK SKIN LESION EXCISIONAL BIOPSY Left 1/5/2018    Procedure: LEFT BUTTOCK EXPLORATION, EXCISION BUTTOCK MASS;  Surgeon: Lg Jameson MD;  Location: [facility];  Service:     Family History   Problem Relation Age of Onset     Aneurysm Father         AAA     Cancer Mother         pancrease    Social History      Socioeconomic History     Marital status:      Spouse name: Not on file     Number of children: Not on file     Years of education: Not on file     Highest education level: Not on file   Occupational History     Not on file   Social Needs     Financial resource strain: Not on file     Food insecurity     Worry: Not on file     Inability: Not on file     Transportation needs     Medical: Not on file     Non-medical: Not on file   Tobacco Use     Smoking status: Former Smoker     Packs/day: 0.25     Years: 1.00     Pack years: 0.25     Quit date: 1961     Years since quittin.3     Smokeless tobacco: Never Used   Substance and Sexual Activity     Alcohol use: No     Drug use: No     Sexual activity: Never   Lifestyle     Physical activity     Days per week: Not on file     Minutes per session: Not on file     Stress: Not on file   Relationships     Social connections     Talks on phone: Not on file     Gets together: Not on file     Attends Taoism service: Not on file     Active member of club or organization: Not on file     Attends meetings of clubs or organizations: Not on file     Relationship status: Not on file     Intimate partner violence     Fear of current or ex partner: Not on file     Emotionally abused: Not on file     Physically abused: Not on file     Forced sexual activity: Not on file   Other Topics Concern     Not on file   Social History Narrative    Lives in senior living with  of over 50 years. 3 daughters          Medications  Allergies   Current Outpatient Medications   Medication Sig Dispense Refill     aspirin 81 MG EC tablet Take 81 mg by mouth at bedtime.        calcium carbonate-vitamin D3 (CALCIUM 600 + D,3,) 600 mg calcium- 200 unit cap Take 1 tablet by mouth 2 (two) times a day.              celecoxib (CELEBREX) 200 MG capsule TAKE ONE CAPSULE BY MOUTH ONE TIME DAILY  90 capsule 3     estrogens, conjugated, (PREMARIN) 0.3 MG tablet Take 1 tablet (0.3 mg  total) by mouth daily. 90 tablet 3     fluticasone propionate (FLONASE) 50 mcg/actuation nasal spray INSTILL 2 SPRAYS IN EACH NOSTRIL DAILY AS NEEDED 48 g 3     furosemide (LASIX) 20 MG tablet TAKE ONE TABLET BY MOUTH ONE TIME DAILY  90 tablet 3     gabapentin (NEURONTIN) 100 MG capsule Take 100 mg by mouth 3 (three) times a day. 90 capsule 2     LACTOBAC CMB #3/FOS/PANTETHINE (PROBIOTIC & ACIDOPHILUS ORAL) Take 1 capsule by mouth once daily. 1.5 billion units             levothyroxine (SYNTHROID, LEVOTHROID) 50 MCG tablet TAKE ONE TABLET BY MOUTH ONE TIME DAILY AT 6AM 90 tablet 3     metoprolol tartrate (LOPRESSOR) 50 MG tablet Take 0.5 tablets (25 mg total) by mouth 2 (two) times a day. 90 tablet 3     multivitamin with minerals (THERA-M) 9 mg iron-400 mcg Tab tablet Take 1 tablet by mouth every evening.       omeprazole (PRILOSEC) 20 MG capsule TAKE ONE CAPSULE BY MOUTH TWICE DAILY  180 capsule 3     simvastatin (ZOCOR) 20 MG tablet TAKE 1 TABLET BY MOUTH AT BEDTIME 90 tablet 3     traZODone (DESYREL) 50 MG tablet Take 25 mg by mouth at bedtime. Pt taking 75mg at bedtime       venlafaxine (EFFEXOR) 75 MG tablet Take 75 mg by mouth daily.        No current facility-administered medications for this visit.     Allergies   Allergen Reactions     Oxycodone      hallucinations     Penicillins Hives     Pollen      Venom-Honey Bee Swelling         Lab Results    Chemistry/lipid CBC Cardiac Enzymes/BNP/TSH/INR   Lab Results   Component Value Date    CHOL 220 (H) 11/17/2020    HDL 78 11/17/2020    LDLCALC 121 11/17/2020    TRIG 105 11/17/2020    CREATININE 0.76 11/17/2020    BUN 19 11/17/2020    K 4.5 11/17/2020     11/17/2020     11/17/2020    CO2 31 11/17/2020    Lab Results   Component Value Date    WBC 6.3 05/11/2020    HGB 12.5 05/11/2020    HCT 39.4 05/11/2020    MCV 95 05/11/2020     05/11/2020    Lab Results   Component Value Date    CKTOTAL 87 04/12/2018    TROPONINI <0.01 05/11/2020    BNP  116 06/07/2018    TSH 2.18 09/16/2020    INR 1.06 12/27/2018        Pk Garcia

## 2021-09-21 ENCOUNTER — HOSPITAL ENCOUNTER (OUTPATIENT)
Dept: PHYSICAL THERAPY | Facility: REHABILITATION | Age: 81
End: 2021-09-21
Payer: MEDICARE

## 2021-09-21 DIAGNOSIS — R26.9 GAIT ABNORMALITY: ICD-10-CM

## 2021-09-21 DIAGNOSIS — M67.959 TENDINOPATHY OF GLUTEAL REGION: Primary | ICD-10-CM

## 2021-09-21 DIAGNOSIS — M76.32 IT BAND SYNDROME, LEFT: ICD-10-CM

## 2021-09-21 PROCEDURE — 97140 MANUAL THERAPY 1/> REGIONS: CPT | Mod: GP | Performed by: PHYSICAL THERAPIST

## 2021-09-21 NOTE — PROGRESS NOTES
[facility]   Hip Daily Note    Assessment:     Today patient returns for 4th follow up, reports she is doing about the same, questioned PT today about her ITB and bursa, and if the bursitis is still there. Patient still very TTP at L bursa and ITB so most likely yes. Patient also questioning why she leans to the R when she walks, PT explained to patient that is due to her L tendinopathy and demonstrating trendelenburg gait.  Patient's last question is about how much progress she should be making and when to contact provider, PT recommended setting up appt with Dr within the next few weeks. Patient  Might be getting cortizone injection from primary within the coming weeks, PT stated it might be beneficial because it might allow her to move more and participate in more activity. Patient leaves for [place] in middle of October so would like to be better by then.       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 Patient demonstrates impairments including decreased hip range of motion, joint mobility and flexibility L>R, with TTP along lateral side of hip including gluteals, leading to impaired functional mobility. Patient's functional limitations include standing or sitting for 15-30 minutes, walking without her walker, and household duties. Today patient responded well to patient education and therapeutic exercise.    Treatment Today       Exercises:   Date 9/21/21 8/31/21 8/25/21   Exercise      Supine hip rolls   20 reps   SKTC, piriformis (supine and seated), QL    Hold 20-30 reps   Talked about seated pelvic posture      Supine SLR  5-10 reps    Supine bridge with hip adduction   5-10 reps    sidelying clamshells  5-10 reps    sidelying hip abd  Too difficult    Nustep 5 min

## 2021-09-23 NOTE — ADDENDUM NOTE
Encounter addended by: Betty Crowell PT on: 9/23/2021 8:40 AM   Actions taken: Charge Capture section accepted

## 2021-09-24 DIAGNOSIS — I87.2 VENOUS INSUFFICIENCY OF BOTH LOWER EXTREMITIES: Primary | ICD-10-CM

## 2021-09-24 DIAGNOSIS — R06.00 DYSPNEA: ICD-10-CM

## 2021-09-24 DIAGNOSIS — I51.7 RIGHT ATRIAL ENLARGEMENT: ICD-10-CM

## 2021-09-24 DIAGNOSIS — E03.9 ACQUIRED HYPOTHYROIDISM: ICD-10-CM

## 2021-09-24 RX ORDER — FUROSEMIDE 20 MG
TABLET ORAL
Qty: 90 TABLET | Refills: 3 | Status: SHIPPED | OUTPATIENT
Start: 2021-09-24 | End: 2022-09-22

## 2021-09-24 RX ORDER — LEVOTHYROXINE SODIUM 50 UG/1
TABLET ORAL
Qty: 90 TABLET | Refills: 3 | Status: SHIPPED | OUTPATIENT
Start: 2021-09-24 | End: 2022-09-22

## 2021-09-29 ENCOUNTER — HOSPITAL ENCOUNTER (OUTPATIENT)
Dept: PHYSICAL THERAPY | Facility: REHABILITATION | Age: 81
End: 2021-09-29
Payer: MEDICARE

## 2021-09-29 DIAGNOSIS — M67.959 TENDINOPATHY OF GLUTEAL REGION: Primary | ICD-10-CM

## 2021-09-29 DIAGNOSIS — M76.32 IT BAND SYNDROME, LEFT: ICD-10-CM

## 2021-09-29 DIAGNOSIS — R26.9 GAIT ABNORMALITY: ICD-10-CM

## 2021-09-29 PROCEDURE — 97140 MANUAL THERAPY 1/> REGIONS: CPT | Mod: GP | Performed by: PHYSICAL THERAPIST

## 2021-09-29 NOTE — PROGRESS NOTES
[facility]   Hip Daily Note    Assessment:     Today patient returns for 5th follow up, reports she is doing about the same, with more bad days than good days. Patient still very TTP at L bursa and ITB. Patient also questioning why she leans to the R when she walks, PT explained to patient that is due to her L tendinopathy and demonstrating trendelenburg gait.  Patient's last question is about how much progress she should be making and when to contact provider, PT recommended setting up appt with Dr within the next few weeks. Patient  Might be getting cortizone injection from primary or Dr. Vila within the coming weeks, PT stated it might be beneficial because it might allow her to move more and participate in more activity. Patient leaves for [place] in middle of October so would like to be better by then. Patient with most benefit from manual therapy at this point.       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 Patient demonstrates impairments including decreased hip range of motion, joint mobility and flexibility L>R, with TTP along lateral side of hip including gluteals, leading to impaired functional mobility. Patient's functional limitations include standing or sitting for 15-30 minutes, walking without her walker, and household duties. Today patient responded well to patient education and therapeutic exercise.    Treatment Today       Exercises:   Date 9/29/21 9/21/21 8/31/21 8/25/21   Exercise       Supine hip rolls    20 reps   SKTC, piriformis (supine and seated), QL     Hold 20-30 reps   Talked about seated pelvic posture       Supine SLR   5-10 reps    Supine bridge with hip adduction    5-10 reps    sidelying clamshells   5-10 reps    sidelying hip abd   Too  difficult    Nustep  5 min

## 2021-10-04 ASSESSMENT — ACTIVITIES OF DAILY LIVING (ADL): CURRENT_FUNCTION: HOUSEWORK REQUIRES ASSISTANCE

## 2021-10-05 ENCOUNTER — HOSPITAL ENCOUNTER (OUTPATIENT)
Dept: PHYSICAL THERAPY | Facility: REHABILITATION | Age: 81
End: 2021-10-05
Payer: MEDICARE

## 2021-10-05 DIAGNOSIS — R26.9 GAIT ABNORMALITY: ICD-10-CM

## 2021-10-05 DIAGNOSIS — M76.32 IT BAND SYNDROME, LEFT: ICD-10-CM

## 2021-10-05 DIAGNOSIS — M67.959 TENDINOPATHY OF GLUTEAL REGION: Primary | ICD-10-CM

## 2021-10-05 PROCEDURE — 97140 MANUAL THERAPY 1/> REGIONS: CPT | Mod: GP | Performed by: PHYSICAL THERAPIST

## 2021-10-05 NOTE — PROGRESS NOTES
[facility]   Hip Daily Note    Assessment:     Today patient returns for 7th follow up, reports she is doing about the same, with more bad days than good days. Patient reports decreased L bursa and ITB symptoms on this date, however with increased symptoms on the R lateral thigh after MFR. Patient encouraged to perform manual work on self on the R lateral thigh as well.    Patient might be getting cortizone injection from [facility] or Dr. Vila within the coming weeks, PT stated it might be beneficial because it might allow her to move more and participate in more activity. Patient leaves for [place] in middle of October on the 27th so would like to be better by then. Patient with most benefit from manual therapy at this point.       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 Patient demonstrates impairments including decreased hip range of motion, joint mobility and flexibility L>R, with TTP along lateral side of hip including gluteals, leading to impaired functional mobility. Patient's functional limitations include standing or sitting for 15-30 minutes, walking without her walker, and household duties. Today patient responded well to patient education and therapeutic exercise.    Treatment Today       Exercises:   Date 10/5/21 9/21/21 8/31/21 8/25/21   Exercise       Supine hip rolls    20 reps   SKTC, piriformis (supine and seated), QL     Hold 20-30 reps   Talked about seated pelvic posture       Supine SLR   5-10 reps    Supine bridge with hip adduction    5-10 reps    sidelying clamshells   5-10 reps    sidelying hip abd   Too difficult    Nustep 5 min 5 min

## 2021-10-06 ENCOUNTER — OFFICE VISIT (OUTPATIENT)
Dept: FAMILY MEDICINE | Facility: CLINIC | Age: 81
End: 2021-10-06
Payer: MEDICARE

## 2021-10-06 VITALS
WEIGHT: 195 LBS | HEART RATE: 74 BPM | OXYGEN SATURATION: 94 % | SYSTOLIC BLOOD PRESSURE: 120 MMHG | BODY MASS INDEX: 35.67 KG/M2 | DIASTOLIC BLOOD PRESSURE: 70 MMHG

## 2021-10-06 DIAGNOSIS — M54.16 LEFT LUMBAR RADICULOPATHY: ICD-10-CM

## 2021-10-06 DIAGNOSIS — Z23 ENCOUNTER FOR IMMUNIZATION: ICD-10-CM

## 2021-10-06 DIAGNOSIS — F33.9 RECURRENT MAJOR DEPRESSIVE DISORDER, REMISSION STATUS UNSPECIFIED (H): ICD-10-CM

## 2021-10-06 DIAGNOSIS — M25.50 ARTHRALGIA OF MULTIPLE JOINTS: ICD-10-CM

## 2021-10-06 DIAGNOSIS — Z00.01 ENCOUNTER FOR GENERAL ADULT MEDICAL EXAMINATION WITH ABNORMAL FINDINGS: Primary | ICD-10-CM

## 2021-10-06 DIAGNOSIS — K21.9 GASTROESOPHAGEAL REFLUX DISEASE WITHOUT ESOPHAGITIS: ICD-10-CM

## 2021-10-06 DIAGNOSIS — R23.2 HOT FLASHES: ICD-10-CM

## 2021-10-06 DIAGNOSIS — I10 ESSENTIAL HYPERTENSION WITH GOAL BLOOD PRESSURE LESS THAN 140/90: ICD-10-CM

## 2021-10-06 DIAGNOSIS — E03.9 ACQUIRED HYPOTHYROIDISM: ICD-10-CM

## 2021-10-06 DIAGNOSIS — K21.9 ESOPHAGEAL REFLUX: ICD-10-CM

## 2021-10-06 DIAGNOSIS — J30.2 SEASONAL ALLERGIC RHINITIS, UNSPECIFIED TRIGGER: ICD-10-CM

## 2021-10-06 DIAGNOSIS — E78.5 HYPERLIPIDEMIA, UNSPECIFIED HYPERLIPIDEMIA TYPE: ICD-10-CM

## 2021-10-06 DIAGNOSIS — R49.0 HOARSENESS: ICD-10-CM

## 2021-10-06 PROBLEM — R29.2 HYPERREFLEXIA: Status: ACTIVE | Noted: 2017-04-19

## 2021-10-06 PROCEDURE — 90662 IIV NO PRSV INCREASED AG IM: CPT | Performed by: FAMILY MEDICINE

## 2021-10-06 PROCEDURE — G0439 PPPS, SUBSEQ VISIT: HCPCS | Performed by: FAMILY MEDICINE

## 2021-10-06 PROCEDURE — G0008 ADMIN INFLUENZA VIRUS VAC: HCPCS | Performed by: FAMILY MEDICINE

## 2021-10-06 PROCEDURE — 99214 OFFICE O/P EST MOD 30 MIN: CPT | Mod: 25 | Performed by: FAMILY MEDICINE

## 2021-10-06 RX ORDER — VENLAFAXINE HYDROCHLORIDE 75 MG/1
75 CAPSULE, EXTENDED RELEASE ORAL DAILY
COMMUNITY
Start: 2021-10-05 | End: 2021-10-06

## 2021-10-06 RX ORDER — CELECOXIB 200 MG/1
CAPSULE ORAL
Qty: 90 CAPSULE | Refills: 3 | Status: SHIPPED | OUTPATIENT
Start: 2021-10-06 | End: 2022-09-22

## 2021-10-06 RX ORDER — TRAZODONE HYDROCHLORIDE 50 MG/1
50 TABLET, FILM COATED ORAL AT BEDTIME
Qty: 90 TABLET | Refills: 3 | Status: SHIPPED | OUTPATIENT
Start: 2021-10-06 | End: 2022-09-10

## 2021-10-06 RX ORDER — VENLAFAXINE HYDROCHLORIDE 75 MG/1
75 CAPSULE, EXTENDED RELEASE ORAL DAILY
Qty: 90 CAPSULE | Refills: 3 | Status: SHIPPED | OUTPATIENT
Start: 2021-10-06 | End: 2022-07-01

## 2021-10-06 RX ORDER — AZELASTINE 1 MG/ML
2 SPRAY, METERED NASAL 2 TIMES DAILY
Qty: 30 ML | Refills: 3 | Status: SHIPPED | OUTPATIENT
Start: 2021-10-06 | End: 2023-04-12

## 2021-10-06 ASSESSMENT — ANXIETY QUESTIONNAIRES
GAD7 TOTAL SCORE: 0
4. TROUBLE RELAXING: NOT AT ALL
GAD7 TOTAL SCORE: 0
8. IF YOU CHECKED OFF ANY PROBLEMS, HOW DIFFICULT HAVE THESE MADE IT FOR YOU TO DO YOUR WORK, TAKE CARE OF THINGS AT HOME, OR GET ALONG WITH OTHER PEOPLE?: SOMEWHAT DIFFICULT
7. FEELING AFRAID AS IF SOMETHING AWFUL MIGHT HAPPEN: NOT AT ALL
5. BEING SO RESTLESS THAT IT IS HARD TO SIT STILL: NOT AT ALL
2. NOT BEING ABLE TO STOP OR CONTROL WORRYING: NOT AT ALL
6. BECOMING EASILY ANNOYED OR IRRITABLE: NOT AT ALL
1. FEELING NERVOUS, ANXIOUS, OR ON EDGE: NOT AT ALL
3. WORRYING TOO MUCH ABOUT DIFFERENT THINGS: NOT AT ALL
GAD7 TOTAL SCORE: 0
7. FEELING AFRAID AS IF SOMETHING AWFUL MIGHT HAPPEN: NOT AT ALL

## 2021-10-06 ASSESSMENT — PATIENT HEALTH QUESTIONNAIRE - PHQ9
10. IF YOU CHECKED OFF ANY PROBLEMS, HOW DIFFICULT HAVE THESE PROBLEMS MADE IT FOR YOU TO DO YOUR WORK, TAKE CARE OF THINGS AT HOME, OR GET ALONG WITH OTHER PEOPLE: NOT DIFFICULT AT ALL
SUM OF ALL RESPONSES TO PHQ QUESTIONS 1-9: 8
SUM OF ALL RESPONSES TO PHQ QUESTIONS 1-9: 8

## 2021-10-06 ASSESSMENT — ACTIVITIES OF DAILY LIVING (ADL): CURRENT_FUNCTION: HOUSEWORK REQUIRES ASSISTANCE

## 2021-10-06 NOTE — PROGRESS NOTES
SUBJECTIVE:   Dyana Nicole is a 81 year old female who presents for Preventive Visit.      Annual wellness visit completed.  Risk questionnaire reviewed.  Hoarseness of voice over the last few months.  Using fluticasone nasal spray for allergic rhinitis management as well.  Had also been on Celebrex in the past however this was discontinued and arthralgias have worsened and would like to resume this.  We will follow up tomorrow with Dr. Wynn at [facility] with history of lumbar radiculopathy and bursitis concerns described.  Remains on metoprolol tartrate 75 mg in the morning and 50 mg in the evening for hypertension management.  Simvastatin 20 mg at bedtime for lipid management.  Levothyroxine 50 mcg daily for thyroid replacement.  Gabapentin 100 mg using 2 tablets 3 times daily for radiculopathy as well as benefits previously with Celebrex likely.  Omeprazole 20 mg twice daily for reflux prevention.  Now using venlafaxine extended release 75 mg daily which had been prescribed through [facility] neurologic clinic.  Also uses trazodone and wants to use up to 50 mg at bedtime with prior dosing between 25 and 37.5 mg typically.  Needs refills on medications today as well.       - Ray   3 daughters - Genesis ( at [facility]), Eneida   Smoke - quit in 60s after 1 year   EtOH: sober x 36 years   Surgeries: gallbladder, appy, hysterectomy and ? 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; left CTS release; bladder lift; right TKA; left rotator cuff   Hospitalizations:      Patient has been advised of split billing requirements and indicates understanding: Yes   Are you in the first 12 months of your Medicare coverage?  No    Healthy Habits:     In general, how would you rate your overall health?  Fair    Frequency of exercise:  6-7 days/week    Duration of exercise:  Less than 15 minutes    Do you usually eat at least 4 servings of  "fruit and vegetables a day, include whole grains    & fiber and avoid regularly eating high fat or \"junk\" foods?  No    Taking medications regularly:  No    Ability to successfully perform activities of daily living:  Housework requires assistance    Home Safety:  No safety concerns identified    Hearing Impairment:  Feel that people are mumbling or not speaking clearly, need to ask people to speak up or repeat themselves and difficulty understanding soft or whispered speech    In the past 6 months, have you been bothered by leaking of urine? Yes    In general, how would you rate your overall mental or emotional health?  Good      PHQ-2 Total Score: 0    Additional concerns today:  Yes    Do you feel safe in your environment? Yes    Have you ever done Advance Care Planning? (For example, a Health Directive, POLST, or a discussion with a medical provider or your loved ones about your wishes): Yes, advance care planning is on file.       Fall risk  Fallen 2 or more times in the past year?: No  Any fall with injury in the past year?: No    Cognitive Screening   1) Repeat 3 items (Leader, Season, Table)    2) Clock draw: Answers for HPI/ROS submitted by the patient on 10/6/2021  If you checked off any problems, how difficult have these problems made it for you to do your work, take care of things at home, or get along with other people?: Not difficult at all  PHQ9 TOTAL SCORE: 8  YEE 7 TOTAL SCORE: 0    NORMAL  3) 3 item recall: Recalls 2 objects   Results: 3 items recalled: COGNITIVE IMPAIRMENT LESS LIKELY    Mini-CogTM Copyright NEREIDA Savage. Licensed by the author for use in [facility]; reprinted with permission ([email or web address] [email or web address]). All rights reserved.      Do you have sleep apnea, excessive snoring or daytime drowsiness?: yes    Reviewed and updated as needed this visit by clinical staff  Tobacco  Allergies  Meds  Problems  Med Hx  Surg Hx  Fam Hx          Reviewed and updated as needed this visit by " Provider  Tobacco  Allergies  Meds  Problems  Med Hx  Surg Hx  Fam Hx         Social History     Tobacco Use     Smoking status: Former Smoker     Packs/day: 0.25     Years: 1.00     Pack years: 0.25     Quit date: 1961     Years since quittin.8     Smokeless tobacco: Never Used   Substance Use Topics     Alcohol use: No     If you drink alcohol do you typically have >3 drinks per day or >7 drinks per week? No    Alcohol Use 10/6/2021   Prescreen: >3 drinks/day or >7 drinks/week? -   Prescreen: >3 drinks/day or >7 drinks/week? No         Current providers sharing in care for this patient include:   Patient Care Team:  True Pugh MD as PCP - General (Family Practice)  True Pugh MD as Assigned PCP  Julian Wynn MD as Assigned Surgical Provider  Pk Garcia DO as Assigned Heart and Vascular Provider  Daylin Wynn DO as Assigned Neuroscience Provider    The following health maintenance items are reviewed in Epic and correct as of today:  Health Maintenance Due   Topic Date Due     ANNUAL REVIEW OF HM ORDERS  Never done     DEPRESSION ACTION PLAN  Never done     FALL RISK ASSESSMENT  2020     PHQ-9  2021     Lab work is in process  Labs reviewed in EPIC  BP Readings from Last 3 Encounters:   10/06/21 120/70   21 112/70   21 110/60    Wt Readings from Last 3 Encounters:   10/06/21 88.5 kg (195 lb)   21 88.6 kg (195 lb 4.8 oz)   21 86.2 kg (190 lb)                  Patient Active Problem List   Diagnosis     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     Insomnia     Heartburn     Arthritis     Cervical Spondylosis     Generalized Osteoarthritis Of The Hand     Lumbar Spondylosis     Trochanteric Bursitis     Lower Back Pain     Osteopenia     Microscopic colitis, unspecified microscopic colitis type     Fibromyalgia     Anemia     Hypertension     Hyperlipidemia     Peripheral Neuropathy     Walk Is Wobbly Or  Unsteady (Ataxia)     Hypothyroidism     JANICE on CPAP     Arthralgia     Generalized anxiety disorder     Recurrent major depressive disorder, remission status unspecified (H)     Exertional dyspnea     Pituitary mass (H)     Essential hypertension with goal blood pressure less than 140/90     Pain in both lower extremities     Venous insufficiency of both lower extremities     Venous hypertension of both lower extremities     Fat deposits     Class 2 obesity due to excess calories in adult     Vitamin D deficiency     Fatigue due to excessive exertion     Adrenal insufficiency (H)     Leg weakness, bilateral     Lymphedema     Obesity (BMI 35.0-39.9) with comorbidity (H)     Atrial tachycardia (H)     Left ventricular outflow obstruction     Hypertensive left ventricular hypertrophy, without heart failure     Lower GI bleed     BRBPR (bright red blood per rectum)     Anemia due to blood loss, acute     Acute cystitis without hematuria     Snoring     Suprasellar mass     Anxiety state     Depressive disorder     GERD (gastroesophageal reflux disease)     Hyperreflexia     Memory loss     Ringing in ears     Past Surgical History:   Procedure Laterality Date     ARTHROPLASTY KNEE BILATERAL       ARTHROSCOPY SHOULDER ROTATOR CUFF REPAIR       CATARACT EXTRACTION, BILATERAL       CHOLECYSTECTOMY       EXCISE LESION TRUNK Left 1/5/2018    Procedure: LEFT BUTTOCK EXPLORATION, EXCISION BUTTOCK MASS;  Surgeon: Lg Jameson MD;  Location: [facility];  Service:      HYSTERECTOMY       JOINT REPLACEMENT Bilateral     knees     MOHS MICROGRAPHIC PROCEDURE      Nose     OOPHORECTOMY Left     1 removed, 1 remains     OTHER SURGICAL HISTORY      Bladder lift     RI ARTHROPLASTY TIBIAL PLATEAU      Description: Knee Replacement;  Recorded: 05/16/2013;     RI INJECT NERV JOE STEWART PERIPWILLIAM NERV      Description: Peripheral Nerve Block Wrist Median Right;  Recorded: 05/16/2013;     RELEASE CARPAL TUNNEL       REPLACEMENT  TOTAL KNEE      L       Social History     Tobacco Use     Smoking status: Former Smoker     Packs/day: 0.25     Years: 1.00     Pack years: 0.25     Quit date: 1961     Years since quittin.8     Smokeless tobacco: Never Used   Substance Use Topics     Alcohol use: No     Family History   Problem Relation Age of Onset     Aneurysm Father         AAA     Cancer Mother         pancrease         Current Outpatient Medications   Medication Sig Dispense Refill     acetaminophen (TYLENOL) 650 MG CR tablet [ACETAMINOPHEN (TYLENOL) 650 MG CR TABLET] Take 650 mg by mouth as needed for pain.       aspirin 81 MG EC tablet [ASPIRIN 81 MG EC TABLET] Take 81 mg by mouth at bedtime.        azelastine (ASTELIN) 0.1 % nasal spray Spray 2 sprays into both nostrils 2 times daily 30 mL 3     biotin 5 MG CAPS Take 5,000 mcg by mouth       calcium carbonate-vitamin D3 (CALCIUM 600 + D,3,) 600 mg calcium- 200 unit cap [CALCIUM CARBONATE-VITAMIN D3 (CALCIUM 600 + D,3,) 600 MG CALCIUM- 200 UNIT CAP] Take 1 tablet by mouth 2 (two) times a day.              celecoxib (CELEBREX) 200 MG capsule [CELECOXIB (CELEBREX) 200 MG CAPSULE] TAKE ONE CAPSULE BY MOUTH ONE TIME DAILY 90 capsule 3     estrogen conj (PREMARIN) 0.3 MG tablet Take 1 tablet (0.3 mg) by mouth daily 90 tablet 3     fluticasone propionate (FLONASE) 50 mcg/actuation nasal spray [FLUTICASONE PROPIONATE (FLONASE) 50 MCG/ACTUATION NASAL SPRAY] INSTILL 2 SPRAYS IN EACH NOSTRIL DAILY AS NEEDED 48 g 3     furosemide (LASIX) 20 MG tablet [FUROSEMIDE (LASIX) 20 MG TABLET] TAKE ONE TABLET BY MOUTH ONE TIME DAILY 90 tablet 3     levothyroxine (SYNTHROID/LEVOTHROID) 50 MCG tablet [LEVOTHYROXINE (SYNTHROID, LEVOTHROID) 50 MCG TABLET] TAKE ONE TABLET BY MOUTH ONE TIME DAILY AT 6AM 90 tablet 3     MEDICATION CANNOT BE REORDERED - PLEASE MANUALLY REORDER AND DISCONTINUE THE OLD ORDER Take 1 capsule by mouth daily        metoprolol tartrate (LOPRESSOR) 50 MG tablet [METOPROLOL TARTRATE  "(LOPRESSOR) 50 MG TABLET] Take 75 mg in AM and 50 mg in  tablet 3     multivitamin with minerals (THERA-M) 9 mg iron-400 mcg Tab tablet [MULTIVITAMIN WITH MINERALS (THERA-M) 9 MG IRON-400 MCG TAB TABLET] Take 1 tablet by mouth every evening.       omeprazole (PRILOSEC) 20 MG DR capsule [OMEPRAZOLE (PRILOSEC) 20 MG CAPSULE] TAKE ONE CAPSULE BY MOUTH TWICE DAILY 180 capsule 3     Probiotic Product (MISC INTESTINAL BRANDON REGULAT) CAPS        simvastatin (ZOCOR) 20 MG tablet [SIMVASTATIN (ZOCOR) 20 MG TABLET] TAKE 1 TABLET BY MOUTH AT BEDTIME 90 tablet 3     traZODone (DESYREL) 50 MG tablet Take 1 tablet (50 mg) by mouth At Bedtime 90 tablet 3     venlafaxine (EFFEXOR-XR) 75 MG 24 hr capsule Take 1 capsule (75 mg) by mouth daily 90 capsule 3     Allergies   Allergen Reactions     Cimicifuga [place] (Black Cohosh) Anaphylaxis and Hives     Ciprofloxacin Rash     Developed rash over trunk of body after one dose of cipro     Oxychlorodene Other (See Comments)     Hallucinations     Oxycodone Unknown     hallucinations     Penicillins Hives     Pollen [Pollen Extract] Unknown     Venom-Honey Bee [Bee Venom] Swelling     Cephalosporins Rash     Needs high-dose flu shot.  Has completed Moderna COVID-19 vaccine series previously.    Mammogram Screening - Patient over age 75, has elected to discontinue screenings.  Pertinent mammograms are reviewed under the imaging tab.    Review of Systems  Constitutional, HEENT, cardiovascular, pulmonary, GI, , musculoskeletal, neuro, skin, endocrine and psych systems are negative, except as otherwise noted.    OBJECTIVE:   /70   Pulse 74   Wt 88.5 kg (195 lb)   SpO2 94%   BMI 35.67 kg/m   Estimated body mass index is 35.67 kg/m  as calculated from the following:    Height as of 9/17/21: 1.575 m (5' 2\").    Weight as of this encounter: 88.5 kg (195 lb).  Physical Exam  GENERAL: healthy, alert and no distress  EYES: Eyes grossly normal to inspection, PERRL and conjunctivae " and sclerae normal  HENT: ear canals and TM's normal, nose and mouth without ulcers or lesions  NECK: no adenopathy, no asymmetry, masses, or scars and thyroid normal to palpation  RESP: lungs clear to auscultation - no rales, rhonchi or wheezes  CV: regular rate and rhythm, normal S1 S2, no S3 or S4, no murmur, click or rub, no peripheral edema and peripheral pulses strong  ABDOMEN: soft, nontender, no hepatosplenomegaly, no masses and bowel sounds normal  MS: no gross musculoskeletal defects noted, no edema  SKIN: no suspicious lesions or rashes  NEURO: Normal strength and tone, mentation intact and speech normal  PSYCH: mentation appears normal, affect normal/bright    Diagnostic Test Results:  Labs reviewed in Epic    ASSESSMENT / PLAN:   Encounter for general adult medical examination with abnormal findings  Annual wellness visit completed.  Multiple risks including fair health, need for assistance with activities of daily living, urine leakage history, suboptimal diet etc.  Risk modification reviewed.  Annual wellness visits to continue.    Encounter for immunization  High-dose flu shot provided otherwise immunizations appear up-to-date.  - INFLUENZA, QUAD, HIGH DOSE, PF, 65YR + (FLUZONE HD)    Essential hypertension with goal blood pressure less than 140/90  Underlying hypertension stable with metoprolol tartrate 75 mg in the morning and 50 mg in the evening.    Acquired hypothyroidism  Continues use of levothyroxine 50 mcg daily.  Will complete TSH at earliest convenience.    Hyperlipidemia, unspecified hyperlipidemia type  We will schedule for fasting labs including lipid cascade with orders placed August 26, 2021.    Left lumbar radiculopathy  Has follow-up with Dr. Wynn tomorrow October 7, 2021 through spine care clinic.    Hoarseness  Hoarseness question secondary to postnasal drainage versus side effect fluticasone nasal spray.  Fluticasone nasal spray has not been helpful for nasal congestion.   "Astelin nasal spray use 1-2 sprays per nostril twice daily as substitute.    Seasonal allergic rhinitis, unspecified trigger  Astelin nasal spray use 1-2 sprays per nostril twice daily.  - azelastine (ASTELIN) 0.1 % nasal spray  Dispense: 30 mL; Refill: 3    Arthralgia of multiple joints  Celebrex 200 mg daily which has been helpful for patient previously.  Most recent renal function normal.  - celecoxib (CELEBREX) 200 MG capsule  Dispense: 90 capsule; Refill: 3    Gastroesophageal reflux disease without esophagitis  Esophageal reflux history continuing omeprazole 20 mg twice daily.  - omeprazole (PRILOSEC) 20 MG DR capsule  Dispense: 180 capsule; Refill: 3    Recurrent major depressive disorder, remission status unspecified (H)  Utilizing venlafaxine extended release 75 mg daily which had been prescribed through [facility].  Trazodone 50 mg at bedtime also has been helpful for both insomnia and mood management.  - venlafaxine (EFFEXOR-XR) 75 MG 24 hr capsule  Dispense: 90 capsule; Refill: 3  - traZODone (DESYREL) 50 MG tablet  Dispense: 90 tablet; Refill: 3    Hot flashes  Patient elects to continue Premarin 0.3 mg daily with benefits described in hot flash management.  - estrogen conj (PREMARIN) 0.3 MG tablet  Dispense: 90 tablet; Refill: 3       Patient has been advised of split billing requirements and indicates understanding: No  COUNSELING:  Reviewed preventive health counseling, as reflected in patient instructions       Regular exercise       Healthy diet/nutrition       Vision screening       Hearing screening       Dental care       Bladder control       Fall risk prevention       Osteoporosis prevention/bone health       (Dayanna)menopause management       Advanced Planning     Estimated body mass index is 35.67 kg/m  as calculated from the following:    Height as of 9/17/21: 1.575 m (5' 2\").    Weight as of this encounter: 88.5 kg (195 lb).    Weight management plan: Discussed healthy diet and " exercise guidelines    She reports that she quit smoking about 60 years ago. She has a 0.25 pack-year smoking history. She has never used smokeless tobacco.      Appropriate preventive services were discussed with this patient, including applicable screening as appropriate for cardiovascular disease, diabetes, osteopenia/osteoporosis, and glaucoma.  As appropriate for age/gender, discussed screening for colorectal cancer, prostate cancer, breast cancer, and cervical cancer. Checklist reviewing preventive services available has been given to the patient.    Reviewed patients plan of care and provided an AVS. The Complex Care Plan (for patients with higher acuity and needing more deliberate coordination of services) for Dyana meets the Care Plan requirement. This Care Plan has been established and reviewed with the Patient and spouse.    Counseling Resources:  ATP IV Guidelines  Pooled Cohorts Equation Calculator  Breast Cancer Risk Calculator  Breast Cancer: Medication to Reduce Risk  FRAX Risk Assessment  ICSI Preventive Guidelines  Dietary Guidelines for Americans, 2010  [employer]'s MyPlate  ASA Prophylaxis  Lung CA Screening    True Pugh MD  [facility]    Identified Health Risks:    The patient was provided with suggestions to help her develop a healthy physical lifestyle.  The patient was counseled and encouraged to consider modifying their diet and eating habits. She was provided with information on recommended healthy diet options.  The patient reports that she has difficulty with activities of daily living. I have asked that the patient make a follow up appointment in 26 weeks (once she returns from [place] in the spring) where this issue will be further evaluated and addressed.  The patient was provided with written information regarding signs of hearing loss.  Information on urinary incontinence and treatment options given to patient.

## 2021-10-06 NOTE — PROGRESS NOTES
Assessment:   Dyana Nicole (AKANGEL LUIS Nick)  is a 81 year old y.o. female with past medical history significant for atrial tachycardia, hypertension, hypothyroidism, hyperlipidemia, anxiety/depression, GERD, colitis, osteoarthritis, skin cancer who presents today for follow-up regarding subacute left-sided buttock pain with radiation down the left leg that started after a fall in December 2020.  She does have a history of an L1-L5 fusion.  At her last visit on July 28 of 2021, she was feeling significantly better after a left intra-articular hip joint injection.  She continues to feel good, but is having some left lateral thigh pain and left lateral lower leg pain, consistent with IT band syndrome, and a fascial band causing pain below the knee.  She also has pain consistent with greater trochanteric bursitis.  She is without any red flag symptoms.    PSP:  Dr. Vila       Plan:     A shared decision making plan was used.  The patient's values and choices were respected.  The following represents what was discussed and decided upon by the physician and the patient.  Her daughter, Carlita, was present for the entire encounter.    1.  DIAGNOSTIC TESTS:    -The patient's MRI of the pelvis report from June 30 of 2021 performed through [facility] was reviewed by the physician for reference given that she is having pain in the greater trochanteric bursa and is summarized as follows: The patient does have tendinopathy of the gluteal tendons.  There is no specific tear.  She does have significant fatty replacement atrophy of the gluteal muscles.  There are findings consistent with greater trochanteric bursitis.  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  No evidence of avascular necrosis or other significant findings.  -The patient's MRI of the lumbar spine from April 24 of 2021 was personally reviewed today by the physician with the physician performing her own interpretation which  was reviewed to ensure that there was no nerve root impingement that could be the cause of the lateral thigh and lower leg pain.  There is an L1-5 fusion.  There is transitional anatomy with a lumbarized S1, though to match the previous imaging count, the last surgical segment is called L4-5.  There is no significant central canal stenosis.  No significant foraminal stenosis.  There is a slight spondylolisthesis above the level of her fusion.  -Patient's x-ray report of the bilateral hips from April 24 of 2021 was reviewed and is summarized as follows: Mild joint space narrowing of both hips.  Otherwise normal without fracture or dislocation.  2.  PHYSICAL THERAPY: At the last visit, the [facility] on gluteal strengthening.  At this time, she would like a referral for physical therapy in [place].  I did give her an order today so that she can take this to [place] to continue to progress while she is there.  3.  MEDICATIONS:    -She can continue with the Celebrex 200 mg once a day as needed for pain.  -She can continue with the Tylenol as needed for pain.  -She had previously tapered off the gabapentin medication (had previously tolerated 100 mg 3 times a day).  4.  INTERVENTIONS: I did explain that there is really not an injection that can be done for IT band syndrome.  -She had a left intra-articular hip joint injection on May 13 of 2021.  -Her second dose of the Covid vaccine was March 31 of 2021.  5.  PATIENT EDUCATION:    -She and her daughter asked if acupuncture would be helpful for IT band syndrome.  Explained that acupuncture is rarely beneficial, however is also very rarely harmful.  If she has the financial ability to do so (mostly time this is an out-of-pocket cost) there is no harm in trying it to see if it would work for her.  -They did ask if massage would be beneficial for this.  I said that massage may be beneficial, but again the results may  not be very long-lasting.  However there are other wellness benefits from receiving a massage.  Certainly if she would like to try this, there is no harm in doing so.  -We did discuss having her do self manual therapy with a massage roller.  I did show her pictures of device that she could purchase online if she would like to try this.  She is also encouraged to stretch and do her strengthening exercises.  Icing is better than heat typically for this.  -With her and her daughter's questions were answered to their satisfaction today.  They were in agreement with the treatment plan.  6.  FOLLOW-UP: She is welcome to follow-up as needed.  She will be leaving for [place] and will not be returning to [place] until March 2022.  If there are any questions/concerns or any significant worsening of pain prior to that time, the patient is asked to call the clinic via the nurse navigation line or via [employer].     Subjective:     Dyana Nicole (AKANGEL LUIS Nick) is a 81 year old female who presents today for follow-up regarding chronic left thigh pain.  The patient reports that she is still overall feeling better since her hip joint injection in the summer.  However she does not feel like she is quite as good as she could be.  She continues to have left lateral thigh pain with pain in the left lateral lower leg.  She has been diagnosed with IT band syndrome and has been working with her physical therapist.  She rates her pain at a 5 out of 10.  At worst is an 8 out of 10.  At best is a 3 out of 10.  Her pain is worse with changing from sitting to standing.  She does feel better with Tylenol, stretching, massage.  She denies any pain in her back.  The pain that she has is more myofascial pain as opposed to radicular/nerve type pain.  She denies any numbness or tingling.  She does feel like her left leg is somewhat weak, though she does feel like she is making progress with regards to strength.  She is going to physical therapy and  has 1 session left.  She would be interested in going to physical therapy in [place] to continue working on things so that she can continue to progress.  I do think that this is reasonable.  She is only taking Tylenol as needed for pain.    Past medical history is reviewed and is unchanged for any new medical diagnoses in the interim.      Family history is reviewed and is unchanged in the interim.      Review of Systems:  Positive for weakness in the leg but stable.  Positive for pain that makes it difficult to sleep at night sometimes..  Pertinent negatives include no numbness, tingling, headaches, fevers, chills, unexplained weight loss, bowel incontinence, bladder incontinence, trips, stumbles, falls.  All others reviewed and are negative.     Objective:   CONSTITUTIONAL:  Vital signs as above.  No acute distress.  The patient is well nourished and well groomed.    PSYCHIATRIC:  The patient is awake, alert, oriented to person, place and time.  The patient is answering questions appropriately with clear speech.  Normal affect.  SKIN:  Skin over the face, posterior torso, bilateral upper and lower extremities is clean, dry, intact without rashes.  She does have a well-healed surgical incision over the low back.  MUSCULOSKELETAL: She can ambulate independently from the chair to the exam table.  She arises independently from the chair.  No significant tenderness over the lateral lumbar paraspinal muscles.     She does have significant tenderness over the left greater trochanteric bursa.  She then has tenderness and tightness over the left IT band.  She does have tenderness with tightness over the left peroneal muscle.

## 2021-10-06 NOTE — PATIENT INSTRUCTIONS
Patient Education   Personalized Prevention Plan  You are due for the preventive services outlined below.  Your care team is available to assist you in scheduling these services.  If you have already completed any of these items, please share that information with your care team to update in your medical record.  Health Maintenance Due   Topic Date Due     ANNUAL REVIEW OF HM ORDERS  Never done     Depression Action Plan  Never done     FALL RISK ASSESSMENT  09/30/2020     Depression Assessment  03/16/2021     Flu Vaccine (1) 09/01/2021     Your Health Risk Assessment indicates you feel you are not in good health    A healthy lifestyle helps keep the body fit and the mind alert. It helps protect you from disease, helps you fight disease, and helps prevent chronic disease (disease that doesn't go away) from getting worse. This is important as you get older and begin to notice twinges in muscles and joints and a decline in the strength and stamina you once took for granted. A healthy lifestyle includes good healthcare, good nutrition, weight control, recreation, and regular exercise. Avoid harmful substances and do what you can to keep safe. Another part of a healthy lifestyle is stay mentally active and socially involved.    Good healthcare     Have a wellness visit every year.     If you have new symptoms, let us know right away. Don't wait until the next checkup.     Take medicines exactly as prescribed and keep your medicines in a safe place. Tell us if your medicine causes problems.   Healthy diet and weight control     Eat 3 or 4 small, nutritious, low-fat, high-fiber meals a day. Include a variety of fruits, vegetables, and whole-grain foods.     Make sure you get enough calcium in your diet. Calcium, vitamin D, and exercise help prevent osteoporosis (bone thinning).     If you live alone, try eating with others when you can. That way you get a good meal and have company while you eat it.     Try to keep a  healthy weight. If you eat more calories than your body uses for energy, it will be stored as fat and you will gain weight.     Recreation   Recreation is not limited to sports and team events. It includes any activity that provides relaxation, interest, enjoyment, and exercise. Recreation provides an outlet for physical, mental, and social energy. It can give a sense of worth and achievement. It can help you stay healthy.    Mental Exercise and Social Involvement  Mental and emotional health is as important as physical health. Keep in touch with friends and family. Stay as active as possible. Continue to learn and challenge yourself.   Things you can do to stay mentally active are:    Learn something new, like a foreign language or musical instrument.     Play SCRABBLE or do crossword puzzles. If you cannot find people to play these games with you at home, you can play them with others on your computer through the Internet.     Join a games club--anything from card games to chess or checkers or lawn bowling.     Start a new hobby.     Go back to school.     Volunteer.     Read.   Keep up with world events.    Understanding USDA MyPlate  The USDA has guidelines to help you make healthy food choices. These are called MyPlate. MyPlate shows the food groups that make up healthy meals using the image of a place setting. Before you eat, think about the healthiest choices for what to put on your plate or in your cup or bowl. To learn more about building a healthy plate, visit www.choosemyplate.gov.    The food groups    Fruits. Any fruit or 100% fruit juice counts as part of the Fruit Group. Fruits may be fresh, canned, frozen, or dried, and may be whole, cut-up, or pureed. Make 1/2 of your plate fruits and vegetables.    Vegetables. Any vegetable or 100% vegetable juice counts as a member of the Vegetable Group. Vegetables may be fresh, frozen, canned, or dried. They can be served raw or cooked and may be whole, cut-up,  or mashed. Make 1/2 of your plate fruits and vegetables.    Grains. All foods made from grains are part of the Grains Group. These include wheat, rice, oats, cornmeal, and barley. Grains are often used to make foods such as bread, pasta, oatmeal, cereal, tortillas, and grits. Grains should be no more than 1/4 of your plate. At least half of your grains should be whole grains.    Protein. This group includes meat, poultry, seafood, beans and peas, eggs, processed soy products (such as tofu), nuts (including nut butters), and seeds. Make protein choices no more than 1/4 of your plate. Meat and poultry choices should be lean or low fat.    Dairy. The Dairy Group includes all fluid milk products and foods made from milk that contain calcium, such as yogurt and cheese. (Foods that have little calcium, such as cream, butter, and cream cheese, are not part of this group.) Most dairy choices should be low-fat or fat-free.    Oils. Oils aren't a food group, but they do contain essential nutrients. However it's important to watch your intake of oils. These are fats that are liquid at room temperature. They include canola, corn, olive, soybean, vegetable, and sunflower oil. Foods that are mainly oil include mayonnaise, certain salad dressings, and soft margarines. You likely already get your daily oil allowance from the foods you eat.  Things to limit  Eating healthy also means limiting these things in your diet:       Salt (sodium). Many processed foods have a lot of sodium. To keep sodium intake down, eat fresh vegetables, meats, poultry, and seafood when possible. Purchase low-sodium, reduced-sodium, or no-salt-added food products at the store. And don't add salt to your meals at home. Instead, season them with herbs and spices such as dill, oregano, cumin, and paprika. Or try adding flavor with lemon or lime zest and juice.    Saturated fat. Saturated fats are most often found in animal products such as beef, pork, and  chicken. They are often solid at room temperature, such as butter. To reduce your saturated fat intake, choose leaner cuts of meat and poultry. And try healthier cooking methods such as grilling, broiling, roasting, or baking. For a simple lower-fat swap, use plain nonfat yogurt instead of mayonnaise when making potato salad or macaroni salad.    Added sugars. These are sugars added to foods. They are in foods such as ice cream, candy, soda, fruit drinks, sports drinks, energy drinks, cookies, pastries, jams, and syrups. Cut down on added sugars by sharing sweet treats with a family member or friend. You can also choose fruit for dessert, and drink water or other unsweetened beverages.     [employer] last reviewed this educational content on 6/1/2020 2000-2021 [employer]. All rights reserved. This information is not intended as a substitute for professional medical care. Always follow your healthcare professional's instructions.        Activities of Daily Living    Your Health Risk Assessment indicates you have difficulties with activities of daily living such as housework, bathing, preparing meals, taking medication, etc. Please make a follow up appointment for us to address this issue in more detail.    Signs of Hearing Loss      Hearing much better with one ear can be a sign of hearing loss.   Hearing loss is a problem shared by many people. In fact, it is one of the most common health problems, particularly as people age. Most people age 65 and older have some hearing loss. By age 80, almost everyone does. Hearing loss often occurs slowly over the years. So you may not realize your hearing has gotten worse.  Have your hearing checked  Call your healthcare provider if you:    Have to strain to hear normal conversation    Have to watch other people s faces very carefully to follow what they re saying    Need to ask people to repeat what they ve said    Often misunderstand what people are  saying    Turn the volume of the television or radio up so high that others complain    Feel that people are mumbling when they re talking to you    Find that the effort to hear leaves you feeling tired and irritated    Notice, when using the phone, that you hear better with one ear than the other  [employer] last reviewed this educational content on 1/1/2020 2000-2021 [employer]. All rights reserved. This information is not intended as a substitute for professional medical care. Always follow your healthcare professional's instructions.          Urinary Incontinence, Female (Adult)   Urinary incontinence means loss of bladder control. This problem affects many women, especially as they get older. If you have incontinence, you may be embarrassed to ask for help. But know that this problem can be treated.   Types of Incontinence  There are different types of incontinence. Two of the main types are described here. You can have more than one type.     Stress incontinence. With this type, urine leaks when pressure (stress) is put on the bladder. This may happen when you cough, sneeze, or laugh. Stress incontinence most often occurs because the pelvic floor muscles that support the bladder and urethra are weak. This can happen after pregnancy and vaginal childbirth or a hysterectomy. It can also be due to excess body weight or hormone changes.    Urge incontinence (also called overactive bladder). With this type, a sudden urge to urinate is felt often. This may happen even though there may not be much urine in the bladder. The need to urinate often during the night is common. Urge incontinence most often occurs because of bladder spasms. This may be due to bladder irritation or infection. Damage to bladder nerves or pelvic muscles, constipation, and certain medicines can also lead to urge incontinence.  Treatment depends on the cause. Further evaluation is needed to find the type you have. This will  likely include an exam and certain tests. Based on the results, you and your healthcare provider can then plan treatment. Until a diagnosis is made, the home care tips below can help ease symptoms.   Home care    Do pelvic floor muscle exercises, if they are prescribed. The pelvic floor muscles help support the bladder and urethra. Many women find that their symptoms improve when doing special exercises that strengthen these muscles. To do the exercises, contract the muscles you would use to stop your stream of urine. But do this when you re not urinating. Hold for 10 seconds, then relax. Repeat 10 to 20 times in a row, at least 3 times a day. Your healthcare provider may give you other instructions for how to do the exercises and how often.    Keep a bladder diary. This helps track how often and how much you urinate over a set period of time. Bring this diary with you to your next visit with the provider. The information can help your provider learn more about your bladder problem.    Lose weight, if advised to by your provider. Extra weight puts pressure on the bladder. Your provider can help you create a weight-loss plan that s right for you. This may include exercising more and making certain diet changes.    Don't have foods and drinks that may irritate the bladder. These can include alcohol and caffeinated drinks.    Quit smoking. Smoking and other tobacco use can lead to a long-term (chronic) cough that strains the pelvic floor muscles. Smoking may also damage the bladder and urethra. Talk with your provider about treatments or methods you can use to quit smoking.    If drinking large amounts of fluid makes you have symptoms, you may be advised to limit your fluid intake. You may also be advised to drink most of your fluids during the day and to limit fluids at night.    If you re worried about urine leakage or accidents, you may wear absorbent pads to catch urine. Change the pads often. This helps reduce  discomfort. It may also reduce the risk of skin or bladder infections.    Follow-up care  Follow up with your healthcare provider, or as directed. It may take some to find the right treatment for your problem. But healthy lifestyle changes can be made right away. These include such things as exercising on a regular basis, eating a healthy diet, losing weight (if needed), and quitting smoking. Your treatment plan may include special therapies or medicines. Certain procedures or surgery may also be options. Talk about any questions you have with your provider.   When to seek medical advice  Call the healthcare provider right away if any of these occur:    Fever of 100.4 F (38 C) or higher, or as directed by your provider    Bladder pain or fullness    Belly swelling    Nausea or vomiting    Back pain    Weakness, dizziness, or fainting  [employer] last reviewed this educational content on 1/1/2020 2000-2021 [employer]. All rights reserved. This information is not intended as a substitute for professional medical care. Always follow your healthcare professional's instructions.

## 2021-10-07 ENCOUNTER — OFFICE VISIT (OUTPATIENT)
Dept: PHYSICAL MEDICINE AND REHAB | Facility: CLINIC | Age: 81
End: 2021-10-07
Payer: MEDICARE

## 2021-10-07 VITALS — SYSTOLIC BLOOD PRESSURE: 136 MMHG | DIASTOLIC BLOOD PRESSURE: 67 MMHG | HEART RATE: 63 BPM | TEMPERATURE: 97.9 F

## 2021-10-07 DIAGNOSIS — Z98.1 HISTORY OF LUMBAR FUSION: ICD-10-CM

## 2021-10-07 DIAGNOSIS — M76.32 IT BAND SYNDROME, LEFT: ICD-10-CM

## 2021-10-07 DIAGNOSIS — M79.18 LEFT BUTTOCK PAIN: Primary | ICD-10-CM

## 2021-10-07 PROCEDURE — 99213 OFFICE O/P EST LOW 20 MIN: CPT | Performed by: PHYSICAL MEDICINE & REHABILITATION

## 2021-10-07 ASSESSMENT — ANXIETY QUESTIONNAIRES: GAD7 TOTAL SCORE: 0

## 2021-10-07 ASSESSMENT — PAIN SCALES - GENERAL: PAINLEVEL: MODERATE PAIN (5)

## 2021-10-07 ASSESSMENT — PATIENT HEALTH QUESTIONNAIRE - PHQ9: SUM OF ALL RESPONSES TO PHQ QUESTIONS 1-9: 8

## 2021-10-07 NOTE — PATIENT INSTRUCTIONS
[facility]       Please continue to massage the IT area.  You can purchase one of the hand-held massage rollers that may be beneficial for you.  Icing over the area will also be helpful.    Have a wonderful time in [place].  If your physical therapist in [place] has any questions, please do not hesitate to have them reach out to me.  Our nurse phone number is [phone].  You can only send a [employer] message to me as well.       Thanks, [facility]!  Dr. Vila

## 2021-10-07 NOTE — LETTER
10/7/2021         RE: Dyana Nicole  [address]  [place] [id number]        Dear Colleague,    Thank you for referring your patient, Dyana Nicole, to the [facility] [place]. Please see a copy of my visit note below.    Assessment:   Dyana Nicole (AKANGEL LUIS Nick)  is a 81 year old y.o. female with past medical history significant for atrial tachycardia, hypertension, hypothyroidism, hyperlipidemia, anxiety/depression, GERD, colitis, osteoarthritis, skin cancer who presents today for follow-up regarding subacute left-sided buttock pain with radiation down the left leg that started after a fall in December 2020.  She does have a history of an L1-L5 fusion.  At her last visit on July 28 of 2021, she was feeling significantly better after a left intra-articular hip joint injection.  She continues to feel good, but is having some left lateral thigh pain and left lateral lower leg pain, consistent with IT band syndrome, and a fascial band causing pain below the knee.  She also has pain consistent with greater trochanteric bursitis.  She is without any red flag symptoms.    PSP:  Dr. Vila       Plan:     A shared decision making plan was used.  The patient's values and choices were respected.  The following represents what was discussed and decided upon by the physician and the patient.  Her daughter, Carlita, was present for the entire encounter.    1.  DIAGNOSTIC TESTS:    -The patient's MRI of the pelvis report from June 30 of 2021 performed through [facility] was reviewed by the physician for reference given that she is having pain in the greater trochanteric bursa and is summarized as follows: The patient does have tendinopathy of the gluteal tendons.  There is no specific tear.  She does have significant fatty replacement atrophy of the gluteal muscles.  There are findings consistent with greater trochanteric bursitis.  There is chondromalacia of the hips, but no  significant degenerative changes.  No evidence of avascular necrosis or other significant findings.  -The patient's MRI of the lumbar spine from April 24 of 2021 was personally reviewed today by the physician with the physician performing her own interpretation which was reviewed to ensure that there was no nerve root impingement that could be the cause of the lateral thigh and lower leg pain.  There is an L1-5 fusion.  There is transitional anatomy with a lumbarized S1, though to match the previous imaging count, the last surgical segment is called L4-5.  There is no significant central canal stenosis.  No significant foraminal stenosis.  There is a slight spondylolisthesis above the level of her fusion.  -Patient's x-ray report of the bilateral hips from April 24 of 2021 was reviewed and is summarized as follows: Mild joint space narrowing of both hips.  Otherwise normal without fracture or dislocation.  2.  PHYSICAL THERAPY: At the last visit, the patient was referred to [facility] rehab services for work on gluteal strengthening.  At this time, she would like a referral for physical therapy in [place].  I did give her an order today so that she can take this to [place] to continue to progress while she is there.  3.  MEDICATIONS:    -She can continue with the Celebrex 200 mg once a day as needed for pain.  -She can continue with the Tylenol as needed for pain.  -She had previously tapered off the gabapentin medication (had previously tolerated 100 mg 3 times a day).  4.  INTERVENTIONS: I did explain that there is really not an injection that can be done for IT band syndrome.  -She had a left intra-articular hip joint injection on May 13 of 2021.  -Her second dose of the Covid vaccine was March 31 of 2021.  5.  PATIENT EDUCATION:    -She and her daughter asked if acupuncture would be helpful for IT band syndrome.  Explained that acupuncture is rarely beneficial, however is also very rarely harmful.  If  she has the financial ability to do so (mostly time this is an out-of-pocket cost) there is no harm in trying it to see if it would work for her.  -They did ask if massage would be beneficial for this.  I said that massage may be beneficial, but again the results may not be very long-lasting.  However there are other wellness benefits from receiving a massage.  Certainly if she would like to try this, there is no harm in doing so.  -We did discuss having her do self manual therapy with a massage roller.  I did show her pictures of device that she could purchase online if she would like to try this.  She is also encouraged to stretch and do her strengthening exercises.  Icing is better than heat typically for this.  -With her and her daughter's questions were answered to their satisfaction today.  They were in agreement with the treatment plan.  6.  FOLLOW-UP: She is welcome to follow-up as needed.  She will be leaving for [place] and will not be returning to [place] until March 2022.  If there are any questions/concerns or any significant worsening of pain prior to that time, the patient is asked to call the clinic via the nurse navigation line or via [employer].     Subjective:     Dyana Nicole (AKA Park) is a 81 year old female who presents today for follow-up regarding chronic left thigh pain.  The patient reports that she is still overall feeling better since her hip joint injection in the summer.  However she does not feel like she is quite as good as she could be.  She continues to have left lateral thigh pain with pain in the left lateral lower leg.  She has been diagnosed with IT band syndrome and has been working with her physical therapist.  She rates her pain at a 5 out of 10.  At worst is an 8 out of 10.  At best is a 3 out of 10.  Her pain is worse with changing from sitting to standing.  She does feel better with Tylenol, stretching, massage.  She denies any pain in her back.  The pain that she has  is more myofascial pain as opposed to radicular/nerve type pain.  She denies any numbness or tingling.  She does feel like her left leg is somewhat weak, though she does feel like she is making progress with regards to strength.  She is going to physical therapy and has 1 session left.  She would be interested in going to physical therapy in [place] to continue working on things so that she can continue to progress.  I do think that this is reasonable.  She is only taking Tylenol as needed for pain.    Past medical history is reviewed and is unchanged for any new medical diagnoses in the interim.      Family history is reviewed and is unchanged in the interim.      Review of Systems:  Positive for weakness in the leg but stable.  Positive for pain that makes it difficult to sleep at night sometimes..  Pertinent negatives include no numbness, tingling, headaches, fevers, chills, unexplained weight loss, bowel incontinence, bladder incontinence, trips, stumbles, falls.  All others reviewed and are negative.     Objective:   CONSTITUTIONAL:  Vital signs as above.  No acute distress.  The patient is well nourished and well groomed.    PSYCHIATRIC:  The patient is awake, alert, oriented to person, place and time.  The patient is answering questions appropriately with clear speech.  Normal affect.  SKIN:  Skin over the face, posterior torso, bilateral upper and lower extremities is clean, dry, intact without rashes.  She does have a well-healed surgical incision over the low back.  MUSCULOSKELETAL: She can ambulate independently from the chair to the exam table.  She arises independently from the chair.  No significant tenderness over the lateral lumbar paraspinal muscles.     She does have significant tenderness over the left greater trochanteric bursa.  She then has tenderness and tightness over the left IT band.  She does have tenderness with tightness over the left peroneal muscle.          Again, thank you for  allowing me to participate in the care of your patient.        Sincerely,        Daylin Wynn, DO

## 2021-10-12 NOTE — PROGRESS NOTES
Outpatient Physical Therapy Discharge Note     Patient: Dyana Nicole  : 1940    Beginning/End Dates of Reporting Period:  21 to 10/5/21    Referring Provider: Dr. Vila    Therapy Diagnosis: Gluteal pain     Client Self Report: Patient doing okay, feeling most benefit from ITB manual therapy    Goals:  Goal Identifier 1   Goal Description Patient will be able to go from sit to stand without difficulty or increased pain    Target Date 10/24/21   Date Met      Progress (detail required for progress note):       Goal Identifier 2   Goal Description Patient will be able to sit for longer than 30 minutes without increased L gluteal pain   Target Date 21   Date Met      Progress (detail required for progress note):       Goal Identifier 3   Goal Description Patient will be able to stand for longer than 15 minutes without use of walker or increased leg symptoms   Target Date 10/24/21   Date Met      Progress (detail required for progress note):         Plan:  Discharge from therapy.    Discharge:    Reason for Discharge: [place]    Discharge Plan: Patient to continue home program.

## 2021-10-12 NOTE — ADDENDUM NOTE
Encounter addended by: Mary Gaming, PT on: 10/12/2021 3:08 PM   Actions taken: Episode resolved, Clinical Note Signed

## 2021-10-13 ENCOUNTER — LAB (OUTPATIENT)
Dept: LAB | Facility: CLINIC | Age: 81
End: 2021-10-13

## 2021-10-13 DIAGNOSIS — E03.9 HYPOTHYROIDISM, UNSPECIFIED TYPE: ICD-10-CM

## 2021-10-13 DIAGNOSIS — E78.5 HYPERLIPIDEMIA, UNSPECIFIED HYPERLIPIDEMIA TYPE: ICD-10-CM

## 2021-10-13 DIAGNOSIS — I10 ESSENTIAL HYPERTENSION WITH GOAL BLOOD PRESSURE LESS THAN 140/90: ICD-10-CM

## 2021-10-13 DIAGNOSIS — I47.10 SUPRAVENTRICULAR TACHYCARDIA (H): ICD-10-CM

## 2021-10-13 LAB
ANION GAP SERPL CALCULATED.3IONS-SCNC: 8 MMOL/L (ref 5–18)
BUN SERPL-MCNC: 25 MG/DL (ref 8–28)
CALCIUM SERPL-MCNC: 9.4 MG/DL (ref 8.5–10.5)
CHLORIDE BLD-SCNC: 104 MMOL/L (ref 98–107)
CHOLEST SERPL-MCNC: 220 MG/DL
CO2 SERPL-SCNC: 29 MMOL/L (ref 22–31)
CREAT SERPL-MCNC: 0.73 MG/DL (ref 0.6–1.1)
FASTING STATUS PATIENT QL REPORTED: YES
GFR SERPL CREATININE-BSD FRML MDRD: 78 ML/MIN/1.73M2
GLUCOSE BLD-MCNC: 88 MG/DL (ref 70–125)
HDLC SERPL-MCNC: 84 MG/DL
LDLC SERPL CALC-MCNC: 118 MG/DL
MAGNESIUM SERPL-MCNC: 2.2 MG/DL (ref 1.8–2.6)
POTASSIUM BLD-SCNC: 4.7 MMOL/L (ref 3.5–5)
SODIUM SERPL-SCNC: 141 MMOL/L (ref 136–145)
TRIGL SERPL-MCNC: 92 MG/DL
TSH SERPL DL<=0.005 MIU/L-ACNC: 2.08 UIU/ML (ref 0.3–5)

## 2021-10-13 PROCEDURE — 36415 COLL VENOUS BLD VENIPUNCTURE: CPT | Performed by: FAMILY MEDICINE

## 2021-10-13 PROCEDURE — 83735 ASSAY OF MAGNESIUM: CPT | Performed by: FAMILY MEDICINE

## 2021-10-13 PROCEDURE — 80048 BASIC METABOLIC PNL TOTAL CA: CPT | Performed by: FAMILY MEDICINE

## 2021-10-13 PROCEDURE — 80061 LIPID PANEL: CPT | Performed by: FAMILY MEDICINE

## 2021-10-13 PROCEDURE — 84443 ASSAY THYROID STIM HORMONE: CPT | Performed by: FAMILY MEDICINE

## 2021-12-03 ENCOUNTER — TRANSFERRED RECORDS (OUTPATIENT)
Dept: HEALTH INFORMATION MANAGEMENT | Facility: CLINIC | Age: 81
End: 2021-12-03
Payer: MEDICARE

## 2022-03-31 ENCOUNTER — OFFICE VISIT (OUTPATIENT)
Dept: FAMILY MEDICINE | Facility: CLINIC | Age: 82
End: 2022-03-31
Payer: MEDICARE

## 2022-03-31 VITALS
OXYGEN SATURATION: 92 % | SYSTOLIC BLOOD PRESSURE: 118 MMHG | HEART RATE: 79 BPM | BODY MASS INDEX: 34.2 KG/M2 | WEIGHT: 187 LBS | DIASTOLIC BLOOD PRESSURE: 70 MMHG

## 2022-03-31 DIAGNOSIS — E66.811 CLASS 1 OBESITY DUE TO EXCESS CALORIES WITH SERIOUS COMORBIDITY AND BODY MASS INDEX (BMI) OF 34.0 TO 34.9 IN ADULT: ICD-10-CM

## 2022-03-31 DIAGNOSIS — F33.9 RECURRENT MAJOR DEPRESSIVE DISORDER, REMISSION STATUS UNSPECIFIED (H): ICD-10-CM

## 2022-03-31 DIAGNOSIS — E66.09 CLASS 1 OBESITY DUE TO EXCESS CALORIES WITH SERIOUS COMORBIDITY AND BODY MASS INDEX (BMI) OF 34.0 TO 34.9 IN ADULT: ICD-10-CM

## 2022-03-31 DIAGNOSIS — I10 ESSENTIAL HYPERTENSION WITH GOAL BLOOD PRESSURE LESS THAN 140/90: ICD-10-CM

## 2022-03-31 DIAGNOSIS — R29.898 BILATERAL LEG WEAKNESS: Primary | ICD-10-CM

## 2022-03-31 DIAGNOSIS — R20.2 PARESTHESIAS: ICD-10-CM

## 2022-03-31 PROBLEM — E66.01 MORBID OBESITY (H): Status: RESOLVED | Noted: 2018-08-30 | Resolved: 2022-03-31

## 2022-03-31 PROBLEM — I47.19 ATRIAL TACHYCARDIA (H): Status: RESOLVED | Noted: 2018-08-30 | Resolved: 2022-03-31

## 2022-03-31 PROBLEM — E27.40 ADRENAL INSUFFICIENCY (H): Status: RESOLVED | Noted: 2018-06-08 | Resolved: 2022-03-31

## 2022-03-31 PROBLEM — E23.6 PITUITARY MASS (H): Status: RESOLVED | Noted: 2017-03-28 | Resolved: 2022-03-31

## 2022-03-31 LAB
ALBUMIN SERPL-MCNC: 3.9 G/DL (ref 3.5–5)
ALP SERPL-CCNC: 73 U/L (ref 45–120)
ALT SERPL W P-5'-P-CCNC: 14 U/L (ref 0–45)
ANION GAP SERPL CALCULATED.3IONS-SCNC: 10 MMOL/L (ref 5–18)
AST SERPL W P-5'-P-CCNC: 22 U/L (ref 0–40)
BILIRUB SERPL-MCNC: 0.3 MG/DL (ref 0–1)
BUN SERPL-MCNC: 19 MG/DL (ref 8–28)
CALCIUM SERPL-MCNC: 9.7 MG/DL (ref 8.5–10.5)
CHLORIDE BLD-SCNC: 102 MMOL/L (ref 98–107)
CO2 SERPL-SCNC: 28 MMOL/L (ref 22–31)
CREAT SERPL-MCNC: 0.78 MG/DL (ref 0.6–1.1)
ERYTHROCYTE [DISTWIDTH] IN BLOOD BY AUTOMATED COUNT: 12.7 % (ref 10–15)
ERYTHROCYTE [SEDIMENTATION RATE] IN BLOOD BY WESTERGREN METHOD: 23 MM/HR (ref 0–20)
GFR SERPL CREATININE-BSD FRML MDRD: 76 ML/MIN/1.73M2
GLUCOSE BLD-MCNC: 79 MG/DL (ref 70–125)
HCT VFR BLD AUTO: 38 % (ref 35–47)
HGB BLD-MCNC: 12.5 G/DL (ref 11.7–15.7)
MCH RBC QN AUTO: 31.2 PG (ref 26.5–33)
MCHC RBC AUTO-ENTMCNC: 32.9 G/DL (ref 31.5–36.5)
MCV RBC AUTO: 95 FL (ref 78–100)
PLATELET # BLD AUTO: 247 10E3/UL (ref 150–450)
POTASSIUM BLD-SCNC: 4.7 MMOL/L (ref 3.5–5)
PROT SERPL-MCNC: 6.8 G/DL (ref 6–8)
RBC # BLD AUTO: 4.01 10E6/UL (ref 3.8–5.2)
SODIUM SERPL-SCNC: 140 MMOL/L (ref 136–145)
WBC # BLD AUTO: 7.3 10E3/UL (ref 4–11)

## 2022-03-31 PROCEDURE — 99214 OFFICE O/P EST MOD 30 MIN: CPT | Performed by: FAMILY MEDICINE

## 2022-03-31 PROCEDURE — 85652 RBC SED RATE AUTOMATED: CPT | Performed by: FAMILY MEDICINE

## 2022-03-31 PROCEDURE — 80053 COMPREHEN METABOLIC PANEL: CPT | Performed by: FAMILY MEDICINE

## 2022-03-31 PROCEDURE — 85027 COMPLETE CBC AUTOMATED: CPT | Performed by: FAMILY MEDICINE

## 2022-03-31 PROCEDURE — 36415 COLL VENOUS BLD VENIPUNCTURE: CPT | Performed by: FAMILY MEDICINE

## 2022-03-31 RX ORDER — SEMAGLUTIDE 1.34 MG/ML
INJECTION, SOLUTION SUBCUTANEOUS
COMMUNITY
Start: 2022-03-18 | End: 2022-07-01

## 2022-03-31 ASSESSMENT — ANXIETY QUESTIONNAIRES
6. BECOMING EASILY ANNOYED OR IRRITABLE: SEVERAL DAYS
7. FEELING AFRAID AS IF SOMETHING AWFUL MIGHT HAPPEN: NOT AT ALL
5. BEING SO RESTLESS THAT IT IS HARD TO SIT STILL: NOT AT ALL
3. WORRYING TOO MUCH ABOUT DIFFERENT THINGS: NOT AT ALL
GAD7 TOTAL SCORE: 1
4. TROUBLE RELAXING: NOT AT ALL
2. NOT BEING ABLE TO STOP OR CONTROL WORRYING: NOT AT ALL
GAD7 TOTAL SCORE: 1
7. FEELING AFRAID AS IF SOMETHING AWFUL MIGHT HAPPEN: NOT AT ALL
GAD7 TOTAL SCORE: 1
1. FEELING NERVOUS, ANXIOUS, OR ON EDGE: NOT AT ALL

## 2022-03-31 ASSESSMENT — PATIENT HEALTH QUESTIONNAIRE - PHQ9
10. IF YOU CHECKED OFF ANY PROBLEMS, HOW DIFFICULT HAVE THESE PROBLEMS MADE IT FOR YOU TO DO YOUR WORK, TAKE CARE OF THINGS AT HOME, OR GET ALONG WITH OTHER PEOPLE: NOT DIFFICULT AT ALL
SUM OF ALL RESPONSES TO PHQ QUESTIONS 1-9: 3
SUM OF ALL RESPONSES TO PHQ QUESTIONS 1-9: 3

## 2022-03-31 NOTE — PROGRESS NOTES
Assessment/Plan:    Bilateral leg weakness  Bilateral leg weakness with balance difficulties ongoing.  Patient referred back to [facility] where she has been seen in the past and has had bilateral lower extremity EMG study July 17, 2018 that was essentially normal.  Continues to use walker to ensure no concerns for falls.  Anticipate follow-up in this office no later than 3 months for reassessment.  Check sedimentation rate to ensure no evidence for polymyalgia rheumatica.  - Adult Neurology  Referral  - Erythrocyte sedimentation rate auto    Paresthesias  Bilateral foot paresthesias also involving left scalp and neck at times.  Await neurology recommendations.  Lab assessment completed today.  - Adult Neurology  Referral  - Comprehensive metabolic panel  - CBC with platelets    Recurrent major depressive disorder, remission status unspecified (H)  Venlafaxine extended release 75 mg daily continues with PHQ-9 questionnaire 3 out of 27 and YEE-7 questionnaire 1 out of 21.    Essential hypertension with goal blood pressure less than 140/90  Hypertension appears stable continues metoprolol tartrate 75 mg in the morning and 50 mg in the evening.  - REVIEW OF HEALTH MAINTENANCE PROTOCOL ORDERS  - Comprehensive metabolic panel    Class 1 obesity due to excess calories with serious comorbidity and body mass index (BMI) of 34.0 to 34.9 in adult  Has lost 9 pounds since starting Ozempic 0.5 mg weekly.  We will continue to follow closely.          Subjective:    Dyana Nicole is seen today for evaluation of balance difficulties.  Ongoing difficulties requiring walker to assist with ambulation to ensure no evidence for falls.  Had completed physical therapy while in [place] near mobility to [facility].  Still feels like her left hip moves forward more than the right.  Leg weakness in the past.  Has had EMG studies July 17, 2018 which patient had forgotten about.  Essentially normal study at that time  without evidence for neuropathy.  Remains on venlafaxine extended release 75 mg daily for depression.  Has history of left lumbar radiculopathy also and follows with Dr. Wynn with spine care clinic.  Started on Ozempic 0.5 mg weekly for weight loss with 9 pound weight loss noted in the past week or so.  No history of polymyalgia rheumatica.  Comprehensive review of systems as above otherwise all negative.      - Ray   3 daughters - Genesis ( at [facility]), [place]   Smoke - quit in 60s after 1 year   EtOH: sober x 36 years   Surgeries: gallbladder, appy, hysterectomy and ? 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; left CTS release; bladder lift; right TKA; left rotator cuff   Hospitalizations:   [place], [place] - live in community with [address] houses in [place] (1 mile circumference if walk)      Past Surgical History:   Procedure Laterality Date     ARTHROPLASTY KNEE BILATERAL       ARTHROSCOPY SHOULDER ROTATOR CUFF REPAIR       CATARACT EXTRACTION, BILATERAL       CHOLECYSTECTOMY       EXCISE LESION TRUNK Left 1/5/2018    Procedure: LEFT BUTTOCK EXPLORATION, EXCISION BUTTOCK MASS;  Surgeon: Lg Jameson MD;  Location: [facility] OR;  Service:      HYSTERECTOMY       JOINT REPLACEMENT Bilateral     knees     MOHS MICROGRAPHIC PROCEDURE      Nose     OOPHORECTOMY Left     1 removed, 1 remains     OTHER SURGICAL HISTORY      Bladder lift     IN ARTHROPLASTY TIBIAL PLATEAU      Description: Knee Replacement;  Recorded: 05/16/2013;     IN INJECT NERV BLCK,OTRAMONA PERIPH NERV      Description: Peripheral Nerve Block Wrist Median Right;  Recorded: 05/16/2013;     RELEASE CARPAL TUNNEL       REPLACEMENT TOTAL KNEE      L        Family History   Problem Relation Age of Onset     Aneurysm Father         AAA     Cancer Mother         pancrease        Past Medical History:   Diagnosis Date     Anemia, unspecified     Created by  Conversion      Arrhythmia      Cervical spondylosis without myelopathy     Created by Conversion      Depression      Disease of thyroid gland     hypo     GERD (gastroesophageal reflux disease)      High cholesterol      History of transfusion      Myalgia and myositis, unspecified     Created by Conversion      Near syncope      Other and unspecified hyperlipidemia     Created by Conversion      Skin cancer, basal cell      Sleep apnea     mild     Tachycardia      Unspecified essential hypertension     Created by Conversion      Unspecified hereditary and idiopathic peripheral neuropathy     Created by Conversion         Social History     Tobacco Use     Smoking status: Former Smoker     Packs/day: 0.25     Years: 1.00     Pack years: 0.25     Quit date: 1961     Years since quittin.2     Smokeless tobacco: Never Used   Substance Use Topics     Alcohol use: No     Drug use: No        Current Outpatient Medications   Medication Sig Dispense Refill     acetaminophen (TYLENOL) 650 MG CR tablet [ACETAMINOPHEN (TYLENOL) 650 MG CR TABLET] Take 650 mg by mouth as needed for pain.       aspirin 81 MG EC tablet [ASPIRIN 81 MG EC TABLET] Take 81 mg by mouth at bedtime.        azelastine (ASTELIN) 0.1 % nasal spray Spray 2 sprays into both nostrils 2 times daily 30 mL 3     biotin 5 MG CAPS Take 5,000 mcg by mouth       calcium carbonate-vitamin D3 (CALCIUM 600 + D,3,) 600 mg calcium- 200 unit cap [CALCIUM CARBONATE-VITAMIN D3 (CALCIUM 600 + D,3,) 600 MG CALCIUM- 200 UNIT CAP] Take 1 tablet by mouth 2 (two) times a day.              celecoxib (CELEBREX) 200 MG capsule [CELECOXIB (CELEBREX) 200 MG CAPSULE] TAKE ONE CAPSULE BY MOUTH ONE TIME DAILY 90 capsule 3     estrogen conj (PREMARIN) 0.3 MG tablet Take 1 tablet (0.3 mg) by mouth daily 90 tablet 3     fluticasone propionate (FLONASE) 50 mcg/actuation nasal spray [FLUTICASONE PROPIONATE (FLONASE) 50 MCG/ACTUATION NASAL SPRAY] INSTILL 2 SPRAYS IN EACH NOSTRIL  DAILY AS NEEDED 48 g 3     furosemide (LASIX) 20 MG tablet [FUROSEMIDE (LASIX) 20 MG TABLET] TAKE ONE TABLET BY MOUTH ONE TIME DAILY 90 tablet 3     levothyroxine (SYNTHROID/LEVOTHROID) 50 MCG tablet [LEVOTHYROXINE (SYNTHROID, LEVOTHROID) 50 MCG TABLET] TAKE ONE TABLET BY MOUTH ONE TIME DAILY AT 6AM 90 tablet 3     MEDICATION CANNOT BE REORDERED - PLEASE MANUALLY REORDER AND DISCONTINUE THE OLD ORDER Take 1 capsule by mouth daily        metoprolol tartrate (LOPRESSOR) 50 MG tablet [METOPROLOL TARTRATE (LOPRESSOR) 50 MG TABLET] Take 75 mg in AM and 50 mg in  tablet 3     multivitamin with minerals (THERA-M) 9 mg iron-400 mcg Tab tablet [MULTIVITAMIN WITH MINERALS (THERA-M) 9 MG IRON-400 MCG TAB TABLET] Take 1 tablet by mouth every evening.       omeprazole (PRILOSEC) 20 MG DR capsule [OMEPRAZOLE (PRILOSEC) 20 MG CAPSULE] TAKE ONE CAPSULE BY MOUTH TWICE DAILY 180 capsule 3     Probiotic Product (MISC INTESTINAL BRANDON REGULAT) CAPS        simvastatin (ZOCOR) 20 MG tablet [SIMVASTATIN (ZOCOR) 20 MG TABLET] TAKE 1 TABLET BY MOUTH AT BEDTIME 90 tablet 3     traZODone (DESYREL) 50 MG tablet Take 1 tablet (50 mg) by mouth At Bedtime 90 tablet 3     venlafaxine (EFFEXOR-XR) 75 MG 24 hr capsule Take 1 capsule (75 mg) by mouth daily 90 capsule 3     OZEMPIC, 0.25 OR 0.5 MG/DOSE, 2 MG/1.5ML SOPN pen             Objective:    Vitals:    03/31/22 1012   BP: 118/70   Pulse: 79   SpO2: 92%   Weight: 84.8 kg (187 lb)      Body mass index is 34.2 kg/m .    Alert.  Uses walker to assist with ambulation and transfer slowly.  Cooperative and forthcoming without psychomotor agitation.  Pleasant.      This note has been dictated using voice recognition software and as a result may contain minor grammatical errors and unintended word substitutions.   Answers for HPI/ROS submitted by the patient on 3/31/2022  If you checked off any problems, how difficult have these problems made it for you to do your work, take care of things at  home, or get along with other people?: Not difficult at all  PHQ9 TOTAL SCORE: 3  YEE 7 TOTAL SCORE: 1  How many servings of fruits and vegetables do you eat daily?: 2-3  On average, how many sweetened beverages do you drink each day (Examples: soda, juice, sweet tea, etc.  Do NOT count diet or artificially sweetened beverages)?: 1  How many minutes a day do you exercise enough to make your heart beat faster?: 9 or less  How many days a week do you exercise enough to make your heart beat faster?: 3 or less  How many days per week do you miss taking your medication?: 0  What is the reason for your visit today?: Follow up trouble walking  When did your symptoms begin?: More than a month  What are your symptoms?: Balance is not good  How would you describe these symptoms?: Severe  Are your symptoms:: Staying the same  Have you had these symptoms before?: Yes  Have you tried or received treatment for these symptoms before?: Yes  Did that treatment work? : No  Is there anything that makes you feel worse?: Moving  Is there anything that makes you feel better?: Sitting

## 2022-03-31 NOTE — PROGRESS NOTES
Answers for HPI/ROS submitted by the patient on 3/31/2022  If you checked off any problems, how difficult have these problems made it for you to do your work, take care of things at home, or get along with other people?: Not difficult at all  PHQ9 TOTAL SCORE: 3  YEE 7 TOTAL SCORE: 1  How many servings of fruits and vegetables do you eat daily?: 2-3  On average, how many sweetened beverages do you drink each day (Examples: soda, juice, sweet tea, etc.  Do NOT count diet or artificially sweetened beverages)?: 1  How many minutes a day do you exercise enough to make your heart beat faster?: 9 or less  How many days a week do you exercise enough to make your heart beat faster?: 3 or less  How many days per week do you miss taking your medication?: 0  What is the reason for your visit today?: Follow up trouble walking  When did your symptoms begin?: More than a month  What are your symptoms?: Balance is not good  How would you describe these symptoms?: Severe  Are your symptoms:: Staying the same  Have you had these symptoms before?: Yes  Have you tried or received treatment for these symptoms before?: Yes  Did that treatment work? : No  Is there anything that makes you feel worse?: Moving  Is there anything that makes you feel better?: Sitting

## 2022-04-01 ASSESSMENT — PATIENT HEALTH QUESTIONNAIRE - PHQ9: SUM OF ALL RESPONSES TO PHQ QUESTIONS 1-9: 3

## 2022-04-01 ASSESSMENT — ANXIETY QUESTIONNAIRES: GAD7 TOTAL SCORE: 1

## 2022-05-03 ENCOUNTER — ANCILLARY PROCEDURE (OUTPATIENT)
Dept: GENERAL RADIOLOGY | Facility: CLINIC | Age: 82
End: 2022-05-03
Attending: PHYSICIAN ASSISTANT
Payer: MEDICARE

## 2022-05-03 ENCOUNTER — OFFICE VISIT (OUTPATIENT)
Dept: FAMILY MEDICINE | Facility: CLINIC | Age: 82
End: 2022-05-03
Payer: MEDICARE

## 2022-05-03 ENCOUNTER — HOSPITAL ENCOUNTER (EMERGENCY)
Facility: CLINIC | Age: 82
Discharge: HOME | End: 2022-05-03
Attending: EMERGENCY MEDICINE | Admitting: EMERGENCY MEDICINE
Payer: MEDICARE

## 2022-05-03 VITALS
RESPIRATION RATE: 40 BRPM | OXYGEN SATURATION: 98 % | DIASTOLIC BLOOD PRESSURE: 79 MMHG | TEMPERATURE: 98.3 F | HEIGHT: 62 IN | BODY MASS INDEX: 33.86 KG/M2 | SYSTOLIC BLOOD PRESSURE: 134 MMHG | HEART RATE: 76 BPM | WEIGHT: 184 LBS

## 2022-05-03 VITALS
SYSTOLIC BLOOD PRESSURE: 90 MMHG | TEMPERATURE: 98.1 F | OXYGEN SATURATION: 91 % | RESPIRATION RATE: 18 BRPM | HEART RATE: 77 BPM | DIASTOLIC BLOOD PRESSURE: 62 MMHG

## 2022-05-03 DIAGNOSIS — R05.9 COUGH: ICD-10-CM

## 2022-05-03 DIAGNOSIS — I50.9 ACUTE CONGESTIVE HEART FAILURE, UNSPECIFIED HEART FAILURE TYPE (H): Primary | ICD-10-CM

## 2022-05-03 DIAGNOSIS — R53.83 FATIGUE, UNSPECIFIED TYPE: ICD-10-CM

## 2022-05-03 DIAGNOSIS — R06.02 SHORTNESS OF BREATH: ICD-10-CM

## 2022-05-03 LAB
ALBUMIN SERPL-MCNC: 3.9 G/DL (ref 3.5–5)
ALP SERPL-CCNC: 89 U/L (ref 45–120)
ALT SERPL W P-5'-P-CCNC: 19 U/L (ref 0–45)
ANION GAP SERPL CALCULATED.3IONS-SCNC: 11 MMOL/L (ref 5–18)
ANION GAP SERPL CALCULATED.3IONS-SCNC: 12 MMOL/L (ref 5–18)
AST SERPL W P-5'-P-CCNC: 23 U/L (ref 0–40)
BASOPHILS # BLD AUTO: 0 10E3/UL (ref 0–0.2)
BASOPHILS # BLD AUTO: 0.1 10E3/UL (ref 0–0.2)
BASOPHILS NFR BLD AUTO: 0 %
BASOPHILS NFR BLD AUTO: 1 %
BILIRUB DIRECT SERPL-MCNC: 0.1 MG/DL
BILIRUB SERPL-MCNC: 0.4 MG/DL (ref 0–1)
BNP SERPL-MCNC: 92 PG/ML (ref 0–159)
BUN SERPL-MCNC: 18 MG/DL (ref 8–28)
BUN SERPL-MCNC: 18 MG/DL (ref 8–28)
CALCIUM SERPL-MCNC: 9.5 MG/DL (ref 8.5–10.5)
CALCIUM SERPL-MCNC: 9.5 MG/DL (ref 8.5–10.5)
CHLORIDE BLD-SCNC: 100 MMOL/L (ref 98–107)
CHLORIDE BLD-SCNC: 102 MMOL/L (ref 98–107)
CO2 SERPL-SCNC: 27 MMOL/L (ref 22–31)
CO2 SERPL-SCNC: 27 MMOL/L (ref 22–31)
CREAT SERPL-MCNC: 0.82 MG/DL (ref 0.6–1.1)
CREAT SERPL-MCNC: 0.86 MG/DL (ref 0.6–1.1)
EOSINOPHIL # BLD AUTO: 0.2 10E3/UL (ref 0–0.7)
EOSINOPHIL # BLD AUTO: 0.2 10E3/UL (ref 0–0.7)
EOSINOPHIL NFR BLD AUTO: 3 %
EOSINOPHIL NFR BLD AUTO: 4 %
ERYTHROCYTE [DISTWIDTH] IN BLOOD BY AUTOMATED COUNT: 12.9 % (ref 10–15)
ERYTHROCYTE [DISTWIDTH] IN BLOOD BY AUTOMATED COUNT: 13.2 % (ref 10–15)
FLUAV RNA SPEC QL NAA+PROBE: NEGATIVE
FLUBV RNA RESP QL NAA+PROBE: NEGATIVE
GFR SERPL CREATININE-BSD FRML MDRD: 67 ML/MIN/1.73M2
GFR SERPL CREATININE-BSD FRML MDRD: 71 ML/MIN/1.73M2
GLUCOSE BLD-MCNC: 90 MG/DL (ref 70–125)
GLUCOSE BLD-MCNC: 99 MG/DL (ref 70–125)
HCT VFR BLD AUTO: 40.7 % (ref 35–47)
HCT VFR BLD AUTO: 41.1 % (ref 35–47)
HGB BLD-MCNC: 12.7 G/DL (ref 11.7–15.7)
HGB BLD-MCNC: 13 G/DL (ref 11.7–15.7)
IMM GRANULOCYTES # BLD: 0 10E3/UL
IMM GRANULOCYTES # BLD: 0 10E3/UL
IMM GRANULOCYTES NFR BLD: 0 %
IMM GRANULOCYTES NFR BLD: 0 %
LYMPHOCYTES # BLD AUTO: 0.8 10E3/UL (ref 0.8–5.3)
LYMPHOCYTES # BLD AUTO: 0.8 10E3/UL (ref 0.8–5.3)
LYMPHOCYTES NFR BLD AUTO: 12 %
LYMPHOCYTES NFR BLD AUTO: 15 %
MAGNESIUM SERPL-MCNC: 2.2 MG/DL (ref 1.8–2.6)
MCH RBC QN AUTO: 30.4 PG (ref 26.5–33)
MCH RBC QN AUTO: 30.8 PG (ref 26.5–33)
MCHC RBC AUTO-ENTMCNC: 31.2 G/DL (ref 31.5–36.5)
MCHC RBC AUTO-ENTMCNC: 31.6 G/DL (ref 31.5–36.5)
MCV RBC AUTO: 97 FL (ref 78–100)
MCV RBC AUTO: 97 FL (ref 78–100)
MONOCYTES # BLD AUTO: 0.4 10E3/UL (ref 0–1.3)
MONOCYTES # BLD AUTO: 0.5 10E3/UL (ref 0–1.3)
MONOCYTES NFR BLD AUTO: 7 %
MONOCYTES NFR BLD AUTO: 8 %
NEUTROPHILS # BLD AUTO: 3.9 10E3/UL (ref 1.6–8.3)
NEUTROPHILS # BLD AUTO: 4.8 10E3/UL (ref 1.6–8.3)
NEUTROPHILS NFR BLD AUTO: 74 %
NEUTROPHILS NFR BLD AUTO: 76 %
NRBC # BLD AUTO: 0 10E3/UL
NRBC BLD AUTO-RTO: 0 /100
PLATELET # BLD AUTO: 208 10E3/UL (ref 150–450)
PLATELET # BLD AUTO: 226 10E3/UL (ref 150–450)
POTASSIUM BLD-SCNC: 4.2 MMOL/L (ref 3.5–5)
POTASSIUM BLD-SCNC: 4.7 MMOL/L (ref 3.5–5)
PROT SERPL-MCNC: 7.4 G/DL (ref 6–8)
RBC # BLD AUTO: 4.18 10E6/UL (ref 3.8–5.2)
RBC # BLD AUTO: 4.22 10E6/UL (ref 3.8–5.2)
SARS-COV-2 RNA RESP QL NAA+PROBE: NEGATIVE
SODIUM SERPL-SCNC: 138 MMOL/L (ref 136–145)
SODIUM SERPL-SCNC: 141 MMOL/L (ref 136–145)
TROPONIN I SERPL-MCNC: <0.01 NG/ML (ref 0–0.29)
WBC # BLD AUTO: 5.3 10E3/UL (ref 4–11)
WBC # BLD AUTO: 6.3 10E3/UL (ref 4–11)

## 2022-05-03 PROCEDURE — 36415 COLL VENOUS BLD VENIPUNCTURE: CPT | Performed by: EMERGENCY MEDICINE

## 2022-05-03 PROCEDURE — 84484 ASSAY OF TROPONIN QUANT: CPT | Performed by: EMERGENCY MEDICINE

## 2022-05-03 PROCEDURE — 36415 COLL VENOUS BLD VENIPUNCTURE: CPT | Performed by: PHYSICIAN ASSISTANT

## 2022-05-03 PROCEDURE — 85004 AUTOMATED DIFF WBC COUNT: CPT | Performed by: EMERGENCY MEDICINE

## 2022-05-03 PROCEDURE — 80053 COMPREHEN METABOLIC PANEL: CPT | Performed by: EMERGENCY MEDICINE

## 2022-05-03 PROCEDURE — 83880 ASSAY OF NATRIURETIC PEPTIDE: CPT | Performed by: EMERGENCY MEDICINE

## 2022-05-03 PROCEDURE — 99285 EMERGENCY DEPT VISIT HI MDM: CPT | Mod: 25

## 2022-05-03 PROCEDURE — 80048 BASIC METABOLIC PNL TOTAL CA: CPT | Performed by: PHYSICIAN ASSISTANT

## 2022-05-03 PROCEDURE — 71046 X-RAY EXAM CHEST 2 VIEWS: CPT | Mod: TC | Performed by: RADIOLOGY

## 2022-05-03 PROCEDURE — 85025 COMPLETE CBC W/AUTO DIFF WBC: CPT | Performed by: PHYSICIAN ASSISTANT

## 2022-05-03 PROCEDURE — 83735 ASSAY OF MAGNESIUM: CPT | Performed by: EMERGENCY MEDICINE

## 2022-05-03 PROCEDURE — 93005 ELECTROCARDIOGRAM TRACING: CPT | Performed by: EMERGENCY MEDICINE

## 2022-05-03 PROCEDURE — 82248 BILIRUBIN DIRECT: CPT | Performed by: EMERGENCY MEDICINE

## 2022-05-03 PROCEDURE — C9803 HOPD COVID-19 SPEC COLLECT: HCPCS

## 2022-05-03 PROCEDURE — 99215 OFFICE O/P EST HI 40 MIN: CPT | Performed by: PHYSICIAN ASSISTANT

## 2022-05-03 PROCEDURE — 87636 SARSCOV2 & INF A&B AMP PRB: CPT | Performed by: EMERGENCY MEDICINE

## 2022-05-03 RX ORDER — BENZONATATE 200 MG/1
200 CAPSULE ORAL 3 TIMES DAILY PRN
Qty: 15 CAPSULE | Refills: 0 | Status: SHIPPED | OUTPATIENT
Start: 2022-05-03 | End: 2022-07-01

## 2022-05-03 ASSESSMENT — ENCOUNTER SYMPTOMS
HEMATURIA: 0
ABDOMINAL PAIN: 0
SHORTNESS OF BREATH: 1
BLOOD IN STOOL: 0
CHILLS: 0
DIZZINESS: 0
SORE THROAT: 0
DYSURIA: 0
DIARRHEA: 0
FEVER: 0
CONFUSION: 0
JOINT SWELLING: 0
NAUSEA: 0
UNEXPECTED WEIGHT CHANGE: 0
COUGH: 1
VOMITING: 0

## 2022-05-03 NOTE — ED PROVIDER NOTES
Emergency Department Encounter     Evaluation Date & Time:   No admission date for patient encounter.    CHIEF COMPLAINT:  Cough      Triage Note:Has a cough since last Friday was seen at her clinic and sent to the ER for further eval              ED COURSE & MEDICAL DECISION MAKING:     ED Course as of 22 1545   Tue May 03, 2022   1523 Labs all reassuring. BNP and Trop negative.  Awaiting covid/flu and CXR.   1536 Covid and Flu negative.  Awaiting CXR.   1537 Pt re-evaluated, updated on current results.   1544 Pt actually had CXR earlier today at walk-in, so ours was canceled.  CXR from today is clear, no PNA or CHF.      Pt and family updated on all results, Rx for tessalon perles, encouraged zyrtec and outpatient follow up.       Pt here with ongoing cough for the past 4-5 days, nonproductive with occasional dyspnea, but no cp, fevers or other concerning symptoms  Pt denies CHF history. She was seen initially Saturday at urgent care, had labs, CXR that were reportedly normal including negative covid testing.  Pt seen again today at walk-in clinic and sent to us for further cardiac workup given age/symptoms.  Pt is not hypoxic, well appearing here otherwise.  She was recently around multiple people for a  that were coughin.  Pt is fully vaccinated for covid.  Will get labs, CXR.  EKG unremarkable.  Lower suspicion for CHF or other acute cardiac pathology.      1:40 PM I met with the patient, obtained history, performed an initial exam, and discussed options and plan for diagnostics and treatment here in the ED.     At the conclusion of the encounter I discussed the results of all the tests and the disposition. The questions were answered. The patient or family acknowledged understanding and was agreeable with the care plan.      MEDICATIONS GIVEN IN THE EMERGENCY DEPARTMENT:  Medications - No data to display    NEW PRESCRIPTIONS STARTED AT TODAY'S ED VISIT:  New Prescriptions    BENZONATATE  (TESSALON) 200 MG CAPSULE    Take 1 capsule (200 mg) by mouth 3 times daily as needed for cough       HPI   The history is provided by the patient. No  was used.        Dyana Nicole is a 82 year old female with a pertinent history of HTN and hyperlipidemia, who presents to this ED via walk-in for evaluation of cough.    Patient reports she was seen today at the walk-in clinic for an ongoing non-productive cough for four days, with some shortness of breath at times. She was seen on Saturday at Urgent Care and had some labs and a chest xray that reportedly were normal. Patient continues to have a cough. She was told to present to the ED for follow up to check her heart. Patient denies a history of CHF. Denies any known weight gain, new leg swelling, chest pain, fevers, abdominal pain, nausea, vomiting, diarrhea, bloody or black stools. Patient reports she was recently at a  one week ago with multiple people coughing. Patient is fully vaccinated against Covid with one booster. She denies any treatments at home for her cough. Patient denies any other current complaints.    REVIEW OF SYSTEMS:  Review of Systems   Constitutional: Negative for chills, fever and unexpected weight change.   HENT: Negative for sore throat.    Eyes: Negative for visual disturbance.   Respiratory: Positive for cough and shortness of breath.    Cardiovascular: Negative for chest pain and leg swelling.   Gastrointestinal: Negative for abdominal pain, blood in stool, diarrhea, nausea and vomiting.   Endocrine: Negative for polyuria.   Genitourinary: Negative for dysuria and hematuria.        - urinary changes     Musculoskeletal: Negative for joint swelling.   Skin: Negative for rash.   Neurological: Negative for dizziness.   Psychiatric/Behavioral: Negative for confusion.   All other systems reviewed and are negative.        Medical History     Past Medical History:   Diagnosis Date     Anemia, unspecified       Arrhythmia      Cervical spondylosis without myelopathy      Depression      Disease of thyroid gland      GERD (gastroesophageal reflux disease)      High cholesterol      History of transfusion      Myalgia and myositis, unspecified      Near syncope      Other and unspecified hyperlipidemia      Skin cancer, basal cell      Sleep apnea      Tachycardia      Unspecified essential hypertension      Unspecified hereditary and idiopathic peripheral neuropathy        Past Surgical History:   Procedure Laterality Date     ARTHROPLASTY KNEE BILATERAL       ARTHROSCOPY SHOULDER ROTATOR CUFF REPAIR       CATARACT EXTRACTION, BILATERAL       CHOLECYSTECTOMY       EXCISE LESION TRUNK Left 2018    Procedure: LEFT BUTTOCK EXPLORATION, EXCISION BUTTOCK MASS;  Surgeon: Lg Jameson MD;  Location: [facility] OR;  Service:      HYSTERECTOMY       JOINT REPLACEMENT Bilateral     knees     MOHS MICROGRAPHIC PROCEDURE      Nose     OOPHORECTOMY Left     1 removed, 1 remains     OTHER SURGICAL HISTORY      Bladder lift     [place] ARTHROPLASTY TIBIAL PLATEAU      Description: Knee Replacement;  Recorded: 2013;     [place] INJECT NERV PAT,JOE PERIPH NERV      Description: Peripheral Nerve Block Wrist Median Right;  Recorded: 2013;     RELEASE CARPAL TUNNEL       REPLACEMENT TOTAL KNEE      L       Family History   Problem Relation Age of Onset     Aneurysm Father         AAA     Cancer Mother         pancrease       Social History     Tobacco Use     Smoking status: Former Smoker     Packs/day: 0.25     Years: 1.00     Pack years: 0.25     Quit date: 1961     Years since quittin.3     Smokeless tobacco: Never Used   Substance Use Topics     Alcohol use: No     Drug use: No       benzonatate (TESSALON) 200 MG capsule  acetaminophen (TYLENOL) 650 MG CR tablet  aspirin 81 MG EC tablet  azelastine (ASTELIN) 0.1 % nasal spray  biotin 5 MG CAPS  calcium carbonate-vitamin D3 (CALCIUM 600 + D,3,) 600 mg calcium- 200  "unit cap  celecoxib (CELEBREX) 200 MG capsule  estrogen conj (PREMARIN) 0.3 MG tablet  fluticasone propionate (FLONASE) 50 mcg/actuation nasal spray  furosemide (LASIX) 20 MG tablet  levothyroxine (SYNTHROID/LEVOTHROID) 50 MCG tablet  MEDICATION CANNOT BE REORDERED - PLEASE MANUALLY REORDER AND DISCONTINUE THE OLD ORDER  metoprolol tartrate (LOPRESSOR) 50 MG tablet  multivitamin with minerals (THERA-M) 9 mg iron-400 mcg Tab tablet  omeprazole (PRILOSEC) 20 MG DR capsule  OZEMPIC, 0.25 OR 0.5 MG/DOSE, 2 MG/1.5ML SOPN pen  Probiotic Product (MISC INTESTINAL BRANDON REGULAT) CAPS  simvastatin (ZOCOR) 20 MG tablet  traZODone (DESYREL) 50 MG tablet  venlafaxine (EFFEXOR-XR) 75 MG 24 hr capsule        Physical Exam     Vitals:  BP (!) 156/69   Pulse 70   Temp 98.4  F (36.9  C) (Oral)   Resp 24   Ht 1.575 m (5' 2\")   Wt 83.5 kg (184 lb)   SpO2 96%   BMI 33.65 kg/m      PHYSICAL EXAM:   Physical Exam  Vitals and nursing note reviewed.   Constitutional:       General: She is not in acute distress.     Appearance: Normal appearance.      Comments: Elderly appearing.   HENT:      Head: Normocephalic and atraumatic.      Nose: Nose normal.      Mouth/Throat:      Mouth: Mucous membranes are moist.   Eyes:      Pupils: Pupils are equal, round, and reactive to light.   Neck:      Vascular: No JVD.   Cardiovascular:      Rate and Rhythm: Normal rate and regular rhythm.      Pulses: Normal pulses.           Radial pulses are 2+ on the right side and 2+ on the left side.        Dorsalis pedis pulses are 2+ on the right side and 2+ on the left side.   Pulmonary:      Effort: Pulmonary effort is normal. No respiratory distress.      Breath sounds: Normal breath sounds.   Abdominal:      Palpations: Abdomen is soft.      Tenderness: There is no abdominal tenderness.   Musculoskeletal:      Cervical back: Full passive range of motion without pain and neck supple.      Comments: No calf tenderness or swelling b/l   Skin:     " General: Skin is warm.      Findings: No rash.   Neurological:      General: No focal deficit present.      Mental Status: She is alert. Mental status is at baseline.      Comments: Fluent speech, no acute lateralizing deficits   Psychiatric:         Mood and Affect: Mood normal.         Behavior: Behavior normal.           Results     LAB:  All pertinent labs reviewed and interpreted  Labs Ordered and Resulted from Time of ED Arrival to Time of ED Departure   CBC WITH PLATELETS AND DIFFERENTIAL - Abnormal       Result Value    WBC Count 5.3      RBC Count 4.18      Hemoglobin 12.7      Hematocrit 40.7      MCV 97      MCH 30.4      MCHC 31.2 (*)     RDW 13.2      Platelet Count 208      % Neutrophils 74      % Lymphocytes 15      % Monocytes 7      % Eosinophils 4      % Basophils 0      % Immature Granulocytes 0      NRBCs per 100 WBC 0      Absolute Neutrophils 3.9      Absolute Lymphocytes 0.8      Absolute Monocytes 0.4      Absolute Eosinophils 0.2      Absolute Basophils 0.0      Absolute Immature Granulocytes 0.0      Absolute NRBCs 0.0     BASIC METABOLIC PANEL - Normal    Sodium 138      Potassium 4.2      Chloride 100      Carbon Dioxide (CO2) 27      Anion Gap 11      Urea Nitrogen 18      Creatinine 0.82      Calcium 9.5      Glucose 99      GFR Estimate 71     TROPONIN I - Normal    Troponin I <0.01     MAGNESIUM - Normal    Magnesium 2.2     B-TYPE NATRIURETIC PEPTIDE ([facility] ONLY) - Normal    BNP 92     HEPATIC FUNCTION PANEL - Normal    Bilirubin Total 0.4      Bilirubin Direct 0.1      Protein Total 7.4      Albumin 3.9      Alkaline Phosphatase 89      AST 23      ALT 19     INFLUENZA A/B & SARS-COV2 PCR MULTIPLEX - Normal    Influenza A PCR Negative      Influenza B PCR Negative      SARS CoV2 PCR Negative         RADIOLOGY:  No orders to display              ECG:  NSR, rate 69, normal intervals, no acute ischemia    I have independently reviewed and interpreted the EKG(s) documented above      PROCEDURES:  Procedures:  none      FINAL IMPRESSION:    ICD-10-CM    1. Cough  R05.9        0 minutes of critical care time      I, Leydi Bateman, am serving as a scribe to document services personally performed by Dr. Varun Forbes, based on my observations and the provider's statements to me. I, Varun Forbes, DO attest that Leydi Bateman is acting in a scribe capacity, has observed my performance of the services and has documented them in accordance with my direction.      Varun Forbes, DO  Emergency Medicine  [facility] EMERGENCY ROOM  5/3/2022  1:50 PM        Varun Forbes MD  05/03/22 1543

## 2022-05-03 NOTE — ED NOTES
Patient discharged to home . Discharge instructions reviewed and signed by the patient.  Vick Khan RN  5/3/2022  4:19 PM

## 2022-05-03 NOTE — PROGRESS NOTES
Patient presents with:  Sinus Problem: Has had cough  sinus congestion  for 5 days was seen in Urgent Care on 4/30 had negative COVID test       Clinical Decision Making:  Patient has had a 5-day worsening history of shortness of breath fatigue weight gain lower extremity swelling and orthopnea and hypoxia with pulse ox 91% on room air.  Patient is sent to the next higher level of care for evaluation and treatment.  I called and gave report to the [facility] ER staff.  Patient is brought by her  and daughter by personal vehicle to the ER.      ICD-10-CM    1. Acute congestive heart failure, unspecified heart failure type (H)  I50.9    2. Cough  R05.9 CBC with platelets and differential     Basic metabolic panel     XR Chest 2 Views   3. Fatigue, unspecified type  R53.83 CBC with platelets and differential     Basic metabolic panel     XR Chest 2 Views   4. Shortness of breath  R06.02 CBC with platelets and differential     Basic metabolic panel     XR Chest 2 Views       Patient Instructions   Present to the emergency room for evaluation of your shortness of breath and further work-up.          HPI:  Dyana Nicole is a 82 year old female who has a complicated past medical history with exertional dyspnea and lymphadenopathy presents today shortness of breath, lower extremity edema, dyspnea on exertion, orthopnea, fatigue and cough.  Patient was recently seen in the urgent care 5 days ago for a cough.  Patient has had slight nausea and loss of appetite.  She has been using Delsym over-the-counter.  She does not have any other symptoms of COVID.  She has had COVID vaccination but has not had recent COVID screening.    History obtained from chart review and the patient.    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  2019-01: Snoring  2018-12: Anemia due to blood loss, acute  2018-12: Lower GI bleed  2018-12: BRBPR (bright red blood per rectum)  2018-08: Obesity (BMI 35.0-39.9) with comorbidity (H)  2018-08: Atrial  tachycardia (H)  2018-08: Left ventricular outflow obstruction  2018-08: Hypertensive left ventricular hypertrophy, without heart   failure  2018-06: Leg weakness, bilateral  2018-06: Lymphedema  2018-06: Adrenal insufficiency (H)  2018-05: Fatigue due to excessive exertion  2018-05: Pain in both lower extremities  2018-05: Venous insufficiency of both lower extremities  2018-05: Venous hypertension of both lower extremities  2018-05: Fat deposits  2018-05: Class 2 obesity due to excess calories in adult  2018-05: Vitamin D deficiency  2017-04: Hyperreflexia  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)  2017-03: Essential hypertension with goal blood pressure less than   140/90  2016-09: Exertional dyspnea  2015-09: Generalized anxiety disorder  2015-09: Recurrent major depressive disorder, remission status   unspecified (H)  2015-09: Arthralgia  2014-02: GERD (gastroesophageal reflux disease)  2014-02: Ringing in ears  2008-06: Anxiety state  2008-06: Depressive disorder  2008-06: Memory loss  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  Insomnia  Heartburn  Arthritis  Cervical Spondylosis  Generalized Osteoarthritis Of The Hand  Lumbar Spondylosis  Trochanteric Bursitis  Lower Back Pain  Osteopenia  Microscopic colitis, unspecified microscopic colitis type  Fibromyalgia  Anemia  Hypertension  Hyperlipidemia  Peripheral Neuropathy  Walk Is Wobbly Or Unsteady (Ataxia)  Hypothyroidism  JANICE on CPAP  Acute cystitis without hematuria  Orthostatic hypotension  Recurrent Major Depression In Partial Remission      Past Medical History:   Diagnosis Date     Anemia, unspecified     Created by Conversion      Arrhythmia      Cervical spondylosis without myelopathy     Created by Conversion      Depression      Disease of thyroid gland     hypo     GERD (gastroesophageal reflux disease)      High cholesterol      History of transfusion      Myalgia and myositis, unspecified     Created by Conversion      Near syncope      Other and  unspecified hyperlipidemia     Created by Conversion      Skin cancer, basal cell      Sleep apnea     mild     Tachycardia      Unspecified essential hypertension     Created by Conversion      Unspecified hereditary and idiopathic peripheral neuropathy     Created by Conversion        Social History     Tobacco Use     Smoking status: Former Smoker     Packs/day: 0.25     Years: 1.00     Pack years: 0.25     Quit date: 1961     Years since quittin.3     Smokeless tobacco: Never Used   Substance Use Topics     Alcohol use: No       Review of Systems  As above in HPI otherwise negative.    Vitals:    22 1112 22 1115   BP: 96/62 90/62   Pulse: 77    Resp: 18    Temp: 98.1  F (36.7  C)    TempSrc: Oral    SpO2: 91%        General: Patient is resting comfortably no acute distress is afebrile  HEENT: Head is normocephalic atraumatic   eyes are PERRL EOMI sclera anicteric   TMs are clear bilaterally  Throat is clear  No cervical lymphadenopathy present  LUNGS: Quiet breath  HEART: Quiet heart sounds  Skin: Without rash non-diaphoretic  Musculoskeletal: Swelling of lower extremities 3+ edema    Physical Exam      Labs:  Results for orders placed or performed in visit on 22   XR Chest 2 Views     Status: None    Narrative    EXAM: XR CHEST 2 VW  LOCATION: [facility]  DATE/TIME: 5/3/2022 11:51 AM    INDICATION: cough and fatigue  COMPARISON: 2014.      Impression    IMPRESSION: The lungs are clear. The heart size and pulmonary vascularity are normal. Thoracic aorta is mildly tortuous. There is thoracolumbar scoliosis convex to the right at the thoracolumbar junction. There is lumbar spinal fusion hardware which is   incompletely visualized. Negative chest radiograph.   CBC with platelets and differential     Status: None   Result Value Ref Range    WBC Count 6.3 4.0 - 11.0 10e3/uL    RBC Count 4.22 3.80 - 5.20 10e6/uL    Hemoglobin 13.0 11.7 - 15.7 g/dL    Hematocrit  41.1 35.0 - 47.0 %    MCV 97 78 - 100 fL    MCH 30.8 26.5 - 33.0 pg    MCHC 31.6 31.5 - 36.5 g/dL    RDW 12.9 10.0 - 15.0 %    Platelet Count 226 150 - 450 10e3/uL    % Neutrophils 76 %    % Lymphocytes 12 %    % Monocytes 8 %    % Eosinophils 3 %    % Basophils 1 %    % Immature Granulocytes 0 %    Absolute Neutrophils 4.8 1.6 - 8.3 10e3/uL    Absolute Lymphocytes 0.8 0.8 - 5.3 10e3/uL    Absolute Monocytes 0.5 0.0 - 1.3 10e3/uL    Absolute Eosinophils 0.2 0.0 - 0.7 10e3/uL    Absolute Basophils 0.1 0.0 - 0.2 10e3/uL    Absolute Immature Granulocytes 0.0 <=0.4 10e3/uL   CBC with platelets and differential     Status: None    Narrative    The following orders were created for panel order CBC with platelets and differential.  Procedure                               Abnormality         Status                     ---------                               -----------         ------                     CBC with platelets and d...[[id number]]                      Final result                 Please view results for these tests on the individual orders.     CHEM basic is pending at time of documentation.    At the end of the encounter, I discussed results, diagnosis, medications. Discussed red flags for immediate return to clinic/ER, as well as indications for follow up if no improvement. Patient understood and agreed to plan. Patient was stable for discharge.

## 2022-05-03 NOTE — DISCHARGE INSTRUCTIONS
Take tessalon perles for cough and start over the counter Zyrtec (cetirizine) daily for next 7-14 days. Follow up with primary clinic.  Return for new emergency concerns.

## 2022-05-03 NOTE — ED NOTES
Expected Patient Referral to ED  12:46 PM    Referring Clinic/Provider:  Walk in clinic    Reason for referral/Clinical facts:  81y/o female with CHF.  SOB, extremity swelling, orthopnea, fatigue.      Recommendations provided:  Send to ED for further evaluation    Caller was informed that this institution does possess the capabilities and/or resources to provide for patient and should be transferred to our facility.    Discussed that if direct admit is sought and any hurdles are encountered, this ED would be happy to see the patient and evaluate.    Informed caller that recommendations provided are recommendations based only on the facts provided and that they responsible to accept or reject the advice, or to seek a formal in person consultation as needed and that this ED will see/treat patient should they arrive.      HORTENCIA MARIE DO  [facility] EMERGENCY ROOM  [address] [id number] [phone]     Hortencia Marie DO  05/03/22 9430

## 2022-05-06 LAB
ATRIAL RATE - MUSE: 69 BPM
DIASTOLIC BLOOD PRESSURE - MUSE: NORMAL MMHG
INTERPRETATION ECG - MUSE: NORMAL
P AXIS - MUSE: 37 DEGREES
PR INTERVAL - MUSE: 150 MS
QRS DURATION - MUSE: 94 MS
QT - MUSE: 392 MS
QTC - MUSE: 420 MS
R AXIS - MUSE: 51 DEGREES
SYSTOLIC BLOOD PRESSURE - MUSE: NORMAL MMHG
T AXIS - MUSE: 45 DEGREES
VENTRICULAR RATE- MUSE: 69 BPM

## 2022-05-11 ENCOUNTER — TRANSFERRED RECORDS (OUTPATIENT)
Dept: HEALTH INFORMATION MANAGEMENT | Facility: CLINIC | Age: 82
End: 2022-05-11
Payer: MEDICARE

## 2022-05-12 ENCOUNTER — OFFICE VISIT (OUTPATIENT)
Dept: FAMILY MEDICINE | Facility: CLINIC | Age: 82
End: 2022-05-12
Payer: MEDICARE

## 2022-05-12 VITALS
BODY MASS INDEX: 33.89 KG/M2 | SYSTOLIC BLOOD PRESSURE: 110 MMHG | DIASTOLIC BLOOD PRESSURE: 62 MMHG | OXYGEN SATURATION: 96 % | WEIGHT: 185.3 LBS | HEART RATE: 74 BPM

## 2022-05-12 DIAGNOSIS — J40 BRONCHITIS: Primary | ICD-10-CM

## 2022-05-12 PROCEDURE — 99214 OFFICE O/P EST MOD 30 MIN: CPT | Performed by: FAMILY MEDICINE

## 2022-05-12 RX ORDER — AZELASTINE 1 MG/ML
1 SPRAY, METERED NASAL
COMMUNITY
End: 2022-07-01

## 2022-05-12 RX ORDER — SEMAGLUTIDE 1.34 MG/ML
INJECTION, SOLUTION SUBCUTANEOUS
COMMUNITY
Start: 2022-04-09 | End: 2022-07-01

## 2022-05-12 RX ORDER — TRIAMCINOLONE ACETONIDE 1 MG/G
CREAM TOPICAL
COMMUNITY
Start: 2022-05-11 | End: 2022-10-07

## 2022-05-12 RX ORDER — AZITHROMYCIN 250 MG/1
TABLET, FILM COATED ORAL
Qty: 6 TABLET | Refills: 0 | Status: SHIPPED | OUTPATIENT
Start: 2022-05-12 | End: 2022-05-17

## 2022-05-12 RX ORDER — SENNOSIDES 8.6 MG
1300 CAPSULE ORAL
COMMUNITY
End: 2022-07-01

## 2022-05-12 RX ORDER — FUROSEMIDE 20 MG
20 TABLET ORAL
COMMUNITY
Start: 2022-03-10 | End: 2022-07-01

## 2022-05-12 RX ORDER — ALBUTEROL SULFATE 90 UG/1
2 AEROSOL, METERED RESPIRATORY (INHALATION) EVERY 6 HOURS
Qty: 18 G | Refills: 1 | Status: SHIPPED | OUTPATIENT
Start: 2022-05-12 | End: 2023-04-12

## 2022-05-12 NOTE — PROGRESS NOTES
"  Assessment & Plan     Bronchitis  Patient will discontinue her Zyrtec for the next 4 to 5 days; she will take some honey every 2-3 hours for her cough addition of the following medication  - azithromycin (ZITHROMAX) 250 MG tablet  Dispense: 6 tablet; Refill: 0  - albuterol (PROAIR HFA/PROVENTIL HFA/VENTOLIN HFA) 108 (90 Base) MCG/ACT inhaler  Dispense: 18 g; Refill: 1               BMI:   Estimated body mass index is 33.89 kg/m  as calculated from the following:    Height as of 5/3/22: 1.575 m (5' 2\").    Weight as of this encounter: 84.1 kg (185 lb 4.8 oz).           No follow-ups on file.    Froylan Villarreal MD  [facility] is [facility] 82 year old who presents for the following health issues she has had 2 or 3 weeks of cough that wants to stop; she is made visits to the emergency room and to the emergency room and has been worked up thoroughly including a cardiovascular work-up all of which were essentially negative.  She has been tested for COVID-19 x3; she is fully vaccinated including boosters; today she is accompanied by her .  She is obviously having a loose cough with wheezing.  On examination she is moving air throughout her lung fields but there is a bronchospastic component causing expiratory wheezing.  Based on all of her blood tests I am going to give her a trial of azithromycin based on secondary infection suspicion with probable early primary viral infection causing the persistent coughing.  We will also add a bronco dilator and encourage her to hydrate herself.  She will follow-up with us if she fails to respond    HPI           Review of Systems   Constitutional, HEENT, cardiovascular, pulmonary, gi and gu systems are negative, except as otherwise noted.      Objective    /62 (BP Location: Right arm, Patient Position: Sitting, Cuff Size: Adult Regular)   Pulse 74   Wt 84.1 kg (185 lb 4.8 oz)   SpO2 96%   BMI 33.89 kg/m    Body mass index is 33.89 " kg/m .  Physical Exam   General Appearance:  Alert, cooperative, no distress  Head:  Normocephalic, no obvious abnormality  Ears: TM anatomy normal  Eyes:  PERRL, EOM's intact, conjunctiva and corneas clear  Nose:  Nares symmetrical, septum midline, mucosa pink, no sinus tenderness  Throat:  Lips, tongue, and mucosa are moist, pink, and intact  Neck:  Supple, symmetrical, trachea midline, no adenopathy; thyroid: no enlargement, symmetric,no tenderness/mass/nodules; no carotid bruit, no JVD  Back:  Symmetrical, no curvature, ROM normal, no CVA tenderness  Chest/Breast:  No mass or tenderness  Lungs: She does have expiratory wheezes throughout her chest loose cough is present  Heart:  Normal PMI, regular rate & rhythm, S1 and S2 normal, no murmurs, rubs, or gallops  Abdomen:  Soft, non-tender, bowel sounds active all four quadrants, no mass, or organomegaly  Musculoskeletal:  Tone and strength strong and symmetrical, all extremities  Lymphatic:  No adenopathy  Skin/Hair/Nails:  Skin warm, dry, and intact, no rashes  Neurologic:  Alert and oriented x3, no cranial nerve deficits, normal strength and tone, gait   Extremities:  steady;No edema.  Oleg's sign negative.    Genitourinary: deferred  Pulses:  Equal bilaterally

## 2022-05-31 ENCOUNTER — TRANSFERRED RECORDS (OUTPATIENT)
Dept: HEALTH INFORMATION MANAGEMENT | Facility: CLINIC | Age: 82
End: 2022-05-31
Payer: MEDICARE

## 2022-06-16 ENCOUNTER — OFFICE VISIT (OUTPATIENT)
Dept: NEUROLOGY | Facility: CLINIC | Age: 82
End: 2022-06-16
Payer: MEDICARE

## 2022-06-16 DIAGNOSIS — F33.0 MAJOR DEPRESSIVE DISORDER, RECURRENT EPISODE, MILD WITH ANXIOUS DISTRESS (H): Primary | ICD-10-CM

## 2022-06-16 PROCEDURE — 90834 PSYTX W PT 45 MINUTES: CPT | Performed by: PSYCHOLOGIST

## 2022-06-16 NOTE — LETTER
"    6/16/2022         RE: Dyana Nicole  [address]  [facility] [address]        Dear Colleague,    Thank you for referring your patient, yDana Nicole, to the [facility]. Please see a copy of my visit note below.    Psychology Progress Note    Date: 6/16/2022    Time length and type of treatment: 4123-1951 , individual therapy, in person visit    Necessity: This session is necessary to address depressed and anxious mood.  Patient was last seen 9/7/2016.  Patient participated in psychology services with this writer from 11/30/2015 to 9/7/2016 with a total of 4 sessions.  Patient's diagnosis was major depressive disorder, recurrent, mild.  Patient sought psychology services to address care giver strain.    We review the limits of confidentiality.  The patient agrees to proceed.    Intervention: Patient reports that her care giver strain has increased significantly.  \"We are arguing all of the time.\"  We discuss the importance of not correcting her  if there are no safety concerns at stake.  We explore enrolling her  in a day program.  We discuss a support group for the patient.  Patient indicates that she participates in a variety of activities at her senior living residence and enjoys herself.    The patient reports that she is looking for a psychiatrist to manage her medications.  This writer suggests JANAK Cabral at [facility] and gives the patient contact information.    This writer utilized motivational interviewing, active listening, reassurance and support in the context of cognitive behavioral therapy and ACT therapy to address the above.      Mental Status: Orientation to person, place, and time is in tact.  Recent and remote memory, attention, concentration, language, and fund of knowledge are intact.  Mood appears stable with sad affect.  No indication of suicidal ideation, plan, or intent.  No indication of homicidal ideation, plan or " intent.    Progress: Patient reports increased distress secondary to care giver burden.  Progress is disrupted with maintaining stable mood secondary to her 's cognitive change.    Plan: Patient will return in 1 month when we will continue with cognitive behavioral therapy to promote adaptive coping with care giver role..  Estimated duration of treatment is 4 individual therapy sessions (75376) at 1 month intervals. Treatment is expected to be completed by 10/1/2022.  Further services are warranted to prevent further exacerbation of depressed mood which is common for care givers of loved ones with cognitive decline.    Diagnosis:  MDD, recurrent, mild with anxious distress      Again, thank you for allowing me to participate in the care of your patient.        Sincerely,        Myranda Vizcarra Psy.D, LP

## 2022-06-16 NOTE — PROGRESS NOTES
"Psychology Progress Note    Date: 6/16/2022    Time length and type of treatment: 6194-1024 , individual therapy, in person visit    Necessity: This session is necessary to address depressed and anxious mood.  Patient was last seen 9/7/2016.  Patient participated in psychology services with this writer from 11/30/2015 to 9/7/2016 with a total of 4 sessions.  Patient's diagnosis was major depressive disorder, recurrent, mild.  Patient sought psychology services to address care giver strain.    We review the limits of confidentiality.  The patient agrees to proceed.    Intervention: Patient reports that her care giver strain has increased significantly.  \"We are arguing all of the time.\"  We discuss the importance of not correcting her  if there are no safety concerns at stake.  We explore enrolling her  in a day program.  We discuss a support group for the patient.  Patient indicates that she participates in a variety of activities at her senior living residence and enjoys herself.    The patient reports that she is looking for a psychiatrist to manage her medications.  This writer suggests JANAK Cabral at [facility] and gives the patient contact information.    This writer utilized motivational interviewing, active listening, reassurance and support in the context of cognitive behavioral therapy and ACT therapy to address the above.      Mental Status: Orientation to person, place, and time is in tact.  Recent and remote memory, attention, concentration, language, and fund of knowledge are intact.  Mood appears stable with sad affect.  No indication of suicidal ideation, plan, or intent.  No indication of homicidal ideation, plan or intent.    Progress: Patient reports increased distress secondary to care giver burden.  Progress is disrupted with maintaining stable mood secondary to her 's cognitive change.    Plan: Patient will return in 1 month when we will continue with cognitive behavioral " therapy to promote adaptive coping with care giver role..  Estimated duration of treatment is 4 individual therapy sessions (00827) at 1 month intervals. Treatment is expected to be completed by 10/1/2022.  Further services are warranted to prevent further exacerbation of depressed mood which is common for care givers of loved ones with cognitive decline.    Diagnosis:  MDD, recurrent, mild with anxious distress

## 2022-06-21 ENCOUNTER — TRANSFERRED RECORDS (OUTPATIENT)
Dept: HEALTH INFORMATION MANAGEMENT | Facility: CLINIC | Age: 82
End: 2022-06-21

## 2022-06-29 ENCOUNTER — TRANSFERRED RECORDS (OUTPATIENT)
Dept: HEALTH INFORMATION MANAGEMENT | Facility: CLINIC | Age: 82
End: 2022-06-29

## 2022-07-01 ENCOUNTER — OFFICE VISIT (OUTPATIENT)
Dept: FAMILY MEDICINE | Facility: CLINIC | Age: 82
End: 2022-07-01
Payer: MEDICARE

## 2022-07-01 VITALS
OXYGEN SATURATION: 97 % | HEART RATE: 71 BPM | WEIGHT: 185 LBS | BODY MASS INDEX: 33.84 KG/M2 | TEMPERATURE: 98.9 F | DIASTOLIC BLOOD PRESSURE: 70 MMHG | SYSTOLIC BLOOD PRESSURE: 116 MMHG

## 2022-07-01 DIAGNOSIS — F33.9 RECURRENT MAJOR DEPRESSIVE DISORDER, REMISSION STATUS UNSPECIFIED (H): ICD-10-CM

## 2022-07-01 DIAGNOSIS — Z63.79 STRESSFUL LIFE EVENT AFFECTING FAMILY: Primary | ICD-10-CM

## 2022-07-01 DIAGNOSIS — I89.0 LYMPHEDEMA: ICD-10-CM

## 2022-07-01 DIAGNOSIS — R29.898 BILATERAL LEG WEAKNESS: ICD-10-CM

## 2022-07-01 PROCEDURE — 99214 OFFICE O/P EST MOD 30 MIN: CPT | Performed by: FAMILY MEDICINE

## 2022-07-01 PROCEDURE — 96127 BRIEF EMOTIONAL/BEHAV ASSMT: CPT | Performed by: FAMILY MEDICINE

## 2022-07-01 RX ORDER — VENLAFAXINE HYDROCHLORIDE 75 MG/1
150 CAPSULE, EXTENDED RELEASE ORAL DAILY
Qty: 180 CAPSULE | Refills: 3 | Status: SHIPPED | OUTPATIENT
Start: 2022-07-01 | End: 2023-06-08

## 2022-07-01 ASSESSMENT — ANXIETY QUESTIONNAIRES
GAD7 TOTAL SCORE: 4
7. FEELING AFRAID AS IF SOMETHING AWFUL MIGHT HAPPEN: NOT AT ALL
GAD7 TOTAL SCORE: 4
4. TROUBLE RELAXING: NOT AT ALL
1. FEELING NERVOUS, ANXIOUS, OR ON EDGE: SEVERAL DAYS
7. FEELING AFRAID AS IF SOMETHING AWFUL MIGHT HAPPEN: NOT AT ALL
2. NOT BEING ABLE TO STOP OR CONTROL WORRYING: NOT AT ALL
GAD7 TOTAL SCORE: 4
6. BECOMING EASILY ANNOYED OR IRRITABLE: SEVERAL DAYS
5. BEING SO RESTLESS THAT IT IS HARD TO SIT STILL: SEVERAL DAYS
3. WORRYING TOO MUCH ABOUT DIFFERENT THINGS: SEVERAL DAYS
8. IF YOU CHECKED OFF ANY PROBLEMS, HOW DIFFICULT HAVE THESE MADE IT FOR YOU TO DO YOUR WORK, TAKE CARE OF THINGS AT HOME, OR GET ALONG WITH OTHER PEOPLE?: SOMEWHAT DIFFICULT

## 2022-07-01 ASSESSMENT — PATIENT HEALTH QUESTIONNAIRE - PHQ9
SUM OF ALL RESPONSES TO PHQ QUESTIONS 1-9: 4
SUM OF ALL RESPONSES TO PHQ QUESTIONS 1-9: 4
10. IF YOU CHECKED OFF ANY PROBLEMS, HOW DIFFICULT HAVE THESE PROBLEMS MADE IT FOR YOU TO DO YOUR WORK, TAKE CARE OF THINGS AT HOME, OR GET ALONG WITH OTHER PEOPLE: NOT DIFFICULT AT ALL

## 2022-07-01 ASSESSMENT — PAIN SCALES - GENERAL: PAINLEVEL: SEVERE PAIN (6)

## 2022-07-01 NOTE — PROGRESS NOTES
Assessment/Plan:    Stressful life event affecting family  Patient shares devastating news of  diagnosis with chronic dementia likely Alzheimer's variety.  's slums score 13 out of 30 and now on donepezil.  Discussed with patient support that she needs well remaining caretaker for her .  Will increase venlafaxine XR 75 mg up to 150 mg daily and may also utilize trazodone 50 mg at bedtime by increasing up to 100 mg at bedtime as needed.  Anticipate reassessment in office in approximately 3 months, sooner if concerns or suboptimal management.    Bilateral leg weakness  Bilateral leg weakness and continues to utilize walker to assist with ambulation and provide falls prevention    Recurrent major depressive disorder, remission status unspecified (H)  As above, increasing venlafaxine to extended release 75 mg up to 150 mg daily.  PHQ-9 questionnaire 4 out of 27 and YEE-7 questionnaire 4 out of 21 and will anticipate reassessment at follow-up office visit in approximately 3 months.  - venlafaxine (EFFEXOR XR) 75 MG 24 hr capsule  Dispense: 180 capsule; Refill: 3    Lymphedema  Lymphedema management discussed.  Patient requesting compression stocking with zipper to help with self application.  Had seen Dr. Sheela Alfred historically.  She is no longer with the organization.  - Orthotics and Prosthetics DME Compression; Leg; Knee; Bilateral; 20/30 mmHg; 4 Pair          Subjective:    Dyana Nicole is seen today for stressful life event.   diagnosed with chronic dementia and Alzheimer's variety likely.  Slums score 13 out of 30 described.  This is devastating news for patient who is been  now for 62 years.  Has social work appointment next week as well to determine whether assistance for patient's spouse and family.  Has support group set up through [employer] next week.  Patient's daughters are very supportive.  Patient with leg swelling historically and has seen Dr. Sheela Alfred in  the past.  Would like compression stockings with zipper to help assist with self application.  Venlafaxine XR 75 mg daily.  Trazodone 50 mg previously half tablet and is recently increased to 1 tablet at bedtime.  Still has some sleep disruption unfortunately due to life stressors.  Comprehensive review of systems as above otherwise all negative.     - Ray x 62 years  3 daughters - Genesis ( at [facility]), Eneida, and Loretta  Smoke - quit in 60s after 1 year   EtOH: sober x 36 years   Surgeries: gallbladder, appy, hysterectomy and ? 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; left CTS release; bladder lift; right TKA; left rotator cuff   Hospitalizations:   [place], [place] - live in community with 65 houses in [place] (1 mile circumference if walk)    Past Surgical History:   Procedure Laterality Date     ARTHROPLASTY KNEE BILATERAL       ARTHROSCOPY SHOULDER ROTATOR CUFF REPAIR       CATARACT EXTRACTION, BILATERAL       CHOLECYSTECTOMY       EXCISE LESION TRUNK Left 1/5/2018    Procedure: LEFT BUTTOCK EXPLORATION, EXCISION BUTTOCK MASS;  Surgeon: Lg Jameson MD;  Location: [facility] OR;  Service:      HYSTERECTOMY       JOINT REPLACEMENT Bilateral     knees     MOHS MICROGRAPHIC PROCEDURE      Nose     OOPHORECTOMY Left     1 removed, 1 remains     OTHER SURGICAL HISTORY      Bladder lift     [place] ARTHROPLASTY TIBIAL PLATEAU      Description: Knee Replacement;  Recorded: 05/16/2013;     [place] INJECT NERV JOE STEWART PERIPH NERV      Description: Peripheral Nerve Block Wrist Median Right;  Recorded: 05/16/2013;     RELEASE CARPAL TUNNEL       REPLACEMENT TOTAL KNEE      L        Family History   Problem Relation Age of Onset     Aneurysm Father         AAA     Cancer Mother         pancrease        Past Medical History:   Diagnosis Date     Anemia, unspecified     Created by Conversion      Arrhythmia      Cervical  spondylosis without myelopathy     Created by Conversion      Depression      Disease of thyroid gland     hypo     GERD (gastroesophageal reflux disease)      High cholesterol      History of transfusion      Myalgia and myositis, unspecified     Created by Conversion      Near syncope      Other and unspecified hyperlipidemia     Created by Conversion      Skin cancer, basal cell      Sleep apnea     mild     Tachycardia      Unspecified essential hypertension     Created by Conversion      Unspecified hereditary and idiopathic peripheral neuropathy     Created by Conversion         Social History     Tobacco Use     Smoking status: Former Smoker     Packs/day: 0.25     Years: 1.00     Pack years: 0.25     Quit date: 1961     Years since quittin.5     Smokeless tobacco: Never Used   Substance Use Topics     Alcohol use: No     Drug use: No        Current Outpatient Medications   Medication Sig Dispense Refill     acetaminophen (TYLENOL) 650 MG CR tablet [ACETAMINOPHEN (TYLENOL) 650 MG CR TABLET] Take 650 mg by mouth as needed for pain.       albuterol (PROAIR HFA/PROVENTIL HFA/VENTOLIN HFA) 108 (90 Base) MCG/ACT inhaler Inhale 2 puffs into the lungs every 6 hours 18 g 1     aspirin 81 MG EC tablet [ASPIRIN 81 MG EC TABLET] Take 81 mg by mouth at bedtime.        azelastine (ASTELIN) 0.1 % nasal spray Spray 2 sprays into both nostrils 2 times daily 30 mL 3     biotin 5 MG CAPS Take 5,000 mcg by mouth       calcium carbonate-vitamin D3 (CALCIUM 600 + D,3,) 600 mg calcium- 200 unit cap [CALCIUM CARBONATE-VITAMIN D3 (CALCIUM 600 + D,3,) 600 MG CALCIUM- 200 UNIT CAP] Take 1 tablet by mouth 2 (two) times a day.              celecoxib (CELEBREX) 200 MG capsule [CELECOXIB (CELEBREX) 200 MG CAPSULE] TAKE ONE CAPSULE BY MOUTH ONE TIME DAILY 90 capsule 3     estrogen conj (PREMARIN) 0.3 MG tablet Take 1 tablet (0.3 mg) by mouth daily 90 tablet 3     furosemide (LASIX) 20 MG tablet [FUROSEMIDE (LASIX) 20 MG TABLET]  TAKE ONE TABLET BY MOUTH ONE TIME DAILY 90 tablet 3     levothyroxine (SYNTHROID/LEVOTHROID) 50 MCG tablet [LEVOTHYROXINE (SYNTHROID, LEVOTHROID) 50 MCG TABLET] TAKE ONE TABLET BY MOUTH ONE TIME DAILY AT 6AM 90 tablet 3     metoprolol tartrate (LOPRESSOR) 50 MG tablet TAKE 75 MG BY MOUTH IN THE MORNING & 50 MG IN THE EVENING 225 tablet 1     multivitamin with minerals (THERA-M) 9 mg iron-400 mcg Tab tablet [MULTIVITAMIN WITH MINERALS (THERA-M) 9 MG IRON-400 MCG TAB TABLET] Take 1 tablet by mouth every evening.       omeprazole (PRILOSEC) 20 MG DR capsule [OMEPRAZOLE (PRILOSEC) 20 MG CAPSULE] TAKE ONE CAPSULE BY MOUTH TWICE DAILY 180 capsule 3     Probiotic Product (MISC INTESTINAL BRANDON REGULAT) CAPS        simvastatin (ZOCOR) 20 MG tablet Take 1 tablet (20 mg) by mouth At Bedtime 90 tablet 3     traZODone (DESYREL) 50 MG tablet Take 1 tablet (50 mg) by mouth At Bedtime 90 tablet 3     triamcinolone (KENALOG) 0.1 % external cream        venlafaxine (EFFEXOR XR) 75 MG 24 hr capsule Take 2 capsules (150 mg) by mouth daily 180 capsule 3          Objective:    Vitals:    07/01/22 1247   BP: 116/70   BP Location: Left arm   Patient Position: Sitting   Cuff Size: Adult Large   Pulse: 71   Temp: 98.9  F (37.2  C)   TempSrc: Oral   SpO2: 97%   Weight: 83.9 kg (185 lb)      Body mass index is 33.84 kg/m .    Alert.  Tearful.  Chest clear.  Cardiac exam regular.  Trace peripheral edema with lymphedema findings present.  No rash.  Status post left external pinna reconstruction following likely Mohs procedure for basal cell carcinoma performed by Dr. Yung approximately 10 days ago.  Follows with Dr. Vanegas dermatologist.      This note has been dictated using voice recognition software and as a result may contain minor grammatical errors and unintended word substitutions.       Answers for HPI/ROS submitted by the patient on 7/1/2022  If you checked off any problems, how difficult have these problems made it for you to do your  work, take care of things at home, or get along with other people?: Not difficult at all  PHQ9 TOTAL SCORE: 4  YEE 7 TOTAL SCORE: 4  What is the reason for your visit today? : Follow up for leg pain  How many servings of fruits and vegetables do you eat daily?: 0-1  On average, how many sweetened beverages do you drink each day (Examples: soda, juice, sweet tea, etc.  Do NOT count diet or artificially sweetened beverages)?: 0  How many minutes a day do you exercise enough to make your heart beat faster?: 9 or less  How many days a week do you exercise enough to make your heart beat faster?: 3 or less  How many days per week do you miss taking your medication?: 0      DME (Durable Medical Equipment) Orders and Documentation  Orders Placed This Encounter   Procedures     Orthotics and Prosthetics DME Compression; Leg; Knee; Bilateral; 20/30 mmHg; 4 Pair      The patient was assessed and it was determined the patient is in need of the following listed DME Supplies/Equipment. Please complete supporting documentation below to demonstrate medical necessity.      DME All Other Item(s) Documentation    List reason for need and supporting documentation for medical necessity below for each DME item.     1.  Lymphedema history.  Requesting compression stockings with zipper to assist with self application.

## 2022-07-05 ENCOUNTER — TRANSFERRED RECORDS (OUTPATIENT)
Dept: HEALTH INFORMATION MANAGEMENT | Facility: CLINIC | Age: 82
End: 2022-07-05

## 2022-07-14 ENCOUNTER — OFFICE VISIT (OUTPATIENT)
Dept: NEUROLOGY | Facility: CLINIC | Age: 82
End: 2022-07-14
Payer: MEDICARE

## 2022-07-14 DIAGNOSIS — F33.0 MAJOR DEPRESSIVE DISORDER, RECURRENT EPISODE, MILD WITH ANXIOUS DISTRESS (H): Primary | ICD-10-CM

## 2022-07-14 PROCEDURE — 90834 PSYTX W PT 45 MINUTES: CPT | Performed by: PSYCHOLOGIST

## 2022-07-14 NOTE — LETTER
7/14/2022         RE: Dyana Nicole  [address]  [facility] [address]        Dear Colleague,    Thank you for referring your patient, Dyana Nicole, to the [facility]. Please see a copy of my visit note below.    Psychology Progress Note    Date: 7/14/2022    Time length and type of treatment: 5084-8775 , individual therapy, in person visit    Necessity: This session is necessary to address depressed and anxious mood secondary to care giver role for her  with Alzheimer's.    We initiate the patient's treatment plan today.  Patient completed the YEE-7 and PHQ-9 on 7/1//2022 with her primary care MD.  Scores were 4 (mild) and 4 (mild) respectively.  Patient signs her treatment plan which we discuss.  Patient's signature serves as consent to psychological services.  Today we focus on the patient's depression and anxiety treatment plan, specifically exploring processing grief, thoughts and expectations of self and others, and cognitive messages that exacerbate depression and anxiety. The reader is invited to review the patient's full treatment plan in the Media section of the patient's Epic medical record.       Intervention: Patient reports doing better.  She attributes this to medication changes which have helped with sleep and stabilized mood.  We revisit the option of enrolling her  in a day program.  We discuss that many of the activities where they live may be too difficult for  to participate.  This writer also encourages the patient to discuss this option with her children.  The patient benefits from reassurance that she is managing the care giver role well and validation that it is a difficult road.    This writer utilized motivational interviewing, active listening, reassurance and support in the context of cognitive behavioral therapy and ACT therapy to address the above.      Mental Status: Orientation to person, place, and time is in  tact.  Recent and remote memory, attention, concentration, language, and fund of knowledge are intact.  Mood appears stable with calm affect.  No indication of suicidal ideation, plan, or intent.  No indication of homicidal ideation, plan or intent.    Progress: Patient reports doing better.  Progress is good with stabilizing mood.    Plan:Patient will return in 1 month when we will continue with cognitive behavioral therapy to promote adaptive coping with care giver role..  Estimated duration of treatment is 4 individual therapy sessions (51498) at 1 month intervals. Treatment is expected to be completed by 10/1/2022.  Further services are warranted to prevent further exacerbation of depressed mood which is common for care givers of loved ones with cognitive decline.     Diagnosis:  MDD, recurrent, mild with anxious distress      Again, thank you for allowing me to participate in the care of your patient.        Sincerely,        Myranda Vizcarra Psy.D, LP

## 2022-07-14 NOTE — PROGRESS NOTES
Psychology Progress Note    Date: 7/14/2022    Time length and type of treatment: 1036-8649 , individual therapy, in person visit    Necessity: This session is necessary to address depressed and anxious mood secondary to care giver role for her  with Alzheimer's.    We initiate the patient's treatment plan today.  Patient completed the YEE-7 and PHQ-9 on 7/1//2022 with her primary care MD.  Scores were 4 (mild) and 4 (mild) respectively.  Patient signs her treatment plan which we discuss.  Patient's signature serves as consent to psychological services.  Today we focus on the patient's depression and anxiety treatment plan, specifically exploring processing grief, thoughts and expectations of self and others, and cognitive messages that exacerbate depression and anxiety. The reader is invited to review the patient's full treatment plan in the Media section of the patient's Epic medical record.       Intervention: Patient reports doing better.  She attributes this to medication changes which have helped with sleep and stabilized mood.  We revisit the option of enrolling her  in a day program.  We discuss that many of the activities where they live may be too difficult for  to participate.  This writer also encourages the patient to discuss this option with her children.  The patient benefits from reassurance that she is managing the care giver role well and validation that it is a difficult road.    This writer utilized motivational interviewing, active listening, reassurance and support in the context of cognitive behavioral therapy and ACT therapy to address the above.      Mental Status: Orientation to person, place, and time is in tact.  Recent and remote memory, attention, concentration, language, and fund of knowledge are intact.  Mood appears stable with calm affect.  No indication of suicidal ideation, plan, or intent.  No indication of homicidal ideation, plan or intent.    Progress:  Patient reports doing better.  Progress is good with stabilizing mood.    Plan:Patient will return in 1 month when we will continue with cognitive behavioral therapy to promote adaptive coping with care giver role..  Estimated duration of treatment is 4 individual therapy sessions (88105) at 1 month intervals. Treatment is expected to be completed by 10/1/2022.  Further services are warranted to prevent further exacerbation of depressed mood which is common for care givers of loved ones with cognitive decline.     Diagnosis:  MDD, recurrent, mild with anxious distress

## 2022-08-16 DIAGNOSIS — K21.9 GASTROESOPHAGEAL REFLUX DISEASE WITHOUT ESOPHAGITIS: ICD-10-CM

## 2022-08-17 NOTE — TELEPHONE ENCOUNTER
"Routing refill request to provider for review/approval because:  Early refill requested.    Last Written Prescription Date:  10/6/21  Last Fill Quantity: 180,  # refills: 3   Last office visit provider:  7/1/22     Requested Prescriptions   Pending Prescriptions Disp Refills     omeprazole (PRILOSEC) 20 MG DR capsule [Pharmacy Med Name: Omeprazole Oral Capsule Delayed Release 20 MG] 180 capsule 0     Sig: TAKE ONE CAPSULE BY MOUTH TWICE DAILY       PPI Protocol Passed - 8/17/2022  8:58 AM        Passed - Not on Clopidogrel (unless Pantoprazole ordered)        Passed - No diagnosis of osteoporosis on record        Passed - Recent (12 mo) or future (30 days) visit within the authorizing provider's specialty     Patient has had an office visit with the authorizing provider or a provider within the authorizing providers department within the previous 12 mos or has a future within next 30 days. See \"Patient Info\" tab in inbasket, or \"Choose Columns\" in Meds & Orders section of the refill encounter.              Passed - Medication is active on med list        Passed - Patient is age 18 or older        Passed - No active pregnacy on record        Passed - No positive pregnancy test in past 12 months             Jersey Chaudhari RN 08/17/22 8:58 AM  "

## 2022-08-25 ENCOUNTER — TRANSFERRED RECORDS (OUTPATIENT)
Dept: HEALTH INFORMATION MANAGEMENT | Facility: CLINIC | Age: 82
End: 2022-08-25

## 2022-08-30 ENCOUNTER — TRANSFERRED RECORDS (OUTPATIENT)
Dept: HEALTH INFORMATION MANAGEMENT | Facility: CLINIC | Age: 82
End: 2022-08-30

## 2022-09-09 DIAGNOSIS — F33.9 RECURRENT MAJOR DEPRESSIVE DISORDER, REMISSION STATUS UNSPECIFIED (H): ICD-10-CM

## 2022-09-10 NOTE — TELEPHONE ENCOUNTER
"Drug interaction warning    Last Written Prescription Date:  10/6/21  Last Fill Quantity: 90,  # refills: 3   Last office visit provider:  7/1/22     Requested Prescriptions   Pending Prescriptions Disp Refills     traZODone (DESYREL) 50 MG tablet [Pharmacy Med Name: traZODone HCl Oral Tablet 50 MG] 90 tablet 0     Sig: TAKE 1 TABLET BY MOUTH AT BEDTIME       Serotonin Modulators Passed - 9/9/2022  2:16 PM        Passed - Recent (12 mo) or future (30 days) visit within the authorizing provider's specialty     Patient has had an office visit with the authorizing provider or a provider within the authorizing providers department within the previous 12 mos or has a future within next 30 days. See \"Patient Info\" tab in inbasket, or \"Choose Columns\" in Meds & Orders section of the refill encounter.              Passed - Medication is active on med list        Passed - Patient is age 18 or older        Passed - No active pregnancy on record        Passed - No positive pregnancy test in past 12 months             Kandis Conway RN 09/10/22 2:00 PM  "

## 2022-09-12 RX ORDER — TRAZODONE HYDROCHLORIDE 50 MG/1
TABLET, FILM COATED ORAL
Qty: 90 TABLET | Refills: 2 | Status: SHIPPED | OUTPATIENT
Start: 2022-09-12 | End: 2023-04-12

## 2022-09-18 ENCOUNTER — HEALTH MAINTENANCE LETTER (OUTPATIENT)
Age: 82
End: 2022-09-18

## 2022-09-19 ENCOUNTER — NURSE TRIAGE (OUTPATIENT)
Dept: NURSING | Facility: CLINIC | Age: 82
End: 2022-09-19

## 2022-09-19 NOTE — TELEPHONE ENCOUNTER
Daughter Genesis calling to get patient seen for Personality changes, and behavior changes.   Mother has been swearing , and acting out of sorts lately. She does have memory issues too.  Daughter made appointment for her mom tomorrow at       Appointment made for tomorrow at [facility] at 0710 with Debbie Church CNP..    Cindy Valera RN   [facility] Nurse Advisor      Reason for Disposition    Anxiety or panic attack is main concern    Depression is main problem or symptom (e.g., feelings of sadness or hopelessness)    [1] Depression AND [2] worsening (e.g.,sleeping poorly, less able to do activities of daily living)    Additional Information    Negative: SEVERE difficulty breathing (e.g., struggling for each breath, speaks in single words)    Negative: Bluish (or gray) lips or face now    Negative: Difficult to awaken or acting confused (e.g., disoriented, slurred speech)    Negative: Hysterical or combative behavior    Negative: Sounds like a life-threatening emergency to the triager    Negative: Chest pain    Negative: Palpitations, skipped heart beat, or rapid heart beat    Negative: Cough is main symptom    Negative: Suicide thoughts, threats, attempts, or questions    Negative: [1] Depression AND [2] unable to do any of normal activities (e.g., self care, school, work; in comparison to baseline).    Negative: Suicide thoughts, threats, attempts, or questions    Negative: Questions or concerns about alcohol use, unhealthy alcohol use, binge drinking, intoxication, or withdrawal    Negative: Questions or concerns about substance use (drug use), unhealthy drug use, intoxication, or withdrawal    Negative: Questions or concerns about bipolar disorder (manic depression)    Negative: Questions or concerns about depression during the postpartum period (< 1 year since delivery)    Negative: Patient attempted suicide    Negative: Patient is threatening suicide now    Negative: Violent behavior, or threatening to  physically hurt or kill someone    Negative: [1] Patient is very confused (disoriented, slurred speech) AND [2] no other adult (e.g., friend or family member) available    Negative: [1] Difficult to awaken or acting very confused (disoriented, slurred speech) AND [2] new-onset    Negative: Sounds like a life-threatening emergency to the triager    Negative: Very strange or confused behavior    Negative: Patient sounds very sick or weak to the triager    Protocols used: DEMENTIA SYMPTOMS AND [id number], ANXIETY AND PANIC ATTACK-A-AH, DEPRESSION-A-AH

## 2022-09-20 ENCOUNTER — OFFICE VISIT (OUTPATIENT)
Dept: INTERNAL MEDICINE | Facility: CLINIC | Age: 82
End: 2022-09-20
Payer: MEDICARE

## 2022-09-20 VITALS
OXYGEN SATURATION: 96 % | RESPIRATION RATE: 18 BRPM | TEMPERATURE: 97.8 F | HEART RATE: 64 BPM | DIASTOLIC BLOOD PRESSURE: 68 MMHG | BODY MASS INDEX: 34.04 KG/M2 | SYSTOLIC BLOOD PRESSURE: 112 MMHG | WEIGHT: 186.1 LBS

## 2022-09-20 DIAGNOSIS — E03.9 ACQUIRED HYPOTHYROIDISM: ICD-10-CM

## 2022-09-20 DIAGNOSIS — E78.2 MIXED HYPERLIPIDEMIA: Primary | ICD-10-CM

## 2022-09-20 DIAGNOSIS — M25.50 ARTHRALGIA OF MULTIPLE JOINTS: ICD-10-CM

## 2022-09-20 DIAGNOSIS — R46.89 COGNITIVE AND BEHAVIORAL CHANGES: ICD-10-CM

## 2022-09-20 DIAGNOSIS — R41.89 COGNITIVE AND BEHAVIORAL CHANGES: ICD-10-CM

## 2022-09-20 DIAGNOSIS — I87.2 VENOUS INSUFFICIENCY OF BOTH LOWER EXTREMITIES: ICD-10-CM

## 2022-09-20 DIAGNOSIS — I51.7 RIGHT ATRIAL ENLARGEMENT: ICD-10-CM

## 2022-09-20 DIAGNOSIS — N30.00 ACUTE CYSTITIS WITHOUT HEMATURIA: ICD-10-CM

## 2022-09-20 DIAGNOSIS — E53.8 VITAMIN B12 DEFICIENCY (NON ANEMIC): ICD-10-CM

## 2022-09-20 DIAGNOSIS — E55.9 VITAMIN D DEFICIENCY: ICD-10-CM

## 2022-09-20 PROBLEM — D62 ANEMIA DUE TO BLOOD LOSS, ACUTE: Status: RESOLVED | Noted: 2018-12-27 | Resolved: 2022-09-20

## 2022-09-20 LAB
ALBUMIN UR-MCNC: NEGATIVE MG/DL
ANION GAP SERPL CALCULATED.3IONS-SCNC: 7 MMOL/L (ref 7–15)
APPEARANCE UR: ABNORMAL
BACTERIA #/AREA URNS HPF: ABNORMAL /HPF
BILIRUB UR QL STRIP: NEGATIVE
BUN SERPL-MCNC: 19.6 MG/DL (ref 8–23)
CALCIUM SERPL-MCNC: 9.4 MG/DL (ref 8.8–10.2)
CHLORIDE SERPL-SCNC: 103 MMOL/L (ref 98–107)
CHOLEST SERPL-MCNC: 214 MG/DL
COLOR UR AUTO: YELLOW
CREAT SERPL-MCNC: 0.82 MG/DL (ref 0.51–0.95)
DEPRECATED CALCIDIOL+CALCIFEROL SERPL-MC: 62 UG/L (ref 20–75)
DEPRECATED HCO3 PLAS-SCNC: 31 MMOL/L (ref 22–29)
ERYTHROCYTE [DISTWIDTH] IN BLOOD BY AUTOMATED COUNT: 13.5 % (ref 10–15)
GFR SERPL CREATININE-BSD FRML MDRD: 71 ML/MIN/1.73M2
GLUCOSE SERPL-MCNC: 88 MG/DL (ref 70–99)
GLUCOSE UR STRIP-MCNC: NEGATIVE MG/DL
HCT VFR BLD AUTO: 39 % (ref 35–47)
HDLC SERPL-MCNC: 84 MG/DL
HGB BLD-MCNC: 12.5 G/DL (ref 11.7–15.7)
HGB UR QL STRIP: NEGATIVE
KETONES UR STRIP-MCNC: NEGATIVE MG/DL
LDLC SERPL CALC-MCNC: 112 MG/DL
LEUKOCYTE ESTERASE UR QL STRIP: ABNORMAL
MAGNESIUM SERPL-MCNC: 2.2 MG/DL (ref 1.7–2.3)
MCH RBC QN AUTO: 31 PG (ref 26.5–33)
MCHC RBC AUTO-ENTMCNC: 32.1 G/DL (ref 31.5–36.5)
MCV RBC AUTO: 97 FL (ref 78–100)
NITRATE UR QL: NEGATIVE
NONHDLC SERPL-MCNC: 130 MG/DL
PH UR STRIP: 7 [PH] (ref 5–8)
PLATELET # BLD AUTO: 232 10E3/UL (ref 150–450)
POTASSIUM SERPL-SCNC: 4.6 MMOL/L (ref 3.4–5.3)
RBC # BLD AUTO: 4.03 10E6/UL (ref 3.8–5.2)
RBC #/AREA URNS AUTO: ABNORMAL /HPF
SODIUM SERPL-SCNC: 141 MMOL/L (ref 136–145)
SP GR UR STRIP: 1.02 (ref 1–1.03)
SQUAMOUS #/AREA URNS AUTO: ABNORMAL /LPF
TRIGL SERPL-MCNC: 89 MG/DL
TSH SERPL DL<=0.005 MIU/L-ACNC: 2.77 UIU/ML (ref 0.3–4.2)
UROBILINOGEN UR STRIP-ACNC: 0.2 E.U./DL
VIT B12 SERPL-MCNC: 796 PG/ML (ref 232–1245)
WBC # BLD AUTO: 7.2 10E3/UL (ref 4–11)
WBC #/AREA URNS AUTO: ABNORMAL /HPF

## 2022-09-20 PROCEDURE — 84443 ASSAY THYROID STIM HORMONE: CPT | Performed by: NURSE PRACTITIONER

## 2022-09-20 PROCEDURE — 36415 COLL VENOUS BLD VENIPUNCTURE: CPT | Performed by: NURSE PRACTITIONER

## 2022-09-20 PROCEDURE — 82607 VITAMIN B-12: CPT | Performed by: NURSE PRACTITIONER

## 2022-09-20 PROCEDURE — 80061 LIPID PANEL: CPT | Performed by: NURSE PRACTITIONER

## 2022-09-20 PROCEDURE — 87086 URINE CULTURE/COLONY COUNT: CPT | Performed by: NURSE PRACTITIONER

## 2022-09-20 PROCEDURE — 81001 URINALYSIS AUTO W/SCOPE: CPT | Performed by: NURSE PRACTITIONER

## 2022-09-20 PROCEDURE — 80048 BASIC METABOLIC PNL TOTAL CA: CPT | Performed by: NURSE PRACTITIONER

## 2022-09-20 PROCEDURE — 82306 VITAMIN D 25 HYDROXY: CPT | Performed by: NURSE PRACTITIONER

## 2022-09-20 PROCEDURE — 99215 OFFICE O/P EST HI 40 MIN: CPT | Performed by: NURSE PRACTITIONER

## 2022-09-20 PROCEDURE — 83735 ASSAY OF MAGNESIUM: CPT | Performed by: NURSE PRACTITIONER

## 2022-09-20 PROCEDURE — 85027 COMPLETE CBC AUTOMATED: CPT | Performed by: NURSE PRACTITIONER

## 2022-09-20 RX ORDER — SULFAMETHOXAZOLE/TRIMETHOPRIM 800-160 MG
1 TABLET ORAL 2 TIMES DAILY
Qty: 10 TABLET | Refills: 0 | Status: SHIPPED | OUTPATIENT
Start: 2022-09-20 | End: 2022-09-25

## 2022-09-20 ASSESSMENT — ANXIETY QUESTIONNAIRES
2. NOT BEING ABLE TO STOP OR CONTROL WORRYING: SEVERAL DAYS
8. IF YOU CHECKED OFF ANY PROBLEMS, HOW DIFFICULT HAVE THESE MADE IT FOR YOU TO DO YOUR WORK, TAKE CARE OF THINGS AT HOME, OR GET ALONG WITH OTHER PEOPLE?: EXTREMELY DIFFICULT
IF YOU CHECKED OFF ANY PROBLEMS ON THIS QUESTIONNAIRE, HOW DIFFICULT HAVE THESE PROBLEMS MADE IT FOR YOU TO DO YOUR WORK, TAKE CARE OF THINGS AT HOME, OR GET ALONG WITH OTHER PEOPLE: EXTREMELY DIFFICULT
1. FEELING NERVOUS, ANXIOUS, OR ON EDGE: MORE THAN HALF THE DAYS
3. WORRYING TOO MUCH ABOUT DIFFERENT THINGS: SEVERAL DAYS
GAD7 TOTAL SCORE: 9
GAD7 TOTAL SCORE: 9
7. FEELING AFRAID AS IF SOMETHING AWFUL MIGHT HAPPEN: SEVERAL DAYS
4. TROUBLE RELAXING: SEVERAL DAYS
GAD7 TOTAL SCORE: 9
6. BECOMING EASILY ANNOYED OR IRRITABLE: NEARLY EVERY DAY
7. FEELING AFRAID AS IF SOMETHING AWFUL MIGHT HAPPEN: SEVERAL DAYS
5. BEING SO RESTLESS THAT IT IS HARD TO SIT STILL: NOT AT ALL

## 2022-09-20 ASSESSMENT — PATIENT HEALTH QUESTIONNAIRE - PHQ9
SUM OF ALL RESPONSES TO PHQ QUESTIONS 1-9: 16
SUM OF ALL RESPONSES TO PHQ QUESTIONS 1-9: 16
10. IF YOU CHECKED OFF ANY PROBLEMS, HOW DIFFICULT HAVE THESE PROBLEMS MADE IT FOR YOU TO DO YOUR WORK, TAKE CARE OF THINGS AT HOME, OR GET ALONG WITH OTHER PEOPLE: VERY DIFFICULT

## 2022-09-20 NOTE — PROGRESS NOTES
"  Assessment & Plan   Problem List Items Addressed This Visit        Digestive    Vitamin D deficiency    Relevant Orders    Vitamin D deficiency screening       Endocrine    Hyperlipidemia - Primary    Relevant Orders    Lipid panel reflex to direct LDL Non-fasting    Hypothyroidism    Relevant Orders    TSH with free T4 reflex      Other Visit Diagnoses     Cognitive and behavioral changes        Relevant Orders    UA Macro with Reflex to Micro and Culture - lab collect (Completed)    Basic metabolic panel    Magnesium    CBC with platelets (Completed)    Urine Microscopic Exam (Completed)    Urine Culture    Vitamin B12 deficiency (non anemic)        Relevant Orders    Vitamin B12         - Due to reported cognitive and behavioral changes, will do labs as noted above to look for organic cause. I am concerned about the potential for a UA but there are no other obvious symptoms besides reported forgetfulness that would overtly suggest a vitamin deficiency, thyroid abnormality, or infection. If labs are normal, my suspicion is that symptoms relate to her mental health as she is under a lot of stress. She has a follow-up appointment with psychiatry scheduled.  She is also encouraged to follow-up with psychology and her primary care provider regarding her medications for depression.    Time: 40 minutes spent on date of encounter on chart review, patient visit, documentation, and lab interpretation      BMI:   Estimated body mass index is 34.04 kg/m  as calculated from the following:    Height as of 5/3/22: 1.575 m (5' 2\").    Weight as of this encounter: 84.4 kg (186 lb 1.6 oz).       No follow-ups on file.    Debbie Church NP  [facility] AKANKSHA Nick is a 82 year old female with a PMH of vitamin D deficiency, venous hypertension of both lower extremities, recurrent major depression disorder, peripheral neuropathy, obstructive sleep apnea on CPAP, microscopic colitis, hypothyroidism, " "hypertension, hyperlipidemia, GERD, fibromyalgia, generalized anxiety disorder, essential hypertension, and obesityaccompanied by her daughter, Genesis, presenting for the following health issues:  Follow Up (Mental health)    Her daughter called the clinic yesterday on 9/19 reporting behavioral changes including swearing and not acting like herself.  She does have a history of depression and generalized anxiety disorder and sees a psychologist.  Her last primary care provider appointment in July they had discussed her 's new diagnosis of chronic dementia, likely Alzheimer's.  Venlafaxine was increased from 75 mg to 150 mg daily as well as trazodone up to 100 mg at bedtime as needed. She reports feeling \"not like myself\". She feels \"mixed up\" and has said things to her kids that she doesn't recall saying or that are uncharacteristic in the past 2 months. She had an appointment with psychiatry at the end of August but cancelled it because she was feeling better at the time. Her daughter notes that she has been impulsive, more labile mood, and irritable/mood. She has urinary incontinence, no dysuria or flank pain. No other changes in her baseline symptoms.     PHQ 3/31/2022 7/1/2022 9/20/2022   PHQ-9 Total Score 3 4 16   Q9: Thoughts of better off dead/self-harm past 2 weeks Not at all Not at all Several days   F/U: Thoughts of suicide or self-harm - - Yes   F/U: Self harm-plan - - No   F/U: Self-harm action - - No   F/U: Safety concerns - - Yes         History of Present Illness       Mental Health Follow-up:  Patient presents to follow-up on Depression & Anxiety.Patient's depression since last visit has been:  Bad  The patient is having other symptoms associated with depression.  Patient's anxiety since last visit has been:  Bad  The patient is having other symptoms associated with anxiety.  Any significant life events: grief or loss  Patient is not feeling anxious or having panic attacks.  Patient has no " concerns about alcohol or drug use.    She eats 0-1 servings of fruits and vegetables daily.She consumes 0 sweetened beverage(s) daily.She exercises with enough effort to increase her heart rate 9 or less minutes per day.  She exercises with enough effort to increase her heart rate 3 or less days per week.   She is taking medications regularly.    Today's PHQ-9         PHQ-9 Total Score: 16    PHQ-9 Q9 Thoughts of better off dead/self-harm past 2 weeks :   Several days  Thoughts of suicide or self harm: (P) Yes  Self-harm Plan:   (P) No  Self-harm Action:     (P) No  Safety concerns for self or others: (P) Yes    How difficult have these problems made it for you to do your work, take care of things at home, or get along with other people: Very difficult  Today's YEE-7 Score: 9    Review of Systems- pertinent positive in bold:  Constitutional: Fever, chills  Eyes: change in vision, blurred or double vision  Ears, nose, mouth, throat: change in hearing,ear pain, hoarseness, difficulty swallowing, sores in the mouth or throat  Respiratory: shortness of breath, cough, bloody sputum, wheezing  Cardiovascular: chest pain, palpitations   Gastrointestinal: abdominalpain, heartburn/indigestion, nausea/vomiting, change in appetite, change in bowel habits, constipation or diarrhea, rectal bleeding/dark stools, difficulty swallowing  Urinary: painful urination, frequent urination,urinary urgency/incontinence, blood in urine/dark urine, nocturia  Hematologic/lymphatic: swollen lymph glands, abnormal bruising/bleeding  Endocrine:excessive thirst/urination, cold or heat intolerance  Neurologic/emotional: worrisome memory change, anxiety, mood swings, paresthesias feet r/t neuropathy        Objective    /68 (BP Location: Right arm, Patient Position: Sitting, Cuff Size: Adult Regular)   Pulse 64   Temp 97.8  F (36.6  C) (Oral)   Resp 18   Wt 84.4 kg (186 lb 1.6 oz)   SpO2 96%   BMI 34.04 kg/m    Body mass index is 34.04  kg/m .     Mini mental exam  Clock draw: normal  3-word recall: 1 word recalled     Physical Exam   GENERAL: Alert and no distress  RESP: lungs clear to auscultation - no rales, rhonchi or wheezes  CV: regular rate and rhythm, normal S1 S2. Systolic murmur heard best RSB  : no CVA tenderness   NEURO: Normal strength and tone, mentation intact and speech normal. Oriented to person, place, and time   PSYCH: mentation appears normal, affect normal/bright

## 2022-09-21 LAB — BACTERIA UR CULT: NORMAL

## 2022-09-22 RX ORDER — LEVOTHYROXINE SODIUM 50 UG/1
TABLET ORAL
Qty: 90 TABLET | Refills: 3 | Status: SHIPPED | OUTPATIENT
Start: 2022-09-22 | End: 2023-09-18

## 2022-09-22 RX ORDER — FUROSEMIDE 20 MG
TABLET ORAL
Qty: 90 TABLET | Refills: 3 | Status: SHIPPED | OUTPATIENT
Start: 2022-09-22 | End: 2023-09-18

## 2022-09-22 RX ORDER — CELECOXIB 200 MG/1
CAPSULE ORAL
Qty: 90 CAPSULE | Refills: 3 | Status: SHIPPED | OUTPATIENT
Start: 2022-09-22 | End: 2023-09-18

## 2022-09-22 NOTE — TELEPHONE ENCOUNTER
"Last Written Prescription Date:  9/24/21  Last Fill Quantity: 90,  # refills: 3   Last office visit provider:  9/20/22     Requested Prescriptions   Pending Prescriptions Disp Refills     celecoxib (CELEBREX) 200 MG capsule [Pharmacy Med Name: Celecoxib Oral Capsule 200 MG] 90 capsule 0     Sig: TAKE ONE CAPSULE BY MOUTH ONE TIME DAILY       NSAID Medications Failed - 9/22/2022 10:21 AM        Failed - Patient is age 6-64 years        Passed - Blood pressure under 140/90 in past 12 months     BP Readings from Last 3 Encounters:   09/20/22 112/68   07/01/22 116/70   05/12/22 110/62                 Passed - Normal ALT on file in past 12 months     Recent Labs   Lab Test 05/03/22  1424   ALT 19             Passed - Normal AST on file in past 12 months     Recent Labs   Lab Test 05/03/22  1424   AST 23             Passed - Recent (12 mo) or future (30 days) visit within the authorizing provider's specialty     Patient has had an office visit with the authorizing provider or a provider within the authorizing providers department within the previous 12 mos or has a future within next 30 days. See \"Patient Info\" tab in inbasket, or \"Choose Columns\" in Meds & Orders section of the refill encounter.              Passed - Normal CBC on file in past 12 months     Recent Labs   Lab Test 09/20/22  0820   WBC 7.2   RBC 4.03   HGB 12.5   HCT 39.0                    Passed - Medication is active on med list        Passed - No active pregnancy on record        Passed - Normal serum creatinine on file in past 12 months     Recent Labs   Lab Test 09/20/22  0820   CR 0.82       Ok to refill medication if creatinine is low          Passed - No positive pregnancy test in past 12 months           furosemide (LASIX) 20 MG tablet [Pharmacy Med Name: Furosemide Oral Tablet 20 MG] 90 tablet 0     Sig: TAKE ONE TABLET BY MOUTH ONE TIME DAILY       Diuretics (Including Combos) Protocol Passed - 9/22/2022 10:21 AM        Passed - Blood " "pressure under 140/90 in past 12 months     BP Readings from Last 3 Encounters:   09/20/22 112/68   07/01/22 116/70   05/12/22 110/62                 Passed - Recent (12 mo) or future (30 days) visit within the authorizing provider's specialty     Patient has had an office visit with the authorizing provider or a provider within the authorizing providers department within the previous 12 mos or has a future within next 30 days. See \"Patient Info\" tab in inbasket, or \"Choose Columns\" in Meds & Orders section of the refill encounter.              Passed - Medication is active on med list        Passed - Patient is age 18 or older        Passed - No active pregancy on record        Passed - Normal serum creatinine on file in past 12 months     Recent Labs   Lab Test 09/20/22  0820   CR 0.82              Passed - Normal serum potassium on file in past 12 months     Recent Labs   Lab Test 09/20/22  0820   POTASSIUM 4.6                    Passed - Normal serum sodium on file in past 12 months     Recent Labs   Lab Test 09/20/22  0820                 Passed - No positive pregnancy test in past 12 months           levothyroxine (SYNTHROID/LEVOTHROID) 50 MCG tablet [Pharmacy Med Name: Levothyroxine Sodium Oral Tablet 50 MCG] 90 tablet 0     Sig: TAKE ONE TABLET BY MOUTH ONE TIME DAILY at 6am       Thyroid Protocol Passed - 9/20/2022 12:08 PM        Passed - Patient is 12 years or older        Passed - Recent (12 mo) or future (30 days) visit within the authorizing provider's specialty     Patient has had an office visit with the authorizing provider or a provider within the authorizing providers department within the previous 12 mos or has a future within next 30 days. See \"Patient Info\" tab in inbasket, or \"Choose Columns\" in Meds & Orders section of the refill encounter.              Passed - Medication is active on med list        Passed - Normal TSH on file in past 12 months     Recent Labs   Lab Test " 09/20/22  0820   TSH 2.77              Passed - No active pregnancy on record     If patient is pregnant or has had a positive pregnancy test, please check TSH.          Passed - No positive pregnancy test in past 12 months     If patient is pregnant or has had a positive pregnancy test, please check TSH.               Jersey Chaudhari RN 09/22/22 10:22 AM

## 2022-09-22 NOTE — TELEPHONE ENCOUNTER
"Routing refill request to provider for review/approval because:  Age warning    Last Written Prescription Date:  10/6/21  Last Fill Quantity: 90,  # refills: 3   Last office visit provider:  9/20/22     Requested Prescriptions   Pending Prescriptions Disp Refills     celecoxib (CELEBREX) 200 MG capsule [Pharmacy Med Name: Celecoxib Oral Capsule 200 MG] 90 capsule 0     Sig: TAKE ONE CAPSULE BY MOUTH ONE TIME DAILY       NSAID Medications Failed - 9/22/2022 10:21 AM        Failed - Patient is age 6-64 years        Passed - Blood pressure under 140/90 in past 12 months     BP Readings from Last 3 Encounters:   09/20/22 112/68   07/01/22 116/70   05/12/22 110/62                 Passed - Normal ALT on file in past 12 months     Recent Labs   Lab Test 05/03/22  1424   ALT 19             Passed - Normal AST on file in past 12 months     Recent Labs   Lab Test 05/03/22  1424   AST 23             Passed - Recent (12 mo) or future (30 days) visit within the authorizing provider's specialty     Patient has had an office visit with the authorizing provider or a provider within the authorizing providers department within the previous 12 mos or has a future within next 30 days. See \"Patient Info\" tab in inbasket, or \"Choose Columns\" in Meds & Orders section of the refill encounter.              Passed - Normal CBC on file in past 12 months     Recent Labs   Lab Test 09/20/22  0820   WBC 7.2   RBC 4.03   HGB 12.5   HCT 39.0                    Passed - Medication is active on med list        Passed - No active pregnancy on record        Passed - Normal serum creatinine on file in past 12 months     Recent Labs   Lab Test 09/20/22  0820   CR 0.82       Ok to refill medication if creatinine is low          Passed - No positive pregnancy test in past 12 months         Signed Prescriptions Disp Refills    furosemide (LASIX) 20 MG tablet 90 tablet 3     Sig: TAKE ONE TABLET BY MOUTH ONE TIME DAILY       Diuretics (Including " "Combos) Protocol Passed - 9/22/2022 10:21 AM        Passed - Blood pressure under 140/90 in past 12 months     BP Readings from Last 3 Encounters:   09/20/22 112/68   07/01/22 116/70   05/12/22 110/62                 Passed - Recent (12 mo) or future (30 days) visit within the authorizing provider's specialty     Patient has had an office visit with the authorizing provider or a provider within the authorizing providers department within the previous 12 mos or has a future within next 30 days. See \"Patient Info\" tab in inbasket, or \"Choose Columns\" in Meds & Orders section of the refill encounter.              Passed - Medication is active on med list        Passed - Patient is age 18 or older        Passed - No active pregancy on record        Passed - Normal serum creatinine on file in past 12 months     Recent Labs   Lab Test 09/20/22  0820   CR 0.82              Passed - Normal serum potassium on file in past 12 months     Recent Labs   Lab Test 09/20/22  0820   POTASSIUM 4.6                    Passed - Normal serum sodium on file in past 12 months     Recent Labs   Lab Test 09/20/22  0820                 Passed - No positive pregnancy test in past 12 months          levothyroxine (SYNTHROID/LEVOTHROID) 50 MCG tablet 90 tablet 3     Sig: TAKE ONE TABLET BY MOUTH ONE TIME DAILY at 6am       Thyroid Protocol Passed - 9/20/2022 12:08 PM        Passed - Patient is 12 years or older        Passed - Recent (12 mo) or future (30 days) visit within the authorizing provider's specialty     Patient has had an office visit with the authorizing provider or a provider within the authorizing providers department within the previous 12 mos or has a future within next 30 days. See \"Patient Info\" tab in inbasket, or \"Choose Columns\" in Meds & Orders section of the refill encounter.              Passed - Medication is active on med list        Passed - Normal TSH on file in past 12 months     Recent Labs   Lab Test " 09/20/22  0820   TSH 2.77              Passed - No active pregnancy on record     If patient is pregnant or has had a positive pregnancy test, please check TSH.          Passed - No positive pregnancy test in past 12 months     If patient is pregnant or has had a positive pregnancy test, please check TSH.               Jersey Chaudhari RN 09/22/22 10:23 AM

## 2022-09-30 ASSESSMENT — ENCOUNTER SYMPTOMS
HEMATURIA: 0
WEAKNESS: 1
BREAST MASS: 0
SORE THROAT: 0
COUGH: 1
NAUSEA: 0
CONSTIPATION: 0
ABDOMINAL PAIN: 0
PARESTHESIAS: 0
JOINT SWELLING: 1
DYSURIA: 0
EYE PAIN: 0
MYALGIAS: 0
DIZZINESS: 0
FREQUENCY: 1
DIARRHEA: 1
HEARTBURN: 1
SHORTNESS OF BREATH: 1
HEMATOCHEZIA: 0
FEVER: 0
HEADACHES: 0
PALPITATIONS: 0
NERVOUS/ANXIOUS: 1
CHILLS: 0
ARTHRALGIAS: 1

## 2022-09-30 ASSESSMENT — PATIENT HEALTH QUESTIONNAIRE - PHQ9: SUM OF ALL RESPONSES TO PHQ QUESTIONS 1-9: 10

## 2022-09-30 ASSESSMENT — ACTIVITIES OF DAILY LIVING (ADL): CURRENT_FUNCTION: HOUSEWORK REQUIRES ASSISTANCE

## 2022-10-07 ENCOUNTER — OFFICE VISIT (OUTPATIENT)
Dept: FAMILY MEDICINE | Facility: CLINIC | Age: 82
End: 2022-10-07
Payer: MEDICARE

## 2022-10-07 VITALS
WEIGHT: 185 LBS | BODY MASS INDEX: 34.93 KG/M2 | DIASTOLIC BLOOD PRESSURE: 70 MMHG | HEIGHT: 61 IN | HEART RATE: 108 BPM | OXYGEN SATURATION: 93 % | SYSTOLIC BLOOD PRESSURE: 120 MMHG

## 2022-10-07 DIAGNOSIS — R23.2 HOT FLASHES: ICD-10-CM

## 2022-10-07 DIAGNOSIS — I10 ESSENTIAL HYPERTENSION WITH GOAL BLOOD PRESSURE LESS THAN 140/90: ICD-10-CM

## 2022-10-07 DIAGNOSIS — G60.9 HEREDITARY AND IDIOPATHIC PERIPHERAL NEUROPATHY: ICD-10-CM

## 2022-10-07 DIAGNOSIS — E78.2 MIXED HYPERLIPIDEMIA: ICD-10-CM

## 2022-10-07 DIAGNOSIS — E66.811 CLASS 1 OBESITY DUE TO EXCESS CALORIES WITH SERIOUS COMORBIDITY AND BODY MASS INDEX (BMI) OF 34.0 TO 34.9 IN ADULT: ICD-10-CM

## 2022-10-07 DIAGNOSIS — R29.898 BILATERAL LEG WEAKNESS: ICD-10-CM

## 2022-10-07 DIAGNOSIS — E03.9 ACQUIRED HYPOTHYROIDISM: ICD-10-CM

## 2022-10-07 DIAGNOSIS — Z00.00 ENCOUNTER FOR MEDICARE ANNUAL WELLNESS EXAM: Primary | ICD-10-CM

## 2022-10-07 DIAGNOSIS — G47.33 OSA (OBSTRUCTIVE SLEEP APNEA): ICD-10-CM

## 2022-10-07 DIAGNOSIS — K21.9 GASTROESOPHAGEAL REFLUX DISEASE WITHOUT ESOPHAGITIS: ICD-10-CM

## 2022-10-07 DIAGNOSIS — E66.09 CLASS 1 OBESITY DUE TO EXCESS CALORIES WITH SERIOUS COMORBIDITY AND BODY MASS INDEX (BMI) OF 34.0 TO 34.9 IN ADULT: ICD-10-CM

## 2022-10-07 DIAGNOSIS — G93.89 SUPRASELLAR MASS: ICD-10-CM

## 2022-10-07 DIAGNOSIS — F33.9 RECURRENT MAJOR DEPRESSIVE DISORDER, REMISSION STATUS UNSPECIFIED (H): ICD-10-CM

## 2022-10-07 DIAGNOSIS — Z63.79 STRESSFUL LIFE EVENT AFFECTING FAMILY: ICD-10-CM

## 2022-10-07 DIAGNOSIS — F41.1 GENERALIZED ANXIETY DISORDER: ICD-10-CM

## 2022-10-07 PROCEDURE — G0439 PPPS, SUBSEQ VISIT: HCPCS | Performed by: FAMILY MEDICINE

## 2022-10-07 PROCEDURE — 99214 OFFICE O/P EST MOD 30 MIN: CPT | Mod: 25 | Performed by: FAMILY MEDICINE

## 2022-10-07 ASSESSMENT — ENCOUNTER SYMPTOMS
JOINT SWELLING: 1
DYSURIA: 0
PARESTHESIAS: 0
FREQUENCY: 1
COUGH: 1
NAUSEA: 0
SHORTNESS OF BREATH: 1
HEMATOCHEZIA: 0
ARTHRALGIAS: 1
HEMATURIA: 0
PALPITATIONS: 0
BREAST MASS: 0
CONSTIPATION: 0
SORE THROAT: 0
HEARTBURN: 1
CHILLS: 0
EYE PAIN: 0
DIZZINESS: 0
NERVOUS/ANXIOUS: 1
HEADACHES: 0
DIARRHEA: 1
WEAKNESS: 1
MYALGIAS: 0
ABDOMINAL PAIN: 0
FEVER: 0

## 2022-10-07 ASSESSMENT — ANXIETY QUESTIONNAIRES
4. TROUBLE RELAXING: NOT AT ALL
3. WORRYING TOO MUCH ABOUT DIFFERENT THINGS: NOT AT ALL
IF YOU CHECKED OFF ANY PROBLEMS ON THIS QUESTIONNAIRE, HOW DIFFICULT HAVE THESE PROBLEMS MADE IT FOR YOU TO DO YOUR WORK, TAKE CARE OF THINGS AT HOME, OR GET ALONG WITH OTHER PEOPLE: NOT DIFFICULT AT ALL
2. NOT BEING ABLE TO STOP OR CONTROL WORRYING: NOT AT ALL
GAD7 TOTAL SCORE: 1
8. IF YOU CHECKED OFF ANY PROBLEMS, HOW DIFFICULT HAVE THESE MADE IT FOR YOU TO DO YOUR WORK, TAKE CARE OF THINGS AT HOME, OR GET ALONG WITH OTHER PEOPLE?: NOT DIFFICULT AT ALL
6. BECOMING EASILY ANNOYED OR IRRITABLE: SEVERAL DAYS
5. BEING SO RESTLESS THAT IT IS HARD TO SIT STILL: NOT AT ALL
7. FEELING AFRAID AS IF SOMETHING AWFUL MIGHT HAPPEN: NOT AT ALL
7. FEELING AFRAID AS IF SOMETHING AWFUL MIGHT HAPPEN: NOT AT ALL
1. FEELING NERVOUS, ANXIOUS, OR ON EDGE: NOT AT ALL

## 2022-10-07 ASSESSMENT — PATIENT HEALTH QUESTIONNAIRE - PHQ9
SUM OF ALL RESPONSES TO PHQ QUESTIONS 1-9: 3
SUM OF ALL RESPONSES TO PHQ QUESTIONS 1-9: 3
10. IF YOU CHECKED OFF ANY PROBLEMS, HOW DIFFICULT HAVE THESE PROBLEMS MADE IT FOR YOU TO DO YOUR WORK, TAKE CARE OF THINGS AT HOME, OR GET ALONG WITH OTHER PEOPLE: NOT DIFFICULT AT ALL

## 2022-10-07 ASSESSMENT — ACTIVITIES OF DAILY LIVING (ADL): CURRENT_FUNCTION: HOUSEWORK REQUIRES ASSISTANCE

## 2022-10-07 ASSESSMENT — PAIN SCALES - GENERAL: PAINLEVEL: NO PAIN (0)

## 2022-10-07 NOTE — PROGRESS NOTES
SUBJECTIVE:     Park is a 82 year old who presents for Preventive Visit.      Annual wellness visit completed.  Risk questionnaire reviewed in detail with suboptimal health, lack of consistent exercise, suboptimal diet, assistance with activities of daily living, hearing loss, urine incontinence concerns as well as poor mental health.  Does have underlying depression with anxiety.  Utilizes venlafaxine extended release which had been increased from 75 mg up to 150 mg daily.  Does have an appointment at 11 AM today with a psychiatrist as well to see if further medication adjustment indicated.  Life stressors associate with her 's diagnosis of Alzheimer's dementia prior slums testing 13 out of 30.  Good family support.  Her daughter Ivy is with today.  Describes her mother as impulsive, accusatory, short tempered, mean and labile at times.  Patient is in agreement with these descriptive words however does not like the word mean.  Patient had been seen recently through clinic visit with lab assessment without obvious UTI.  Did complete self antibiotic without change in mental health.  Lab assessment with fasting glucose 88, mild cholesterol elevation while on simvastatin 20 mg at bedtime however protective HDL cholesterol elevated at 84, continuing levothyroxine 50 mcg daily with TSH of 2.77, normal CBC, B12 and vitamin D levels as well as magnesium level noted.  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  [facility] been [facility] as well for peripheral neuropathy.  Uses walker to assist with ambulation.  History of obstructive sleep apnea however no longer using CPAP and feels that she is doing fine without this.  Has had history of hot flashes for which she is used estrogen replacement.  Omeprazole 20 mg twice daily without breakthrough symptoms of reflux.  Metoprolol tartrate 75 mg in the morning and 50 mg in the evening for hypertension.   "Comprehensive review of systems as above otherwise all negative.       - Ray x 62 years (progressive dementia SLUMS 13 out of 30)   3 daughters - Genesis ( at [facility]), Niall Monique   Smoke - quit in 60s after 1 year   EtOH: sober x 36 years   Surgeries: gallbladder, appy, hysterectomy and ? 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; left CTS release; bladder lift; right TKA; left rotator cuff   Hospitalizations:   [place], [place] - live in community with 65 houses in [place] (1 mile circumference if walk)      Patient has been advised of split billing requirements and indicates understanding: Yes  Are you in the first 12 months of your Medicare coverage?  No    Healthy Habits:     In general, how would you rate your overall health?  Fair    Frequency of exercise:  None    Do you usually eat at least 4 servings of fruit and vegetables a day, include whole grains    & fiber and avoid regularly eating high fat or \"junk\" foods?  No    Taking medications regularly:  Yes    Medication side effects:  Not applicable    Ability to successfully perform activities of daily living:  Housework requires assistance    Home Safety:  No safety concerns identified    Hearing Impairment:  Difficulty following a conversation in a noisy restaurant or crowded room, feel that people are mumbling or not speaking clearly, need to ask people to speak up or repeat themselves and difficulty understanding soft or whispered speech    In the past 6 months, have you been bothered by leaking of urine? Yes    In general, how would you rate your overall mental or emotional health?  Poor      PHQ-2 Total Score: 1    Additional concerns today:  No    Do you feel safe in your environment? Yes    Have you ever done Advance Care Planning? (For example, a Health Directive, POLST, or a discussion with a medical provider or your loved ones about your wishes): " Yes, advance care planning is on file.       Fall risk  Fallen 2 or more times in the past year?: No  Any fall with injury in the past year?: No    Cognitive Screening   1) Repeat 3 items (Leader, Season, Table)    2) Clock draw: NORMAL  3) 3 item recall: Recalls 3 objects  Results: 3 items recalled: COGNITIVE IMPAIRMENT LESS LIKELY    Mini-CogTM Copyright NEREIDA Savage. Licensed by the author for use in [facility]; reprinted with permission (soob@[facility]). All rights reserved.      Do you have sleep apnea, excessive snoring or daytime drowsiness?: yes    Reviewed and updated as needed this visit by clinical staff    Allergies                 Reviewed and updated as needed this visit by Provider                   Social History     Tobacco Use     Smoking status: Former Smoker     Packs/day: 0.25     Years: 1.00     Pack years: 0.25     Quit date: 1961     Years since quittin.8     Smokeless tobacco: Never Used   Substance Use Topics     Alcohol use: No     If you drink alcohol do you typically have >3 drinks per day or >7 drinks per week? No    Alcohol Use 10/7/2022   Prescreen: >3 drinks/day or >7 drinks/week? -   Prescreen: >3 drinks/day or >7 drinks/week? No               Current providers sharing in care for this patient include:   Patient Care Team:  True Pugh MD as PCP - General (Family Practice)  True Pugh MD as Assigned PCP  Pk Garcia DO as Assigned Heart and Vascular Provider  Daylin Wynn DO as Assigned Neuroscience Provider  Myranda Vizcarra Psy.D, YAMINI as Assigned Behavioral Health Provider    The following health maintenance items are reviewed in Epic and correct as of today:  Health Maintenance   Topic Date Due     HF ACTION PLAN  Never done     DEPRESSION ACTION PLAN  Never done     BMP  2023     ANNUAL REVIEW OF HM ORDERS  2023     PHQ-9  2023     ALT  2023     LIPID  2023     CBC  2023     MEDICARE  ANNUAL WELLNESS VISIT  10/07/2023     FALL RISK ASSESSMENT  10/07/2023     ADVANCE CARE PLANNING  10/07/2027     DTAP/TDAP/TD IMMUNIZATION (2 - Td or Tdap) 11/24/2028     DEXA  10/16/2033     TSH W/FREE T4 REFLEX  Completed     INFLUENZA VACCINE  Completed     Pneumococcal Vaccine: 65+ Years  Completed     ZOSTER IMMUNIZATION  Completed     COVID-19 Vaccine  Completed     IPV IMMUNIZATION  Aged Out     MENINGITIS IMMUNIZATION  Aged Out     HEPATITIS B IMMUNIZATION  Aged Out     Lab work is in process  Labs reviewed in EPIC  BP Readings from Last 3 Encounters:   10/07/22 120/70   09/20/22 112/68   07/01/22 116/70    Wt Readings from Last 3 Encounters:   10/07/22 83.9 kg (185 lb)   09/20/22 84.4 kg (186 lb 1.6 oz)   07/01/22 83.9 kg (185 lb)                  Patient Active Problem List   Diagnosis     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     Insomnia     Heartburn     Arthritis     Cervical Spondylosis     Generalized Osteoarthritis Of The Hand     Lumbar Spondylosis     Trochanteric Bursitis     Lower Back Pain     Osteopenia     Microscopic colitis, unspecified microscopic colitis type     Fibromyalgia     Anemia     Hypertension     Hyperlipidemia     Peripheral Neuropathy     Walk Is Wobbly Or Unsteady (Ataxia)     Hypothyroidism     JANICE on CPAP     Arthralgia     Generalized anxiety disorder     Recurrent major depressive disorder, remission status unspecified (H)     Exertional dyspnea     Pain in both lower extremities     Venous insufficiency of both lower extremities     Venous hypertension of both lower extremities     Fat deposits     Class 2 obesity due to excess calories in adult     Vitamin D deficiency     Fatigue due to excessive exertion     Leg weakness, bilateral     Lymphedema     Left ventricular outflow obstruction     Hypertensive left ventricular hypertrophy, without heart failure     Lower GI bleed     BRBPR (bright red blood per rectum)     Snoring     Suprasellar mass      Depressive disorder     GERD (gastroesophageal reflux disease)     Hyperreflexia     Memory loss     Ringing in ears     Past Surgical History:   Procedure Laterality Date     ARTHROPLASTY KNEE BILATERAL       ARTHROSCOPY SHOULDER ROTATOR CUFF REPAIR       CATARACT EXTRACTION, BILATERAL       CHOLECYSTECTOMY       EXCISE LESION TRUNK Left 2018    Procedure: LEFT BUTTOCK EXPLORATION, EXCISION BUTTOCK MASS;  Surgeon: Lg Jameson MD;  Location: [facility] OR;  Service:      HYSTERECTOMY       JOINT REPLACEMENT Bilateral     knees     MOHS MICROGRAPHIC PROCEDURE      Nose     OOPHORECTOMY Left     1 removed, 1 remains     OTHER SURGICAL HISTORY      Bladder lift     NJ ARTHROPLASTY TIBIAL PLATEAU      Description: Knee Replacement;  Recorded: 2013;     [place] INJECT NERV BLCK,OTHR PERIPH NERV      Description: Peripheral Nerve Block Wrist Median Right;  Recorded: 2013;     RELEASE CARPAL TUNNEL       REPLACEMENT TOTAL KNEE      L       Social History     Tobacco Use     Smoking status: Former Smoker     Packs/day: 0.25     Years: 1.00     Pack years: 0.25     Quit date: 1961     Years since quittin.8     Smokeless tobacco: Never Used   Substance Use Topics     Alcohol use: No     Family History   Problem Relation Age of Onset     Aneurysm Father         AAA     Cancer Mother         pancrease         Current Outpatient Medications   Medication Sig Dispense Refill     acetaminophen (TYLENOL) 650 MG CR tablet [ACETAMINOPHEN (TYLENOL) 650 MG CR TABLET] Take 650 mg by mouth as needed for pain.       albuterol (PROAIR HFA/PROVENTIL HFA/VENTOLIN HFA) 108 (90 Base) MCG/ACT inhaler Inhale 2 puffs into the lungs every 6 hours 18 g 1     aspirin 81 MG EC tablet [ASPIRIN 81 MG EC TABLET] Take 81 mg by mouth at bedtime.        azelastine (ASTELIN) 0.1 % nasal spray Spray 2 sprays into both nostrils 2 times daily 30 mL 3     biotin 5 MG CAPS Take 5,000 mcg by mouth       calcium carbonate-vitamin D3  (CALCIUM 600 + D,3,) 600 mg calcium- 200 unit cap [CALCIUM CARBONATE-VITAMIN D3 (CALCIUM 600 + D,3,) 600 MG CALCIUM- 200 UNIT CAP] Take 1 tablet by mouth 2 (two) times a day.              celecoxib (CELEBREX) 200 MG capsule TAKE ONE CAPSULE BY MOUTH ONE TIME DAILY 90 capsule 3     estrogen conj (PREMARIN) 0.3 MG tablet Take 1 tablet (0.3 mg) by mouth daily 90 tablet 3     furosemide (LASIX) 20 MG tablet TAKE ONE TABLET BY MOUTH ONE TIME DAILY 90 tablet 3     levothyroxine (SYNTHROID/LEVOTHROID) 50 MCG tablet TAKE ONE TABLET BY MOUTH ONE TIME DAILY at 6am 90 tablet 3     metoprolol tartrate (LOPRESSOR) 50 MG tablet TAKE 75 MG BY MOUTH IN THE MORNING & 50 MG IN THE EVENING 225 tablet 1     multivitamin with minerals (THERA-M) 9 mg iron-400 mcg Tab tablet [MULTIVITAMIN WITH MINERALS (THERA-M) 9 MG IRON-400 MCG TAB TABLET] Take 1 tablet by mouth every evening.       omeprazole (PRILOSEC) 20 MG DR capsule TAKE ONE CAPSULE BY MOUTH TWICE DAILY 180 capsule 3     Probiotic Product (MISC INTESTINAL BRANDON REGULAT) CAPS        simvastatin (ZOCOR) 20 MG tablet Take 1 tablet (20 mg) by mouth At Bedtime 90 tablet 3     traZODone (DESYREL) 50 MG tablet TAKE 1 TABLET BY MOUTH AT BEDTIME 90 tablet 2     venlafaxine (EFFEXOR XR) 75 MG 24 hr capsule Take 2 capsules (150 mg) by mouth daily 180 capsule 3     triamcinolone (KENALOG) 0.1 % external cream        Allergies   Allergen Reactions     Cimicifuga [place] (Black Cohosh) Anaphylaxis and Hives     Ciprofloxacin Rash     Developed rash over trunk of body after one dose of cipro     Oxychlorodene Other (See Comments)     Hallucinations     Oxycodone Unknown     hallucinations     Penicillins Hives     Pollen [Pollen Extract] Unknown     Venom-Honey Bee [Bee Venom] Swelling     Cephalosporins Rash       Immunizations reviewed and up-to-date.    Mammogram Screening - Patient over age 75, has elected to discontinue screenings.  Pertinent mammograms are reviewed under the imaging  "tab.    Review of Systems   Constitutional: Negative for chills and fever.   HENT: Positive for hearing loss. Negative for congestion, ear pain and sore throat.    Eyes: Positive for visual disturbance. Negative for pain.   Respiratory: Positive for cough and shortness of breath.    Cardiovascular: Positive for peripheral edema. Negative for chest pain and palpitations.   Gastrointestinal: Positive for diarrhea and heartburn. Negative for abdominal pain, constipation, hematochezia and nausea.   Breasts:  Negative for tenderness, breast mass and discharge.   Genitourinary: Positive for frequency and urgency. Negative for dysuria, genital sores, hematuria, pelvic pain, vaginal bleeding and vaginal discharge.   Musculoskeletal: Positive for arthralgias and joint swelling. Negative for myalgias.   Skin: Negative for rash.   Neurological: Positive for weakness. Negative for dizziness, headaches and paresthesias.   Psychiatric/Behavioral: Positive for mood changes. The patient is nervous/anxious.      Constitutional, HEENT, cardiovascular, pulmonary, GI, , musculoskeletal, neuro, skin, endocrine and psych systems are negative, except as otherwise noted.    OBJECTIVE:   /70   Pulse 108   Ht 1.556 m (5' 1.25\")   Wt 83.9 kg (185 lb)   LMP  (LMP Unknown)   SpO2 93%   BMI 34.67 kg/m   Estimated body mass index is 34.67 kg/m  as calculated from the following:    Height as of this encounter: 1.556 m (5' 1.25\").    Weight as of this encounter: 83.9 kg (185 lb).     Physical Exam     GENERAL APPEARANCE: healthy, alert and no distress.  Transfer slowly.  Utilizes walker to assist with ambulation.  EYES: Eyes grossly normal to inspection, PERRL and conjunctivae and sclerae normal  HENT: ear canals and TM's normal, nose and mouth without ulcers or lesions, oropharynx clear and oral mucous membranes moist  NECK: no adenopathy, no asymmetry, masses, or scars and thyroid normal to palpation  RESP: lungs clear to " auscultation - no rales, rhonchi or wheezes  CV: regular rate and rhythm, normal S1 S2, no S3 or S4, no murmur, click or rub, no peripheral edema and peripheral pulses strong  ABDOMEN: soft, nontender, no hepatosplenomegaly, no masses and bowel sounds normal  MS: no musculoskeletal defects are noted and gait is age appropriate without ataxia  SKIN: no suspicious lesions or rashes  NEURO: Normal strength and tone, mentation intact and speech normal  PSYCH: mentation appears somewhat quiet and affect normal    Diagnostic Test Results:  Labs reviewed in Epic  No results found for this or any previous visit (from the past 24 hour(s)).        EXAM: MR BRAIN W/WO IV CONT   LOCATION:  [facility]   DATE/TIME: 5/29/2020 11:46 AM     INDICATION: Suprasellar mass.   COMPARISON: Head MRI examinations dating back to 08/28/2007   CONTRAST: GADOBUTROL 1 MMOL/ML IV SOLN 4 mL   TECHNIQUE: Multiplanar multisequence head MRI without and with intravenous contrast including dedicated imaging of the sella.     FINDINGS:     SELLA: 4.0 x 5.3 mm suprasellar lesion again identified. T1 shortening is similar on pre and post gadolinium images. No definite enhancement. The lesion is isointense on T2-weighted images. These findings are stable dating back to 08/28/2007. A Rathke's cleft cyst is presumed. Normal appearance of the intrasellar contents and cavernous sinuses. Normal optic chiasm and optic nerves.     INTRACRANIAL CONTENTS: No acute or subacute infarct. No mass, acute hemorrhage, or extra-axial fluid collections. Scattered nonspecific T2/FLAIR hyperintensities within the cerebral white matter most consistent with mild chronic microvascular ischemic change. Normal ventricles and sulci. Normal position of the cerebellar tonsils. No pathologic contrast enhancement.     OTHER: Accounting for technique no additional abnormalities identified.     IMPRESSION:   1.  No change dating back to 08/28/2007   2.  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   3.  Mild presumed chronic small vessel ischemic change and generalized volume loss.        ASSESSMENT / PLAN:     Encounter for Medicare annual wellness exam  Annual wellness visit completed.  Risk questionnaire reviewed with suboptimal health, lack of consistent exercise, suboptimal diet, assistance needed with activities of daily living, hearing loss, urine incontinence and suboptimal mental health.  Annual wellness visits to continue.    Acquired hypothyroidism  Recent TSH at 2.77 while on levothyroxine 50 mcg daily which we will continue.    Recurrent major depressive disorder, remission status unspecified (H)  Suboptimal depression management noted.  Will see psychiatrist later this morning.  Continues venlafaxine extended release 150 mg daily.    Generalized anxiety disorder  As above.    Gastroesophageal reflux disease without esophagitis  Omeprazole 20 mg twice daily.    Mixed hyperlipidemia  Continuing simvastatin 20 mg at bedtime.    Essential hypertension with goal blood pressure less than 140/90  Metoprolol to tartrate 75 mg in the morning and 50 mg in the evening.    Class 1 obesity due to excess calories with serious comorbidity and body mass index (BMI) of 34.0 to 34.9 in adult  Dietary and exercise modifications to maintain weight less than 180 pounds initially, less than 170 pounds ideally.    Stressful life event affecting family  Continuing to address 's cognitive decline with Alzheimer's diagnosis noted.    Bilateral leg weakness  Bilateral leg weakness.  [facility].  Peripheral neuropathy history.  Utilizing walker to assist with ambulation and prevent falls.    Hot flashes  Estrogen replacement has been utilized.    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    JANICE (obstructive sleep apnea)  Not utilizing CPAP.    Peripheral Neuropathy  Follows through  [facility].  No longer on gabapentin.       Patient has been advised of split billing requirements and indicates understanding: No    COUNSELING:  Reviewed preventive health counseling, as reflected in patient instructions       Regular exercise       Healthy diet/nutrition       Vision screening       Hearing screening       Dental care       Bladder control       Fall risk prevention       Osteoporosis prevention/bone health       ([facility])menopause management       Advanced Planning     Estimated body mass index is 34.67 kg/m  as calculated from the following:    Height as of this encounter: 1.556 m (5' 1.25\").    Weight as of this encounter: 83.9 kg (185 lb).    Weight management plan: Discussed healthy diet and exercise guidelines    She reports that she quit smoking about 61 years ago. She has a 0.25 pack-year smoking history. She has never used smokeless tobacco.      Appropriate preventive services were discussed with this patient, including applicable screening as appropriate for cardiovascular disease, diabetes, osteopenia/osteoporosis, and glaucoma.  As appropriate for age/gender, discussed screening for colorectal cancer, prostate cancer, breast cancer, and cervical cancer. Checklist reviewing preventive services available has been given to the patient.    Reviewed patients plan of care and provided an AVS. The Intermediate Care Plan ( asthma action plan, low back pain action plan, and migraine action plan) for Dyana meets the Care Plan requirement. This Care Plan has been established and reviewed with the Patient and daughter.    Counseling Resources:  ATP IV Guidelines  Pooled Cohorts Equation Calculator  Breast Cancer Risk Calculator  Breast Cancer: Medication to Reduce Risk  FRAX Risk Assessment  ICSI Preventive Guidelines  Dietary Guidelines for Americans, 2010  Ingo Money's MyPlate  ASA Prophylaxis  Lung CA Screening    True Pugh MD  [facility] " Risks:  Answers for HPI/ROS submitted by the patient on 10/7/2022  YEE 7 TOTAL SCORE: 1          The patient was provided with suggestions to help her develop a healthy physical lifestyle.  She is at risk for lack of exercise and has been provided with information to increase physical activity for the benefit of her well-being.  The patient was counseled and encouraged to consider modifying their diet and eating habits. She was provided with information on recommended healthy diet options.  The patient reports that she has difficulty with activities of daily living. I have asked that the patient make a follow up appointment in 26 weeks where this issue will be further evaluated and addressed.  The patient was provided with written information regarding signs of hearing loss.  Information on urinary incontinence and treatment options given to patient.  The patient was provided with suggestions to help her develop a healthy emotional lifestyle.

## 2022-10-07 NOTE — PATIENT INSTRUCTIONS
Patient Education   Personalized Prevention Plan  You are due for the preventive services outlined below.  Your care team is available to assist you in scheduling these services.  If you have already completed any of these items, please share that information with your care team to update in your medical record.  Health Maintenance Due   Topic Date Due     Heart Failure Action Plan  Never done     Depression Action Plan  Never done     Your Health Risk Assessment indicates you feel you are not in good health    A healthy lifestyle helps keep the body fit and the mind alert. It helps protect you from disease, helps you fight disease, and helps prevent chronic disease (disease that doesn't go away) from getting worse. This is important as you get older and begin to notice twinges in muscles and joints and a decline in the strength and stamina you once took for granted. A healthy lifestyle includes good healthcare, good nutrition, weight control, recreation, and regular exercise. Avoid harmful substances and do what you can to keep safe. Another part of a healthy lifestyle is stay mentally active and socially involved.    Good healthcare     Have a wellness visit every year.     If you have new symptoms, let us know right away. Don't wait until the next checkup.     Take medicines exactly as prescribed and keep your medicines in a safe place. Tell us if your medicine causes problems.   Healthy diet and weight control     Eat 3 or 4 small, nutritious, low-fat, high-fiber meals a day. Include a variety of fruits, vegetables, and whole-grain foods.     Make sure you get enough calcium in your diet. Calcium, vitamin D, and exercise help prevent osteoporosis (bone thinning).     If you live alone, try eating with others when you can. That way you get a good meal and have company while you eat it.     Try to keep a healthy weight. If you eat more calories than your body uses for energy, it will be stored as fat and you  will gain weight.     Recreation   Recreation is not limited to sports and team events. It includes any activity that provides relaxation, interest, enjoyment, and exercise. Recreation provides an outlet for physical, mental, and social energy. It can give a sense of worth and achievement. It can help you stay healthy.    Mental Exercise and Social Involvement  Mental and emotional health is as important as physical health. Keep in touch with friends and family. Stay as active as possible. Continue to learn and challenge yourself.   Things you can do to stay mentally active are:    Learn something new, like a foreign language or musical instrument.     Play SCRABBLE or do crossword puzzles. If you cannot find people to play these games with you at home, you can play them with others on your computer through the Internet.     Join a games club--anything from card games to chess or checkers or lawn bowling.     Start a new hobby.     Go back to school.     Volunteer.     Read.   Keep up with world events.    Exercise for a Healthier Heart  You may wonder how you can improve the health of your heart. If you re thinking about exercise, you re on the right track. You don t need to become an athlete. But you do need a certain amount of brisk exercise to help strengthen your heart. If you have been diagnosed with a heart condition, your healthcare provider may advise exercise to help stabilize your condition. To help make exercise a habit, choose safe, fun activities.      Exercise with a friend. When activity is fun, you're more likely to stick with it.   Before you start  Check with your healthcare provider before starting an exercise program. This is especially important if you have not been active for a while. It's also important if you have a long-term (chronic) health problem such as heart disease, diabetes, or obesity. Or if you are at high risk for having these problems.   Why exercise?  Exercising regularly offers  many healthy rewards. It can help you do all of the following:     Improve your blood cholesterol level to help prevent further heart trouble    Lower your blood pressure to help prevent a stroke or heart attack    Control diabetes, or reduce your risk of getting this disease    Improve your heart and lung function    Reach and stay at a healthy weight    Make your muscles stronger so you can stay active    Prevent falls and fractures by slowing the loss of bone mass (osteoporosis)    Manage stress better    Reduce your blood pressure    Improve your sense of self and your body image  Exercise tips      Ease into your routine. Set small goals. Then build on them. If you are not sure what your activity level should be, talk with your healthcare provider first before starting an exercise routine.    Exercise on most days. Aim for a total of 150 minutes (2 hours and 30 minutes) or more of moderate-intensity aerobic activity each week. Or 75 minutes (1 hour and 15 minutes) or more of vigorous-intensity aerobic activity each week. Or try for a combination of both. Moderate activity means that you breathe heavier and your heart rate increases but you can still talk. Think about doing 40 minutes of moderate exercise, 3 to 4 times a week. For best results, activity should last for about 40 minutes to lower blood pressure and cholesterol. It's OK to work up to the 40-minute period over time. Examples of moderate-intensity activity are walking 1 mile in 15 minutes. Or doing 30 to 45 minutes of yard work.    Step up your daily activity level.  Along with your exercise program, try being more active the whole day. Walk instead of drive. Or park further away so that you take more steps each day. Do more household tasks or yard work. You may not be able to meet the advised mount of physical activity. But doing some moderate- or vigorous-intensity aerobic activity can help reduce your risk for heart disease. Your healthcare  provider can help you figure out what is best for you.    Choose 1 or more activities you enjoy.  Walking is one of the easiest things you can do. You can also try swimming, riding a bike, dancing, or taking an exercise class.    When to call your healthcare provider  Call your healthcare provider if you have any of these:     Chest pain or feel dizzy or lightheaded    Burning, tightness, pressure, or heaviness in your chest, neck, shoulders, back, or arms    Abnormal shortness of breath    More joint or muscle pain    A very fast or irregular heartbeat (palpitations)  [employer] last reviewed this educational content on 7/1/2019 2000-2021 [employer]. All rights reserved. This information is not intended as a substitute for professional medical care. Always follow your healthcare professional's instructions.          Understanding USDA MyPlate  The USDA has guidelines to help you make healthy food choices. These are called MyPlate. MyPlate shows the food groups that make up healthy meals using the image of a place setting. Before you eat, think about the healthiest choices for what to put on your plate or in your cup or bowl. To learn more about building a healthy plate, visit www.choosemyplate.gov.    The food groups    Fruits. Any fruit or 100% fruit juice counts as part of the Fruit Group. Fruits may be fresh, canned, frozen, or dried, and may be whole, cut-up, or pureed. Make 1/2 of your plate fruits and vegetables.    Vegetables. Any vegetable or 100% vegetable juice counts as a member of the Vegetable Group. Vegetables may be fresh, frozen, canned, or dried. They can be served raw or cooked and may be whole, cut-up, or mashed. Make 1/2 of your plate fruits and vegetables.    Grains. All foods made from grains are part of the Grains Group. These include wheat, rice, oats, cornmeal, and barley. Grains are often used to make foods such as bread, pasta, oatmeal, cereal, tortillas, and grits. Grains  should be no more than 1/4 of your plate. At least half of your grains should be whole grains.    Protein. This group includes meat, poultry, seafood, beans and peas, eggs, processed soy products (such as tofu), nuts (including nut butters), and seeds. Make protein choices no more than 1/4 of your plate. Meat and poultry choices should be lean or low fat.    Dairy. The Dairy Group includes all fluid milk products and foods made from milk that contain calcium, such as yogurt and cheese. (Foods that have little calcium, such as cream, butter, and cream cheese, are not part of this group.) Most dairy choices should be low-fat or fat-free.    Oils. Oils aren't a food group, but they do contain essential nutrients. However it's important to watch your intake of oils. These are fats that are liquid at room temperature. They include canola, corn, olive, soybean, vegetable, and sunflower oil. Foods that are mainly oil include mayonnaise, certain salad dressings, and soft margarines. You likely already get your daily oil allowance from the foods you eat.  Things to limit  Eating healthy also means limiting these things in your diet:       Salt (sodium). Many processed foods have a lot of sodium. To keep sodium intake down, eat fresh vegetables, meats, poultry, and seafood when possible. Purchase low-sodium, reduced-sodium, or no-salt-added food products at the store. And don't add salt to your meals at home. Instead, season them with herbs and spices such as dill, oregano, cumin, and paprika. Or try adding flavor with lemon or lime zest and juice.    Saturated fat. Saturated fats are most often found in animal products such as beef, pork, and chicken. They are often solid at room temperature, such as butter. To reduce your saturated fat intake, choose leaner cuts of meat and poultry. And try healthier cooking methods such as grilling, broiling, roasting, or baking. For a simple lower-fat swap, use plain nonfat yogurt instead  of mayonnaise when making potato salad or macaroni salad.    Added sugars. These are sugars added to foods. They are in foods such as ice cream, candy, soda, fruit drinks, sports drinks, energy drinks, cookies, pastries, jams, and syrups. Cut down on added sugars by sharing sweet treats with a family member or friend. You can also choose fruit for dessert, and drink water or other unsweetened beverages.     [employer] last reviewed this educational content on 6/1/2020 2000-2021 [employer]. All rights reserved. This information is not intended as a substitute for professional medical care. Always follow your healthcare professional's instructions.        Activities of Daily Living    Your Health Risk Assessment indicates you have difficulties with activities of daily living such as housework, bathing, preparing meals, taking medication, etc. Please make a follow up appointment for us to address this issue in more detail.    Signs of Hearing Loss      Hearing much better with one ear can be a sign of hearing loss.   Hearing loss is a problem shared by many people. In fact, it is one of the most common health problems, particularly as people age. Most people age 65 and older have some hearing loss. By age 80, almost everyone does. Hearing loss often occurs slowly over the years. So you may not realize your hearing has gotten worse.  Have your hearing checked  Call your healthcare provider if you:    Have to strain to hear normal conversation    Have to watch other people s faces very carefully to follow what they re saying    Need to ask people to repeat what they ve said    Often misunderstand what people are saying    Turn the volume of the television or radio up so high that others complain    Feel that people are mumbling when they re talking to you    Find that the effort to hear leaves you feeling tired and irritated    Notice, when using the phone, that you hear better with one ear than the  other  [employer] last reviewed this educational content on 1/1/2020 2000-2021 [employer]. All rights reserved. This information is not intended as a substitute for professional medical care. Always follow your healthcare professional's instructions.          Urinary Incontinence, Female (Adult)   Urinary incontinence means loss of bladder control. This problem affects many women, especially as they get older. If you have incontinence, you may be embarrassed to ask for help. But know that this problem can be treated.   Types of Incontinence  There are different types of incontinence. Two of the main types are described here. You can have more than one type.     Stress incontinence. With this type, urine leaks when pressure (stress) is put on the bladder. This may happen when you cough, sneeze, or laugh. Stress incontinence most often occurs because the pelvic floor muscles that support the bladder and urethra are weak. This can happen after pregnancy and vaginal childbirth or a hysterectomy. It can also be due to excess body weight or hormone changes.    Urge incontinence (also called overactive bladder). With this type, a sudden urge to urinate is felt often. This may happen even though there may not be much urine in the bladder. The need to urinate often during the night is common. Urge incontinence most often occurs because of bladder spasms. This may be due to bladder irritation or infection. Damage to bladder nerves or pelvic muscles, constipation, and certain medicines can also lead to urge incontinence.  Treatment depends on the cause. Further evaluation is needed to find the type you have. This will likely include an exam and certain tests. Based on the results, you and your healthcare provider can then plan treatment. Until a diagnosis is made, the home care tips below can help ease symptoms.   Home care    Do pelvic floor muscle exercises, if they are prescribed. The pelvic floor muscles  help support the bladder and urethra. Many women find that their symptoms improve when doing special exercises that strengthen these muscles. To do the exercises, contract the muscles you would use to stop your stream of urine. But do this when you re not urinating. Hold for 10 seconds, then relax. Repeat 10 to 20 times in a row, at least 3 times a day. Your healthcare provider may give you other instructions for how to do the exercises and how often.    Keep a bladder diary. This helps track how often and how much you urinate over a set period of time. Bring this diary with you to your next visit with the provider. The information can help your provider learn more about your bladder problem.    Lose weight, if advised to by your provider. Extra weight puts pressure on the bladder. Your provider can help you create a weight-loss plan that s right for you. This may include exercising more and making certain diet changes.    Don't have foods and drinks that may irritate the bladder. These can include alcohol and caffeinated drinks.    Quit smoking. Smoking and other tobacco use can lead to a long-term (chronic) cough that strains the pelvic floor muscles. Smoking may also damage the bladder and urethra. Talk with your provider about treatments or methods you can use to quit smoking.    If drinking large amounts of fluid makes you have symptoms, you may be advised to limit your fluid intake. You may also be advised to drink most of your fluids during the day and to limit fluids at night.    If you re worried about urine leakage or accidents, you may wear absorbent pads to catch urine. Change the pads often. This helps reduce discomfort. It may also reduce the risk of skin or bladder infections.    Follow-up care  Follow up with your healthcare provider, or as directed. It may take some to find the right treatment for your problem. But healthy lifestyle changes can be made right away. These include such things as  exercising on a regular basis, eating a healthy diet, losing weight (if needed), and quitting smoking. Your treatment plan may include special therapies or medicines. Certain procedures or surgery may also be options. Talk about any questions you have with your provider.   When to seek medical advice  Call the healthcare provider right away if any of these occur:    Fever of 100.4 F (38 C) or higher, or as directed by your provider    Bladder pain or fullness    Belly swelling    Nausea or vomiting    Back pain    Weakness, dizziness, or fainting  [employer] last reviewed this educational content on 1/1/2020 2000-2021 [employer]. All rights reserved. This information is not intended as a substitute for professional medical care. Always follow your healthcare professional's instructions.        Your Health Risk Assessment indicates you feel you are not in good emotional health.    Recreation   Recreation is not limited to sports and team events. It includes any activity that provides relaxation, interest, enjoyment, and exercise. Recreation provides an outlet for physical, mental, and social energy. It can give a sense of worth and achievement. It can help you stay healthy.    Mental Exercise and Social Involvement  Mental and emotional health is as important as physical health. Keep in touch with friends and family. Stay as active as possible. Continue to learn and challenge yourself.   Things you can do to stay mentally active are:    Learn something new, like a foreign language or musical instrument.     Play SCRABBLE or do crossword puzzles. If you cannot find people to play these games with you at home, you can play them with others on your computer through the Internet.     Join a games club--anything from card games to chess or checkers or lawn bowling.     Start a new hobby.     Go back to school.     Volunteer.     Read.   Keep up with world events.

## 2022-10-08 ASSESSMENT — PATIENT HEALTH QUESTIONNAIRE - PHQ9: SUM OF ALL RESPONSES TO PHQ QUESTIONS 1-9: 3

## 2022-10-24 ENCOUNTER — TRANSFERRED RECORDS (OUTPATIENT)
Dept: HEALTH INFORMATION MANAGEMENT | Facility: CLINIC | Age: 82
End: 2022-10-24

## 2022-10-24 DIAGNOSIS — I10 ESSENTIAL HYPERTENSION WITH GOAL BLOOD PRESSURE LESS THAN 140/90: Primary | ICD-10-CM

## 2022-12-21 DIAGNOSIS — R23.2 HOT FLASHES: ICD-10-CM

## 2022-12-22 NOTE — TELEPHONE ENCOUNTER
"Last Written Prescription Date:  10/6/21  Last Fill Quantity: 90,  # refills: 3   Last office visit provider:  10/7/22     Requested Prescriptions   Pending Prescriptions Disp Refills     PREMARIN 0.3 MG tablet [Pharmacy Med Name: Premarin Oral Tablet 0.3 MG] 90 tablet 0     Sig: TAKE ONE TABLET BY MOUTH ONE TIME DAILY       Hormone Replacement Therapy Passed - 12/22/2022  1:05 PM        Passed - Blood pressure under 140/90 in past 12 months     BP Readings from Last 3 Encounters:   10/07/22 120/70   09/20/22 112/68   07/01/22 116/70                 Passed - Recent (12 mo) or future (30 days) visit within the authorizing provider's specialty     Patient has had an office visit with the authorizing provider or a provider within the authorizing providers department within the previous 12 mos or has a future within next 30 days. See \"Patient Info\" tab in inbasket, or \"Choose Columns\" in Meds & Orders section of the refill encounter.              Passed - Medication is active on med list        Passed - Patient is 18 years of age or older        Passed - No active pregnancy on record        Passed - No positive pregnancy test on record in past 12 months             Jersey Chaudhari RN 12/22/22 1:06 PM  "

## 2023-03-28 ENCOUNTER — APPOINTMENT (OUTPATIENT)
Dept: RADIOLOGY | Facility: CLINIC | Age: 83
End: 2023-03-28
Attending: EMERGENCY MEDICINE
Payer: MEDICARE

## 2023-03-28 ENCOUNTER — HOSPITAL ENCOUNTER (EMERGENCY)
Facility: CLINIC | Age: 83
Discharge: HOME | End: 2023-03-28
Attending: EMERGENCY MEDICINE | Admitting: EMERGENCY MEDICINE
Payer: MEDICARE

## 2023-03-28 VITALS
OXYGEN SATURATION: 97 % | HEART RATE: 68 BPM | TEMPERATURE: 96.8 F | SYSTOLIC BLOOD PRESSURE: 128 MMHG | RESPIRATION RATE: 19 BRPM | DIASTOLIC BLOOD PRESSURE: 63 MMHG

## 2023-03-28 DIAGNOSIS — R10.9 RIGHT FLANK PAIN: ICD-10-CM

## 2023-03-28 LAB
ALBUMIN SERPL-MCNC: 4.3 G/DL (ref 3.5–5)
ALBUMIN UR-MCNC: NEGATIVE MG/DL
ALP SERPL-CCNC: 77 U/L (ref 45–120)
ALT SERPL W P-5'-P-CCNC: 41 U/L (ref 0–45)
ANION GAP SERPL CALCULATED.3IONS-SCNC: 7 MMOL/L (ref 5–18)
APPEARANCE UR: CLEAR
AST SERPL W P-5'-P-CCNC: 55 U/L (ref 0–40)
ATRIAL RATE - MUSE: 65 BPM
BACTERIA #/AREA URNS HPF: ABNORMAL /HPF
BASOPHILS # BLD AUTO: 0 10E3/UL (ref 0–0.2)
BASOPHILS NFR BLD AUTO: 1 %
BILIRUB SERPL-MCNC: 0.3 MG/DL (ref 0–1)
BILIRUB UR QL STRIP: NEGATIVE
BUN SERPL-MCNC: 17 MG/DL (ref 8–28)
CALCIUM SERPL-MCNC: 10 MG/DL (ref 8.5–10.5)
CHLORIDE BLD-SCNC: 102 MMOL/L (ref 98–107)
CO2 SERPL-SCNC: 26 MMOL/L (ref 22–31)
COLOR UR AUTO: YELLOW
CREAT SERPL-MCNC: 1.09 MG/DL (ref 0.6–1.1)
DIASTOLIC BLOOD PRESSURE - MUSE: NORMAL MMHG
EOSINOPHIL # BLD AUTO: 0.1 10E3/UL (ref 0–0.7)
EOSINOPHIL NFR BLD AUTO: 2 %
ERYTHROCYTE [DISTWIDTH] IN BLOOD BY AUTOMATED COUNT: 13.8 % (ref 10–15)
GFR SERPL CREATININE-BSD FRML MDRD: 50 ML/MIN/1.73M2
GLUCOSE BLD-MCNC: 91 MG/DL (ref 70–125)
GLUCOSE UR STRIP-MCNC: NEGATIVE MG/DL
HCT VFR BLD AUTO: 42.8 % (ref 35–47)
HGB BLD-MCNC: 13.5 G/DL (ref 11.7–15.7)
HGB UR QL STRIP: NEGATIVE
HYALINE CASTS: 15 /LPF
IMM GRANULOCYTES # BLD: 0 10E3/UL
IMM GRANULOCYTES NFR BLD: 0 %
INTERPRETATION ECG - MUSE: NORMAL
KETONES UR STRIP-MCNC: NEGATIVE MG/DL
LEUKOCYTE ESTERASE UR QL STRIP: ABNORMAL
LIPASE SERPL-CCNC: 46 U/L (ref 0–52)
LYMPHOCYTES # BLD AUTO: 1.6 10E3/UL (ref 0.8–5.3)
LYMPHOCYTES NFR BLD AUTO: 23 %
MCH RBC QN AUTO: 30.5 PG (ref 26.5–33)
MCHC RBC AUTO-ENTMCNC: 31.5 G/DL (ref 31.5–36.5)
MCV RBC AUTO: 97 FL (ref 78–100)
MONOCYTES # BLD AUTO: 0.4 10E3/UL (ref 0–1.3)
MONOCYTES NFR BLD AUTO: 6 %
MUCOUS THREADS #/AREA URNS LPF: PRESENT /LPF
NEUTROPHILS # BLD AUTO: 4.8 10E3/UL (ref 1.6–8.3)
NEUTROPHILS NFR BLD AUTO: 68 %
NITRATE UR QL: NEGATIVE
NRBC # BLD AUTO: 0 10E3/UL
NRBC BLD AUTO-RTO: 0 /100
P AXIS - MUSE: 36 DEGREES
PH UR STRIP: 5.5 [PH] (ref 5–7)
PLATELET # BLD AUTO: 339 10E3/UL (ref 150–450)
POTASSIUM BLD-SCNC: 5.3 MMOL/L (ref 3.5–5)
PR INTERVAL - MUSE: 160 MS
PROT SERPL-MCNC: 7.6 G/DL (ref 6–8)
QRS DURATION - MUSE: 98 MS
QT - MUSE: 402 MS
QTC - MUSE: 418 MS
R AXIS - MUSE: 24 DEGREES
RBC # BLD AUTO: 4.42 10E6/UL (ref 3.8–5.2)
RBC URINE: 1 /HPF
SODIUM SERPL-SCNC: 135 MMOL/L (ref 136–145)
SP GR UR STRIP: 1.02 (ref 1–1.03)
SQUAMOUS EPITHELIAL: 2 /HPF
SYSTOLIC BLOOD PRESSURE - MUSE: NORMAL MMHG
T AXIS - MUSE: 21 DEGREES
TROPONIN I SERPL-MCNC: <0.01 NG/ML (ref 0–0.29)
UROBILINOGEN UR STRIP-MCNC: <2 MG/DL
VENTRICULAR RATE- MUSE: 65 BPM
WBC # BLD AUTO: 7 10E3/UL (ref 4–11)
WBC URINE: 8 /HPF

## 2023-03-28 PROCEDURE — 85004 AUTOMATED DIFF WBC COUNT: CPT | Performed by: EMERGENCY MEDICINE

## 2023-03-28 PROCEDURE — 83690 ASSAY OF LIPASE: CPT | Performed by: EMERGENCY MEDICINE

## 2023-03-28 PROCEDURE — 84484 ASSAY OF TROPONIN QUANT: CPT | Performed by: EMERGENCY MEDICINE

## 2023-03-28 PROCEDURE — 99285 EMERGENCY DEPT VISIT HI MDM: CPT | Mod: 25

## 2023-03-28 PROCEDURE — 81003 URINALYSIS AUTO W/O SCOPE: CPT | Performed by: EMERGENCY MEDICINE

## 2023-03-28 PROCEDURE — 71046 X-RAY EXAM CHEST 2 VIEWS: CPT

## 2023-03-28 PROCEDURE — 93005 ELECTROCARDIOGRAM TRACING: CPT | Performed by: EMERGENCY MEDICINE

## 2023-03-28 PROCEDURE — 80053 COMPREHEN METABOLIC PANEL: CPT | Performed by: EMERGENCY MEDICINE

## 2023-03-28 PROCEDURE — 36415 COLL VENOUS BLD VENIPUNCTURE: CPT | Performed by: EMERGENCY MEDICINE

## 2023-03-28 RX ORDER — GABAPENTIN 100 MG/1
100 CAPSULE ORAL 3 TIMES DAILY
Qty: 30 CAPSULE | Refills: 0 | Status: SHIPPED | OUTPATIENT
Start: 2023-03-28 | End: 2023-04-07

## 2023-03-28 ASSESSMENT — ACTIVITIES OF DAILY LIVING (ADL)
ADLS_ACUITY_SCORE: 35
ADLS_ACUITY_SCORE: 33

## 2023-03-28 ASSESSMENT — ENCOUNTER SYMPTOMS: ACTIVITY CHANGE: 1

## 2023-03-28 NOTE — ED NOTES
Daughter is at the Pts bedside and stated that the PT has failed to take her Metoprolol for the past few days. 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

## 2023-03-28 NOTE — ED NOTES
AIDET performed, white board updated for rounding. Patient updated on plan of care. Patient's pain assessed. Call light within reach, bed in low position, side rails up. Visitor at bedside: none

## 2023-03-28 NOTE — ED TRIAGE NOTES
Pt here with back pain since January. Had 3 UTI's since January. Pt states this feels a little like when she had a UTI. Pt also had e sculp procedure in [place] for abdominal fat. Pain is worse with movement.

## 2023-03-28 NOTE — ED PROVIDER NOTES
EMERGENCY DEPARTMENT ENCOUNTER      NAME: Dyana Nicole  AGE: 83 year old female  YOB: 1940  MRN: [id number]  EVALUATION DATE & TIME: 3/28/2023  2:23 PM    PCP: True Pugh    ED PROVIDER: Zach Dupree MD        Chief Complaint   Patient presents with     Back Pain         FINAL IMPRESSION:  1. Right flank pain          ED COURSE & MEDICAL DECISION MAKIN:49 PM Rechecked and updated the patient. We discussed the plan for discharge and the patient is agreeable. Reviewed supportive cares, symptomatic treatment, outpatient follow up, and reasons to return to the Emergency Department. Patient to be discharged by ED RN.     Pertinent Labs & Imaging studies reviewed. (See chart for details)  83 year old female presents to the Emergency Department for evaluation of right flank pain has been present for months recently aggravated after undergoing a vibrating fat loss treatment in [place].  History of back surgeries.  Had CT scan done 8 days ago which I reviewed as well as ER visit 8 days ago which I reviewed in [place].  CT did not showfor patient's symptoms.  Families were about pancreatic cancer and renal cancer.  CT did not show evidence of this in [place].  Patient's pain is brought on with activity such as walking or rolling over.  Patient has a very unnatural gait with a right hip and pelvis that is lower than her left with walking and scoliosis.    Highly doubt aortic dissection.  Recently had CT imaging that does not show renal colic, aneurysm, diverticulitis, colitis, or gallstones    ACS unlikely.  Patient reports she has had some intermittent palpitations over EKG and troponin were ordered which reviewed and reassuring    Chest x-ray no evidence of pneumonia but does show tortuous aorta    No pain at rest.  Has been on gabapentin in the past but was stopped secondary to somnolence    Reviewed last urine culture with no growth.  No urinary frequency or dysuria    Pyelonephritis  unlikely    Patient lives in [place] in the winter and summer here.  Here with family.  Walks with a cane    Scheduled to see primary care doctor in roughly 8 days    Plan for slow titration gabapentin 100 mg daily followed by 100 twice a day followed by 100 3 times a day.        ED Course as of 03/28/23 1704   Tue Mar 28, 2023   1654 Potassium(!): 5.3   1655 Unlikely cause of any patient's symptoms   1655 Troponin I: <0.01   1655 Lipase: 46   1655 WBC: 7.0   1655 Leukocyte Esterase Urine(!): 75 Sidney/uL   1655 Bacteria Urine(!): Few   1655 Mucus Urine(!): Present   1655 WBC Urine(!): 8         Medical Decision Making    History:    Supplemental history from: Documented in chart, if applicable and Family Member/Significant Other    External Record(s) reviewed: Documented in chart, if applicable.    Work Up:    Chart documentation includes differential considered and any EKGs or imaging independently interpreted by provider, where specified.    In additional to work up documented, I considered the following work up: Documented in chart, if applicable.    External consultation:    Discussion of management with another provider: Documented in chart, if applicable    Complicating factors:    Care impacted by chronic illness: Cancer/Chemotherapy, Chronic Pain, Hyperlipidemia, Hypertension and Mental Health    Care affected by social determinants of health: N/A    Disposition considerations: Discharge. I prescribed additional prescription strength medication(s) as charted. N/A.          Voice recognition software was used in the creation of this note. Any grammatical or nonsensical errors are due to inherent errors with the software and are not the intention of the writer.         At the conclusion of the encounter I discussed the results of all of the tests and the disposition. The questions were answered. The patient or family acknowledged understanding and was agreeable with the care plan.             MEDICATIONS GIVEN  "IN THE EMERGENCY:  Medications - No data to display    NEW PRESCRIPTIONS STARTED AT TODAY'S ER VISIT  New Prescriptions    GABAPENTIN (NEURONTIN) 100 MG CAPSULE    Take 1 capsule (100 mg) by mouth 3 times daily          =================================================================    HPI    Triage note  \"Pt here with back pain since January. Had 3 UTI's since January. Pt states this feels a little like when she had a UTI. Pt also had e sculp procedure in [place] for abdominal fat. Pain is worse with movement.       \"  Patient information was obtained from: Patient and Daughter     Use of : N/A         Dyana Nicole is a 83 year old female with a pertinent history of hypertension, hyperlipidemia, arthritis, fibromyalgia, leg weakness, unsteady walk, and lower back pain who presents to this ED via walk in for evaluation of back pain.    Patient reports right sided lower back pain for the past couple of months. The pain has been getting worse in the past couple of days which prompted her to present to the ED for further evaluation. She recently went to [place] and had States that she's had 3 UTI's since November 24th.       REVIEW OF SYSTEMS   Review of Systems   Constitutional: Positive for activity change.        PAST MEDICAL HISTORY:  Past Medical History:   Diagnosis Date     Anemia, unspecified     Created by Conversion      Arrhythmia      Cervical spondylosis without myelopathy     Created by Conversion      Depression      Disease of thyroid gland     hypo     GERD (gastroesophageal reflux disease)      High cholesterol      History of transfusion      Myalgia and myositis, unspecified     Created by Conversion      Near syncope      Other and unspecified hyperlipidemia     Created by Conversion      Skin cancer, basal cell      Sleep apnea     mild     Tachycardia      Unspecified essential hypertension     Created by Conversion      Unspecified hereditary and idiopathic peripheral neuropathy  "    Created by Conversion        PAST SURGICAL HISTORY:  Past Surgical History:   Procedure Laterality Date     ARTHROPLASTY KNEE BILATERAL       ARTHROSCOPY SHOULDER ROTATOR CUFF REPAIR       CATARACT EXTRACTION, BILATERAL       CHOLECYSTECTOMY       EXCISE LESION TRUNK Left 1/5/2018    Procedure: LEFT BUTTOCK EXPLORATION, EXCISION BUTTOCK MASS;  Surgeon: Lg Jameson MD;  Location: [facility] OR;  Service:      HYSTERECTOMY       JOINT REPLACEMENT Bilateral     knees     MOHS MICROGRAPHIC PROCEDURE      Nose     OOPHORECTOMY Left     1 removed, 1 remains     OTHER SURGICAL HISTORY      Bladder lift     VA ARTHROPLASTY TIBIAL PLATEAU      Description: Knee Replacement;  Recorded: 05/16/2013;     VA INJECT NERV BLCK,OTHR PERIPH NERV      Description: Peripheral Nerve Block Wrist Median Right;  Recorded: 05/16/2013;     RELEASE CARPAL TUNNEL       REPLACEMENT TOTAL KNEE      L           CURRENT MEDICATIONS:    gabapentin (NEURONTIN) 100 MG capsule  acetaminophen (TYLENOL) 650 MG CR tablet  albuterol (PROAIR HFA/PROVENTIL HFA/VENTOLIN HFA) 108 (90 Base) MCG/ACT inhaler  aspirin 81 MG EC tablet  azelastine (ASTELIN) 0.1 % nasal spray  biotin 5 MG CAPS  calcium carbonate-vitamin D3 (CALCIUM 600 + D,3,) 600 mg calcium- 200 unit cap  celecoxib (CELEBREX) 200 MG capsule  estrogen conj (PREMARIN) 0.3 MG tablet  furosemide (LASIX) 20 MG tablet  levothyroxine (SYNTHROID/LEVOTHROID) 50 MCG tablet  metoprolol tartrate (LOPRESSOR) 50 MG tablet  multivitamin with minerals (THERA-M) 9 mg iron-400 mcg Tab tablet  omeprazole (PRILOSEC) 20 MG DR capsule  Probiotic Product (MISC INTESTINAL BRANDON REGULAT) CAPS  simvastatin (ZOCOR) 20 MG tablet  traZODone (DESYREL) 50 MG tablet  venlafaxine (EFFEXOR XR) 75 MG 24 hr capsule        ALLERGIES:  Allergies   Allergen Reactions     Cimicifuga [place] (Black Cohosh) Anaphylaxis and Hives     Ciprofloxacin Rash     Developed rash over trunk of body after one dose of cipro      Oxychlorodene Other (See Comments)     Hallucinations     Oxycodone Unknown     hallucinations     Penicillins Hives     Pollen [Pollen Extract] Unknown     Venom-Honey Bee [Bee Venom] Swelling     Cephalosporins Rash       FAMILY HISTORY:  Family History   Problem Relation Age of Onset     Aneurysm Father         AAA     Cancer Mother         pancrease       SOCIAL HISTORY:   Social History     Socioeconomic History     Marital status:    Tobacco Use     Smoking status: Former     Packs/day: 0.25     Years: 1.00     Pack years: 0.25     Types: Cigarettes     Quit date: 1961     Years since quittin.2     Smokeless tobacco: Never   Substance and Sexual Activity     Alcohol use: No     Drug use: No     Sexual activity: Never   Social History Narrative    Lives in senior living with  of over 50 years. 3 daughters       VITALS:  /80   Pulse 68   Temp 96.8  F (36  C)   Resp 19   LMP  (LMP Unknown)   SpO2 98%     PHYSICAL EXAM      Vitals: /80   Pulse 68   Temp 96.8  F (36  C)   Resp 19   LMP  (LMP Unknown)   SpO2 98%   General: Appears in no acute distress, awake, alert, interactive.  Eyes: Conjunctivae non-injected. Sclera anicteric.  HENT: Atraumatic.  Neck: Supple.  Respiratory/Chest: Respiration unlabored. Bilateral breath sounds  Heart: Regular rate and rhythm   Abdomen: non distended. No abdominal tenderness.  No flank tenderness.  No rash.  Musculoskeletal: Normal extremities. No edema or erythema. When walking with cane patient's right hip is lower than the left.  Pelvis is lower on the right with walk.  Has scoliosis with walking.  Skin: Normal color. No rash or diaphoresis. Surgical incision to the lower back.   Neurologic: Face symmetric, moves all extremities spontaneously. Speech clear.  Psychiatric: Oriented to person, place, and time. Affect appropriate.       LAB:  All pertinent labs reviewed and interpreted.  Results for orders placed or performed during the  hospital encounter of 03/28/23   Chest XR,  PA & LAT    Impression    IMPRESSION: Heart size is normal. Thoracic aorta is tortuous. Lungs are clear of CHF, lobar consolidation, or effusion. The right shoulder is high riding with degenerative changes in the AC joint. Findings may be seen in setting of chronic rotator cuff   abnormality.   Comprehensive metabolic panel   Result Value Ref Range    Sodium 135 (L) 136 - 145 mmol/L    Potassium 5.3 (H) 3.5 - 5.0 mmol/L    Chloride 102 98 - 107 mmol/L    Carbon Dioxide (CO2) 26 22 - 31 mmol/L    Anion Gap 7 5 - 18 mmol/L    Urea Nitrogen 17 8 - 28 mg/dL    Creatinine 1.09 0.60 - 1.10 mg/dL    Calcium 10.0 8.5 - 10.5 mg/dL    Glucose 91 70 - 125 mg/dL    Alkaline Phosphatase 77 45 - 120 U/L    AST 55 (H) 0 - 40 U/L    ALT 41 0 - 45 U/L    Protein Total 7.6 6.0 - 8.0 g/dL    Albumin 4.3 3.5 - 5.0 g/dL    Bilirubin Total 0.3 0.0 - 1.0 mg/dL    GFR Estimate 50 (L) >60 mL/min/1.73m2   Result Value Ref Range    Lipase 46 0 - 52 U/L   UA with Microscopic reflex to Culture    Specimen: Urine, Clean Catch   Result Value Ref Range    Color Urine Yellow Colorless, Straw, Light Yellow, Yellow    Appearance Urine Clear Clear    Glucose Urine Negative Negative mg/dL    Bilirubin Urine Negative Negative    Ketones Urine Negative Negative mg/dL    Specific Gravity Urine 1.020 1.001 - 1.030    Blood Urine Negative Negative    pH Urine 5.5 5.0 - 7.0    Protein Albumin Urine Negative Negative mg/dL    Urobilinogen Urine <2.0 <2.0 mg/dL    Nitrite Urine Negative Negative    Leukocyte Esterase Urine 75 Sidney/uL (A) Negative    Bacteria Urine Few (A) None Seen /HPF    Mucus Urine Present (A) None Seen /LPF    RBC Urine 1 <=2 /HPF    WBC Urine 8 (H) <=5 /HPF    Squamous Epithelials Urine 2 (H) <=1 /HPF    Hyaline Casts Urine 15 (H) <=2 /LPF   CBC with platelets and differential   Result Value Ref Range    WBC Count 7.0 4.0 - 11.0 10e3/uL    RBC Count 4.42 3.80 - 5.20 10e6/uL    Hemoglobin 13.5  11.7 - 15.7 g/dL    Hematocrit 42.8 35.0 - 47.0 %    MCV 97 78 - 100 fL    MCH 30.5 26.5 - 33.0 pg    MCHC 31.5 31.5 - 36.5 g/dL    RDW 13.8 10.0 - 15.0 %    Platelet Count 339 150 - 450 10e3/uL    % Neutrophils 68 %    % Lymphocytes 23 %    % Monocytes 6 %    % Eosinophils 2 %    % Basophils 1 %    % Immature Granulocytes 0 %    NRBCs per 100 WBC 0 <1 /100    Absolute Neutrophils 4.8 1.6 - 8.3 10e3/uL    Absolute Lymphocytes 1.6 0.8 - 5.3 10e3/uL    Absolute Monocytes 0.4 0.0 - 1.3 10e3/uL    Absolute Eosinophils 0.1 0.0 - 0.7 10e3/uL    Absolute Basophils 0.0 0.0 - 0.2 10e3/uL    Absolute Immature Granulocytes 0.0 <=0.4 10e3/uL    Absolute NRBCs 0.0 10e3/uL   Result Value Ref Range    Troponin I <0.01 0.00 - 0.29 ng/mL   ECG 12-LEAD WITH MUSE (LHE)   Result Value Ref Range    Systolic Blood Pressure  mmHg    Diastolic Blood Pressure  mmHg    Ventricular Rate 65 BPM    Atrial Rate 65 BPM    MD Interval 160 ms    QRS Duration 98 ms     ms    QTc 418 ms    P Axis 36 degrees    R AXIS 24 degrees    T Axis 21 degrees    Interpretation ECG       Sinus rhythm  Normal ECG  When compared with ECG of 03-MAY-2022 13:02,  No significant change was found  Confirmed by SEE ED PROVIDER NOTE FOR, ECG INTERPRETATION (4000),  Bar Tejada ([id number]) on 3/28/2023 3:49:35 PM  Also confirmed by SEE ED PROVIDER NOTE FOR, ECG INTERPRETATION (4000),  Bar Tejada ([id number])  on 3/28/2023 3:50:00 PM         RADIOLOGY:  Reviewed all pertinent imaging. Please see official radiology report.  Chest XR,  PA & LAT   Final Result   IMPRESSION: Heart size is normal. Thoracic aorta is tortuous. Lungs are clear of CHF, lobar consolidation, or effusion. The right shoulder is high riding with degenerative changes in the AC joint. Findings may be seen in setting of chronic rotator cuff    abnormality.          EKG:    Performed at: 3/28/2023 15:44    Impression: Sinus rhythm, normal ECG, when compared with ECG of 5/3/22 13:02, no  significant change was found. No ST changes.     Rate: 65 BPM  Rhythm: Sinus rhythm   Axis: 24   [place] Interval: 160 ms   QRS Interval: 98 ms   QTc Interval: 418 ms   ST Changes: None  Comparison: 5/3/2022 13:02       Alex Dupree MD  Emergency Medicine  [facility] EMERGENCY ROOM  [email or web address] [facility] [id number]  [phone]     Alex Dupree MD  03/28/23 0790

## 2023-03-28 NOTE — DISCHARGE INSTRUCTIONS
As we discussed your flank pain is likely secondary to referred pain from your back.  Recommend starting gabapentin 1 tablet daily for the first day, 1 tablet twice a day for second day, 1 tablet 3 times a day on Thursday, keep your appointments with primary care doctor.  Your primary care doctor may increase the dose based on your response.  I feel you would benefit from physical therapy.  Return to the ER for worsening symptom

## 2023-03-30 ENCOUNTER — TRANSFERRED RECORDS (OUTPATIENT)
Dept: HEALTH INFORMATION MANAGEMENT | Facility: CLINIC | Age: 83
End: 2023-03-30

## 2023-04-07 DIAGNOSIS — G60.9 HEREDITARY AND IDIOPATHIC PERIPHERAL NEUROPATHY: Primary | ICD-10-CM

## 2023-04-07 RX ORDER — GABAPENTIN 100 MG/1
CAPSULE ORAL
Qty: 180 CAPSULE | Refills: 1 | Status: SHIPPED | OUTPATIENT
Start: 2023-04-07 | End: 2023-05-04

## 2023-04-12 ENCOUNTER — OFFICE VISIT (OUTPATIENT)
Dept: FAMILY MEDICINE | Facility: CLINIC | Age: 83
End: 2023-04-12
Payer: MEDICARE

## 2023-04-12 ENCOUNTER — TRANSFERRED RECORDS (OUTPATIENT)
Dept: HEALTH INFORMATION MANAGEMENT | Facility: CLINIC | Age: 83
End: 2023-04-12

## 2023-04-12 VITALS
SYSTOLIC BLOOD PRESSURE: 120 MMHG | RESPIRATION RATE: 11 BRPM | BODY MASS INDEX: 35.05 KG/M2 | HEART RATE: 80 BPM | WEIGHT: 187 LBS | DIASTOLIC BLOOD PRESSURE: 70 MMHG | OXYGEN SATURATION: 95 % | TEMPERATURE: 97.5 F

## 2023-04-12 DIAGNOSIS — R10.31 RIGHT GROIN PAIN: Primary | ICD-10-CM

## 2023-04-12 DIAGNOSIS — N18.31 CHRONIC KIDNEY DISEASE, STAGE 3A (H): ICD-10-CM

## 2023-04-12 DIAGNOSIS — R26.89 BALANCE PROBLEMS: ICD-10-CM

## 2023-04-12 DIAGNOSIS — R29.898 BILATERAL LEG WEAKNESS: ICD-10-CM

## 2023-04-12 PROCEDURE — 99214 OFFICE O/P EST MOD 30 MIN: CPT | Performed by: FAMILY MEDICINE

## 2023-04-12 ASSESSMENT — PAIN SCALES - GENERAL: PAINLEVEL: EXTREME PAIN (8)

## 2023-04-12 NOTE — PROGRESS NOTES
"Assessment/Plan:    Right groin pain  Right groin plain reviewed.  Concern for hip etiology predominantly.  Patient will be seen through [facility] this evening to further assess and complete x-rays of right hip and determine further management and including pain management.  Patient may wean from gabapentin currently 200 mg 3 times daily by decreasing to 100 mg 3 times daily before discontinuing.  - Orthopedic  Referral    Balance problems  Balance issues noted.  Trendelenburg gait noted.  Further evaluation with [facility] recommended    Chronic kidney disease, stage 3a (H)  Known history of CKD stage IIIa.  We will continue to follow closely.  Ensure adequate hydration    Bilateral leg weakness  Continues to utilize walker currently to assist with ambulation and prevent falls.  Continuing to work with physical therapy.          Subjective:    Dyana Nicole is seen today for right groin pain evaluation.  Reviewed concerns regarding evaluation and treatment while in [place] over the winter.  Vacations in [place].  Did receive treatment in [facility] which she was started on weight loss management with Ozempic etc.  Was able to lose 15 pounds but chose to discontinue medicine since not FDA approved for weight loss and patient without diabetes.  Completed \"e sculpt\" program initially.  Describes 2 electrodes on her stomach as well as perhaps TENS type unit on her back.  This was to provide \"heat\".  Felt that this may have been disruptive to prior lumbar back fusion.  Has had residual right groin pain since that time.  Her daughter Eneida wonders if this caused some musculature injury.  Patient has been evaluated for chronic low back pain and has had physical therapy.  Noted Trendelenburg gait historically.  States that Tylenol Extra Strength using 2 tablets will help with lower back pain but not groin pain.  Comprehensive review of systems as above otherwise all " negative.       - Ray x 62 years (progressive dementia SLUMS 13 out of 30)   3 daughters - Genesis ( at [facility]), Eneida, and Loretta   Smoke - quit in 60s after 1 year   EtOH: sober x 36 years   Surgeries: gallbladder, appy, hysterectomy and ? 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; left CTS release; bladder lift; right TKA; left rotator cuff   Hospitalizations:   [place], [place] - live in community with 65 houses in [place] (1 mile circumference if walk)      Past Surgical History:   Procedure Laterality Date     ARTHROPLASTY KNEE BILATERAL       ARTHROSCOPY SHOULDER ROTATOR CUFF REPAIR       CATARACT EXTRACTION, BILATERAL       CHOLECYSTECTOMY       EXCISE LESION TRUNK Left 1/5/2018    Procedure: LEFT BUTTOCK EXPLORATION, EXCISION BUTTOCK MASS;  Surgeon: Lg Jameson MD;  Location: [facility] OR;  Service:      HYSTERECTOMY       JOINT REPLACEMENT Bilateral     knees     MOHS MICROGRAPHIC PROCEDURE      Nose     OOPHORECTOMY Left     1 removed, 1 remains     OTHER SURGICAL HISTORY      Bladder lift     OR ARTHROPLASTY TIBIAL PLATEAU      Description: Knee Replacement;  Recorded: 05/16/2013;     OR INJECT NERV BLCK,OTHR PERIPH NERV      Description: Peripheral Nerve Block Wrist Median Right;  Recorded: 05/16/2013;     RELEASE CARPAL TUNNEL       REPLACEMENT TOTAL KNEE      L        Family History   Problem Relation Age of Onset     Aneurysm Father         AAA     Cancer Mother         pancrease        Past Medical History:   Diagnosis Date     Anemia, unspecified     Created by Conversion      Arrhythmia      Cervical spondylosis without myelopathy     Created by Conversion      Depression      Disease of thyroid gland     hypo     GERD (gastroesophageal reflux disease)      High cholesterol      History of transfusion      Myalgia and myositis, unspecified     Created by Conversion      Near syncope      Other  and unspecified hyperlipidemia     Created by Conversion      Skin cancer, basal cell      Sleep apnea     mild     Tachycardia      Unspecified essential hypertension     Created by Conversion      Unspecified hereditary and idiopathic peripheral neuropathy     Created by Conversion         Social History     Tobacco Use     Smoking status: Former     Packs/day: 0.25     Years: 1.00     Pack years: 0.25     Types: Cigarettes     Quit date: 1961     Years since quittin.3     Smokeless tobacco: Never   Substance Use Topics     Alcohol use: No     Drug use: No        Current Outpatient Medications   Medication Sig Dispense Refill     acetaminophen (TYLENOL) 650 MG CR tablet [ACETAMINOPHEN (TYLENOL) 650 MG CR TABLET] Take 650 mg by mouth as needed for pain.       aspirin 81 MG EC tablet [ASPIRIN 81 MG EC TABLET] Take 81 mg by mouth at bedtime.        biotin 5 MG CAPS Take 5,000 mcg by mouth       calcium carbonate-vitamin D3 (CALCIUM 600 + D,3,) 600 mg calcium- 200 unit cap [CALCIUM CARBONATE-VITAMIN D3 (CALCIUM 600 + D,3,) 600 MG CALCIUM- 200 UNIT CAP] Take 1 tablet by mouth 2 (two) times a day.              celecoxib (CELEBREX) 200 MG capsule TAKE ONE CAPSULE BY MOUTH ONE TIME DAILY 90 capsule 3     estrogen conj (PREMARIN) 0.3 MG tablet Take 1 tablet (0.3 mg) by mouth daily 90 tablet 3     furosemide (LASIX) 20 MG tablet TAKE ONE TABLET BY MOUTH ONE TIME DAILY 90 tablet 3     gabapentin (NEURONTIN) 100 MG capsule Take 2 tablets three times a day as needed 180 capsule 1     levothyroxine (SYNTHROID/LEVOTHROID) 50 MCG tablet TAKE ONE TABLET BY MOUTH ONE TIME DAILY at 6am 90 tablet 3     metoprolol tartrate (LOPRESSOR) 50 MG tablet TAKE 1&1/2 TABLET BY MOUTH EVERY MORNING AND TAKE 1 TABLET BY MOUTH EVERY EVENING 75 tablet 0     multivitamin with minerals (THERA-M) 9 mg iron-400 mcg Tab tablet [MULTIVITAMIN WITH MINERALS (THERA-M) 9 MG IRON-400 MCG TAB TABLET] Take 1 tablet by mouth every evening.        omeprazole (PRILOSEC) 20 MG DR capsule TAKE ONE CAPSULE BY MOUTH TWICE DAILY 180 capsule 3     Probiotic Product (MISC INTESTINAL BRANDON REGULAT) CAPS        simvastatin (ZOCOR) 20 MG tablet Take 1 tablet (20 mg) by mouth At Bedtime 90 tablet 3     venlafaxine (EFFEXOR XR) 75 MG 24 hr capsule Take 2 capsules (150 mg) by mouth daily 180 capsule 3     albuterol (PROAIR HFA/PROVENTIL HFA/VENTOLIN HFA) 108 (90 Base) MCG/ACT inhaler Inhale 2 puffs into the lungs every 6 hours (Patient not taking: Reported on 4/12/2023) 18 g 1     azelastine (ASTELIN) 0.1 % nasal spray Spray 2 sprays into both nostrils 2 times daily (Patient not taking: Reported on 4/12/2023) 30 mL 3     traZODone (DESYREL) 50 MG tablet TAKE 1 TABLET BY MOUTH AT BEDTIME (Patient not taking: Reported on 4/12/2023) 90 tablet 2          Objective:    Vitals:    04/12/23 1522   BP: 120/70   Pulse: 80   Resp: 11   Temp: 97.5  F (36.4  C)   SpO2: 95%   Weight: 84.8 kg (187 lb)      Body mass index is 35.05 kg/m .    Alert.  Demonstrates area of discomfort as right groin and intertriginous area.  No trochanteric bursa tenderness.  Does demonstrate Trendelenburg gait with ambulation.  Able to utilize cane to demonstrate her gait otherwise typically utilizing walker.      This note has been dictated using voice recognition software and as a result may contain minor grammatical errors and unintended word substitutions.             Answers for HPI/ROS submitted by the patient on 4/12/2023  If you checked off any problems, how difficult have these problems made it for you to do your work, take care of things at home, or get along with other people?: Not difficult at all  PHQ9 TOTAL SCORE: 3  Your back pain is: chronic  Where is your back pain located? : right lower back, right middle of back, right hip, right side of waist  How would you describe your back pain? : sharp, shooting, stabbing  Where does your back pain spread? : right thigh  Since you noticed your back  pain, how has it changed? : gradually worsening  Does your back pain interfere with your job?: Yes  How many servings of fruits and vegetables do you eat daily?: 0-1  On average, how many sweetened beverages do you drink each day (Examples: soda, juice, sweet tea, etc.  Do NOT count diet or artificially sweetened beverages)?: 2  How many minutes a day do you exercise enough to make your heart beat faster?: 9 or less  How many days a week do you exercise enough to make your heart beat faster?: 3 or less  How many days per week do you miss taking your medication?: 0

## 2023-05-04 ENCOUNTER — OFFICE VISIT (OUTPATIENT)
Dept: FAMILY MEDICINE | Facility: CLINIC | Age: 83
End: 2023-05-04
Attending: FAMILY MEDICINE
Payer: MEDICARE

## 2023-05-04 VITALS
DIASTOLIC BLOOD PRESSURE: 60 MMHG | OXYGEN SATURATION: 96 % | WEIGHT: 188 LBS | TEMPERATURE: 98.1 F | SYSTOLIC BLOOD PRESSURE: 110 MMHG | RESPIRATION RATE: 18 BRPM | BODY MASS INDEX: 35.23 KG/M2 | HEART RATE: 70 BPM

## 2023-05-04 DIAGNOSIS — E66.01 MORBID OBESITY (H): ICD-10-CM

## 2023-05-04 DIAGNOSIS — R29.898 BILATERAL LEG WEAKNESS: ICD-10-CM

## 2023-05-04 DIAGNOSIS — I10 ESSENTIAL HYPERTENSION WITH GOAL BLOOD PRESSURE LESS THAN 140/90: ICD-10-CM

## 2023-05-04 DIAGNOSIS — N18.31 CHRONIC KIDNEY DISEASE, STAGE 3A (H): ICD-10-CM

## 2023-05-04 DIAGNOSIS — M25.50 ARTHRALGIA OF MULTIPLE JOINTS: ICD-10-CM

## 2023-05-04 DIAGNOSIS — Z00.00 ENCOUNTER FOR MEDICARE ANNUAL WELLNESS EXAM: Primary | ICD-10-CM

## 2023-05-04 DIAGNOSIS — E03.9 HYPOTHYROIDISM, UNSPECIFIED TYPE: ICD-10-CM

## 2023-05-04 DIAGNOSIS — F10.21 ALCOHOL USE DISORDER, MODERATE, IN SUSTAINED REMISSION, DEPENDENCE (H): ICD-10-CM

## 2023-05-04 DIAGNOSIS — E78.2 MIXED HYPERLIPIDEMIA: ICD-10-CM

## 2023-05-04 DIAGNOSIS — K21.9 GASTROESOPHAGEAL REFLUX DISEASE WITHOUT ESOPHAGITIS: ICD-10-CM

## 2023-05-04 DIAGNOSIS — R13.10 DYSPHAGIA, UNSPECIFIED TYPE: ICD-10-CM

## 2023-05-04 DIAGNOSIS — R60.9 EDEMA, UNSPECIFIED TYPE: ICD-10-CM

## 2023-05-04 DIAGNOSIS — G47.33 OSA (OBSTRUCTIVE SLEEP APNEA): ICD-10-CM

## 2023-05-04 DIAGNOSIS — G60.9 HEREDITARY AND IDIOPATHIC PERIPHERAL NEUROPATHY: ICD-10-CM

## 2023-05-04 DIAGNOSIS — K52.839 MICROSCOPIC COLITIS, UNSPECIFIED MICROSCOPIC COLITIS TYPE: ICD-10-CM

## 2023-05-04 DIAGNOSIS — R23.2 HOT FLASHES: ICD-10-CM

## 2023-05-04 DIAGNOSIS — R00.2 PALPITATIONS: ICD-10-CM

## 2023-05-04 DIAGNOSIS — F33.9 RECURRENT MAJOR DEPRESSIVE DISORDER, REMISSION STATUS UNSPECIFIED (H): ICD-10-CM

## 2023-05-04 PROBLEM — I50.9 ACUTE CONGESTIVE HEART FAILURE, UNSPECIFIED HEART FAILURE TYPE (H): Status: ACTIVE | Noted: 2023-05-04

## 2023-05-04 PROBLEM — I50.9 ACUTE CONGESTIVE HEART FAILURE, UNSPECIFIED HEART FAILURE TYPE (H): Status: RESOLVED | Noted: 2023-05-04 | Resolved: 2023-05-04

## 2023-05-04 PROCEDURE — G0439 PPPS, SUBSEQ VISIT: HCPCS | Performed by: FAMILY MEDICINE

## 2023-05-04 PROCEDURE — 99214 OFFICE O/P EST MOD 30 MIN: CPT | Mod: 25 | Performed by: FAMILY MEDICINE

## 2023-05-04 ASSESSMENT — ENCOUNTER SYMPTOMS
PALPITATIONS: 0
CHILLS: 0
JOINT SWELLING: 1
CONSTIPATION: 0
NAUSEA: 0
HEMATOCHEZIA: 0
SORE THROAT: 0
EYE PAIN: 0
ABDOMINAL PAIN: 0
WEAKNESS: 1
DIARRHEA: 1
FEVER: 0
FREQUENCY: 1
ARTHRALGIAS: 1
MYALGIAS: 1
SHORTNESS OF BREATH: 1
HEADACHES: 0
HEMATURIA: 0
COUGH: 1
DIZZINESS: 0
HEARTBURN: 0
PARESTHESIAS: 0
DYSURIA: 0
NERVOUS/ANXIOUS: 0
BREAST MASS: 0

## 2023-05-04 ASSESSMENT — ACTIVITIES OF DAILY LIVING (ADL)
CURRENT_FUNCTION: HOUSEWORK REQUIRES ASSISTANCE
CURRENT_FUNCTION: SHOPPING REQUIRES ASSISTANCE

## 2023-05-04 ASSESSMENT — PAIN SCALES - GENERAL: PAINLEVEL: NO PAIN (0)

## 2023-05-04 NOTE — PATIENT INSTRUCTIONS
Patient Education   Personalized Prevention Plan  You are due for the preventive services outlined below.  Your care team is available to assist you in scheduling these services.  If you have already completed any of these items, please share that information with your care team to update in your medical record.  Health Maintenance Due   Topic Date Due     Heart Failure Action Plan  Never done     Kidney Microalbumin Urine Test  Never done     Depression Action Plan  Never done       Understanding USDA MyPlate  The USDA has guidelines to help you make healthy food choices. These are called MyPlate. MyPlate shows the food groups that make up healthy meals using the image of a place setting. Before you eat, think about the healthiest choices for what to put on your plate or in your cup or bowl. To learn more about building a healthy plate, visit www.choosemyplate.gov.     The food groups    Fruits. Any fruit or 100% fruit juice counts as part of the Fruit Group. Fruits may be fresh, canned, frozen, or dried, and may be whole, cut-up, or pureed. Make 1/2 of your plate fruits and vegetables.    Vegetables. Any vegetable or 100% vegetable juice counts as a member of the Vegetable Group. Vegetables may be fresh, frozen, canned, or dried. They can be served raw or cooked and may be whole, cut-up, or mashed. Make 1/2 of your plate fruits and vegetables.    Grains. All foods made from grains are part of the Grains Group. These include wheat, rice, oats, cornmeal, and barley. Grains are often used to make foods such as bread, pasta, oatmeal, cereal, tortillas, and grits. Grains should be no more than 1/4 of your plate. At least half of your grains should be whole grains.    Protein. This group includes meat, poultry, seafood, beans and peas, eggs, processed soy products (such as tofu), nuts (including nut butters), and seeds. Make protein choices no more than 1/4 of your plate. Meat and poultry choices should be lean or low  fat.    Dairy. The Dairy Group includes all fluid milk products and foods made from milk that contain calcium, such as yogurt and cheese. (Foods that have little calcium, such as cream, butter, and cream cheese, are not part of this group.) Most dairy choices should be low-fat or fat-free.    Oils. Oils aren't a food group, but they do contain essential nutrients. However it's important to watch your intake of oils. These are fats that are liquid at room temperature. They include canola, corn, olive, soybean, vegetable, and sunflower oil. Foods that are mainly oil include mayonnaise, certain salad dressings, and soft margarines. You likely already get your daily oil allowance from the foods you eat.  Things to limit  Eating healthy also means limiting these things in your diet:    Salt (sodium). Many processed foods have a lot of sodium. To keep sodium intake down, eat fresh vegetables, meats, poultry, and seafood when possible. Purchase low-sodium, reduced-sodium, or no-salt-added food products at the store. And don't add salt to your meals at home. Instead, season them with herbs and spices such as dill, oregano, cumin, and paprika. Or try adding flavor with lemon or lime zest and juice.    Saturated fat. Saturated fats are most often found in animal products such as beef, pork, and chicken. They are often solid at room temperature, such as butter. To reduce your saturated fat intake, choose leaner cuts of meat and poultry. And try healthier cooking methods such as grilling, broiling, roasting, or baking. For a simple lower-fat swap, use plain nonfat yogurt instead of mayonnaise when making potato salad or macaroni salad.    Added sugars. These are sugars added to foods. They are in foods such as ice cream, candy, soda, fruit drinks, sports drinks, energy drinks, cookies, pastries, jams, and syrups. Cut down on added sugars by sharing sweet treats with a family member or friend. You can also choose fruit for  dessert, and drink water or other unsweetened beverages.  [employer] last reviewed this educational content on 6/1/2020 2000-2022 [employer]. All rights reserved. This information is not intended as a substitute for professional medical care. Always follow your healthcare professional's instructions.        Activities of Daily Living    Your Health Risk Assessment indicates you have difficulties with activities of daily living such as housework, bathing, preparing meals, taking medication, etc. Please make a follow up appointment for us to address this issue in more detail.    Signs of Hearing Loss  Hearing loss is a problem shared by many people. In fact, it's one of the most common health problems, particularly as people age. Most people aged 65 and older have some hearing loss. By age 80, almost everyone does. Hearing loss often occurs slowly over the years. So, you may not realize your hearing has gotten worse.   When sudden hearing loss occurs, it's important to contact your healthcare provider right away. Your provider will do a medical exam and a hearing exam as soon as possible. This is to help find the cause and type of your sudden hearing loss. Based on your diagnosis, your healthcare provider will discuss possible treatments.      Hearing much better with one ear can be a sign of hearing loss.     Have your hearing checked  Call your healthcare provider if you:     Have to strain to hear normal conversation    Have to watch other people s faces very carefully to follow what they re saying    Need to ask people to repeat what they ve said    Often misunderstand what people are saying    Turn the volume of the television or radio up so high that others complain    Feel that people are mumbling when they re talking to you    Find that the effort to hear leaves you feeling tired and irritated    Notice, when using the phone, that you hear better with one ear than the other  [employer] last  reviewed this educational content on 6/1/2022 2000-2022 [employer]. All rights reserved. This information is not intended as a substitute for professional medical care. Always follow your healthcare professional's instructions.          Urinary Incontinence, Female (Adult)   Urinary incontinence means loss of bladder control. This problem affects many women, especially as they get older. If you have incontinence, you may be embarrassed to ask for help. But know that this problem can be treated.   Types of Incontinence  There are different types of incontinence. Two of the main types are described here. You can have more than one type.     Stress incontinence. With this type, urine leaks when pressure (stress) is put on the bladder. This may happen when you cough, sneeze, or laugh. Stress incontinence most often occurs because the pelvic floor muscles that support the bladder and urethra are weak. This can happen after pregnancy and vaginal childbirth or a hysterectomy. It can also be due to excess body weight or hormone changes.    Urge incontinence (also called overactive bladder). With this type, a sudden urge to urinate is felt often. This may happen even though there may not be much urine in the bladder. The need to urinate often during the night is common. Urge incontinence most often occurs because of bladder spasms. This may be due to bladder irritation or infection. Damage to bladder nerves or pelvic muscles, constipation, and certain medicines can also lead to urge incontinence.  Treatment depends on the cause. Further evaluation is needed to find the type you have. This will likely include an exam and certain tests. Based on the results, you and your healthcare provider can then plan treatment. Until a diagnosis is made, the home care tips below can help ease symptoms.   Home care    Do pelvic floor muscle exercises, if they are prescribed. The pelvic floor muscles help support the  bladder and urethra. Many women find that their symptoms improve when doing special exercises that strengthen these muscles. To do the exercises, contract the muscles you would use to stop your stream of urine. But do this when you re not urinating. Hold for 10 seconds, then relax. Repeat 10 to 20 times in a row, at least 3 times a day. Your healthcare provider may give you other instructions for how to do the exercises and how often.    Keep a bladder diary. This helps track how often and how much you urinate over a set period of time. Bring this diary with you to your next visit with the provider. The information can help your provider learn more about your bladder problem.    Lose weight, if advised to by your provider. Extra weight puts pressure on the bladder. Your provider can help you create a weight-loss plan that s right for you. This may include exercising more and making certain diet changes.    Don't have foods and drinks that may irritate the bladder. These can include alcohol and caffeinated drinks.    Quit smoking. Smoking and other tobacco use can lead to a long-term (chronic) cough that strains the pelvic floor muscles. Smoking may also damage the bladder and urethra. Talk with your provider about treatments or methods you can use to quit smoking.    If drinking large amounts of fluid makes you have symptoms, you may be advised to limit your fluid intake. You may also be advised to drink most of your fluids during the day and to limit fluids at night.    If you re worried about urine leakage or accidents, you may wear absorbent pads to catch urine. Change the pads often. This helps reduce discomfort. It may also reduce the risk of skin or bladder infections.    Follow-up care  Follow up with your healthcare provider, or as directed. It may take some to find the right treatment for your problem. But healthy lifestyle changes can be made right away. These include such things as exercising on a regular  basis, eating a healthy diet, losing weight (if needed), and quitting smoking. Your treatment plan may include special therapies or medicines. Certain procedures or surgery may also be options. Talk about any questions you have with your provider.   When to seek medical advice  Call the healthcare provider right away if any of these occur:    Fever of 100.4 F (38 C) or higher, or as directed by your provider    Bladder pain or fullness    Belly swelling    Nausea or vomiting    Back pain    Weakness, dizziness, or fainting  [employer] last reviewed this educational content on 1/1/2020 2000-2022 [employer]. All rights reserved. This information is not intended as a substitute for professional medical care. Always follow your healthcare professional's instructions.        Your Health Risk Assessment indicates you feel you are not in good emotional health.    Recreation   Recreation is not limited to sports and team events. It includes any activity that provides relaxation, interest, enjoyment, and exercise. Recreation provides an outlet for physical, mental, and social energy. It can give a sense of worth and achievement. It can help you stay healthy.    Mental Exercise and Social Involvement  Mental and emotional health is as important as physical health. Keep in touch with friends and family. Stay as active as possible. Continue to learn and challenge yourself.   Things you can do to stay mentally active are:    Learn something new, like a foreign language or musical instrument.     Play SCRABBLE or do crossword puzzles. If you cannot find people to play these games with you at home, you can play them with others on your computer through the Internet.     Join a games club--anything from card games to chess or checkers or lawn bowling.     Start a new hobby.     Go back to school.     Volunteer.     Read.   Keep up with world events.    Preventing Falls at Home  A person can fall for many reasons.  Older adults may fall because reaction time slows down as we age. Your muscles and joints may get stiff, weak, or less flexible because of illness, medicines, or a physical condition.   Other health problems that make falls more likely include:     Arthritis    Dizziness or lightheadedness when you stand up (orthostatic hypotension)    History of a stroke    Dizziness    Anemia    Certain medicines taken for mental illness or to control blood pressure.    Problems with balance or gait    Bladder or urinary problems    History of falling    Changes in vision (vision impairment)    Changes in thinking skills and memory (cognitive impairment)  Injuries from a fall can include serious injuries such as broken bones, dislocated joints, internal bleeding and cuts. Injuries like these can limit your independence.   Prevention tips  To help prevent falls and fall-related injuries, follow the tips below.    Floors  To make floors safer:     Put nonskid pads under area rugs.    Remove small rugs.    Replace worn floor coverings.    Tack carpets firmly to each step on carpeted stairs. Put nonskid strips on the edges of uncarpeted stairs.    Keep floors and stairs free of clutter and cords.    Arrange furniture so there are clear pathways.    Clean up any spills right away.  Bathrooms    To make bathrooms safer:     Install grab bars in the tub or shower.    Apply nonskid strips or put a nonskid rubber mat in the tub or shower.    Sit on a bath chair to bathe.    Use bathmats with nonskid backing.  Lighting  To improve visibility in your home:      Keep a flashlight in each room. Or put a lamp next to the bed within easy reach.    Put nightlights in the bedrooms, hallways, kitchen, and bathrooms.    Make sure all stairways have good lighting.    Take your time when going up and down stairs.    Put handrails on both sides of stairs and in walkways for more support. To prevent injury to your wrist or arm, don t use handrails to  pull yourself up.    Install grab bars to pull yourself up.    Move or rearrange items that you use often. This will make them easier to find or reach.    Look at your home to find any safety hazards. Especially look at doorways, walkways, and the driveway. Remove or repair any safety problems that you find.  Other changes to make    Look around to find any safety hazards. Look closely at doorways, walkways, and the driveway. Remove or repair any safety problems that you find.    Wear shoes that fit well.    Take your time when going up and down stairs.    Put handrails on both sides of stairs and in walkways for more support. To prevent injury to your wrist or arm, don t use handrails to pull yourself up.    Install grab bars wherever needed to pull yourself up.    Arrange items that you use often. This will make them easier to find or reach.    [employer] last reviewed this educational content on 3/1/2020    9075-8532 [employer]. All rights reserved. This information is not intended as a substitute for professional medical care. Always follow your healthcare professional's instructions.

## 2023-05-04 NOTE — PROGRESS NOTES
The patient was counseled and encouraged to consider modifying their diet and eating habits. She was provided with information on recommended healthy diet options.  The patient reports that she has difficulty with activities of daily living. I have asked that the patient make a follow up appointment in 26 weeks where this issue will be further evaluated and addressed.  The patient was provided with written information regarding signs of hearing loss.  Information on urinary incontinence and treatment options given to patient.  The patient was provided with suggestions to help her develop a healthy emotional lifestyle.  She is at risk for falling and has been provided with information to reduce the risk of falling at home.

## 2023-05-04 NOTE — PROGRESS NOTES
SUBJECTIVE:   Park is a 83 year old who presents for Preventive Visit.      9/20/2022     7:26 AM   Additional Questions   Roomed by Cass PAPPAS CMA   Accompanied by daughter - Ivy     Patient has been advised of split billing requirements and indicates understanding: Yes  Are you in the first 12 months of your Medicare coverage?  No      Annual wellness visit completed.  Risk questionnaire reviewed.  Suboptimal diet.  Assistance needed with activities of daily living.  Suboptimal hearing.  Urinary incontinence concerns at times.  Suboptimal mental health with history of depression and anxiety.  Falls risk reviewed with use of walker due to leg weakness discussed.  Has had mild CKD stage IIIa historically.  Trying to ensure adequate hydration.  Severe obesity with BMI greater than 35 with history of hypertension and hyperlipidemia I discussed.  Benefits of metoprolol to tartrate 75 mg in the morning and 50 mg in the evening as well for palpitations with question of SVT versus atrial tachycardia with question of a reentrant tachycardia etiology.  Had been seen by cardiologist last month and was told to follow-up in 1 year.  Does not feel that she has peripheral neuropathy per se.  Does have leg weakness and utilizes walker to assist ambulation.  Simvastatin 20 mg at bedtime for lipid management.  Reflux history.  Omeprazole 20 mg twice daily.  Dysphagia with history of esophageal dilation procedure in the past.  States still having some difficulties with solid food at times.  No aspiration.  Hot flash management and states would like to get off Premarin 0.3 mg daily and use OTC product.  Celebrex 200 mg daily for joint pain, nonspecific.  Also uses 2 Tylenol Extra Strength tablets frequently.  No alcohol in nearly 40 years this August.  Comprehensive review of systems as above otherwise all negative.       - Ray x 62 years (progressive dementia SLUMS 13 out of 30)   3 daughters - Genesis ( at [facility]),  "Niall Monique   Smoke - quit in 60s after 1 year   EtOH: sober x 36 years   Surgeries: gallbladder, appy, hysterectomy and ? 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; left CTS release; bladder lift; right TKA; left rotator cuff   Hospitalizations:   [place], [place] - live in community with 65 houses in [place] (1 mile circumference if walk)        Healthy Habits:     In general, how would you rate your overall health?  Good    Frequency of exercise:  4-5 days/week    Duration of exercise:  15-30 minutes    Do you usually eat at least 4 servings of fruit and vegetables a day, include whole grains    & fiber and avoid regularly eating high fat or \"junk\" foods?  No    Taking medications regularly:  Yes    Medication side effects:  None    Ability to successfully perform activities of daily living:  Shopping requires assistance and housework requires assistance    Home Safety:  No safety concerns identified    Hearing Impairment:  Difficulty following a conversation in a noisy restaurant or crowded room, feel that people are mumbling or not speaking clearly and need to ask people to speak up or repeat themselves    In the past 6 months, have you been bothered by leaking of urine? Yes    In general, how would you rate your overall mental or emotional health?  Fair      PHQ-2 Total Score: 2    Additional concerns today:  No      Have you ever done Advance Care Planning? (For example, a Health Directive, POLST, or a discussion with a medical provider or your loved ones about your wishes): Yes, advance care planning is on file.       Fall risk  Fallen 2 or more times in the past year?: Yes  Any fall with injury in the past year?: Yes    Cognitive Screening   1) Repeat 3 items (Leader, Season, Table)    2) Clock draw: NORMAL  3) 3 item recall: Recalls 2 objects   Results: NORMAL clock, 1-2 items recalled: COGNITIVE IMPAIRMENT LESS " LIKELY    Mini-CogTM Copyright NEREIDA Savage. Licensed by the author for use in [facility]; reprinted with permission (jose juan@.[facility]). All rights reserved.      Do you have sleep apnea, excessive snoring or daytime drowsiness?: no    Reviewed and updated as needed this visit by clinical staff   Tobacco  Allergies  Meds              Reviewed and updated as needed this visit by Provider                 Social History     Tobacco Use     Smoking status: Former     Packs/day: 0.25     Years: 1.00     Pack years: 0.25     Types: Cigarettes     Quit date: 1961     Years since quittin.3     Smokeless tobacco: Never   Vaping Use     Vaping status: Not on file   Substance Use Topics     Alcohol use: No             2023    12:36 PM   Alcohol Use   Prescreen: >3 drinks/day or >7 drinks/week? Not Applicable     Do you have a current opioid prescription? No  Do you use any other controlled substances or medications that are not prescribed by a provider? None              Current providers sharing in care for this patient include:   Patient Care Team:  True Pugh MD as PCP - General (Family Practice)  True Pugh MD as Assigned PCP  Myranda Vizcarra Psy.D, LP as Assigned Behavioral Health Provider    The following health maintenance items are reviewed in Epic and correct as of today:  Health Maintenance   Topic Date Due     HF ACTION PLAN  Never done     MICROALBUMIN  Never done     DEPRESSION ACTION PLAN  Never done     LIPID  2023     TSH W/FREE T4 REFLEX  2023     BMP  2023     PHQ-9  10/12/2023     ALT  2024     CBC  2024     HEMOGLOBIN  2024     ANNUAL REVIEW OF HM ORDERS  2024     MEDICARE ANNUAL WELLNESS VISIT  2024     FALL RISK ASSESSMENT  2024     ADVANCE CARE PLANNING  2028     DTAP/TDAP/TD IMMUNIZATION (2 - Td or Tdap) 2028     DEXA  10/16/2033     INFLUENZA VACCINE  Completed     Pneumococcal Vaccine: 65+ Years   Completed     URINALYSIS  Completed     ZOSTER IMMUNIZATION  Completed     COVID-19 Vaccine  Completed     IPV IMMUNIZATION  Aged Out     MENINGITIS IMMUNIZATION  Aged Out     Lab work is in process  Labs reviewed in EPIC  BP Readings from Last 3 Encounters:   05/04/23 110/60   04/12/23 120/70   03/28/23 128/63    Wt Readings from Last 3 Encounters:   05/04/23 85.3 kg (188 lb)   04/12/23 84.8 kg (187 lb)   10/07/22 83.9 kg (185 lb)                  Patient Active Problem List   Diagnosis     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     Insomnia     Heartburn     Arthritis     Cervical Spondylosis     Generalized Osteoarthritis Of The Hand     Lumbar Spondylosis     Trochanteric Bursitis     Lower Back Pain     Osteopenia     Microscopic colitis, unspecified microscopic colitis type     Fibromyalgia     Anemia     Hypertension     Hyperlipidemia     Peripheral Neuropathy     Walk Is Wobbly Or Unsteady (Ataxia)     Hypothyroidism     JANICE on CPAP     Arthralgia     Generalized anxiety disorder     Recurrent major depressive disorder, remission status unspecified (H)     Exertional dyspnea     Pain in both lower extremities     Venous insufficiency of both lower extremities     Venous hypertension of both lower extremities     Fat deposits     Class 2 obesity due to excess calories in adult     Vitamin D deficiency     Fatigue due to excessive exertion     Leg weakness, bilateral     Lymphedema     Left ventricular outflow obstruction     Hypertensive left ventricular hypertrophy, without heart failure     Lower GI bleed     BRBPR (bright red blood per rectum)     Snoring     Suprasellar mass     Depressive disorder     GERD (gastroesophageal reflux disease)     Hyperreflexia     Memory loss     Ringing in ears     Chronic kidney disease, stage 3a (H)     Morbid obesity (H)     Alcohol use disorder, moderate, in sustained remission, dependence (H)     Past Surgical History:   Procedure Laterality Date      ARTHROPLASTY KNEE BILATERAL       ARTHROSCOPY SHOULDER ROTATOR CUFF REPAIR       CATARACT EXTRACTION, BILATERAL       CHOLECYSTECTOMY       EXCISE LESION TRUNK Left 2018    Procedure: LEFT BUTTOCK EXPLORATION, EXCISION BUTTOCK MASS;  Surgeon: Lg Jameson MD;  Location: [facility] Main OR;  Service:      HYSTERECTOMY       JOINT REPLACEMENT Bilateral     knees     MOHS MICROGRAPHIC PROCEDURE      Nose     OOPHORECTOMY Left     1 removed, 1 remains     OTHER SURGICAL HISTORY      Bladder lift     OK ARTHROPLASTY TIBIAL PLATEAU      Description: Knee Replacement;  Recorded: 2013;     OK INJECT NERV BLCK,OTHR PERIPH NERV      Description: Peripheral Nerve Block Wrist Median Right;  Recorded: 2013;     RELEASE CARPAL TUNNEL       REPLACEMENT TOTAL KNEE      L       Social History     Tobacco Use     Smoking status: Former     Packs/day: 0.25     Years: 1.00     Pack years: 0.25     Types: Cigarettes     Quit date: 1961     Years since quittin.3     Smokeless tobacco: Never   Vaping Use     Vaping status: Not on file   Substance Use Topics     Alcohol use: No     Family History   Problem Relation Age of Onset     Aneurysm Father         AAA     Cancer Mother         pancrease         Current Outpatient Medications   Medication Sig Dispense Refill     acetaminophen (TYLENOL) 650 MG CR tablet [ACETAMINOPHEN (TYLENOL) 650 MG CR TABLET] Take 650 mg by mouth as needed for pain.       aspirin 81 MG EC tablet [ASPIRIN 81 MG EC TABLET] Take 81 mg by mouth at bedtime.        biotin 5 MG CAPS Take 5,000 mcg by mouth       calcium carbonate-vitamin D3 (CALCIUM 600 + D,3,) 600 mg calcium- 200 unit cap [CALCIUM CARBONATE-VITAMIN D3 (CALCIUM 600 + D,3,) 600 MG CALCIUM- 200 UNIT CAP] Take 1 tablet by mouth 2 (two) times a day.              celecoxib (CELEBREX) 200 MG capsule TAKE ONE CAPSULE BY MOUTH ONE TIME DAILY 90 capsule 3     estrogen conj (PREMARIN) 0.3 MG tablet Take 1 tablet (0.3 mg) by mouth  daily 90 tablet 3     furosemide (LASIX) 20 MG tablet TAKE ONE TABLET BY MOUTH ONE TIME DAILY 90 tablet 3     levothyroxine (SYNTHROID/LEVOTHROID) 50 MCG tablet TAKE ONE TABLET BY MOUTH ONE TIME DAILY at 6am 90 tablet 3     metoprolol tartrate (LOPRESSOR) 50 MG tablet TAKE 1&1/2 TABLET BY MOUTH EVERY MORNING AND TAKE 1 TABLET BY MOUTH EVERY EVENING 75 tablet 0     multivitamin with minerals (THERA-M) 9 mg iron-400 mcg Tab tablet [MULTIVITAMIN WITH MINERALS (THERA-M) 9 MG IRON-400 MCG TAB TABLET] Take 1 tablet by mouth every evening.       omeprazole (PRILOSEC) 20 MG DR capsule TAKE ONE CAPSULE BY MOUTH TWICE DAILY 180 capsule 3     Probiotic Product (MISC INTESTINAL BRANDON REGULAT) CAPS        simvastatin (ZOCOR) 20 MG tablet Take 1 tablet (20 mg) by mouth At Bedtime 90 tablet 3     venlafaxine (EFFEXOR XR) 75 MG 24 hr capsule Take 2 capsules (150 mg) by mouth daily 180 capsule 3     gabapentin (NEURONTIN) 100 MG capsule Take 2 tablets three times a day as needed (Patient not taking: Reported on 5/4/2023) 180 capsule 1     Allergies   Allergen Reactions     Cimicifuga [place] (Black Cohosh) Anaphylaxis and Hives     Ciprofloxacin Rash     Developed rash over trunk of body after one dose of cipro     Oxychlorodene Other (See Comments)     Hallucinations     Oxycodone Unknown     hallucinations     Penicillins Hives     Pollen [Pollen Extract] Unknown     Venom-Honey Bee [Bee Venom] Swelling     Cephalosporins Rash       Immunizations reviewed and UTD    Mammogram Screening - Patient over age 75, has elected to discontinue screenings.  Pertinent mammograms are reviewed under the imaging tab.    Review of Systems   Constitutional: Negative for chills and fever.   HENT: Negative for congestion, ear pain, hearing loss and sore throat.    Eyes: Negative for pain and visual disturbance.   Respiratory: Positive for cough and shortness of breath.    Cardiovascular: Negative for chest pain, palpitations and peripheral edema.  "  Gastrointestinal: Positive for diarrhea. Negative for abdominal pain, constipation, heartburn, hematochezia and nausea.   Breasts:  Negative for tenderness, breast mass and discharge.   Genitourinary: Positive for frequency and urgency. Negative for dysuria, genital sores, hematuria, pelvic pain, vaginal bleeding and vaginal discharge.   Musculoskeletal: Positive for arthralgias, joint swelling and myalgias.   Skin: Negative for rash.   Neurological: Positive for weakness. Negative for dizziness, headaches and paresthesias.   Psychiatric/Behavioral: Negative for mood changes. The patient is not nervous/anxious.      Constitutional, HEENT, cardiovascular, pulmonary, GI, , musculoskeletal, neuro, skin, endocrine and psych systems are negative, except as otherwise noted.    OBJECTIVE:   /60   Pulse 70   Temp 98.1  F (36.7  C)   Resp 18   Wt 85.3 kg (188 lb)   LMP  (LMP Unknown)   SpO2 96%   BMI 35.23 kg/m   Estimated body mass index is 35.23 kg/m  as calculated from the following:    Height as of 10/7/22: 1.556 m (5' 1.25\").    Weight as of this encounter: 85.3 kg (188 lb).     Physical Exam  GENERAL APPEARANCE: healthy, alert and no distress.  Using walker due to leg weakness.  Did drive her car to this appointment.  EYES: Eyes grossly normal to inspection, PERRL and conjunctivae and sclerae normal  HENT: ear canals and TM's normal, nose and mouth without ulcers or lesions, oropharynx clear and oral mucous membranes moist  NECK: no adenopathy, no asymmetry, masses, or scars and thyroid normal to palpation  RESP: lungs clear to auscultation - no rales, rhonchi or wheezes  BREAST: normal without masses, tenderness or nipple discharge and no palpable axillary masses or adenopathy  CV: regular rate and rhythm, normal S1 S2, no S3 or S4, no murmur, click or rub, no peripheral edema and peripheral pulses strong  ABDOMEN: soft, nontender, no hepatosplenomegaly, no masses and bowel sounds normal  MS: no " musculoskeletal defects are noted and gait is age appropriate without ataxia  SKIN: no suspicious lesions or rashes  NEURO: Normal strength and tone, sensory exam grossly normal, mentation intact and speech normal  PSYCH: mentation appears normal and affect normal/bright    Diagnostic Test Results:  Labs reviewed in Epic  No results found for this or any previous visit (from the past 24 hour(s)).        Echo 5/26/21  Narrative & Impression    When compared to the previous study dated 5/30/2018, no significant change.    Normal left ventricular size, wall thickness and wall motion. Left ventricle ejection fraction is normal. The calculated left ventricular ejection fraction is 67%.    Normal right ventricular size and systolic function.    Mild to moderate tricuspid valve regurgitation with estimated pulmonary artery systolic pressure 45 mmHg.      ASSESSMENT / PLAN:     Encounter for Medicare annual wellness exam  Annual wellness visit completed.  Risk questionnaire reviewed in detail.  Suboptimal diet, assistance needed with activities of daily living, decreased hearing, urinary incontinence, suboptimal mental health and falls risk prevention discussed.  Annual wellness visits to continue.    Chronic kidney disease, stage 3a (H)  CKD stage IIIa.  We will obtain microalbumin screen and recheck renal function.  Ensure adequate hydration.  - Comprehensive metabolic panel  - Albumin Random Urine Quantitative with Creat Ratio    Morbid obesity (H)  Severe obesity with BMI greater than 35 with underlying hypertension hyperlipidemia with weight goal remaining less than 170 pounds initially, less than 160 pounds ideally.    Alcohol use disorder, moderate, in sustained remission, dependence (H)  Approaching 40 years of sobriety in August 2023.    Recurrent major depressive disorder, remission status unspecified (H)  Underlying history of depression and anxiety.  Benefits of venlafaxine XR 75 mg using 2 tablets (150 mg)  daily.    Microscopic colitis, unspecified microscopic colitis type  History of microscopic colitis does have diarrhea at times.  Referral was placed to see a gastroenterologist within [facility] per patient request.  - Adult GI  Referral - Consult Only  - CBC with platelets    Hypothyroidism, unspecified type  Obtain TSH to ensure adequate thyroid replacement on levothyroxine 50 mcg daily  - TSH    JANICE (obstructive sleep apnea)  Describes borderline sleep apnea therefore does not use CPAP per her decision.    Bilateral leg weakness  Bilateral leg weakness.  Uses walker to assist with ambulation.  Utilizing quadricep muscle strengthening techniques.  - Comprehensive metabolic panel    Peripheral Neuropathy  Denies concern for neuropathy.  No longer on gabapentin.    Mixed hyperlipidemia  Utilizing simvastatin 20 mg at bedtime.  - Lipid panel reflex to direct LDL Fasting    Gastroesophageal reflux disease without esophagitis  Omeprazole 20 mg twice daily  - Adult GI  Referral - Consult Only    Dysphagia, unspecified type  Does have dysphagia with solids.  Has had esophageal dilatation procedure and will refer patient to a gastroenterologist to determine if need for further treatment options.  - Adult GI  Referral - Consult Only    Essential hypertension with goal blood pressure less than 140/90  Medication monitoring completed.  Continues metoprolol to tartrate 75 mg in the morning and 50 mg in the evening.  - Comprehensive metabolic panel    Palpitations  Had seen cardiologist last month with question SVT versus atrial tachycardia history with potential reentrant tachycardia etiology.  Doing well with current dosing metoprolol tartrate 75 mg in the morning and 50 mg in the evening.  - Comprehensive metabolic panel  - Magnesium    Hot flashes  Patient elects to discontinue Premarin 0.3 mg daily and will utilize OTC product per patient request.    Edema, unspecified  "type  Furosemide 20 mg each morning otherwise ankle edema  - Comprehensive metabolic panel    Arthralgia of multiple joints  Celebrex 200 mg daily continuing.       Patient has been advised of split billing requirements and indicates understanding: Yes      COUNSELING:  Reviewed preventive health counseling, as reflected in patient instructions       Regular exercise       Healthy diet/nutrition       Vision screening       Hearing screening       Dental care       Bladder control       Fall risk prevention       Aspirin prophylaxis        Osteoporosis prevention/bone health       (Day[place])menopause management       Advanced Planning       BMI:   Estimated body mass index is 35.23 kg/m  as calculated from the following:    Height as of 10/7/22: 1.556 m (5' 1.25\").    Weight as of this encounter: 85.3 kg (188 lb).   Weight management plan: Discussed healthy diet and exercise guidelines      She reports that she quit smoking about 62 years ago. She has a 0.25 pack-year smoking history. She has never used smokeless tobacco.      Appropriate preventive services were discussed with this patient, including applicable screening as appropriate for cardiovascular disease, diabetes, osteopenia/osteoporosis, and glaucoma.  As appropriate for age/gender, discussed screening for colorectal cancer, prostate cancer, breast cancer, and cervical cancer. Checklist reviewing preventive services available has been given to the patient.    Reviewed patients plan of care and provided an AVS. The Intermediate Care Plan ( asthma action plan, low back pain action plan, and migraine action plan) for Dyana meets the Care Plan requirement. This Care Plan has been established and reviewed with the Patient.          True Pugh MD  [facility] [place]    Identified Health Risks:    I have reviewed Opioid Use Disorder and Substance Use Disorder risk factors and made any needed referrals.     "

## 2023-05-09 ENCOUNTER — LAB (OUTPATIENT)
Dept: LAB | Facility: CLINIC | Age: 83
End: 2023-05-09
Payer: MEDICARE

## 2023-05-09 DIAGNOSIS — N18.31 CHRONIC KIDNEY DISEASE, STAGE 3A (H): ICD-10-CM

## 2023-05-09 DIAGNOSIS — R00.2 PALPITATIONS: ICD-10-CM

## 2023-05-09 DIAGNOSIS — R29.898 BILATERAL LEG WEAKNESS: ICD-10-CM

## 2023-05-09 DIAGNOSIS — E03.9 HYPOTHYROIDISM, UNSPECIFIED TYPE: ICD-10-CM

## 2023-05-09 DIAGNOSIS — R60.9 EDEMA, UNSPECIFIED TYPE: ICD-10-CM

## 2023-05-09 DIAGNOSIS — I10 ESSENTIAL HYPERTENSION WITH GOAL BLOOD PRESSURE LESS THAN 140/90: ICD-10-CM

## 2023-05-09 DIAGNOSIS — E78.2 MIXED HYPERLIPIDEMIA: ICD-10-CM

## 2023-05-09 DIAGNOSIS — K52.839 MICROSCOPIC COLITIS, UNSPECIFIED MICROSCOPIC COLITIS TYPE: ICD-10-CM

## 2023-05-09 LAB
ALBUMIN SERPL BCG-MCNC: 4.1 G/DL (ref 3.5–5.2)
ALP SERPL-CCNC: 95 U/L (ref 35–104)
ALT SERPL W P-5'-P-CCNC: 18 U/L (ref 10–35)
ANION GAP SERPL CALCULATED.3IONS-SCNC: 11 MMOL/L (ref 7–15)
AST SERPL W P-5'-P-CCNC: 27 U/L (ref 10–35)
BILIRUB SERPL-MCNC: 0.2 MG/DL
BUN SERPL-MCNC: 15.6 MG/DL (ref 8–23)
CALCIUM SERPL-MCNC: 9.4 MG/DL (ref 8.8–10.2)
CHLORIDE SERPL-SCNC: 103 MMOL/L (ref 98–107)
CHOLEST SERPL-MCNC: 203 MG/DL
CREAT SERPL-MCNC: 0.84 MG/DL (ref 0.51–0.95)
CREAT UR-MCNC: 58.4 MG/DL
DEPRECATED HCO3 PLAS-SCNC: 27 MMOL/L (ref 22–29)
ERYTHROCYTE [DISTWIDTH] IN BLOOD BY AUTOMATED COUNT: 14.3 % (ref 10–15)
GFR SERPL CREATININE-BSD FRML MDRD: 69 ML/MIN/1.73M2
GLUCOSE SERPL-MCNC: 86 MG/DL (ref 70–99)
HCT VFR BLD AUTO: 36.1 % (ref 35–47)
HDLC SERPL-MCNC: 74 MG/DL
HGB BLD-MCNC: 11.7 G/DL (ref 11.7–15.7)
LDLC SERPL CALC-MCNC: 97 MG/DL
MAGNESIUM SERPL-MCNC: 2.2 MG/DL (ref 1.7–2.3)
MCH RBC QN AUTO: 31.8 PG (ref 26.5–33)
MCHC RBC AUTO-ENTMCNC: 32.4 G/DL (ref 31.5–36.5)
MCV RBC AUTO: 98 FL (ref 78–100)
MICROALBUMIN UR-MCNC: <12 MG/L
MICROALBUMIN/CREAT UR: NORMAL MG/G{CREAT}
NONHDLC SERPL-MCNC: 129 MG/DL
PLATELET # BLD AUTO: 246 10E3/UL (ref 150–450)
POTASSIUM SERPL-SCNC: 4.3 MMOL/L (ref 3.4–5.3)
PROT SERPL-MCNC: 6.9 G/DL (ref 6.4–8.3)
RBC # BLD AUTO: 3.68 10E6/UL (ref 3.8–5.2)
SODIUM SERPL-SCNC: 141 MMOL/L (ref 136–145)
TRIGL SERPL-MCNC: 159 MG/DL
TSH SERPL DL<=0.005 MIU/L-ACNC: 3.46 UIU/ML (ref 0.3–4.2)
WBC # BLD AUTO: 6.1 10E3/UL (ref 4–11)

## 2023-05-09 PROCEDURE — 36415 COLL VENOUS BLD VENIPUNCTURE: CPT

## 2023-05-09 PROCEDURE — 85027 COMPLETE CBC AUTOMATED: CPT

## 2023-05-09 PROCEDURE — 83735 ASSAY OF MAGNESIUM: CPT

## 2023-05-09 PROCEDURE — 82570 ASSAY OF URINE CREATININE: CPT

## 2023-05-09 PROCEDURE — 80061 LIPID PANEL: CPT

## 2023-05-09 PROCEDURE — 80053 COMPREHEN METABOLIC PANEL: CPT

## 2023-05-09 PROCEDURE — 84443 ASSAY THYROID STIM HORMONE: CPT

## 2023-05-09 PROCEDURE — 82043 UR ALBUMIN QUANTITATIVE: CPT

## 2023-05-22 ENCOUNTER — TRANSFERRED RECORDS (OUTPATIENT)
Dept: HEALTH INFORMATION MANAGEMENT | Facility: CLINIC | Age: 83
End: 2023-05-22
Payer: MEDICARE

## 2023-05-31 ENCOUNTER — TRANSFERRED RECORDS (OUTPATIENT)
Dept: HEALTH INFORMATION MANAGEMENT | Facility: CLINIC | Age: 83
End: 2023-05-31
Payer: MEDICARE

## 2023-06-07 DIAGNOSIS — F33.9 RECURRENT MAJOR DEPRESSIVE DISORDER, REMISSION STATUS UNSPECIFIED (H): ICD-10-CM

## 2023-06-08 ENCOUNTER — TRANSFERRED RECORDS (OUTPATIENT)
Dept: HEALTH INFORMATION MANAGEMENT | Facility: CLINIC | Age: 83
End: 2023-06-08
Payer: MEDICARE

## 2023-06-08 RX ORDER — VENLAFAXINE HYDROCHLORIDE 75 MG/1
CAPSULE, EXTENDED RELEASE ORAL
Qty: 180 CAPSULE | Refills: 0 | Status: SHIPPED | OUTPATIENT
Start: 2023-06-08

## 2023-06-08 NOTE — TELEPHONE ENCOUNTER
Prescription approved per [facility] Refill Protocol.  REJI ChristensenN, RN  [facility]

## 2023-06-09 ENCOUNTER — HOSPITAL ENCOUNTER (EMERGENCY)
Facility: CLINIC | Age: 83
Discharge: HOME | End: 2023-06-09
Attending: EMERGENCY MEDICINE | Admitting: EMERGENCY MEDICINE
Payer: MEDICARE

## 2023-06-09 ENCOUNTER — APPOINTMENT (OUTPATIENT)
Dept: CT IMAGING | Facility: CLINIC | Age: 83
End: 2023-06-09
Attending: EMERGENCY MEDICINE
Payer: MEDICARE

## 2023-06-09 VITALS
OXYGEN SATURATION: 95 % | TEMPERATURE: 97.7 F | RESPIRATION RATE: 18 BRPM | HEART RATE: 57 BPM | HEIGHT: 62 IN | WEIGHT: 187 LBS | BODY MASS INDEX: 34.41 KG/M2 | DIASTOLIC BLOOD PRESSURE: 70 MMHG | SYSTOLIC BLOOD PRESSURE: 143 MMHG

## 2023-06-09 DIAGNOSIS — R10.30 LOWER ABDOMINAL PAIN: ICD-10-CM

## 2023-06-09 DIAGNOSIS — N39.0 URINARY TRACT INFECTION WITHOUT HEMATURIA, SITE UNSPECIFIED: ICD-10-CM

## 2023-06-09 LAB
ALBUMIN SERPL-MCNC: 3.7 G/DL (ref 3.5–5)
ALBUMIN UR-MCNC: NEGATIVE MG/DL
ALP SERPL-CCNC: 101 U/L (ref 45–120)
ALT SERPL W P-5'-P-CCNC: 16 U/L (ref 0–45)
ANION GAP SERPL CALCULATED.3IONS-SCNC: 5 MMOL/L (ref 5–18)
APPEARANCE UR: CLEAR
AST SERPL W P-5'-P-CCNC: 23 U/L (ref 0–40)
BACTERIA #/AREA URNS HPF: ABNORMAL /HPF
BASOPHILS # BLD AUTO: 0.1 10E3/UL (ref 0–0.2)
BASOPHILS NFR BLD AUTO: 1 %
BILIRUB DIRECT SERPL-MCNC: <0.1 MG/DL
BILIRUB SERPL-MCNC: 0.3 MG/DL (ref 0–1)
BILIRUB UR QL STRIP: NEGATIVE
BUN SERPL-MCNC: 15 MG/DL (ref 8–28)
C REACTIVE PROTEIN LHE: 0.3 MG/DL (ref 0–?)
CALCIUM SERPL-MCNC: 9.1 MG/DL (ref 8.5–10.5)
CHLORIDE BLD-SCNC: 102 MMOL/L (ref 98–107)
CO2 SERPL-SCNC: 31 MMOL/L (ref 22–31)
COLOR UR AUTO: ABNORMAL
CREAT SERPL-MCNC: 0.79 MG/DL (ref 0.6–1.1)
EOSINOPHIL # BLD AUTO: 0.2 10E3/UL (ref 0–0.7)
EOSINOPHIL NFR BLD AUTO: 2 %
ERYTHROCYTE [DISTWIDTH] IN BLOOD BY AUTOMATED COUNT: 13.3 % (ref 10–15)
GFR SERPL CREATININE-BSD FRML MDRD: 74 ML/MIN/1.73M2
GLUCOSE BLD-MCNC: 104 MG/DL (ref 70–125)
GLUCOSE UR STRIP-MCNC: NEGATIVE MG/DL
HCT VFR BLD AUTO: 36.6 % (ref 35–47)
HGB BLD-MCNC: 12 G/DL (ref 11.7–15.7)
HGB UR QL STRIP: NEGATIVE
IMM GRANULOCYTES # BLD: 0 10E3/UL
IMM GRANULOCYTES NFR BLD: 0 %
KETONES UR STRIP-MCNC: NEGATIVE MG/DL
LEUKOCYTE ESTERASE UR QL STRIP: ABNORMAL
LIPASE SERPL-CCNC: 28 U/L (ref 0–52)
LYMPHOCYTES # BLD AUTO: 1.5 10E3/UL (ref 0.8–5.3)
LYMPHOCYTES NFR BLD AUTO: 22 %
MAGNESIUM SERPL-MCNC: 2.1 MG/DL (ref 1.8–2.6)
MCH RBC QN AUTO: 32.4 PG (ref 26.5–33)
MCHC RBC AUTO-ENTMCNC: 32.8 G/DL (ref 31.5–36.5)
MCV RBC AUTO: 99 FL (ref 78–100)
MONOCYTES # BLD AUTO: 0.6 10E3/UL (ref 0–1.3)
MONOCYTES NFR BLD AUTO: 8 %
MUCOUS THREADS #/AREA URNS LPF: PRESENT /LPF
NEUTROPHILS # BLD AUTO: 4.7 10E3/UL (ref 1.6–8.3)
NEUTROPHILS NFR BLD AUTO: 67 %
NITRATE UR QL: NEGATIVE
NRBC # BLD AUTO: 0 10E3/UL
NRBC BLD AUTO-RTO: 0 /100
PH UR STRIP: 6 [PH] (ref 5–7)
PLATELET # BLD AUTO: 232 10E3/UL (ref 150–450)
POTASSIUM BLD-SCNC: 4.4 MMOL/L (ref 3.5–5)
PROT SERPL-MCNC: 6.9 G/DL (ref 6–8)
RBC # BLD AUTO: 3.7 10E6/UL (ref 3.8–5.2)
RBC URINE: 2 /HPF
SODIUM SERPL-SCNC: 138 MMOL/L (ref 136–145)
SP GR UR STRIP: 1.01 (ref 1–1.03)
SQUAMOUS EPITHELIAL: 2 /HPF
UROBILINOGEN UR STRIP-MCNC: <2 MG/DL
WBC # BLD AUTO: 7 10E3/UL (ref 4–11)
WBC URINE: 24 /HPF

## 2023-06-09 PROCEDURE — 36415 COLL VENOUS BLD VENIPUNCTURE: CPT | Performed by: EMERGENCY MEDICINE

## 2023-06-09 PROCEDURE — 83690 ASSAY OF LIPASE: CPT | Performed by: EMERGENCY MEDICINE

## 2023-06-09 PROCEDURE — 87086 URINE CULTURE/COLONY COUNT: CPT | Performed by: EMERGENCY MEDICINE

## 2023-06-09 PROCEDURE — 80053 COMPREHEN METABOLIC PANEL: CPT | Performed by: EMERGENCY MEDICINE

## 2023-06-09 PROCEDURE — 258N000003 HC RX IP 258 OP 636: Performed by: EMERGENCY MEDICINE

## 2023-06-09 PROCEDURE — 96360 HYDRATION IV INFUSION INIT: CPT

## 2023-06-09 PROCEDURE — 74177 CT ABD & PELVIS W/CONTRAST: CPT | Mod: MG

## 2023-06-09 PROCEDURE — 81001 URINALYSIS AUTO W/SCOPE: CPT | Performed by: EMERGENCY MEDICINE

## 2023-06-09 PROCEDURE — 86140 C-REACTIVE PROTEIN: CPT | Performed by: EMERGENCY MEDICINE

## 2023-06-09 PROCEDURE — 83735 ASSAY OF MAGNESIUM: CPT | Performed by: EMERGENCY MEDICINE

## 2023-06-09 PROCEDURE — 250N000011 HC RX IP 250 OP 636: Performed by: EMERGENCY MEDICINE

## 2023-06-09 PROCEDURE — G1010 CDSM STANSON: HCPCS

## 2023-06-09 PROCEDURE — 82248 BILIRUBIN DIRECT: CPT | Performed by: EMERGENCY MEDICINE

## 2023-06-09 PROCEDURE — 250N000013 HC RX MED GY IP 250 OP 250 PS 637: Performed by: EMERGENCY MEDICINE

## 2023-06-09 PROCEDURE — 85025 COMPLETE CBC W/AUTO DIFF WBC: CPT | Performed by: EMERGENCY MEDICINE

## 2023-06-09 PROCEDURE — 99285 EMERGENCY DEPT VISIT HI MDM: CPT | Mod: 25

## 2023-06-09 RX ORDER — ACETAMINOPHEN 325 MG/1
975 TABLET ORAL ONCE
Status: COMPLETED | OUTPATIENT
Start: 2023-06-09 | End: 2023-06-09

## 2023-06-09 RX ORDER — NITROFURANTOIN 25; 75 MG/1; MG/1
100 CAPSULE ORAL 2 TIMES DAILY
Qty: 14 CAPSULE | Refills: 0 | Status: SHIPPED | OUTPATIENT
Start: 2023-06-09 | End: 2023-07-05

## 2023-06-09 RX ORDER — IOPAMIDOL 755 MG/ML
100 INJECTION, SOLUTION INTRAVASCULAR ONCE
Status: COMPLETED | OUTPATIENT
Start: 2023-06-09 | End: 2023-06-09

## 2023-06-09 RX ORDER — NITROFURANTOIN 25; 75 MG/1; MG/1
100 CAPSULE ORAL ONCE
Status: COMPLETED | OUTPATIENT
Start: 2023-06-09 | End: 2023-06-09

## 2023-06-09 RX ADMIN — ACETAMINOPHEN 975 MG: 325 TABLET ORAL at 17:10

## 2023-06-09 RX ADMIN — IOPAMIDOL 100 ML: 755 INJECTION, SOLUTION INTRAVENOUS at 16:51

## 2023-06-09 RX ADMIN — SODIUM CHLORIDE, POTASSIUM CHLORIDE, SODIUM LACTATE AND CALCIUM CHLORIDE 500 ML: 600; 310; 30; 20 INJECTION, SOLUTION INTRAVENOUS at 17:11

## 2023-06-09 RX ADMIN — NITROFURANTOIN (MONOHYDRATE/MACROCRYSTALS) 100 MG: 75; 25 CAPSULE ORAL at 18:27

## 2023-06-09 ASSESSMENT — ACTIVITIES OF DAILY LIVING (ADL): ADLS_ACUITY_SCORE: 35

## 2023-06-09 NOTE — ED PROVIDER NOTES
"EMERGENCY DEPARTMENT ENCOUNTER      NAME: Dyana Nicole  AGE: 83 year old female  YOB: 1940  MRN: [id number]  EVALUATION DATE & TIME: 6/9/2023  3:09 PM    PCP: True Pugh    ED PROVIDER: Gerard Mckeon M.D.      Chief Complaint   Patient presents with     Abdominal Pain         IMPRESSION  1. Lower abdominal pain    2. Urinary tract infection without hematuria, site unspecified        PLAN  - Macrobid 100mg q12h x7 days  - close PCP follow up  - discharge to home    ED COURSE & MEDICAL DECISION MAKING    ED Course as of 06/09/23 1951 Fri Jun 09, 2023   1542 83yoF with history of HTN, HLD, obesity, remote alcohol abuse, depression, anxiety, hysterectomy, anterior approach spine surgery presenting from home with her  & sister for evaluation of about 1 month of intermittent RLQ abdominal pain; \"crampy\" pain worse with sitting upright, improves lying flat. Denies any nausea or vomiting. Notes mild bloodless diarrhea ongoing as well. No chest pain, shortness of breath. Notes she has chronic back pain for which she gets steroid shots; pain onset after getting one of these shots---states she has had similar shots in the past without any issue. Came for ongoing pain; no acute worsening today. Notes that Tylenol has been helping at home; none taken today.    Normal vitals on presentation. Calm on exam with mild diffuse tenderness with moderate RLQ tenderness with guarding, no rebound or rigidity, mild bilateral CVA tenderness with no overlying skin changes, no midline spinal tenderness, normal neuro exam, clear lungs, normal work of breathing. Overall calm & well-appearing.    Given age with abdominal exam, certainly needs CT scan. Will obtain blood/urine testing as well while giving IVF & Tylenol. Patient comfortable with this plan; no further questions at this time.   1819 CT with no acute abnormality or explanatory pathology; no hernia, SBO, colitis, diverticulitis, appendicitis, ureteral " stone. Blood tests reassuring with no leukocytosis, CRP within normal limits, no anemia, no BENSON, no electrolyte derangement, normal bilirubin/LFTs/lipase. UA suggestive of UTI----most likely cystitis given pain & otherwise unremarkable workup. No concern for sepsis. Ok for outpatient management. Given 1st dose of Macrobid here now and prescription for home. Patient able to tolerate PO and walk without difficulty. Patient comfortable with discharge at this time. Return precautions and need for PCP follow up discussed and understood. No further questions at the time of discharge.       --------------------------------------------------------------------------------   --------------------------------------------------------------------------------     3:30 PM I met with the patient for the initial interview and physical examination. Discussed plan for treatment and workup in the ED.     6:30 PM I updated patient with results and plan for discharge.      This patient involved a high degree of complexity in medical decision making, as significant risks were present and assessed. Recent encounters & results in medical record reviewed by me.    All workup (i.e. any EKG/labs/imaging as per charting below) reviewed and independently interpreted by me. See respective sections for details.    Broad differential considered for this patient presenting, including but not limited to:  See above    See additional MDM below if interested.      MEDICATIONS GIVEN IN THE EMERGENCY DEPARTMENT  Medications   lactated ringers BOLUS 500 mL (0 mLs Intravenous Stopped 6/9/23 1829)   acetaminophen (TYLENOL) tablet 975 mg (975 mg Oral $Given 6/9/23 1710)   iopamidol (ISOVUE-370) solution 100 mL (100 mLs Intravenous $Given 6/9/23 1651)   nitroFURantoin macrocrystal-monohydrate (MACROBID) capsule 100 mg (100 mg Oral $Given 6/9/23 2092)       NEW PRESCRIPTIONS STARTED AT TODAY'S ER VISIT  Discharge Medication List as of 6/9/2023  6:31 PM       START taking these medications    Details   nitroFURantoin macrocrystal-monohydrate (MACROBID) 100 MG capsule Take 1 capsule (100 mg) by mouth 2 times daily, Disp-14 capsule, R-0, E-Prescribe         CONTINUE these medications which have NOT CHANGED    Details   acetaminophen (TYLENOL) 650 MG CR tablet [ACETAMINOPHEN (TYLENOL) 650 MG CR TABLET] Take 650 mg by mouth as needed for pain., Historical      aspirin 81 MG EC tablet [ASPIRIN 81 MG EC TABLET] Take 81 mg by mouth at bedtime. , Historical      biotin 5 MG CAPS Take 5,000 mcg by mouth, Historical      calcium carbonate-vitamin D3 (CALCIUM 600 + D,3,) 600 mg calcium- 200 unit cap [CALCIUM CARBONATE-VITAMIN D3 (CALCIUM 600 + D,3,) 600 MG CALCIUM- 200 UNIT CAP] Take 1 tablet by mouth 2 (two) times a day.       , Historical      celecoxib (CELEBREX) 200 MG capsule TAKE ONE CAPSULE BY MOUTH ONE TIME DAILY, Disp-90 capsule, R-3, E-Prescribe      estrogen conj (PREMARIN) 0.3 MG tablet Take 1 tablet (0.3 mg) by mouth daily, Disp-90 tablet, R-3, E-Prescribe      furosemide (LASIX) 20 MG tablet TAKE ONE TABLET BY MOUTH ONE TIME DAILY, Disp-90 tablet, R-3, E-Prescribe      levothyroxine (SYNTHROID/LEVOTHROID) 50 MCG tablet TAKE ONE TABLET BY MOUTH ONE TIME DAILY at 6am, Disp-90 tablet, R-3, E-Prescribe      metoprolol tartrate (LOPRESSOR) 50 MG tablet TAKE 1&1/2 TABLET BY MOUTH EVERY MORNING AND TAKE 1 TABLET BY MOUTH EVERY EVENING, Disp-75 tablet, R-0, E-PrescribeDue for follow-up with Dr Garcia for refills      multivitamin with minerals (THERA-M) 9 mg iron-400 mcg Tab tablet [MULTIVITAMIN WITH MINERALS (THERA-M) 9 MG IRON-400 MCG TAB TABLET] Take 1 tablet by mouth every evening., Historical      omeprazole (PRILOSEC) 20 MG DR capsule TAKE ONE CAPSULE BY MOUTH TWICE DAILY, Disp-180 capsule, R-3, E-Prescribe      Probiotic Product (MISC INTESTINAL BRANDON REGULAT) CAPS Historical      simvastatin (ZOCOR) 20 MG tablet Take 1 tablet (20 mg) by mouth At Bedtime, Disp-90  tablet, R-3, E-Prescribe      venlafaxine (EFFEXOR XR) 75 MG 24 hr capsule TAKE TWO CAPSULES BY MOUTH DAILY, Disp-180 capsule, R-0, E-Prescribe                 =================================================================      HPI  Patient information was obtained from: Patient    Use of : N/A        Dyana Nicole is a 83 year old female with a pertinent history of CKD3, HTN, HLD, anxiety, depression, who presents to this ED via walk-in for evaluation of abdominal pain.    Patient endorses low abdominal pain and lower back pain that has been intermittent since 5/16 when she received a cortisone shot in her back. She notes that the pain is worse when she sits down but is not worse after she eats, and states that pain feels like menstrual cramps. Patient also endorses diarrhea. She reports a past hysterectomy. She reports taking tylenol with some relief.     Patient denies nausea, vomiting, and all other complaints at this time.          --------------- MEDICAL HISTORY ---------------  PAST MEDICAL HISTORY:  Reviewed independently by me.  Past Medical History:   Diagnosis Date     Anemia, unspecified     Created by Conversion      Arrhythmia      Cervical spondylosis without myelopathy     Created by Conversion      Depression      Disease of thyroid gland     hypo     GERD (gastroesophageal reflux disease)      High cholesterol      History of transfusion      Myalgia and myositis, unspecified     Created by Conversion      Near syncope      Other and unspecified hyperlipidemia     Created by Conversion      Skin cancer, basal cell      Sleep apnea     mild     Tachycardia      Unspecified essential hypertension     Created by Conversion      Unspecified hereditary and idiopathic peripheral neuropathy     Created by Conversion      Patient Active Problem List   Diagnosis     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     Insomnia     Heartburn     Arthritis     Cervical Spondylosis      Generalized Osteoarthritis Of The Hand     Lumbar Spondylosis     Trochanteric Bursitis     Lower Back Pain     Osteopenia     Microscopic colitis, unspecified microscopic colitis type     Fibromyalgia     Anemia     Hypertension     Hyperlipidemia     Peripheral Neuropathy     Walk Is Wobbly Or Unsteady (Ataxia)     Hypothyroidism     JANICE on CPAP     Arthralgia     Generalized anxiety disorder     Recurrent major depressive disorder, remission status unspecified (H)     Exertional dyspnea     Pain in both lower extremities     Venous insufficiency of both lower extremities     Venous hypertension of both lower extremities     Fat deposits     Class 2 obesity due to excess calories in adult     Vitamin D deficiency     Fatigue due to excessive exertion     Leg weakness, bilateral     Lymphedema     Left ventricular outflow obstruction     Hypertensive left ventricular hypertrophy, without heart failure     Lower GI bleed     BRBPR (bright red blood per rectum)     Snoring     Suprasellar mass     Depressive disorder     GERD (gastroesophageal reflux disease)     Hyperreflexia     Memory loss     Ringing in ears     Chronic kidney disease, stage 3a (H)     Morbid obesity (H)     Alcohol use disorder, moderate, in sustained remission, dependence (H)       PAST SURGICAL HISTORY:  Reviewed independently by me.  Past Surgical History:   Procedure Laterality Date     ARTHROPLASTY KNEE BILATERAL       ARTHROSCOPY SHOULDER ROTATOR CUFF REPAIR       CATARACT EXTRACTION, BILATERAL       CHOLECYSTECTOMY       EXCISE LESION TRUNK Left 1/5/2018    Procedure: LEFT BUTTOCK EXPLORATION, EXCISION BUTTOCK MASS;  Surgeon: Lg Jameson MD;  Location: [facility];  Service:      HYSTERECTOMY       JOINT REPLACEMENT Bilateral     knees     MOHS MICROGRAPHIC PROCEDURE      Nose     OOPHORECTOMY Left     1 removed, 1 remains     OTHER SURGICAL HISTORY      Bladder lift     UT ARTHROPLASTY TIBIAL PLATEAU      Description: Knee  Replacement;  Recorded: 05/16/2013;     [place] INJECT NERV BLCK,OTHR PERIPH NERV      Description: Peripheral Nerve Block Wrist Median Right;  Recorded: 05/16/2013;     RELEASE CARPAL TUNNEL       REPLACEMENT TOTAL KNEE      L       CURRENT MEDICATIONS:    Reviewed independently by me.  No current facility-administered medications for this encounter.    Current Outpatient Medications:      nitroFURantoin macrocrystal-monohydrate (MACROBID) 100 MG capsule, Take 1 capsule (100 mg) by mouth 2 times daily, Disp: 14 capsule, Rfl: 0     acetaminophen (TYLENOL) 650 MG CR tablet, [ACETAMINOPHEN (TYLENOL) 650 MG CR TABLET] Take 650 mg by mouth as needed for pain., Disp: , Rfl:      aspirin 81 MG EC tablet, [ASPIRIN 81 MG EC TABLET] Take 81 mg by mouth at bedtime. , Disp: , Rfl:      biotin 5 MG CAPS, Take 5,000 mcg by mouth, Disp: , Rfl:      calcium carbonate-vitamin D3 (CALCIUM 600 + D,3,) 600 mg calcium- 200 unit cap, [CALCIUM CARBONATE-VITAMIN D3 (CALCIUM 600 + D,3,) 600 MG CALCIUM- 200 UNIT CAP] Take 1 tablet by mouth 2 (two) times a day.       , Disp: , Rfl:      celecoxib (CELEBREX) 200 MG capsule, TAKE ONE CAPSULE BY MOUTH ONE TIME DAILY, Disp: 90 capsule, Rfl: 3     estrogen conj (PREMARIN) 0.3 MG tablet, Take 1 tablet (0.3 mg) by mouth daily, Disp: 90 tablet, Rfl: 3     furosemide (LASIX) 20 MG tablet, TAKE ONE TABLET BY MOUTH ONE TIME DAILY, Disp: 90 tablet, Rfl: 3     levothyroxine (SYNTHROID/LEVOTHROID) 50 MCG tablet, TAKE ONE TABLET BY MOUTH ONE TIME DAILY at 6am, Disp: 90 tablet, Rfl: 3     metoprolol tartrate (LOPRESSOR) 50 MG tablet, TAKE 1&1/2 TABLET BY MOUTH EVERY MORNING AND TAKE 1 TABLET BY MOUTH EVERY EVENING, Disp: 75 tablet, Rfl: 0     multivitamin with minerals (THERA-M) 9 mg iron-400 mcg Tab tablet, [MULTIVITAMIN WITH MINERALS (THERA-M) 9 MG IRON-400 MCG TAB TABLET] Take 1 tablet by mouth every evening., Disp: , Rfl:      omeprazole (PRILOSEC) 20 MG DR capsule, TAKE ONE CAPSULE BY MOUTH TWICE DAILY,  Disp: 180 capsule, Rfl: 3     Probiotic Product (MISC INTESTINAL BRANDON REGULAT) CAPS, , Disp: , Rfl:      simvastatin (ZOCOR) 20 MG tablet, Take 1 tablet (20 mg) by mouth At Bedtime, Disp: 90 tablet, Rfl: 3     venlafaxine (EFFEXOR XR) 75 MG 24 hr capsule, TAKE TWO CAPSULES BY MOUTH DAILY, Disp: 180 capsule, Rfl: 0    ALLERGIES:  Reviewed independently by me.  Allergies   Allergen Reactions     Cimicifuga [place] (Black Cohosh) Anaphylaxis and Hives     Ciprofloxacin Rash     Developed rash over trunk of body after one dose of cipro     Other Environmental Allergy      Grasses     Oxychlorodene Other (See Comments)     Hallucinations     Oxycodone Unknown     hallucinations     Penicillins Hives     Pollen [Pollen Extract] Unknown     Trees      Venom-Honey Bee [Bee Venom] Swelling     Cephalosporins Rash       FAMILY HISTORY:  Reviewed independently by me.  Family History   Problem Relation Age of Onset     Aneurysm Father         AAA     Cancer Mother         pancrease       SOCIAL HISTORY:   Reviewed independently by me.  Social History     Socioeconomic History     Marital status:    Tobacco Use     Smoking status: Former     Packs/day: 0.25     Years: 1.00     Pack years: 0.25     Types: Cigarettes     Quit date: 1961     Years since quittin.4     Smokeless tobacco: Never   Substance and Sexual Activity     Alcohol use: No     Drug use: No     Sexual activity: Never   Social History Narrative    Lives in senior living with  of over 50 years. 3 daughters       --------------- PHYSICAL EXAM ---------------  Nursing notes and vitals independently reviewed by me.  VITALS:  Vitals:    23 1515 23 1642 23 1800 23 1837   BP: 126/70 (!) 143/69 (!) 143/70 (!) 143/70   BP Location:  Right arm     Patient Position:  Supine     Cuff Size:  Adult Regular     Pulse: 69 60 56 57   Resp:       Temp:       SpO2: 95% 95% 97% 95%   Weight:       Height:           PHYSICAL EXAM:     General:  alert, interactive, no distress  Eyes:  conjunctivae clear, conjugate gaze  HENT:  atraumatic, nose with no rhinorrhea, oropharynx clear  Neck:  no meningismus  Cardiovascular:  HR 60s during exam, regular rhythm, no murmurs, brisk cap refill  Chest:  no chest wall tenderness  Pulmonary:  no stridor, normal phonation, normal work of breathing, clear lungs bilaterally  Abdomen:  soft, nondistended, moderate RLQ tenderness, mild diffuse tenderness with guarding, no rebound or rigidity  :  Mild bilateral CVA tenderness, no overlying skin changes  Back:  no midline spinal tenderness  Musculoskeletal:  no pretibial edema, no calf tenderness. Gross ROM intact to joints of extremities with no obvious deformities.  Skin:  warm, dry, no rash  Neuro:  awake, alert, answers questions appropriately, follows commands, moves all limbs  Psych:  calm, normal affect      --------------- RESULTS ---------------  LAB:  Reviewed and independently interpreted by me.  Results for orders placed or performed during the hospital encounter of 06/09/23   CT Abdomen Pelvis w Contrast    Impression    IMPRESSION:   1.  No abnormalities identified to explain patient's right lower quadrant pain.    2.  Moderate coronary artery calcification. The gallbladder is surgically absent.   Basic metabolic panel   Result Value Ref Range    Sodium 138 136 - 145 mmol/L    Potassium 4.4 3.5 - 5.0 mmol/L    Chloride 102 98 - 107 mmol/L    Carbon Dioxide (CO2) 31 22 - 31 mmol/L    Anion Gap 5 5 - 18 mmol/L    Urea Nitrogen 15 8 - 28 mg/dL    Creatinine 0.79 0.60 - 1.10 mg/dL    Calcium 9.1 8.5 - 10.5 mg/dL    Glucose 104 70 - 125 mg/dL    GFR Estimate 74 >60 mL/min/1.73m2   Result Value Ref Range    Lipase 28 0 - 52 U/L   Hepatic function panel   Result Value Ref Range    Bilirubin Total 0.3 0.0 - 1.0 mg/dL    Bilirubin Direct <0.1 <=0.5 mg/dL    Protein Total 6.9 6.0 - 8.0 g/dL    Albumin 3.7 3.5 - 5.0 g/dL    Alkaline Phosphatase 101 45 - 120  U/L    AST 23 0 - 40 U/L    ALT 16 0 - 45 U/L   Result Value Ref Range    Magnesium 2.1 1.8 - 2.6 mg/dL   CRP inflammation   Result Value Ref Range    CRP 0.3 0.0 - <0.8 mg/dL   UA with Microscopic reflex to Culture    Specimen: Urine, Clean Catch   Result Value Ref Range    Color Urine Light Yellow Colorless, Straw, Light Yellow, Yellow    Appearance Urine Clear Clear    Glucose Urine Negative Negative mg/dL    Bilirubin Urine Negative Negative    Ketones Urine Negative Negative mg/dL    Specific Gravity Urine 1.015 1.001 - 1.030    Blood Urine Negative Negative    pH Urine 6.0 5.0 - 7.0    Protein Albumin Urine Negative Negative mg/dL    Urobilinogen Urine <2.0 <2.0 mg/dL    Nitrite Urine Negative Negative    Leukocyte Esterase Urine 500 Sidney/uL (A) Negative    Bacteria Urine Few (A) None Seen /HPF    Mucus Urine Present (A) None Seen /LPF    RBC Urine 2 <=2 /HPF    WBC Urine 24 (H) <=5 /HPF    Squamous Epithelials Urine 2 (H) <=1 /HPF   CBC with platelets and differential   Result Value Ref Range    WBC Count 7.0 4.0 - 11.0 10e3/uL    RBC Count 3.70 (L) 3.80 - 5.20 10e6/uL    Hemoglobin 12.0 11.7 - 15.7 g/dL    Hematocrit 36.6 35.0 - 47.0 %    MCV 99 78 - 100 fL    MCH 32.4 26.5 - 33.0 pg    MCHC 32.8 31.5 - 36.5 g/dL    RDW 13.3 10.0 - 15.0 %    Platelet Count 232 150 - 450 10e3/uL    % Neutrophils 67 %    % Lymphocytes 22 %    % Monocytes 8 %    % Eosinophils 2 %    % Basophils 1 %    % Immature Granulocytes 0 %    NRBCs per 100 WBC 0 <1 /100    Absolute Neutrophils 4.7 1.6 - 8.3 10e3/uL    Absolute Lymphocytes 1.5 0.8 - 5.3 10e3/uL    Absolute Monocytes 0.6 0.0 - 1.3 10e3/uL    Absolute Eosinophils 0.2 0.0 - 0.7 10e3/uL    Absolute Basophils 0.1 0.0 - 0.2 10e3/uL    Absolute Immature Granulocytes 0.0 <=0.4 10e3/uL    Absolute NRBCs 0.0 10e3/uL       RADIOLOGY:  Reviewed and independently interpreted by me. Please see official radiology report.  Recent Results (from the past 24 hour(s))   CT Abdomen Pelvis w  Contrast    Narrative    EXAM: CT ABDOMEN PELVIS W CONTRAST  LOCATION: [facility]  DATE/TIME: 6/9/2023 5:00 PM CDT    INDICATION: RLQ diffuse abdominal pain.  COMPARISON: None.  TECHNIQUE: CT scan of the abdomen and pelvis was performed following injection of IV contrast. Multiplanar reformats were obtained. Dose reduction techniques were used.  CONTRAST: isovue 370 100 mL    FINDINGS:   LOWER CHEST: Moderate coronary artery calcifications.    HEPATOBILIARY: The gallbladder is surgically absent. The liver and bile ducts are normal.    PANCREAS: Normal.    SPLEEN: Normal.    ADRENAL GLANDS: Normal.    KIDNEYS/BLADDER: Normal.    BOWEL: The appendix is not seen and may be atrophic or surgically absent with no evidence for pericecal inflammation.    LYMPH NODES: Normal.    VASCULATURE: Moderate calcification with no aneurysm.    PELVIC ORGANS: Surgically absent.    MUSCULOSKELETAL: Multilevel postoperative and significant degenerative changes are seen in the spine.      Impression    IMPRESSION:   1.  No abnormalities identified to explain patient's right lower quadrant pain.    2.  Moderate coronary artery calcification. The gallbladder is surgically absent.             --------------- ADDITIONAL MDM ---------------  History:  - Supplemental history from:       -- see above charting, if applicable: patient, family  - External Record(s) reviewed:       -- see above charting, if applicable       -- Inpatient/outpatient record (cardiology visit 4/19/23), prior labs (blood 5/9/23), prior imaging (CT 3/20/23)    Workup:  - Chart documentation above includes differential considered and any EKGs or imaging independently interpreted by provider.  - In additional to work up documented, I considered the following work up:       -- see above charting, if applicable    External Consultation:  - Discussion of management with another provider:       -- see above charting, if applicable    Complicating  Factors:  - Care impacted by chronic illness:       -- see above MDM, past medical history, & problem list  - Care affected by social determinants of health:       -- see above social history    Disposition Considerations:  - Discharge       -- I considered admission given that the patient came to the Emergency Department, but ultimately discharged the patient per decision making above       -- I recommended the patient continue their current prescription strength medication(s) as charted above in current medications list       -- I prescribed prescription strength medication(s) as charted above       -- I recommended over-the-counter medication(s) as charted above & in discharge instructions         I, Andrea Damian, am serving as a scribe to document services personally performed by Dr. Gerard Mckeon based on my observation and the provider's statements to me. I, Gerard Mckeon MD attest that Andrea Damian is acting in a scribe capacity, has observed my performance of the services and has documented them in accordance with my direction.      Gerard Mckeon MD  06/09/23  Emergency Medicine  [facility] EMERGENCY ROOM  [address] [address]  [phone]  Dept: [phone]      Gerard Mckeon MD  06/09/23 0486

## 2023-06-09 NOTE — ED TRIAGE NOTES
Pt here with lower abdominal pain she has had since around May 16th. She said it started right after she got a cortisone shot for her back from Dr Courtney at [facility].

## 2023-06-09 NOTE — DISCHARGE INSTRUCTIONS
Take the antibiotic (Macrobid) for your bladder infection.    As needed for pain control at home, take:  - over-the-counter ibuprofen 600mg by mouth every 6 hours (max dose 2400mg in 24 hours)  - over-the-counter acetaminophen 1g by mouth every 6 hours (max dose 4g in 24 hours)    Drink plenty of fluids to stay hydrated. You have been doing a good job with this.    Follow up with your Primary Care provider in 2 days for a recheck.    Return to the Emergency Department for any persistent vomiting, severe worsening, or any other concerns.

## 2023-06-11 LAB
BACTERIA UR CULT: ABNORMAL
BACTERIA UR CULT: ABNORMAL

## 2023-06-12 ENCOUNTER — TELEPHONE (OUTPATIENT)
Dept: FAMILY MEDICINE | Facility: CLINIC | Age: 83
End: 2023-06-12
Payer: MEDICARE

## 2023-06-12 NOTE — TELEPHONE ENCOUNTER
Reason for Call:  Appointment Request    Patient requesting this type of appt:  Hospital/ED Follow-Up     Requested provider: True Pugh    Reason patient unable to be scheduled: Not within requested timeframe    When does patient want to be seen/preferred time: 1-2 days - per hospital    Comments: No appointments available until July. Patient only wants to see Dr. Pugh    Could we send this information to you in [facility] or would you prefer to receive a phone call?:   Patient would prefer a phone call   Okay to leave a detailed message?: Yes at Home number on file [phone] (home)    Call taken on 6/12/2023 at 8:02 AM by Kate Rob

## 2023-06-12 NOTE — TELEPHONE ENCOUNTER
I spoke to the patient. She insists on seeing Dr. Pugh even though [facility] does not have openings.     Please advise appointment time if appropriate.

## 2023-06-13 NOTE — TELEPHONE ENCOUNTER
Discussed with patient by phone.  She was seen through the emergency department and treated for urinary tract infection (50,000-100,000 CFU/mL Klebsiella pneumoniae) with Macrobid 100 mg twice daily x7 days.  Culture shows sensitivity to chosen antibiotic.  Patient feeling better.  Reviewed CT findings without suggestion of etiology for lower abdominal discomfort.  Lab assessment was otherwise unremarkable other than positive urine culture.  Patient declines need for follow-up at this time and will follow-up as scheduled November 7, 2023.

## 2023-06-13 NOTE — TELEPHONE ENCOUNTER
Pt and daughter called back to see if there was a response yet regarding seeing Dr. [facility] smith.    Informed patient that message is with provider.

## 2023-06-16 ENCOUNTER — TRANSFERRED RECORDS (OUTPATIENT)
Dept: HEALTH INFORMATION MANAGEMENT | Facility: CLINIC | Age: 83
End: 2023-06-16
Payer: MEDICARE

## 2023-06-17 DIAGNOSIS — E78.5 HYPERLIPIDEMIA: ICD-10-CM

## 2023-06-17 RX ORDER — SIMVASTATIN 20 MG
TABLET ORAL
Qty: 90 TABLET | Refills: 3 | Status: SHIPPED | OUTPATIENT
Start: 2023-06-17 | End: 2024-02-01

## 2023-06-18 NOTE — TELEPHONE ENCOUNTER
"Last Written Prescription Date:  5/19/22  Last Fill Quantity: 90,  # refills: 3   Last office visit provider:  5/4/23     Requested Prescriptions   Pending Prescriptions Disp Refills     simvastatin (ZOCOR) 20 MG tablet [Pharmacy Med Name: Simvastatin Oral Tablet 20 MG] 90 tablet 0     Sig: TAKE ONE TABLET BY MOUTH AT BEDTIME       [facility] Protocol Passed - 6/17/2023  1:39 PM        Passed - LDL on file in past 12 months     Recent Labs   Lab Test 05/09/23  0757   LDL 97             Passed - No abnormal creatine kinase in past 12 months     Recent Labs   Lab Test 04/12/18  1539   CKT 87                Passed - Recent (12 mo) or future (30 days) visit within the authorizing provider's specialty     Patient has had an office visit with the authorizing provider or a provider within the authorizing providers department within the previous 12 mos or has a future within next 30 days. See \"Patient Info\" tab in inbasket, or \"Choose Columns\" in Meds & Orders section of the refill encounter.              Passed - Medication is active on med list        Passed - Patient is age 18 or older        Passed - No active pregnancy on record        Passed - No positive pregnancy test in past 12 months             Kandis Conway, RN 06/17/23 9:02 PM  "

## 2023-06-23 DIAGNOSIS — K52.9 CHRONIC DIARRHEA: Primary | ICD-10-CM

## 2023-07-05 ENCOUNTER — OFFICE VISIT (OUTPATIENT)
Dept: FAMILY MEDICINE | Facility: CLINIC | Age: 83
End: 2023-07-05
Payer: MEDICARE

## 2023-07-05 VITALS
WEIGHT: 184 LBS | BODY MASS INDEX: 33.65 KG/M2 | TEMPERATURE: 98.2 F | DIASTOLIC BLOOD PRESSURE: 60 MMHG | RESPIRATION RATE: 16 BRPM | HEART RATE: 70 BPM | OXYGEN SATURATION: 93 % | SYSTOLIC BLOOD PRESSURE: 110 MMHG

## 2023-07-05 DIAGNOSIS — R41.0 CONFUSION: Primary | ICD-10-CM

## 2023-07-05 DIAGNOSIS — K52.839 MICROSCOPIC COLITIS, UNSPECIFIED MICROSCOPIC COLITIS TYPE: ICD-10-CM

## 2023-07-05 DIAGNOSIS — F33.9 RECURRENT MAJOR DEPRESSIVE DISORDER, REMISSION STATUS UNSPECIFIED (H): ICD-10-CM

## 2023-07-05 PROCEDURE — 99214 OFFICE O/P EST MOD 30 MIN: CPT | Performed by: FAMILY MEDICINE

## 2023-07-05 RX ORDER — TRAZODONE HYDROCHLORIDE 50 MG/1
50-75 TABLET, FILM COATED ORAL
COMMUNITY
Start: 2023-06-13 | End: 2024-06-12

## 2023-07-05 RX ORDER — BUDESONIDE 3 MG/1
9 CAPSULE, COATED PELLETS ORAL EVERY MORNING
Qty: 90 CAPSULE | Refills: 1 | Status: SHIPPED | OUTPATIENT
Start: 2023-07-05 | End: 2023-08-28

## 2023-07-05 RX ORDER — BUPROPION HYDROCHLORIDE 300 MG/1
TABLET ORAL
COMMUNITY
Start: 2023-06-27 | End: 2023-09-20

## 2023-07-05 ASSESSMENT — PAIN SCALES - GENERAL: PAINLEVEL: NO PAIN (0)
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
## 2023-07-12 ENCOUNTER — TRANSFERRED RECORDS (OUTPATIENT)
Dept: HEALTH INFORMATION MANAGEMENT | Facility: CLINIC | Age: 83
End: 2023-07-12
Payer: MEDICARE

## 2023-08-04 ENCOUNTER — TRANSFERRED RECORDS (OUTPATIENT)
Dept: HEALTH INFORMATION MANAGEMENT | Facility: CLINIC | Age: 83
End: 2023-08-04
Payer: MEDICARE

## 2023-08-22 DIAGNOSIS — R29.898 BILATERAL LEG WEAKNESS: Primary | ICD-10-CM

## 2023-08-22 DIAGNOSIS — G60.9 HEREDITARY AND IDIOPATHIC PERIPHERAL NEUROPATHY: ICD-10-CM

## 2023-08-22 NOTE — PROGRESS NOTES
Letter created.           DME (Durable Medical Equipment) Orders and Documentation  Orders Placed This Encounter   Procedures    Walker Order        The patient was assessed and it was determined the patient is in need of the following listed DME Supplies/Equipment. Please complete supporting documentation below to demonstrate medical necessity.

## 2023-08-23 ENCOUNTER — TRANSFERRED RECORDS (OUTPATIENT)
Dept: HEALTH INFORMATION MANAGEMENT | Facility: CLINIC | Age: 83
End: 2023-08-23
Payer: MEDICARE

## 2023-08-28 ENCOUNTER — TRANSFERRED RECORDS (OUTPATIENT)
Dept: HEALTH INFORMATION MANAGEMENT | Facility: CLINIC | Age: 83
End: 2023-08-28
Payer: MEDICARE

## 2023-08-28 DIAGNOSIS — K52.839 MICROSCOPIC COLITIS, UNSPECIFIED MICROSCOPIC COLITIS TYPE: ICD-10-CM

## 2023-08-28 RX ORDER — BUDESONIDE 3 MG/1
CAPSULE, COATED PELLETS ORAL
Qty: 90 CAPSULE | Refills: 3 | Status: SHIPPED | OUTPATIENT
Start: 2023-08-28

## 2023-09-05 ENCOUNTER — TRANSCRIBE ORDERS (OUTPATIENT)
Dept: OTHER | Age: 83
End: 2023-09-05

## 2023-09-05 DIAGNOSIS — I10 ESSENTIAL (PRIMARY) HYPERTENSION: ICD-10-CM

## 2023-09-05 DIAGNOSIS — R13.10 DYSPHAGIA, UNSPECIFIED: ICD-10-CM

## 2023-09-05 DIAGNOSIS — Z71.3 DIETARY COUNSELING AND SURVEILLANCE: Primary | ICD-10-CM

## 2023-09-05 DIAGNOSIS — K52.831 COLLAGENOUS COLITIS: ICD-10-CM

## 2023-09-05 DIAGNOSIS — K21.00 GASTROESOPHAGEAL REFLUX DISEASE WITH ESOPHAGITIS, UNSPECIFIED WHETHER HEMORRHAGE: ICD-10-CM

## 2023-09-05 DIAGNOSIS — K21.9 GASTROESOPHAGEAL REFLUX DISEASE, UNSPECIFIED WHETHER ESOPHAGITIS PRESENT: ICD-10-CM

## 2023-09-05 DIAGNOSIS — R13.10 DYSPHAGIA, UNSPECIFIED TYPE: ICD-10-CM

## 2023-09-06 ENCOUNTER — TRANSFERRED RECORDS (OUTPATIENT)
Dept: HEALTH INFORMATION MANAGEMENT | Facility: CLINIC | Age: 83
End: 2023-09-06
Payer: MEDICARE

## 2023-09-06 ENCOUNTER — MYC MEDICAL ADVICE (OUTPATIENT)
Dept: FAMILY MEDICINE | Facility: CLINIC | Age: 83
End: 2023-09-06
Payer: MEDICARE

## 2023-09-12 NOTE — TELEPHONE ENCOUNTER
[facility] is needing the face to face visit regarding the need for the walker per medicare guidelines.  LM - please help schedule an appt. Thank you.

## 2023-09-12 NOTE — TELEPHONE ENCOUNTER
Please refax walker order from 8/22/23 per family request to [facility] at [phone].

## 2023-09-15 DIAGNOSIS — I51.7 RIGHT ATRIAL ENLARGEMENT: ICD-10-CM

## 2023-09-15 DIAGNOSIS — M25.50 ARTHRALGIA OF MULTIPLE JOINTS: ICD-10-CM

## 2023-09-15 DIAGNOSIS — I87.2 VENOUS INSUFFICIENCY OF BOTH LOWER EXTREMITIES: ICD-10-CM

## 2023-09-15 DIAGNOSIS — E03.9 ACQUIRED HYPOTHYROIDISM: ICD-10-CM

## 2023-09-18 RX ORDER — CELECOXIB 200 MG/1
CAPSULE ORAL
Qty: 90 CAPSULE | Refills: 3 | Status: SHIPPED | OUTPATIENT
Start: 2023-09-18 | End: 2024-02-01

## 2023-09-18 RX ORDER — FUROSEMIDE 20 MG
TABLET ORAL
Qty: 90 TABLET | Refills: 2 | Status: SHIPPED | OUTPATIENT
Start: 2023-09-18 | End: 2024-02-01

## 2023-09-18 RX ORDER — LEVOTHYROXINE SODIUM 50 UG/1
TABLET ORAL
Qty: 90 TABLET | Refills: 2 | Status: SHIPPED | OUTPATIENT
Start: 2023-09-18 | End: 2024-02-01

## 2023-09-20 ENCOUNTER — OFFICE VISIT (OUTPATIENT)
Dept: FAMILY MEDICINE | Facility: CLINIC | Age: 83
End: 2023-09-20
Payer: MEDICARE

## 2023-09-20 VITALS
BODY MASS INDEX: 34.02 KG/M2 | TEMPERATURE: 97.1 F | DIASTOLIC BLOOD PRESSURE: 70 MMHG | RESPIRATION RATE: 16 BRPM | OXYGEN SATURATION: 96 % | HEART RATE: 76 BPM | WEIGHT: 186 LBS | SYSTOLIC BLOOD PRESSURE: 120 MMHG

## 2023-09-20 DIAGNOSIS — R29.898 LEG WEAKNESS, BILATERAL: ICD-10-CM

## 2023-09-20 DIAGNOSIS — R41.0 CONFUSION: ICD-10-CM

## 2023-09-20 DIAGNOSIS — R26.9 ABNORMALITY OF GAIT: ICD-10-CM

## 2023-09-20 DIAGNOSIS — M54.41 CHRONIC BILATERAL LOW BACK PAIN WITH RIGHT-SIDED SCIATICA: Primary | ICD-10-CM

## 2023-09-20 DIAGNOSIS — G89.29 CHRONIC BILATERAL LOW BACK PAIN WITH RIGHT-SIDED SCIATICA: Primary | ICD-10-CM

## 2023-09-20 LAB
ALBUMIN UR-MCNC: NEGATIVE MG/DL
APPEARANCE UR: CLEAR
BILIRUB UR QL STRIP: NEGATIVE
COLOR UR AUTO: YELLOW
GLUCOSE UR STRIP-MCNC: NEGATIVE MG/DL
HGB UR QL STRIP: NEGATIVE
KETONES UR STRIP-MCNC: NEGATIVE MG/DL
LEUKOCYTE ESTERASE UR QL STRIP: NEGATIVE
NITRATE UR QL: NEGATIVE
PH UR STRIP: 5.5 [PH] (ref 5–8)
SP GR UR STRIP: 1.02 (ref 1–1.03)
UROBILINOGEN UR STRIP-ACNC: 0.2 E.U./DL

## 2023-09-20 PROCEDURE — 99214 OFFICE O/P EST MOD 30 MIN: CPT | Performed by: FAMILY MEDICINE

## 2023-09-20 PROCEDURE — 81003 URINALYSIS AUTO W/O SCOPE: CPT | Performed by: FAMILY MEDICINE

## 2023-09-20 RX ORDER — DULOXETIN HYDROCHLORIDE 20 MG/1
CAPSULE, DELAYED RELEASE ORAL
COMMUNITY
Start: 2023-09-06 | End: 2023-11-07

## 2023-09-20 ASSESSMENT — PATIENT HEALTH QUESTIONNAIRE - PHQ9
10. IF YOU CHECKED OFF ANY PROBLEMS, HOW DIFFICULT HAVE THESE PROBLEMS MADE IT FOR YOU TO DO YOUR WORK, TAKE CARE OF THINGS AT HOME, OR GET ALONG WITH OTHER PEOPLE: SOMEWHAT DIFFICULT
SUM OF ALL RESPONSES TO PHQ QUESTIONS 1-9: 11
SUM OF ALL RESPONSES TO PHQ QUESTIONS 1-9: 11

## 2023-09-20 ASSESSMENT — ANXIETY QUESTIONNAIRES
7. FEELING AFRAID AS IF SOMETHING AWFUL MIGHT HAPPEN: NOT AT ALL
GAD7 TOTAL SCORE: 2
4. TROUBLE RELAXING: SEVERAL DAYS
6. BECOMING EASILY ANNOYED OR IRRITABLE: NOT AT ALL
5. BEING SO RESTLESS THAT IT IS HARD TO SIT STILL: NOT AT ALL
IF YOU CHECKED OFF ANY PROBLEMS ON THIS QUESTIONNAIRE, HOW DIFFICULT HAVE THESE PROBLEMS MADE IT FOR YOU TO DO YOUR WORK, TAKE CARE OF THINGS AT HOME, OR GET ALONG WITH OTHER PEOPLE: SOMEWHAT DIFFICULT
3. WORRYING TOO MUCH ABOUT DIFFERENT THINGS: NOT AT ALL
1. FEELING NERVOUS, ANXIOUS, OR ON EDGE: SEVERAL DAYS
2. NOT BEING ABLE TO STOP OR CONTROL WORRYING: NOT AT ALL
GAD7 TOTAL SCORE: 2

## 2023-09-20 ASSESSMENT — PAIN SCALES - GENERAL: PAINLEVEL: NO PAIN (0)

## 2023-09-20 NOTE — LETTER
September 20, 2023      Dyana Nicole  [address]  [place] [facility] [place] [facility] [address]        To Whom It May Concern,     Please provide a 4-wheel walker with brake and seat due to impaired mobility related activities of daily living including transfers, ambulation, as well as fall risk prevention.  She may safely use the walker.  The functional mobility deficit can be sufficiently resolved by using the walker.      Sincerely,        True Pugh MD

## 2023-09-20 NOTE — PROGRESS NOTES
Assessment/Plan:    Chronic bilateral low back pain with right-sided sciatica  Chronic bilateral low back pain right greater than left with right-sided sciatica typically to the level of the knee occasionally to the foot.  Associated ataxia and bilateral leg weakness also described and did request four-wheel walker with seat and brake per patient request with benefits previously described in order to allow participation in mobility related activities of daily living.  Patient may safely utilize walker and functional mobility deficit is sufficiently resolved with use of the walker.    Addendum (11/7/23):  Face-to-face encounter initially September 20, 2023 with clarification that patient has a mobility limitation that significantly impairs her ability to participate in multiple mobility related activities of daily living in the home.  Patient is safe to use the prescribed walker with anticipated functional mobility deficits sufficiently resolved by use of such walker.  I will update office note from September 20, 2023 as well and [facility].  - Walker Order    Walk Is Wobbly Or Unsteady (Ataxia)  As above.  - Walker Order    Leg weakness, bilateral  As above.  - Walker Order    Confusion  Confusion, intermittent.  Neuropsych testing to be completed with [facility] referral request for patient's daughter.  Urinalysis to be updated with prior history of UTI as well otherwise lab assessment previously unremarkable.  Had been seen through [facility] neurologic evaluation without evidence for dementia.  MRI showing stable Rathke cleft.  - Adult Neuropsychology  Referral  - UA Macroscopic with reflex to Microscopic and Culture          Subjective:    Dyana Nicole is seen today for evaluation of 4 wheeled walker with seat and brake.  Has bilateral low back pain typically right more so than left with right greater than left leg radicular symptoms typically to the level of  the knee occasionally to the foot.  Associated ataxic gait with bilateral leg weakness.  Also has had issues with ongoing intermittent confusion concerns throughout the day.  Has seen neurologist with [facility] neurologic clinic with MRI brain showing stable Rathke cleft otherwise no obvious concern for current dementia.  No fevers or chills.  Comprehensive review of systems as above otherwise all negative.       - Ray x 63 years (progressive Alzheimer's dementia SLUMS 13 out of 30)   3 daughters - Genesis ( at [facility]), Eneida, and Loretta   Smoke - quit in 60s after 1 year   EtOH: sober x 36 years   Surgeries: gallbladder, appy, hysterectomy and ? 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; left CTS release; bladder lift; right TKA; left rotator cuff   Hospitalizations:   [place], [place] - live in community with 65 houses in [place] (1 mile circumference if walk)       Past Surgical History:   Procedure Laterality Date    ARTHROPLASTY KNEE BILATERAL      ARTHROSCOPY SHOULDER ROTATOR CUFF REPAIR      CATARACT EXTRACTION, BILATERAL      CHOLECYSTECTOMY      EXCISE LESION TRUNK Left 1/5/2018    Procedure: LEFT BUTTOCK EXPLORATION, EXCISION BUTTOCK MASS;  Surgeon: Lg Jameson MD;  Location: [facility] OR;  Service:     HYSTERECTOMY      JOINT REPLACEMENT Bilateral     knees    MOHS MICROGRAPHIC PROCEDURE      Nose    OOPHORECTOMY Left     1 removed, 1 remains    OTHER SURGICAL HISTORY      Bladder lift    OH ARTHROPLASTY TIBIAL PLATEAU      Description: Knee Replacement;  Recorded: 05/16/2013;    OH INJECT NERV JOE STEWART PERIPH NERV      Description: Peripheral Nerve Block Wrist Median Right;  Recorded: 05/16/2013;    RELEASE CARPAL TUNNEL      REPLACEMENT TOTAL KNEE      L        Family History   Problem Relation Age of Onset    Aneurysm Father         AAA    Cancer Mother         pancrease        Past Medical History:    Diagnosis Date    Anemia, unspecified     Created by Conversion     Arrhythmia     Cervical spondylosis without myelopathy     Created by Conversion     Depression     Disease of thyroid gland     hypo    GERD (gastroesophageal reflux disease)     High cholesterol     History of transfusion     Myalgia and myositis, unspecified     Created by Conversion     Near syncope     Other and unspecified hyperlipidemia     Created by Conversion     Skin cancer, basal cell     Sleep apnea     mild    Tachycardia     Unspecified essential hypertension     Created by Conversion     Unspecified hereditary and idiopathic peripheral neuropathy     Created by Conversion         Social History     Tobacco Use    Smoking status: Former     Packs/day: 0.25     Years: 1.00     Pack years: 0.25     Types: Cigarettes     Quit date: 1961     Years since quittin.7    Smokeless tobacco: Never   Substance Use Topics    Alcohol use: No    Drug use: No        Current Outpatient Medications   Medication Sig Dispense Refill    acetaminophen (TYLENOL) 650 MG CR tablet [ACETAMINOPHEN (TYLENOL) 650 MG CR TABLET] Take 650 mg by mouth as needed for pain.      biotin 5 MG CAPS Take 5,000 mcg by mouth      budesonide (ENTOCORT EC) 3 MG EC capsule TAKE THREE CAPSULES BY MOUTH IN THE MORNING 90 capsule 3    calcium carbonate-vitamin D3 (CALCIUM 600 + D,3,) 600 mg calcium- 200 unit cap [CALCIUM CARBONATE-VITAMIN D3 (CALCIUM 600 + D,3,) 600 MG CALCIUM- 200 UNIT CAP] Take 1 tablet by mouth 2 (two) times a day.             celecoxib (CELEBREX) 200 MG capsule TAKE ONE CAPSULE BY MOUTH ONE TIME DAILY 90 capsule 3    DULoxetine (CYMBALTA) 20 MG capsule       estrogen conj (PREMARIN) 0.3 MG tablet Take 1 tablet (0.3 mg) by mouth daily 90 tablet 3    furosemide (LASIX) 20 MG tablet TAKE ONE TABLET BY MOUTH ONE TIME DAILY 90 tablet 2    levothyroxine (SYNTHROID/LEVOTHROID) 50 MCG tablet TAKE ONE TABLET BY MOUTH ONE TIME DAILY AT 6AM 90 tablet 2     metoprolol tartrate (LOPRESSOR) 50 MG tablet TAKE 1&1/2 TABLET BY MOUTH EVERY MORNING AND TAKE 1 TABLET BY MOUTH EVERY EVENING 75 tablet 0    multivitamin with minerals (THERA-M) 9 mg iron-400 mcg Tab tablet [MULTIVITAMIN WITH MINERALS (THERA-M) 9 MG IRON-400 MCG TAB TABLET] Take 1 tablet by mouth every evening.      omeprazole (PRILOSEC) 20 MG DR capsule TAKE ONE CAPSULE BY MOUTH TWICE DAILY 180 capsule 3    Probiotic Product (MISC INTESTINAL BRANDON REGULAT) CAPS       simvastatin (ZOCOR) 20 MG tablet TAKE ONE TABLET BY MOUTH AT BEDTIME 90 tablet 3    traZODone (DESYREL) 50 MG tablet Take 50-75 mg by mouth      venlafaxine (EFFEXOR XR) 75 MG 24 hr capsule TAKE TWO CAPSULES BY MOUTH DAILY 180 capsule 0    buPROPion (WELLBUTRIN XL) 300 MG 24 hr tablet  (Patient not taking: Reported on 9/20/2023)            Objective:    Vitals:    09/20/23 1256   BP: 120/70   Pulse: 76   Resp: 16   Temp: 97.1  F (36.2  C)   SpO2: 96%   Weight: 84.4 kg (186 lb)      Body mass index is 34.02 kg/m .    Alert.  No apparent distress.  Does transfer slowly from seated position.  Utilizing walker to assist with ambulation.  Ataxic gait noted with falls risk without use of walker.      This note has been dictated using voice recognition software and as a result may contain minor grammatical errors and unintended word substitutions.         Answers submitted by the patient for this visit:  Patient Health Questionnaire (Submitted on 9/20/2023)  If you checked off any problems, how difficult have these problems made it for you to do your work, take care of things at home, or get along with other people?: Somewhat difficult  PHQ9 TOTAL SCORE: 11  YEE-7 (Submitted on 9/20/2023)  YEE 7 TOTAL SCORE: 2  General Questionnaire (Submitted on 9/20/2023)  Chief Complaint: Chronic problems general questions HPI Form  What is the reason for your visit today? : medicare foms  How many servings of fruits and vegetables do you eat daily?: 0-1  On average, how  many sweetened beverages do you drink each day (Examples: soda, juice, sweet tea, etc.  Do NOT count diet or artificially sweetened beverages)?: 0  How many minutes a day do you exercise enough to make your heart beat faster?: 9 or less  How many days a week do you exercise enough to make your heart beat faster?: 3 or less  How many days per week do you miss taking your medication?: 1  DME (Durable Medical Equipment) Orders and Documentation  Orders Placed This Encounter   Procedures    Walker Order        The patient was assessed and it was determined the patient is in need of the following listed DME Supplies/Equipment. Please complete supporting documentation below to demonstrate medical necessity.

## 2023-09-21 ENCOUNTER — HOSPITAL ENCOUNTER (OUTPATIENT)
Dept: SPEECH THERAPY | Facility: CLINIC | Age: 83
Discharge: HOME | End: 2023-09-21
Attending: PHYSICIAN ASSISTANT
Payer: MEDICARE

## 2023-09-21 ENCOUNTER — HOSPITAL ENCOUNTER (OUTPATIENT)
Dept: RADIOLOGY | Facility: CLINIC | Age: 83
Discharge: HOME | End: 2023-09-21
Attending: PHYSICIAN ASSISTANT
Payer: MEDICARE

## 2023-09-21 DIAGNOSIS — K21.9 GASTROESOPHAGEAL REFLUX DISEASE, UNSPECIFIED WHETHER ESOPHAGITIS PRESENT: ICD-10-CM

## 2023-09-21 DIAGNOSIS — I10 ESSENTIAL (PRIMARY) HYPERTENSION: ICD-10-CM

## 2023-09-21 DIAGNOSIS — K52.831 COLLAGENOUS COLITIS: ICD-10-CM

## 2023-09-21 DIAGNOSIS — Z71.3 DIETARY COUNSELING AND SURVEILLANCE: ICD-10-CM

## 2023-09-21 DIAGNOSIS — R13.10 DYSPHAGIA, UNSPECIFIED TYPE: ICD-10-CM

## 2023-09-21 PROCEDURE — 92526 ORAL FUNCTION THERAPY: CPT | Mod: GN

## 2023-09-21 PROCEDURE — 92611 MOTION FLUOROSCOPY/SWALLOW: CPT | Mod: GN

## 2023-09-21 PROCEDURE — 92610 EVALUATE SWALLOWING FUNCTION: CPT | Mod: GN

## 2023-09-21 PROCEDURE — 74230 X-RAY XM SWLNG FUNCJ C+: CPT

## 2023-09-21 PROCEDURE — 92610 EVALUATE SWALLOWING FUNCTION: CPT | Mod: GN,XU

## 2023-09-21 NOTE — PROGRESS NOTES
"SPEECH LANGUAGE PATHOLOGY EVALUATION    See electronic medical record for Abuse and Falls Screening details.    Subjective      Presenting condition or subjective complaint:  Patient states, \"Things are getting caught [in my throat] and I end up coughing.\"  She notes that meats and breads are especially problematic.  She needs to follow bites of these items with several sips of water.  Patient also states, \"I used to be able to swallow a handful of pills, but I can't anymore.\"  She has a history of esophageal dilation >15 years ago.   Date of onset: 09/01/23 (Order date)    Relevant medical history:  GERD, unspecified whether esophagitis present. Per review of EMR, patient has a history of confusion and short-term memory issues. She lives with her  who has progressive dementia.   Dates & types of surgery:  Please refer to EMR for details.    Prior diagnostic imaging/testing results:  None per cursory review of EMR     Prior therapy history for the same diagnosis, illness or injury:  No      Pain assessment:  No subjective evidence of pain     Objective     SWALLOW EVALUTION  Dysphagia history:  History of esophageal dysphagia with need for esophageal dilation 15+ years ago.  Current Diet/Method of Nutritional Intake: thin liquids (level 0), regular diet        CLINICAL SWALLOW EVALUATION  Clinical swallow evaluation completed prior to VFSS via review of previous records, clinical interview, and oral motor examination.    Oral Motor Function: generally intact  Dentition: natural dentition  Secretion management: WFL  Mucosal quality: good  Mandibular function: intact  Oral labial function: WFL  Lingual function: WFL  Laryngeal function: voicing WFL       VIDEOFLUOROSCOPIC SWALLOW STUDY  Radiologist: Farhan Weber DO  Views Taken: left lateral   Physical location of procedure: [facility]  Patient was sitting upright in swallow study chair.    VFSS textures trialed:   VFSS Eval: " Thin Liquids  Mode of Presentation: cup, straw, self-fed   Order of Presentation:  Trials 1, 5, 6  (trial 6 consisted of consecutive sips taken by straw)  Preparatory Phase: WFL  Oral Phase: premature pharyngeal entry  Bolus Location When Swallow Initiated: posterior laryngeal surface of epiglottis  Pharyngeal Phase: WFL  Rosenbeck's Penetration Aspiration Scale: 1 - no aspiration, contrast does not enter airway  Diagnostic Statement: Swallow response was mildly delayed, triggering just past the posterior laryngeal surface of the epiglottis. With consecutive sips taken by straw, there was a single episode of minimal epiglottic undercoating or trace laryngeal penetration. This cleared spontaneously.    VFSS Eval: Purees  Mode of Presentation: spoon, fed by clinician   Order of Presentation:  Trial 2.  Additional trials were not indicated.  Preparatory Phase: WFL  Oral Phase: WFL  Bolus Location When Swallow Initiated: valleculae  Pharyngeal Phase: WFL  Rosenbeck s Penetration Aspiration Scale: 1 - no aspiration, contrast does not enter airway  Diagnostic Statement: Swallow response was minimally delayed, triggering at the valleculae.    VFSS Eval: Solids  Mode of Presentation: fed by clinician   Order of Presentation:  Trials 3, 4. Additional trials were not indicated.  Preparatory Phase: WFL  Oral Phase: WFL  Bolus Location When Swallow Initiated: valleculae  Pharyngeal Phase: WFL   Rosenbeck s Penetration Aspiration Scale: 1 - no aspiration, contrast does not enter airway  Diagnostic Statement: Mastication was effective and timely. Swallow response was minimally delayed, triggering at the valleculae.    VFSS Eval: Barium Tablet  Mode of Presentation:  Patient swallowed a 13 mm barium tablet with water    Order of Presentation:  Trial 7  Preparatory Phase: WFL  Oral Phase: WFL  Bolus Location When Swallow Initiated: valleculae  Pharyngeal Phase: WFL  Rosenbeck s Penetration Aspiration Scale: 1 - no aspiration,  "contrast does not enter airway  Diagnostic Statement: Barium tablet became briefly hung up in the valleculae, but cleared with an additional sip of water.     ESOPHAGEAL PHASE OF SWALLOW  patient reports symptoms of esophageal dysphagia. Her symptom of foods and pills getting \"caught\" in her throat is suggestive of esophageal dysmotility issue(s). Patient has a history of esophageal dilation.    SWALLOW ASSESSMENT CLINICAL IMPRESSIONS AND RATIONALE  Diet Consistency Recommendations: thin liquids (level 0), regular diet    Recommended Feeding/Eating Techniques: small bolus size, slow rate of intake, alternate food and liquid intake, maintain upright posture during/after eating for 30 minutes, minimize distractions during oral intake   Medication Administration Recommendations: Patient to take pills one at a time  Instrumental Assessment Recommendations: Patient may benefit from an esophagram due to suspected esophageal dysphagia.    Assessment & Plan   CLINICAL IMPRESSIONS   Medical Diagnosis: Dysphagia, unspecified type; Gastroesophageal reflux disease, unspecified whether esophagitis present    Treatment Diagnosis:     Impression/Assessment: Videofluoroscopic swallow study completed per provider order. No oral dysphagia or significant pharyngeal dysphagia noted. Aspiration did not occur during this evaluation. Patient's oropharyngeal swallow function is WNL for her age group.     Oral phase was characterized by good bolus control and effective and timely A-P bolus transit. Tongue base retraction was WNL. Pharyngeal phase notable for mildly delayed timing of the swallow response with all consistencies. This is considered WNL for patient's age group. Epiglottic inversion was complete. Hyolaryngeal excursion and hyolaryngeal elevation were WNL. Pharyngeal constriction was also WNL. Patient was noted to have a mildly prominent cricopharyngeus, but this did not impede bolus transit through the cervical esophagus. "     Patient's overall aspiration risk is low. She is appropriate to continue current diet of Regular textures and thin liquids with use of the safe/compensatory swallowing strategies listed above. She was provided with verbal and written education on these. Further Speech Therapy is not indicated at this time. Anticipate that patient would benefit from further follow up with [facility] for suspected esophageal dysmotility issue(s).    PLAN OF CARE  Treatment Interventions: Swallowing dysfunction and/or oral function for feeding    Prognosis to achieve stated therapy goals is good       Frequency of Treatment:  1 session (today)  Duration of Treatment:  1 visit (today)     Recommended Referrals to Other Professionals: Patient to continue to follow with [facility] for symptom management  Education Assessment:   Learner/Method: Patient;Family (Daughter Genesis was present)    Risks and benefits of evaluation/treatment have been explained.   Patient/Family/caregiver agrees with Plan of Care.     Evaluation Time:    SLP Eval: oral/pharyngeal swallow function, clinical minutes (48510): 10  SLP Eval: VideoFluoroscopic Swallow function Minutes (10751): 15    Signing Clinician: Aracelis Connors, SLP      [facility]                                                                                   OUTPATIENT SPEECH LANGUAGE PATHOLOGY      PLAN OF TREATMENT FOR OUTPATIENT REHABILITATION   Patient's Last Name, First Name, Dyana Myles YOB: 1940   Provider's Name   [facility]   Medical Record No.  [id number]     Onset Date: 09/01/23 (Order date) Start of Care Date:  09/21/23     Medical Diagnosis:  Dysphagia, unspecified type      SLP Treatment Diagnosis: Esophageal dysphagia (suspected)  Plan of Treatment  Frequency/Duration: 1 session (today)/1 visit (today)    Certification date from 9/21/23 To 9/21/23         See note for plan of treatment details and  functional goals      Aracelis Connors, SLP                                                                                                                                 I CERTIFY THE NEED FOR THESE SERVICES FURNISHED UNDER                   THIS PLAN OF TREATMENT AND WHILE UNDER MY CARE     (Physician attestation of this document indicates review and certification of the therapy plan).                           Referring Provider:  Brina Brennan PA-C     Initial Assessment  See Epic Evaluation- 9/21/23

## 2023-09-25 ENCOUNTER — TRANSFERRED RECORDS (OUTPATIENT)
Dept: HEALTH INFORMATION MANAGEMENT | Facility: CLINIC | Age: 83
End: 2023-09-25
Payer: MEDICARE

## 2023-10-14 DIAGNOSIS — K21.9 GASTROESOPHAGEAL REFLUX DISEASE WITHOUT ESOPHAGITIS: ICD-10-CM

## 2023-10-17 ENCOUNTER — MEDICAL CORRESPONDENCE (OUTPATIENT)
Dept: HEALTH INFORMATION MANAGEMENT | Facility: CLINIC | Age: 83
End: 2023-10-17
Payer: MEDICARE

## 2023-10-30 ENCOUNTER — TELEPHONE (OUTPATIENT)
Dept: FAMILY MEDICINE | Facility: CLINIC | Age: 83
End: 2023-10-30
Payer: MEDICARE

## 2023-10-30 NOTE — TELEPHONE ENCOUNTER
Reason for call:  Other     Patient called regarding (reason for call): call back    Additional comments: Daughter called to check in on walker order. States they still haven't gotten it due to information being requested. She would like a call back from Carolyn to discuss what is needed. Says the person she's working with at [facility] isn't very helpful.    Phone number to reach patient:  Cell number on file:    Telephone Information:   Mobile [phone]       Best Time:  Any    Can we leave a detailed message on this number?  YES

## 2023-10-31 NOTE — TELEPHONE ENCOUNTER
We have sent over requested information.  I will call DME company and figure out what else they need.

## 2023-11-07 ENCOUNTER — OFFICE VISIT (OUTPATIENT)
Dept: FAMILY MEDICINE | Facility: CLINIC | Age: 83
End: 2023-11-07
Payer: MEDICARE

## 2023-11-07 VITALS
OXYGEN SATURATION: 96 % | DIASTOLIC BLOOD PRESSURE: 70 MMHG | RESPIRATION RATE: 17 BRPM | BODY MASS INDEX: 33.84 KG/M2 | TEMPERATURE: 97.6 F | WEIGHT: 185 LBS | HEART RATE: 70 BPM | SYSTOLIC BLOOD PRESSURE: 120 MMHG

## 2023-11-07 DIAGNOSIS — F33.9 RECURRENT MAJOR DEPRESSIVE DISORDER, REMISSION STATUS UNSPECIFIED (H): ICD-10-CM

## 2023-11-07 DIAGNOSIS — R26.9 ABNORMALITY OF GAIT: Primary | ICD-10-CM

## 2023-11-07 DIAGNOSIS — G89.4 PAIN SYNDROME, CHRONIC: ICD-10-CM

## 2023-11-07 DIAGNOSIS — R29.6 FALLS FREQUENTLY: ICD-10-CM

## 2023-11-07 DIAGNOSIS — R29.898 LEG WEAKNESS, BILATERAL: ICD-10-CM

## 2023-11-07 DIAGNOSIS — Z23 ENCOUNTER FOR IMMUNIZATION: ICD-10-CM

## 2023-11-07 PROCEDURE — 90480 ADMN SARSCOV2 VAC 1/ONLY CMP: CPT | Performed by: FAMILY MEDICINE

## 2023-11-07 PROCEDURE — 99214 OFFICE O/P EST MOD 30 MIN: CPT | Performed by: FAMILY MEDICINE

## 2023-11-07 PROCEDURE — 91320 SARSCV2 VAC 30MCG TRS-SUC IM: CPT | Performed by: FAMILY MEDICINE

## 2023-11-07 RX ORDER — DULOXETIN HYDROCHLORIDE 30 MG/1
30 CAPSULE, DELAYED RELEASE ORAL DAILY
Qty: 90 CAPSULE | Refills: 1 | Status: SHIPPED | OUTPATIENT
Start: 2023-11-07 | End: 2024-02-01

## 2023-11-07 RX ORDER — RESPIRATORY SYNCYTIAL VIRUS VACCINE 120MCG/0.5
0.5 KIT INTRAMUSCULAR ONCE
Qty: 1 EACH | Refills: 0 | Status: CANCELLED | OUTPATIENT
Start: 2023-11-07 | End: 2023-11-07

## 2023-11-07 ASSESSMENT — PAIN SCALES - GENERAL: PAINLEVEL: NO PAIN (0)

## 2023-11-07 NOTE — PROGRESS NOTES
Assessment/Plan:    Walk Is Wobbly Or Unsteady (Ataxia)  Unsteady gait with increased falls risk.  Face-to-face encounter initially September 20, 2023 with clarification that patient has a mobility limitation that significantly impairs her ability to participate in multiple mobility related activities of daily living in the home.  Patient is safe to use the prescribed walker with anticipated functional mobility deficits sufficiently resolved by use of such walker.  I will update office note from September 20, 2023 as well and forward for [facility] medical DME prescription request.    Leg weakness, bilateral  Bilateral leg weakness.  Chronic low back pain reviewed.  Some benefit of duloxetine 20 mg daily and will increase to 30 mg daily dose.  Physical therapy referral was placed as well.    Falls frequently  Falls risk as noted above with anticipated benefits of utilization of walker.    Recurrent major depressive disorder, remission status unspecified (H24)  As above, increase duloxetine from 20 mg to 30 mg daily for anticipated benefits of both chronic pain and depression management.  - DULoxetine (CYMBALTA) 30 MG capsule  Dispense: 90 capsule; Refill: 1    Pain syndrome, chronic  Physical therapy referral was placed as noted.  - Physical Therapy Referral    Encounter for immunization  COVID updated Pfizer booster provided.  - COVID-19 12+ (2023-24) (PFIZER)    30 minutes total time spent on the date of encounter including patient chart review, counseling and coordination of cares, and documentation.           Subjective:    Dyana Nicole is seen today for follow-up evaluation.  Unsteady gait.  Described Trendelenburg gait.  Bilateral leg weakness associate with chronic low back pain.  Falls risk with prior falls noted.  I have prescribed a walker in the past however needs clarification for walker from [facility] in order to ensure mobility limitation that significantly impairs her ability to  participate in 1 or more mobility related activities of daily living in the home which is the case.  The patient is safe to use the walker and would anticipate significant benefit with mobility deficit sufficiently resolved by the use of such walker.  Patient does have underlying depression.  Low self-esteem.  Has seen a psychiatrist.  Needs COVID booster.  Comprehensive review of systems as above otherwise all negative.       - Ray x 63 years (progressive Alzheimer's dementia SLUMS 13 out of 30)   3 daughters - Genesis ( at [facility]), Niall Monique   Smoke - quit in 60s after 1 year   EtOH: sober x 36 years   Surgeries: gallbladder, appy, hysterectomy and ? 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; left CTS release; bladder lift; right TKA; left rotator cuff   Hospitalizations:   [place], [place] - live in community with 65 houses in [place] (1 mile circumference if walk)       Past Surgical History:   Procedure Laterality Date    ARTHROPLASTY KNEE BILATERAL      ARTHROSCOPY SHOULDER ROTATOR CUFF REPAIR      CATARACT EXTRACTION, BILATERAL      CHOLECYSTECTOMY      EXCISE LESION TRUNK Left 1/5/2018    Procedure: LEFT BUTTOCK EXPLORATION, EXCISION BUTTOCK MASS;  Surgeon: Lg Jameson MD;  Location: [facility] Main OR;  Service:     HYSTERECTOMY      JOINT REPLACEMENT Bilateral     knees    MOHS MICROGRAPHIC PROCEDURE      Nose    OOPHORECTOMY Left     1 removed, 1 remains    OTHER SURGICAL HISTORY      Bladder lift    TX ARTHROPLASTY TIBIAL PLATEAU      Description: Knee Replacement;  Recorded: 05/16/2013;    TX INJECT NERV JOE STEWART PERIPH NERV      Description: Peripheral Nerve Block Wrist Median Right;  Recorded: 05/16/2013;    RELEASE CARPAL TUNNEL      REPLACEMENT TOTAL KNEE      L        Family History   Problem Relation Age of Onset    Aneurysm Father         AAA    Cancer Mother         pancrease        Past  Medical History:   Diagnosis Date    Anemia, unspecified     Created by Conversion     Arrhythmia     Cervical spondylosis without myelopathy     Created by Conversion     Depression     Disease of thyroid gland     hypo    GERD (gastroesophageal reflux disease)     High cholesterol     History of transfusion     Myalgia and myositis, unspecified     Created by Conversion     Near syncope     Other and unspecified hyperlipidemia     Created by Conversion     Skin cancer, basal cell     Sleep apnea     mild    Tachycardia     Unspecified essential hypertension     Created by Conversion     Unspecified hereditary and idiopathic peripheral neuropathy     Created by Conversion         Social History     Tobacco Use    Smoking status: Former     Packs/day: 0.25     Years: 1.00     Additional pack years: 0.00     Total pack years: 0.25     Types: Cigarettes     Quit date: 1961     Years since quittin.8    Smokeless tobacco: Never   Substance Use Topics    Alcohol use: No    Drug use: No        Current Outpatient Medications   Medication Sig Dispense Refill    acetaminophen (TYLENOL) 650 MG CR tablet [ACETAMINOPHEN (TYLENOL) 650 MG CR TABLET] Take 650 mg by mouth as needed for pain.      biotin 5 MG CAPS Take 5,000 mcg by mouth      budesonide (ENTOCORT EC) 3 MG EC capsule TAKE THREE CAPSULES BY MOUTH IN THE MORNING 90 capsule 3    calcium carbonate-vitamin D3 (CALCIUM 600 + D,3,) 600 mg calcium- 200 unit cap [CALCIUM CARBONATE-VITAMIN D3 (CALCIUM 600 + D,3,) 600 MG CALCIUM- 200 UNIT CAP] Take 1 tablet by mouth 2 (two) times a day.             celecoxib (CELEBREX) 200 MG capsule TAKE ONE CAPSULE BY MOUTH ONE TIME DAILY 90 capsule 3    DULoxetine (CYMBALTA) 30 MG capsule Take 1 capsule (30 mg) by mouth daily 90 capsule 1    estrogen conj (PREMARIN) 0.3 MG tablet Take 1 tablet (0.3 mg) by mouth daily 90 tablet 3    furosemide (LASIX) 20 MG tablet TAKE ONE TABLET BY MOUTH ONE TIME DAILY 90 tablet 2    levothyroxine  (SYNTHROID/LEVOTHROID) 50 MCG tablet TAKE ONE TABLET BY MOUTH ONE TIME DAILY AT 6AM 90 tablet 2    metoprolol tartrate (LOPRESSOR) 50 MG tablet TAKE 1&1/2 TABLET BY MOUTH EVERY MORNING AND TAKE 1 TABLET BY MOUTH EVERY EVENING 75 tablet 0    multivitamin with minerals (THERA-M) 9 mg iron-400 mcg Tab tablet [MULTIVITAMIN WITH MINERALS (THERA-M) 9 MG IRON-400 MCG TAB TABLET] Take 1 tablet by mouth every evening.      omeprazole (PRILOSEC) 20 MG DR capsule TAKE ONE CAPSULE BY MOUTH TWICE DAILY 180 capsule 0    Probiotic Product (MISC INTESTINAL BRANDON REGULAT) CAPS       simvastatin (ZOCOR) 20 MG tablet TAKE ONE TABLET BY MOUTH AT BEDTIME 90 tablet 3    traZODone (DESYREL) 50 MG tablet Take 50-75 mg by mouth      venlafaxine (EFFEXOR XR) 75 MG 24 hr capsule TAKE TWO CAPSULES BY MOUTH DAILY 180 capsule 0          Objective:    Vitals:    11/07/23 1020   BP: 120/70   Pulse: 70   Resp: 17   Temp: 97.6  F (36.4  C)   SpO2: 96%   Weight: 83.9 kg (185 lb)      Body mass index is 33.84 kg/m .    Alert.  Using walker currently to assist with transfers and mobility otherwise ataxic gait as demonstrated previously.  Tearful at times.  No active psychoses.  Well-groomed.  Daughter Loretta is with as well as daughter Eneida listening and by phone.      This note has been dictated using voice recognition software and as a result may contain minor grammatical errors and unintended word substitutions.         Answers submitted by the patient for this visit:  Depression / Anxiety Questionnaire (Submitted on 11/7/2023)  Chief Complaint: Chronic problems general questions HPI Form  Depression/Anxiety: Depression  Depression only (Submitted on 11/7/2023)  Chief Complaint: Chronic problems general questions HPI Form  Status since last visit:: bad  Other associated symptoms of depression:: No  Significant life event: : relationship concerns, grief or loss, health concerns  Anxious:: No  Current substance use:: No  Back Pain Visit  Questionnaire (Submitted on 11/7/2023)  Your back pain is: chronic  Chronic or Recurring Back Pain Visit Questionnaire (Submitted on 11/7/2023)  Where is your back pain located? : right lower back, left lower back  How would you describe your back pain? : dull ache, gnawing  Where does your back pain spread? : nowhere  Since you noticed your back pain, how has it changed? : always present, but gets better and worse  Does your back pain interfere with your job?: Not applicable  If yes, which:: acetaminophen (Tylenol), topical pain relievers  General Questionnaire (Submitted on 11/7/2023)  Chief Complaint: Chronic problems general questions HPI Form  How many servings of fruits and vegetables do you eat daily?: 0-1  On average, how many sweetened beverages do you drink each day (Examples: soda, juice, sweet tea, etc.  Do NOT count diet or artificially sweetened beverages)?: 0  How many minutes a day do you exercise enough to make your heart beat faster?: 9 or less  How many days a week do you exercise enough to make your heart beat faster?: 3 or less  How many days per week do you miss taking your medication?: 0

## 2023-11-25 DIAGNOSIS — K21.9 GASTROESOPHAGEAL REFLUX DISEASE WITHOUT ESOPHAGITIS: ICD-10-CM

## 2023-12-04 ENCOUNTER — THERAPY VISIT (OUTPATIENT)
Dept: PHYSICAL THERAPY | Facility: REHABILITATION | Age: 83
End: 2023-12-04
Payer: MEDICARE

## 2023-12-04 DIAGNOSIS — G89.4 PAIN SYNDROME, CHRONIC: ICD-10-CM

## 2023-12-04 DIAGNOSIS — M54.50 CHRONIC BILATERAL LOW BACK PAIN WITHOUT SCIATICA: Primary | ICD-10-CM

## 2023-12-04 DIAGNOSIS — G89.29 CHRONIC BILATERAL LOW BACK PAIN WITHOUT SCIATICA: Primary | ICD-10-CM

## 2023-12-04 DIAGNOSIS — M79.18 MYOFASCIAL PAIN: ICD-10-CM

## 2023-12-04 PROCEDURE — 97161 PT EVAL LOW COMPLEX 20 MIN: CPT | Mod: GP | Performed by: PHYSICAL THERAPIST

## 2023-12-04 PROCEDURE — 97140 MANUAL THERAPY 1/> REGIONS: CPT | Mod: GP | Performed by: PHYSICAL THERAPIST

## 2023-12-04 NOTE — PROGRESS NOTES
PHYSICAL THERAPY EVALUATION  Type of Visit: Evaluation    See electronic medical record for Abuse and Falls Screening details.    Subjective       Presenting condition or subjective complaint: The reason for coming in today is her lower back pain. She feels  like she is really limited with daily tasks. She did have pool therapy which was good but wasn't lasting for her. She did do some PT at [facility]. There wasn't lasting benefit from that either. She really does have a hard time with moving her legs as well which is also limiting her. She only walks in the house but doesn't walk for exercise in the house - states that she could with her long hallways. She doesn't have to do stairs at home and only uses her elevator. Getting in/out of bed is really difficult for her. It is the back that really limits her. She does get a little relief from Tylenol Arthritis and topical cannabis. She did have a fusion L4-S1. She does sit with a flexed position which does help her. She does tend to sit with ice on her back. She does have to take breaks to stand and do tasks like make food. She denies N/T into the LE's. She does have diarrhea quite a bit which is not new.   Date of onset: 11/07/23    Relevant medical history: Arthritis; Bladder or bowel problems; Depression; Fibromyalgia; Incontinence; Overweight; Significant weakness   Dates & types of surgery: L4-C5bsduan, B TKA, R RC repair (is torn again)    Prior diagnostic imaging/testing results: MRI; Bone scan     Prior therapy history for the same diagnosis, illness or injury:   pool therapy, traditional PT    Prior Level of Function  Transfers:  does need help to go from supine to sit. Daughter did want to assist her in standing from seated position.   Ambulation: Assistive equipment  ADL:   IADL:     Living Environment  Social support: With a significant other or spouse   Type of home: Apartment/condo; Multi-level   Stairs to enter the home: No   Is there a railing: No   Ramp:  No   Stairs inside the home: No       Help at home: Home management tasks (cooking, cleaning)  Equipment owned: Straight Cane; Walker; Walker with wheels; Grab bars     Employment: Not Applicable    Hobbies/Interests: crafts, reading    Patient goals for therapy:      Pain assessment: Pain present  Location: 5-6 at best, 10 at best. It is across the whole lower back and does across her pelvis. Once and a while there is  some pain down the R side.       Objective   LUMBAR SPINE EVALUATION  PAIN:   INTEGUMENTARY (edema, incisions):   POSTURE:  flexed posture  GAIT:   Weightbearing Status:   Assistive Device(s): Walker (four wheeled)  Gait Deviations:  Slow gait pattern and slightly flexed due to use of the walker.   BALANCE/PROPRIOCEPTION:  narrow stance she does have diffiuclty with balance. Is unsteady for eyes closed. We did not attempt further balancing obstacles.   WEIGHTBEARING ALIGNMENT:   NON-WEIGHTBEARING ALIGNMENT:    ROM:   PELVIC/SI SCREEN:   STRENGTH:  Hip flexion: 3+/5 B with LBP, knee flex: 4/5 B, knee ext: 4+/5 B, DF: 4+/5    MYOTOMES:   DTR S:   CORD SIGNS:   DERMATOMES:   NEURAL TENSION:   FLEXIBILITY:   LUMBAR/HIP Special Tests: negative scour test but did have more LBP when checking hip ROM. There were mod deficits in hip IR/ER.   PELVIS/SI SPECIAL TESTS:   FUNCTIONAL TESTS:   PALPATION:  tension and pain felt over scar site in lumbar spine. There is poor spinal/abdominal fascial mobility as well. Focal pain is around L2-3-4 region of her lumbar spine.   SPINAL SEGMENTAL CONCLUSIONS:       Assessment & Plan   CLINICAL IMPRESSIONS  Medical Diagnosis: chronic pain syndrome    Treatment Diagnosis:     Impression/Assessment: Patient is a 83 year old female with LBP complaints.  The following significant findings have been identified: Pain, Decreased ROM/flexibility, Decreased joint mobility, Decreased strength, Impaired balance, and Decreased proprioception. These impairments interfere with their  "ability to perform self care tasks, household chores, household mobility, and community mobility as compared to previous level of function.     Clinical Decision Making (Complexity):  Clinical Presentation: Stable/Uncomplicated  Clinical Presentation Rationale: based on medical and personal factors listed in PT evaluation  Clinical Decision Making (Complexity): Low complexity    PLAN OF CARE  Treatment Interventions:  Interventions: Manual Therapy, Neuromuscular Re-education, Therapeutic Activity, Therapeutic Exercise    Long Term Goals     PT Goal 1  Goal Identifier: 1  Goal Description: Pt will verbalize not waking due to pain to improve daily function in 90 days.  PT Goal 2  Goal Identifier: 2  Goal Description: Pt will verbalize being able to stand for 15\" at home to prepare light meals at home in 90 days.  PT Goal 3  Goal Identifier: 2  Goal Description: Pt will be I and compliant with I HEP to allow for self-management of symptoms in 90 days.      Frequency of Treatment: 1x/week  Duration of Treatment: 90 days    Recommended Referrals to Other Professionals:   Education Assessment:        Risks and benefits of evaluation/treatment have been explained.   Patient/Family/caregiver agrees with Plan of Care.     Evaluation Time:     PT Eval, Low Complexity Minutes (63919): 25       Signing Clinician: TARI OBRIEN, PT      [facility]                                                                                   OUTPATIENT PHYSICAL THERAPY      PLAN OF TREATMENT FOR OUTPATIENT REHABILITATION   Patient's Last Name, First Name, Dyana Myles YOB: 1940   Provider's Name   [facility]   Medical Record No.  [id number]     Onset Date: 11/07/23  Start of Care Date: 12/04/23     Medical Diagnosis:  chronic pain syndrome      PT Treatment Diagnosis:    Plan of Treatment  Frequency/Duration: 1x/week/ 90 days    Certification date " from 12/04/23 to 03/03/24         See note for plan of treatment details and functional goals     TARI OBRIEN, PT                         I CERTIFY THE NEED FOR THESE SERVICES FURNISHED UNDER        THIS PLAN OF TREATMENT AND WHILE UNDER MY CARE     (Physician attestation of this document indicates review and certification of the therapy plan).              Referring Provider:  True Pugh    Initial Assessment  See Epic Evaluation- Start of Care Date: 12/04/23

## 2023-12-11 ENCOUNTER — THERAPY VISIT (OUTPATIENT)
Dept: PHYSICAL THERAPY | Facility: REHABILITATION | Age: 83
End: 2023-12-11
Payer: MEDICARE

## 2023-12-11 DIAGNOSIS — G89.4 PAIN SYNDROME, CHRONIC: ICD-10-CM

## 2023-12-11 DIAGNOSIS — G89.29 CHRONIC BILATERAL LOW BACK PAIN WITHOUT SCIATICA: Primary | ICD-10-CM

## 2023-12-11 DIAGNOSIS — M79.18 MYOFASCIAL PAIN: ICD-10-CM

## 2023-12-11 DIAGNOSIS — M54.50 CHRONIC BILATERAL LOW BACK PAIN WITHOUT SCIATICA: Primary | ICD-10-CM

## 2023-12-11 PROCEDURE — 97140 MANUAL THERAPY 1/> REGIONS: CPT | Mod: GP | Performed by: PHYSICAL THERAPIST

## 2023-12-23 DIAGNOSIS — R23.2 HOT FLASHES: ICD-10-CM

## 2023-12-26 ENCOUNTER — THERAPY VISIT (OUTPATIENT)
Dept: PHYSICAL THERAPY | Facility: REHABILITATION | Age: 83
End: 2023-12-26
Payer: MEDICARE

## 2023-12-26 DIAGNOSIS — M79.18 MYOFASCIAL PAIN: ICD-10-CM

## 2023-12-26 DIAGNOSIS — G89.4 PAIN SYNDROME, CHRONIC: ICD-10-CM

## 2023-12-26 DIAGNOSIS — G89.29 CHRONIC BILATERAL LOW BACK PAIN WITHOUT SCIATICA: Primary | ICD-10-CM

## 2023-12-26 DIAGNOSIS — M54.50 CHRONIC BILATERAL LOW BACK PAIN WITHOUT SCIATICA: Primary | ICD-10-CM

## 2023-12-26 PROCEDURE — 97140 MANUAL THERAPY 1/> REGIONS: CPT | Mod: GP | Performed by: PHYSICAL THERAPIST

## 2023-12-26 PROCEDURE — 97110 THERAPEUTIC EXERCISES: CPT | Mod: GP | Performed by: PHYSICAL THERAPIST

## 2023-12-27 RX ORDER — CONJUGATED ESTROGENS 0.3 MG/1
0.3 TABLET, FILM COATED ORAL DAILY
Qty: 90 TABLET | Refills: 2 | Status: SHIPPED | OUTPATIENT
Start: 2023-12-27 | End: 2024-02-01

## 2024-01-05 ENCOUNTER — THERAPY VISIT (OUTPATIENT)
Dept: PHYSICAL THERAPY | Facility: REHABILITATION | Age: 84
End: 2024-01-05
Payer: MEDICARE

## 2024-01-05 DIAGNOSIS — G89.4 PAIN SYNDROME, CHRONIC: ICD-10-CM

## 2024-01-05 DIAGNOSIS — G89.29 CHRONIC BILATERAL LOW BACK PAIN WITHOUT SCIATICA: Primary | ICD-10-CM

## 2024-01-05 DIAGNOSIS — M54.50 CHRONIC BILATERAL LOW BACK PAIN WITHOUT SCIATICA: Primary | ICD-10-CM

## 2024-01-05 DIAGNOSIS — M79.18 MYOFASCIAL PAIN: ICD-10-CM

## 2024-01-05 PROCEDURE — 97110 THERAPEUTIC EXERCISES: CPT | Mod: GP | Performed by: PHYSICAL THERAPIST

## 2024-01-05 PROCEDURE — 97140 MANUAL THERAPY 1/> REGIONS: CPT | Mod: GP | Performed by: PHYSICAL THERAPIST

## 2024-01-19 ENCOUNTER — LAB (OUTPATIENT)
Dept: LAB | Facility: CLINIC | Age: 84
End: 2024-01-19
Payer: MEDICARE

## 2024-01-19 ENCOUNTER — NURSE TRIAGE (OUTPATIENT)
Dept: FAMILY MEDICINE | Facility: CLINIC | Age: 84
End: 2024-01-19

## 2024-01-19 ENCOUNTER — THERAPY VISIT (OUTPATIENT)
Dept: PHYSICAL THERAPY | Facility: REHABILITATION | Age: 84
End: 2024-01-19
Payer: MEDICARE

## 2024-01-19 DIAGNOSIS — M54.50 CHRONIC BILATERAL LOW BACK PAIN WITHOUT SCIATICA: Primary | ICD-10-CM

## 2024-01-19 DIAGNOSIS — N18.31 CHRONIC KIDNEY DISEASE, STAGE 3A (H): Primary | ICD-10-CM

## 2024-01-19 DIAGNOSIS — R30.0 DYSURIA: Primary | ICD-10-CM

## 2024-01-19 DIAGNOSIS — G89.4 PAIN SYNDROME, CHRONIC: ICD-10-CM

## 2024-01-19 DIAGNOSIS — R30.0 DYSURIA: ICD-10-CM

## 2024-01-19 DIAGNOSIS — G89.29 CHRONIC BILATERAL LOW BACK PAIN WITHOUT SCIATICA: Primary | ICD-10-CM

## 2024-01-19 DIAGNOSIS — M79.18 MYOFASCIAL PAIN: ICD-10-CM

## 2024-01-19 LAB
ALBUMIN UR-MCNC: >=300 MG/DL
APPEARANCE UR: CLEAR
BACTERIA #/AREA URNS HPF: ABNORMAL /HPF
BILIRUB UR QL STRIP: NEGATIVE
COLOR UR AUTO: YELLOW
GLUCOSE UR STRIP-MCNC: NEGATIVE MG/DL
HGB UR QL STRIP: ABNORMAL
KETONES UR STRIP-MCNC: ABNORMAL MG/DL
LEUKOCYTE ESTERASE UR QL STRIP: ABNORMAL
NITRATE UR QL: NEGATIVE
PH UR STRIP: 6 [PH] (ref 5–8)
RBC #/AREA URNS AUTO: ABNORMAL /HPF
SP GR UR STRIP: 1.02 (ref 1–1.03)
SQUAMOUS #/AREA URNS AUTO: ABNORMAL /LPF
TRANS CELLS #/AREA URNS HPF: ABNORMAL /HPF
UROBILINOGEN UR STRIP-ACNC: 0.2 E.U./DL
WBC #/AREA URNS AUTO: >100 /HPF
WBC CLUMPS #/AREA URNS HPF: PRESENT /HPF

## 2024-01-19 PROCEDURE — 97110 THERAPEUTIC EXERCISES: CPT | Mod: GP | Performed by: PHYSICAL THERAPIST

## 2024-01-19 PROCEDURE — 87086 URINE CULTURE/COLONY COUNT: CPT

## 2024-01-19 PROCEDURE — 87186 SC STD MICRODIL/AGAR DIL: CPT

## 2024-01-19 PROCEDURE — 81001 URINALYSIS AUTO W/SCOPE: CPT

## 2024-01-19 NOTE — TELEPHONE ENCOUNTER
Provider Recommendation Follow Up:   Reached patient/caregiver. Informed of provider's recommendations. Patient verbalized understanding and agrees with the plan.     Assisted with scheduling lab appt for today.    REJI ChristensenN, RN  [facility]

## 2024-01-19 NOTE — TELEPHONE ENCOUNTER
Daughter, Genesis (CTC on file), called in stating that pt believed she is having a UTI. Daughter does not want to take pt to [facility] d/t running the risk of COVID exposure from someone there.     Per daughter, pt has been having burning with urination for 2 days and finally told daughter about it.     Informed daughter about submitting an e-Visit, but daughter is afraid it will not be addressed before the end of today.    Patient is current at PT appt and writer unable to triage. Will call pt later after 1 PM today to triage.    Dayana Tejada, REJIN, RN  [facility]

## 2024-01-19 NOTE — TELEPHONE ENCOUNTER
Attempted to call, but pt is not back from appt yet.    Writer will try again later.    REJI ChristensenN, RN  [facility]

## 2024-01-19 NOTE — TELEPHONE ENCOUNTER
Nurse Triage SBAR    Is this a 2nd Level Triage? YES, LICENSED PRACTITIONER REVIEW IS REQUIRED    Situation:   Called and spoke to pt about urinary concerns per daughter.     Background:   Age 83      Assessment:   Patient stated she has been having burning with urination and lots pressure in pelvic area fr 2 days.    Denied fever, but has chills sometimes.    Always have dried mouth, but able to drink and eat normally.    Denied side pain or back pain that is new onset.    Denied injury to abdomen or genital area.      Protocol Recommended Disposition:   See in Office Today    Recommendation:   No available appt in clinic today.  Daughter does not want to take pt to [facility] d/t running the risk of COVID exposure from someone there.     Routing to PCP to see if pt could get urine test to check for UTI.    Routed to provider    Does the patient meet one of the following criteria for ADS visit consideration? 16+ years old, with an [facility] PCP     TIP  Providers, please consider if this condition is appropriate for management at one of our Acute and Diagnostic Services sites.     If patient is a good candidate, please use dotphrase <dot>triageresponse and select Refer to ADS to document.    Reason for Disposition   Urinating more frequently than usual (i.e., frequency)    Additional Information   Negative: Shock suspected (e.g., cold/pale/clammy skin, too weak to stand, low BP, rapid pulse)   Negative: Sounds like a life-threatening emergency to the triager   Negative: Followed a female genital area injury (e.g., labia, vagina, vulva)   Negative: Followed a male genital area injury (penis, scrotum)   Negative: Vaginal discharge   Negative: Pus (white, yellow) or bloody discharge from end of penis   Negative: Pain or burning with passing urine (urination) and pregnant   Negative: Pain or burning with passing urine (urination) and female   Negative: Pain or burning with passing urine (urination) and male   Negative: Pain  or itching in the vulvar area   Negative: Pain in scrotum is main symptom   Negative: Blood in the urine is main symptom   Negative: Symptoms arising from use of a urinary catheter (e.g., Coude, Garza)   Negative: Unable to urinate (or only a few drops) > 4 hours and bladder feels very full (e.g., palpable bladder or strong urge to urinate)   Negative: Decreased urination and drinking very little and dehydration suspected (e.g., dark urine, no urine > 12 hours, very dry mouth, very lightheaded)   Negative: Patient sounds very sick or weak to the triager   Negative: Fever > 100.4 F  (38.0 C)   Negative: Side (flank) or lower back pain present   Negative: Bad or foul-smelling urine    Protocols used: Urinary Symptoms-A-REJI SchmidN, RN  [facility]

## 2024-01-21 DIAGNOSIS — N30.00 ACUTE CYSTITIS WITHOUT HEMATURIA: Primary | ICD-10-CM

## 2024-01-21 LAB — BACTERIA UR CULT: ABNORMAL

## 2024-01-21 RX ORDER — NITROFURANTOIN 25; 75 MG/1; MG/1
100 CAPSULE ORAL 2 TIMES DAILY
Qty: 10 CAPSULE | Refills: 0 | Status: SHIPPED | OUTPATIENT
Start: 2024-01-21 | End: 2024-01-26

## 2024-01-22 ENCOUNTER — TRANSFERRED RECORDS (OUTPATIENT)
Dept: HEALTH INFORMATION MANAGEMENT | Facility: CLINIC | Age: 84
End: 2024-01-22
Payer: MEDICARE

## 2024-01-26 ENCOUNTER — HOSPITAL ENCOUNTER (OUTPATIENT)
Dept: RADIOLOGY | Facility: CLINIC | Age: 84
Discharge: HOME | End: 2024-01-26
Attending: PHYSICIAN ASSISTANT | Admitting: PHYSICIAN ASSISTANT
Payer: MEDICARE

## 2024-01-26 DIAGNOSIS — K52.839 MICROSCOPIC COLITIS, UNSPECIFIED MICROSCOPIC COLITIS TYPE: ICD-10-CM

## 2024-01-26 DIAGNOSIS — K21.9 GASTROESOPHAGEAL REFLUX DISEASE, UNSPECIFIED WHETHER ESOPHAGITIS PRESENT: ICD-10-CM

## 2024-01-26 PROCEDURE — 74018 RADEX ABDOMEN 1 VIEW: CPT

## 2024-01-30 ENCOUNTER — TRANSFERRED RECORDS (OUTPATIENT)
Dept: HEALTH INFORMATION MANAGEMENT | Facility: CLINIC | Age: 84
End: 2024-01-30
Payer: MEDICARE

## 2024-01-31 ENCOUNTER — MYC MEDICAL ADVICE (OUTPATIENT)
Dept: FAMILY MEDICINE | Facility: CLINIC | Age: 84
End: 2024-01-31
Payer: MEDICARE

## 2024-01-31 ENCOUNTER — TELEPHONE (OUTPATIENT)
Dept: FAMILY MEDICINE | Facility: CLINIC | Age: 84
End: 2024-01-31
Payer: MEDICARE

## 2024-01-31 DIAGNOSIS — R23.2 HOT FLASHES: ICD-10-CM

## 2024-01-31 DIAGNOSIS — M25.50 ARTHRALGIA OF MULTIPLE JOINTS: ICD-10-CM

## 2024-01-31 DIAGNOSIS — E03.9 ACQUIRED HYPOTHYROIDISM: ICD-10-CM

## 2024-01-31 DIAGNOSIS — K21.9 GASTROESOPHAGEAL REFLUX DISEASE WITHOUT ESOPHAGITIS: ICD-10-CM

## 2024-01-31 DIAGNOSIS — I51.7 RIGHT ATRIAL ENLARGEMENT: ICD-10-CM

## 2024-01-31 DIAGNOSIS — E78.5 HYPERLIPIDEMIA: ICD-10-CM

## 2024-01-31 DIAGNOSIS — I87.2 VENOUS INSUFFICIENCY OF BOTH LOWER EXTREMITIES: ICD-10-CM

## 2024-01-31 DIAGNOSIS — F33.9 RECURRENT MAJOR DEPRESSIVE DISORDER, REMISSION STATUS UNSPECIFIED (H): ICD-10-CM

## 2024-01-31 RX ORDER — DULOXETIN HYDROCHLORIDE 30 MG/1
30 CAPSULE, DELAYED RELEASE ORAL DAILY
Qty: 90 CAPSULE | Refills: 1 | OUTPATIENT
Start: 2024-01-31

## 2024-01-31 RX ORDER — CELECOXIB 200 MG/1
CAPSULE ORAL
Qty: 90 CAPSULE | Refills: 3 | OUTPATIENT
Start: 2024-01-31

## 2024-01-31 RX ORDER — FUROSEMIDE 20 MG
TABLET ORAL
Qty: 90 TABLET | Refills: 2 | OUTPATIENT
Start: 2024-01-31

## 2024-01-31 RX ORDER — LEVOTHYROXINE SODIUM 50 UG/1
TABLET ORAL
Qty: 90 TABLET | Refills: 2 | OUTPATIENT
Start: 2024-01-31

## 2024-01-31 RX ORDER — SIMVASTATIN 20 MG
TABLET ORAL
Qty: 90 TABLET | Refills: 3 | OUTPATIENT
Start: 2024-01-31

## 2024-01-31 NOTE — TELEPHONE ENCOUNTER
Rx pended for Omeprazole, Celecoxib, Furosemide, Levothyroxine, Duloxetine, Premarin and Simvastatin. Routing to Refill Pool.    REJI ChristensenN, RN  [facility]

## 2024-01-31 NOTE — TELEPHONE ENCOUNTER
Hello,     This patient somehow called and left a VM on my personal line in the Retail PA Team.  She states she has tried to reach out multiple times for 7 different medication to be sent to [facility].  She states she is almost out of medications.    These are some of the ones she listed    And I believe the simvastatin.    She would like you to reach out to her daughter

## 2024-02-01 DIAGNOSIS — K21.9 GASTROESOPHAGEAL REFLUX DISEASE WITHOUT ESOPHAGITIS: ICD-10-CM

## 2024-02-01 DIAGNOSIS — E78.5 HYPERLIPIDEMIA: ICD-10-CM

## 2024-02-01 DIAGNOSIS — R23.2 HOT FLASHES: ICD-10-CM

## 2024-02-01 DIAGNOSIS — F33.9 RECURRENT MAJOR DEPRESSIVE DISORDER, REMISSION STATUS UNSPECIFIED (H): ICD-10-CM

## 2024-02-01 DIAGNOSIS — E03.9 ACQUIRED HYPOTHYROIDISM: ICD-10-CM

## 2024-02-01 DIAGNOSIS — I87.2 VENOUS INSUFFICIENCY OF BOTH LOWER EXTREMITIES: ICD-10-CM

## 2024-02-01 DIAGNOSIS — M25.50 ARTHRALGIA OF MULTIPLE JOINTS: ICD-10-CM

## 2024-02-01 DIAGNOSIS — I51.7 RIGHT ATRIAL ENLARGEMENT: ICD-10-CM

## 2024-02-01 RX ORDER — SIMVASTATIN 20 MG
20 TABLET ORAL AT BEDTIME
Qty: 90 TABLET | Refills: 3 | Status: SHIPPED | OUTPATIENT
Start: 2024-02-01

## 2024-02-01 RX ORDER — DULOXETIN HYDROCHLORIDE 30 MG/1
30 CAPSULE, DELAYED RELEASE ORAL DAILY
Qty: 90 CAPSULE | Refills: 1 | Status: SHIPPED | OUTPATIENT
Start: 2024-02-01 | End: 2024-02-07

## 2024-02-01 RX ORDER — FUROSEMIDE 20 MG
TABLET ORAL
Qty: 90 TABLET | Refills: 2 | Status: SHIPPED | OUTPATIENT
Start: 2024-02-01

## 2024-02-01 RX ORDER — LEVOTHYROXINE SODIUM 50 UG/1
TABLET ORAL
Qty: 90 TABLET | Refills: 2 | Status: SHIPPED | OUTPATIENT
Start: 2024-02-01

## 2024-02-01 RX ORDER — CELECOXIB 200 MG/1
CAPSULE ORAL
Qty: 90 CAPSULE | Refills: 3 | Status: SHIPPED | OUTPATIENT
Start: 2024-02-01

## 2024-02-01 NOTE — TELEPHONE ENCOUNTER
Pending Prescriptions:                       Disp   Refills    simvastatin (ZOCOR) 20 MG tablet          90 tab*3            Sig: Take 1 tablet (20 mg) by mouth    omeprazole (PRILOSEC) 20 MG DR capsule    180 ca*2            Sig: TAKE ONE CAPSULE BY MOUTH TWICE DAILY    celecoxib (CELEBREX) 200 MG capsule       90 cap*3            Sig: TAKE ONE CAPSULE BY MOUTH ONE TIME DAILY    furosemide (LASIX) 20 MG tablet           90 tab*2            Sig: TAKE ONE TABLET BY MOUTH ONE TIME DAILY    levothyroxine (SYNTHROID/LEVOTHROID) 50 M*90 tab*2            Sig: TAKE ONE TABLET BY MOUTH ONE TIME DAILY at 6am    DULoxetine (CYMBALTA) 30 MG capsule       90 cap*1            Sig: Take 1 capsule (30 mg) by mouth daily    estrogen conj (PREMARIN) 0.3 MG tablet    90 tab*2            Sig: Take 1 tablet (0.3 mg) by mouth daily

## 2024-02-06 ENCOUNTER — TELEPHONE (OUTPATIENT)
Dept: FAMILY MEDICINE | Facility: CLINIC | Age: 84
End: 2024-02-06
Payer: MEDICARE

## 2024-02-06 DIAGNOSIS — F33.9 RECURRENT MAJOR DEPRESSIVE DISORDER, REMISSION STATUS UNSPECIFIED (H): ICD-10-CM

## 2024-02-06 NOTE — TELEPHONE ENCOUNTER
Per daughter and [facility], they need a short term supply sent to [facility] due to pt will not get mail order supply on time  Only needs 14 days.    Must be done today, and state LSO  Refill Request  Medication name: Duloxetine 30 mg, capsule    Requested Pharmacy:     [id number] [place], [place] - [address]

## 2024-02-07 RX ORDER — DULOXETIN HYDROCHLORIDE 30 MG/1
30 CAPSULE, DELAYED RELEASE ORAL DAILY
Qty: 14 CAPSULE | Refills: 0 | Status: SHIPPED | OUTPATIENT
Start: 2024-02-07 | End: 2024-05-09

## 2024-02-08 ENCOUNTER — THERAPY VISIT (OUTPATIENT)
Dept: PHYSICAL THERAPY | Facility: REHABILITATION | Age: 84
End: 2024-02-08
Payer: MEDICARE

## 2024-02-08 DIAGNOSIS — G89.4 PAIN SYNDROME, CHRONIC: ICD-10-CM

## 2024-02-08 DIAGNOSIS — M54.50 CHRONIC BILATERAL LOW BACK PAIN WITHOUT SCIATICA: Primary | ICD-10-CM

## 2024-02-08 DIAGNOSIS — M79.18 MYOFASCIAL PAIN: ICD-10-CM

## 2024-02-08 DIAGNOSIS — G89.29 CHRONIC BILATERAL LOW BACK PAIN WITHOUT SCIATICA: Primary | ICD-10-CM

## 2024-02-08 PROCEDURE — 97110 THERAPEUTIC EXERCISES: CPT | Mod: GP | Performed by: PHYSICAL THERAPIST

## 2024-02-16 ENCOUNTER — THERAPY VISIT (OUTPATIENT)
Dept: PHYSICAL THERAPY | Facility: REHABILITATION | Age: 84
End: 2024-02-16
Payer: MEDICARE

## 2024-02-16 DIAGNOSIS — M79.18 MYOFASCIAL PAIN: ICD-10-CM

## 2024-02-16 DIAGNOSIS — G89.29 CHRONIC BILATERAL LOW BACK PAIN WITHOUT SCIATICA: Primary | ICD-10-CM

## 2024-02-16 DIAGNOSIS — M54.50 CHRONIC BILATERAL LOW BACK PAIN WITHOUT SCIATICA: Primary | ICD-10-CM

## 2024-02-16 DIAGNOSIS — G89.4 PAIN SYNDROME, CHRONIC: ICD-10-CM

## 2024-02-16 PROCEDURE — 97110 THERAPEUTIC EXERCISES: CPT | Mod: GP | Performed by: PHYSICAL THERAPIST

## 2024-02-23 ENCOUNTER — THERAPY VISIT (OUTPATIENT)
Dept: PHYSICAL THERAPY | Facility: REHABILITATION | Age: 84
End: 2024-02-23
Payer: MEDICARE

## 2024-02-23 DIAGNOSIS — G89.4 PAIN SYNDROME, CHRONIC: ICD-10-CM

## 2024-02-23 DIAGNOSIS — M62.81 GENERALIZED MUSCLE WEAKNESS: Primary | ICD-10-CM

## 2024-02-23 DIAGNOSIS — M54.50 CHRONIC BILATERAL LOW BACK PAIN WITHOUT SCIATICA: ICD-10-CM

## 2024-02-23 DIAGNOSIS — G89.29 CHRONIC BILATERAL LOW BACK PAIN WITHOUT SCIATICA: ICD-10-CM

## 2024-02-23 DIAGNOSIS — M79.18 MYOFASCIAL PAIN: ICD-10-CM

## 2024-02-23 PROCEDURE — 97110 THERAPEUTIC EXERCISES: CPT | Mod: GP | Performed by: PHYSICAL THERAPY ASSISTANT

## 2024-03-01 ENCOUNTER — THERAPY VISIT (OUTPATIENT)
Dept: PHYSICAL THERAPY | Facility: REHABILITATION | Age: 84
End: 2024-03-01
Payer: MEDICARE

## 2024-03-01 DIAGNOSIS — M62.81 GENERALIZED MUSCLE WEAKNESS: Primary | ICD-10-CM

## 2024-03-01 DIAGNOSIS — M54.50 CHRONIC BILATERAL LOW BACK PAIN WITHOUT SCIATICA: ICD-10-CM

## 2024-03-01 DIAGNOSIS — G89.29 CHRONIC BILATERAL LOW BACK PAIN WITHOUT SCIATICA: ICD-10-CM

## 2024-03-01 DIAGNOSIS — M79.18 MYOFASCIAL PAIN: ICD-10-CM

## 2024-03-01 DIAGNOSIS — G89.4 PAIN SYNDROME, CHRONIC: ICD-10-CM

## 2024-03-01 PROCEDURE — 97110 THERAPEUTIC EXERCISES: CPT | Mod: CQ | Performed by: PHYSICAL THERAPY ASSISTANT

## 2024-03-11 ENCOUNTER — TRANSFERRED RECORDS (OUTPATIENT)
Dept: HEALTH INFORMATION MANAGEMENT | Facility: CLINIC | Age: 84
End: 2024-03-11
Payer: MEDICARE

## 2024-04-04 ENCOUNTER — PATIENT OUTREACH (OUTPATIENT)
Dept: CARE COORDINATION | Facility: CLINIC | Age: 84
End: 2024-04-04
Payer: MEDICARE

## 2024-04-18 ENCOUNTER — PATIENT OUTREACH (OUTPATIENT)
Dept: CARE COORDINATION | Facility: CLINIC | Age: 84
End: 2024-04-18
Payer: MEDICARE

## 2024-04-22 ENCOUNTER — TRANSFERRED RECORDS (OUTPATIENT)
Dept: HEALTH INFORMATION MANAGEMENT | Facility: CLINIC | Age: 84
End: 2024-04-22
Payer: MEDICARE

## 2024-05-09 ENCOUNTER — OFFICE VISIT (OUTPATIENT)
Dept: FAMILY MEDICINE | Facility: CLINIC | Age: 84
End: 2024-05-09
Payer: MEDICARE

## 2024-05-09 VITALS
TEMPERATURE: 97.4 F | SYSTOLIC BLOOD PRESSURE: 120 MMHG | WEIGHT: 194 LBS | DIASTOLIC BLOOD PRESSURE: 70 MMHG | BODY MASS INDEX: 35.7 KG/M2 | RESPIRATION RATE: 15 BRPM | OXYGEN SATURATION: 98 % | HEART RATE: 73 BPM | HEIGHT: 62 IN

## 2024-05-09 DIAGNOSIS — E78.2 MIXED HYPERLIPIDEMIA: ICD-10-CM

## 2024-05-09 DIAGNOSIS — F10.21 ALCOHOL USE DISORDER, MODERATE, IN SUSTAINED REMISSION, DEPENDENCE (H): ICD-10-CM

## 2024-05-09 DIAGNOSIS — E03.9 ACQUIRED HYPOTHYROIDISM: ICD-10-CM

## 2024-05-09 DIAGNOSIS — E66.01 MORBID OBESITY (H): ICD-10-CM

## 2024-05-09 DIAGNOSIS — K52.839 MICROSCOPIC COLITIS, UNSPECIFIED MICROSCOPIC COLITIS TYPE: ICD-10-CM

## 2024-05-09 DIAGNOSIS — Z01.818 PREOP GENERAL PHYSICAL EXAM: Primary | ICD-10-CM

## 2024-05-09 DIAGNOSIS — E87.5 HYPERKALEMIA: ICD-10-CM

## 2024-05-09 DIAGNOSIS — Z23 ENCOUNTER FOR IMMUNIZATION: ICD-10-CM

## 2024-05-09 DIAGNOSIS — R13.10 DYSPHAGIA, UNSPECIFIED TYPE: ICD-10-CM

## 2024-05-09 DIAGNOSIS — F33.1 MAJOR DEPRESSIVE DISORDER, RECURRENT EPISODE, MODERATE (H): ICD-10-CM

## 2024-05-09 PROBLEM — N18.31 CHRONIC KIDNEY DISEASE, STAGE 3A (H): Status: RESOLVED | Noted: 2023-04-12 | Resolved: 2024-05-09

## 2024-05-09 LAB
ERYTHROCYTE [DISTWIDTH] IN BLOOD BY AUTOMATED COUNT: 13.9 % (ref 10–15)
HCT VFR BLD AUTO: 37.5 % (ref 35–47)
HGB BLD-MCNC: 12 G/DL (ref 11.7–15.7)
MCH RBC QN AUTO: 31.3 PG (ref 26.5–33)
MCHC RBC AUTO-ENTMCNC: 32 G/DL (ref 31.5–36.5)
MCV RBC AUTO: 98 FL (ref 78–100)
PLATELET # BLD AUTO: 255 10E3/UL (ref 150–450)
RBC # BLD AUTO: 3.84 10E6/UL (ref 3.8–5.2)
WBC # BLD AUTO: 7.6 10E3/UL (ref 4–11)

## 2024-05-09 PROCEDURE — 91320 SARSCV2 VAC 30MCG TRS-SUC IM: CPT | Performed by: FAMILY MEDICINE

## 2024-05-09 PROCEDURE — 99214 OFFICE O/P EST MOD 30 MIN: CPT | Mod: 25 | Performed by: FAMILY MEDICINE

## 2024-05-09 PROCEDURE — 80061 LIPID PANEL: CPT | Performed by: FAMILY MEDICINE

## 2024-05-09 PROCEDURE — 36415 COLL VENOUS BLD VENIPUNCTURE: CPT | Performed by: FAMILY MEDICINE

## 2024-05-09 PROCEDURE — 80048 BASIC METABOLIC PNL TOTAL CA: CPT | Performed by: FAMILY MEDICINE

## 2024-05-09 PROCEDURE — 90480 ADMN SARSCOV2 VAC 1/ONLY CMP: CPT | Performed by: FAMILY MEDICINE

## 2024-05-09 PROCEDURE — 85027 COMPLETE CBC AUTOMATED: CPT | Performed by: FAMILY MEDICINE

## 2024-05-09 RX ORDER — RESPIRATORY SYNCYTIAL VIRUS VACCINE 120MCG/0.5
0.5 KIT INTRAMUSCULAR ONCE
Qty: 1 EACH | Refills: 0 | Status: CANCELLED | OUTPATIENT
Start: 2024-05-09 | End: 2024-05-09

## 2024-05-09 ASSESSMENT — ANXIETY QUESTIONNAIRES
1. FEELING NERVOUS, ANXIOUS, OR ON EDGE: NOT AT ALL
8. IF YOU CHECKED OFF ANY PROBLEMS, HOW DIFFICULT HAVE THESE MADE IT FOR YOU TO DO YOUR WORK, TAKE CARE OF THINGS AT HOME, OR GET ALONG WITH OTHER PEOPLE?: NOT DIFFICULT AT ALL
4. TROUBLE RELAXING: NOT AT ALL
7. FEELING AFRAID AS IF SOMETHING AWFUL MIGHT HAPPEN: NOT AT ALL
7. FEELING AFRAID AS IF SOMETHING AWFUL MIGHT HAPPEN: NOT AT ALL
GAD7 TOTAL SCORE: 1
6. BECOMING EASILY ANNOYED OR IRRITABLE: SEVERAL DAYS
GAD7 TOTAL SCORE: 1
2. NOT BEING ABLE TO STOP OR CONTROL WORRYING: NOT AT ALL
3. WORRYING TOO MUCH ABOUT DIFFERENT THINGS: NOT AT ALL
GAD7 TOTAL SCORE: 1
IF YOU CHECKED OFF ANY PROBLEMS ON THIS QUESTIONNAIRE, HOW DIFFICULT HAVE THESE PROBLEMS MADE IT FOR YOU TO DO YOUR WORK, TAKE CARE OF THINGS AT HOME, OR GET ALONG WITH OTHER PEOPLE: NOT DIFFICULT AT ALL
5. BEING SO RESTLESS THAT IT IS HARD TO SIT STILL: NOT AT ALL

## 2024-05-09 ASSESSMENT — PATIENT HEALTH QUESTIONNAIRE - PHQ9
SUM OF ALL RESPONSES TO PHQ QUESTIONS 1-9: 2
10. IF YOU CHECKED OFF ANY PROBLEMS, HOW DIFFICULT HAVE THESE PROBLEMS MADE IT FOR YOU TO DO YOUR WORK, TAKE CARE OF THINGS AT HOME, OR GET ALONG WITH OTHER PEOPLE: NOT DIFFICULT AT ALL
SUM OF ALL RESPONSES TO PHQ QUESTIONS 1-9: 2

## 2024-05-09 ASSESSMENT — PAIN SCALES - GENERAL: PAINLEVEL: NO PAIN (0)

## 2024-05-09 NOTE — PROGRESS NOTES
Preoperative Evaluation  [facility]  [address]  [facility] [address]  Phone: [phone]  Fax: [phone]  Primary Provider: Willow Mclean  Pre-op Performing Provider: WILLOW MCLEAN  May 9, 2024       Park is a 84 year old, presenting for the following:  Pre-Op Exam (5/24/24, Dr. David, , throat dilation )        5/9/2024    10:02 AM   Additional Questions   Roomed by      Surgical Information  Surgery/Procedure: throat dilatation  Surgery Location: [facility]  Surgeon: Dr. David  Surgery Date: 5/24/24  Time of Surgery: TBD  Where patient plans to recover: At home with family  Fax number for surgical facility: Note does not need to be faxed, will be available electronically in Epic.    Preop general physical exam  Preoperative examination completed.  Dysphagia noted.  Esophageal stenosis with dilatation procedure scheduled.  No contraindication to scheduled procedure identified.    Dysphagia, unspecified type  As above, described esophageal stenosis and esophageal dilatation procedure scheduled.  No contraindication to completion.    Microscopic colitis, unspecified microscopic colitis type  History of microscopic colitis.  Recent flare perhaps with increased stooling.  Will increase budesonide from 3 mg daily up to 6 mg daily x 14 days then resume 3 mg daily.  Reassess at annual wellness visit no later than 3 months, sooner if concerns otherwise follows with gastroenterology.  - CBC with platelets    Major depressive disorder, recurrent episode, moderate (H)  Underlying depression benefits of venlafaxine XR 75 mg using 2 tablets daily.  No longer on duloxetine.    Alcohol use disorder, moderate, in sustained remission, dependence (H)  Sober greater than 36 years.    Morbid obesity (H)  Severe obesity with BMI 35.77 with underlying history of hyperlipidemia.  Dietary and exercise modifications reviewed.    Encounter for immunization  Updated COVID booster to be provided.  -  COVID-19 12+ (2023-24) (PFIZER)    Mixed hyperlipidemia  Nonfasting status.  Continues utilization of simvastatin 20 mg at bedtime.  Lipid cascade updated, nonfasting.  - Lipid panel reflex to direct LDL Non-fasting    Hyperkalemia  Per kalemia with recent labs with potassium 5.3 during ER visit through [facility] April 20, 2024.  Update base metabolic panel.  - Basic metabolic panel     Hypothyroidism  Patient continues utilization of levothyroxine 50 mcg daily.  Most recent TSH 3.21 April 20, 2024 through [facility] ER.          Subjective       HPI related to upcoming procedure: Patient seen for preop examination.  Dysphagia concerns.  Esophageal stenosis described.  Scheduled dilatation procedure noted.  History of microscopic colitis.  Recent flare with increased stooling.  Has been utilizing budesonide 3 mg daily.  May increase to 6 mg daily x 2 weeks and then resume 3 mg dosing following if needed for flare per gastroenterology.  Depression stable with venlafaxine XR 75 mg using 2 tablets daily with PHQ-9 questionnaire 2 out of 27 and YEE-7 questionnaire 1 out of 21 today.  No longer on duloxetine.  Mood levothyroxine 50 mcg daily for thyroid replacement.  Recent TSH 3.21 on April 20, 2024.  No alcohol in greater than 36 years.  Just celebrated 64 years of marriage.  Recent labs through [facility] with potassium 5.3 otherwise hemoglobin 12.0.  No fevers or chills.  Some shortness of breath baseline even at rest.  No orthopnea or PND.  No change in ankle swelling.  Comprehensive review of systems as above otherwise all negative.       - Ray x 64 years (progressive Alzheimer's dementia SLUMS 13 out of 30)  Independent living at [facility] in [place] (prior [place])  3 daughters - Genesis ( at [facility]), Eneida, and Loretta   Smoke - quit in 60s after 1 year   EtOH: sober x 36 years   Surgeries: gallbladder, appy, hysterectomy and ? left ovary; right rotator cuff, right  carpal tunnel relaease; bilateral cataract; lumbar fusion, basal cell on nose; left CTS release; bladder lift; right TKA; left rotator cuff   Hospitalizations:  [place] - live in community with 65 houses in [place] (1 mile circumference if walk)          5/9/2024    10:08 AM   Preop Questions   1. Have you ever had a heart attack or stroke? No   2. Have you ever had surgery on your heart or blood vessels, such as a stent placement, a coronary artery bypass, or surgery on an artery in your head, neck, heart, or legs? No   3. Do you have chest pain with activity? No   4. Do you have a history of  heart failure? No   5. Do you currently have a cold, bronchitis or symptoms of other infection? No   6. Do you have a cough, shortness of breath, or wheezing? YES - chronic SOB   7. Do you or anyone in your family have previous history of blood clots? No   8. Do you or does anyone in your family have a serious bleeding problem such as prolonged bleeding following surgeries or cuts? No   9. Have you ever had problems with anemia or been told to take iron pills? No   10. Have you had any abnormal blood loss such as black, tarry or bloody stools, or abnormal vaginal bleeding? No   11. Have you ever had a blood transfusion? YES -     11a. Have you ever had a transfusion reaction? No   12. Are you willing to have a blood transfusion if it is medically needed before, during, or after your surgery? Yes   13. Have you or any of your relatives ever had problems with anesthesia? No   14. Do you have sleep apnea, excessive snoring or daytime drowsiness? No   15. Do you have any artifical heart valves or other implanted medical devices like a pacemaker, defibrillator, or continuous glucose monitor? No   16. Do you have artificial joints? YES - see list   17. Are you allergic to latex? No       Health Care Directive  Patient does not have a Health Care Directive or Living Will: Patient states has Advance Directive and will bring  in a copy to clinic.    Preoperative Review of    reviewed - no record of controlled substances prescribed.      Status of Chronic Conditions:  See problem list for active medical problems.  Problems all longstanding and stable, except as noted/documented.  See ROS for pertinent symptoms related to these conditions.    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 cleft cyst on MRI 5/29/20 appearing stable since 8/28/07        Snoring 01/04/2019     Priority: Medium    Microscopic colitis, unspecified microscopic colitis type      Priority: Medium     Created by Conversion        JANICE on CPAP      Priority: Medium     Created by Conversion        Insomnia      Priority: Medium     Created by Conversion        Hypothyroidism      Priority: Medium     Created by Conversion  Replacement Utility updated for latest IMO load        Lower GI bleed 12/26/2018     Priority: Medium    BRBPR (bright red blood per rectum) 12/26/2018     Priority: Medium    Fibromyalgia      Priority: Medium     Created by Conversion        Left ventricular outflow obstruction 08/30/2018     Priority: Medium    Hypertensive left ventricular hypertrophy, without heart failure 08/30/2018     Priority: Medium    Leg weakness, bilateral 06/25/2018     Priority: Medium    Lymphedema 06/25/2018     Priority: Medium    Fatigue due to excessive exertion 05/25/2018     Priority: Medium    Pain in both lower extremities 05/17/2018     Priority: Medium    Venous insufficiency of both lower extremities 05/17/2018     Priority: Medium    Venous hypertension of both lower extremities 05/17/2018     Priority: Medium    Fat  deposits 05/17/2018     Priority: Medium    Class 2 obesity due to excess calories in adult 05/17/2018     Priority: Medium    Vitamin D deficiency 05/17/2018     Priority: Medium    Hyperlipidemia      Priority: Medium     Created by Conversion        Hyperreflexia 04/19/2017     Priority: Medium    Exertional dyspnea 09/15/2016     Priority: Medium    Arthritis      Priority: Medium     Created by Conversion  Replacement Utility updated for latest IMO load        Generalized Osteoarthritis Of The Hand      Priority: Medium     Created by Conversion  Replacement Utility updated for latest IMO load        Osteopenia      Priority: Medium     Created by Conversion  Replacement Utility updated for latest IMO load        Hypertension      Priority: Medium     Created by Conversion  Replacement Utility updated for latest IMO load        Peripheral Neuropathy      Priority: Medium     Created by Conversion  Replacement Utility updated for latest IMO load        Generalized anxiety disorder 09/30/2015     Priority: Medium    Arthralgia 09/29/2015     Priority: Medium    Walk Is Wobbly Or Unsteady (Ataxia)      Priority: Medium     Created by Conversion        Anemia      Priority: Medium     Created by Conversion        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      Priority: Medium     Created by Conversion        Heartburn      Priority: Medium     Created by Conversion        Cervical Spondylosis      Priority: Medium     Created by Conversion        Lumbar Spondylosis      Priority: Medium     Created by Conversion        Trochanteric Bursitis      Priority: Medium     Created by Conversion        Lower Back Pain      Priority: Medium     Created by Conversion        GERD (gastroesophageal reflux disease) 02/07/2014     Priority: Medium    Ringing in ears 02/07/2014     Priority: Medium    Depressive disorder 06/05/2008     Priority: Medium    Memory loss 06/05/2008     Priority: Medium      Past Medical  History:   Diagnosis Date    Anemia, unspecified     Created by Conversion     Arrhythmia     Cervical spondylosis without myelopathy     Created by Conversion     Depression     Disease of thyroid gland     hypo    GERD (gastroesophageal reflux disease)     High cholesterol     History of transfusion     Myalgia and myositis, unspecified     Created by Conversion     Near syncope     Other and unspecified hyperlipidemia     Created by Conversion     Skin cancer, basal cell     Sleep apnea     mild    Tachycardia     Unspecified essential hypertension     Created by Conversion     Unspecified hereditary and idiopathic peripheral neuropathy     Created by Conversion      Past Surgical History:   Procedure Laterality Date    ARTHROPLASTY KNEE BILATERAL      ARTHROSCOPY SHOULDER ROTATOR CUFF REPAIR      CATARACT EXTRACTION, BILATERAL      CHOLECYSTECTOMY      EXCISE LESION TRUNK Left 1/5/2018    Procedure: LEFT BUTTOCK EXPLORATION, EXCISION BUTTOCK MASS;  Surgeon: Lg Jameson MD;  Location: [facility] OR;  Service:     HYSTERECTOMY      IR LUMBAR PUNCTURE  8/27/2012    JOINT REPLACEMENT Bilateral     knees    MOHS MICROGRAPHIC PROCEDURE      Nose    OOPHORECTOMY Left     1 removed, 1 remains    OTHER SURGICAL HISTORY      Bladder lift    GA ARTHROPLASTY TIBIAL PLATEAU      Description: Knee Replacement;  Recorded: 05/16/2013;    GA INJECT NERV BLCK,OTHR PERIPH NERV      Description: Peripheral Nerve Block Wrist Median Right;  Recorded: 05/16/2013;    RELEASE CARPAL TUNNEL      REPLACEMENT TOTAL KNEE      L     Current Outpatient Medications   Medication Sig Dispense Refill    acetaminophen (TYLENOL) 650 MG CR tablet [ACETAMINOPHEN (TYLENOL) 650 MG CR TABLET] Take 650 mg by mouth as needed for pain.      biotin 5 MG CAPS Take 5,000 mcg by mouth      budesonide (ENTOCORT EC) 3 MG EC capsule TAKE THREE CAPSULES BY MOUTH IN THE MORNING 90 capsule 3    calcium carbonate-vitamin D3 (CALCIUM 600 + D,3,) 600 mg  calcium- 200 unit cap [CALCIUM CARBONATE-VITAMIN D3 (CALCIUM 600 + D,3,) 600 MG CALCIUM- 200 UNIT CAP] Take 1 tablet by mouth 2 (two) times a day.             celecoxib (CELEBREX) 200 MG capsule TAKE ONE CAPSULE BY MOUTH ONE TIME DAILY 90 capsule 3    estrogen conj (PREMARIN) 0.3 MG tablet Take 1 tablet (0.3 mg) by mouth daily 90 tablet 2    furosemide (LASIX) 20 MG tablet TAKE ONE TABLET BY MOUTH ONE TIME DAILY 90 tablet 2    levothyroxine (SYNTHROID/LEVOTHROID) 50 MCG tablet TAKE ONE TABLET BY MOUTH ONE TIME DAILY at 6am 90 tablet 2    metoprolol tartrate (LOPRESSOR) 50 MG tablet TAKE 1&1/2 TABLET BY MOUTH EVERY MORNING AND TAKE 1 TABLET BY MOUTH EVERY EVENING 75 tablet 0    multivitamin with minerals (THERA-M) 9 mg iron-400 mcg Tab tablet [MULTIVITAMIN WITH MINERALS (THERA-M) 9 MG IRON-400 MCG TAB TABLET] Take 1 tablet by mouth every evening.      omeprazole (PRILOSEC) 20 MG DR capsule TAKE ONE CAPSULE BY MOUTH TWICE DAILY 180 capsule 2    Probiotic Product (MISC INTESTINAL BRANDON REGULAT) CAPS       simvastatin (ZOCOR) 20 MG tablet Take 1 tablet (20 mg) by mouth at bedtime 90 tablet 3    traZODone (DESYREL) 50 MG tablet Take 50-75 mg by mouth      venlafaxine (EFFEXOR XR) 75 MG 24 hr capsule TAKE TWO CAPSULES BY MOUTH DAILY 180 capsule 0       Allergies   Allergen Reactions    Cimicifuga [place] (Black Cohosh) Anaphylaxis and Hives    Ciprofloxacin Rash     Developed rash over trunk of body after one dose of cipro    Other Environmental Allergy      Grasses    Oxychlorodene Other (See Comments)     Hallucinations    Oxycodone Unknown     hallucinations    Penicillins Hives    Pollen [Pollen Extract] Unknown    Trees     Venom-Honey Bee [Bee Venom] Swelling    Cephalosporins Rash        Social History     Tobacco Use    Smoking status: Former     Current packs/day: 0.00     Average packs/day: 0.3 packs/day for 1 year (0.3 ttl pk-yrs)     Types: Cigarettes     Start date: 1/1/1960     Quit date: 1/1/1961     Years  "since quittin.3    Smokeless tobacco: Never   Substance Use Topics    Alcohol use: No     Family History   Problem Relation Age of Onset    Aneurysm Father         AAA    Cancer Mother         pancrease     History   Drug Use No         Review of Systems    Review of Systems  Constitutional, HEENT, cardiovascular, pulmonary, GI, , musculoskeletal, neuro, skin, endocrine and psych systems are negative, except as otherwise noted.    Objective    /70   Pulse 73   Temp 97.4  F (36.3  C)   Resp 15   Ht 1.568 m (5' 1.75\")   Wt 88 kg (194 lb)   LMP  (LMP Unknown)   SpO2 98%   BMI 35.77 kg/m     Estimated body mass index is 35.77 kg/m  as calculated from the following:    Height as of this encounter: 1.568 m (5' 1.75\").    Weight as of this encounter: 88 kg (194 lb).    Physical Exam  GENERAL: alert and no distress  EYES: Eyes grossly normal to inspection, PERRL and conjunctivae and sclerae normal  HENT: ear canals and TM's normal, nose and mouth without ulcers or lesions  NECK: no adenopathy, no asymmetry, masses, or scars  RESP: lungs clear to auscultation - no rales, rhonchi or wheezes  CV: regular rate and rhythm, normal S1 S2, no S3 or S4, no murmur, click or rub, no peripheral edema  ABDOMEN: soft, nontender, no hepatosplenomegaly, no masses and bowel sounds normal  MS: no gross musculoskeletal defects noted, no edema  SKIN: no suspicious lesions or rashes  NEURO: Normal strength and tone, mentation intact and speech normal  PSYCH: mentation appears normal, affect normal/bright    Recent Labs   Lab Test 23  1552 23  0757   HGB 12.0 11.7    246    141   POTASSIUM 4.4 4.3   CR 0.79 0.84        Diagnostics  Labs pending at this time.  Results will be reviewed when available.  No results found for this or any previous visit (from the past 24 hour(s)).   No EKG required for low risk surgery (cataract, skin procedure, breast biopsy, etc).      EKG ([facility]) " 4/20/24  Sinus rhythm   Moderate voltage criteria for LVH, may be normal variant   Borderline ECG   When compared with ECG of 20-APR-2024 15:51,   No significant change was found   Confirmed by Jl Gutierrez ([id number]) on 4/21/2024 11:24:36 AM           EXAM: XR CHEST 2 VIEWS   LOCATION: [facility]   DATE: 4/20/2024     INDICATION: Shortness of breath.   COMPARISON: 03/28/2023     IMPRESSION:     No focal airspace disease. No pleural effusion or pneumothorax.     The cardiomediastinal silhouette is unremarkable. Aortic calcifications.     Scoliotic curvature of the lumbar spine with partially visualized spinal hardware.         EXAM: CT HEAD WO IV CONT, CT TRAUMA CERVICAL SCREEN T4-C1   LOCATION: [facility]   DATE: 4/20/2024     INDICATION: GCS 14 or 15 - head injury and age over 64y; GCS 14 or 15 - head injury and age over 64y   COMPARISON: MRI brain 10/11/2022   TECHNIQUE:   1) Routine CT Head without IV contrast. Multiplanar reformats. Dose reduction techniques were used.   2) Routine CT Cervical Spine without IV contrast. Multiplanar reformats. Dose reduction techniques were used.     FINDINGS:   HEAD CT:   INTRACRANIAL CONTENTS: No intracranial hemorrhage, extraaxial collection, or mass effect.  No CT evidence of acute infarct. Mild presumed chronic small vessel ischemic changes. Intracranial atherosclerosis is present. Mild generalized volume loss. No hydrocephalus.     VISUALIZED ORBITS/SINUSES/MASTOIDS: Prior bilateral cataract surgery. Visualized portions of the orbits are otherwise unremarkable. No paranasal sinus mucosal disease. No middle ear or mastoid effusion.     BONES/SOFT TISSUES: No scalp hematoma. No skull fracture.     CERVICAL SPINE CT:   VERTEBRA: Normal vertebral body heights and alignment. No fracture or posttraumatic subluxation.     CANAL/FORAMINA: There are advanced degenerative changes at the atlantoaxial joint predominantly involving the lateral masses. There is moderate  multilevel disc height loss and facet hypertrophy throughout the cervical spine. No high-grade spinal canal stenosis. Scattered moderate neural foraminal stenoses, greatest at C4-C5 and C5-C6.     PARASPINAL: No extraspinal abnormality. Visualized lung fields are clear.     IMPRESSION:   HEAD CT:   No CT evidence for acute intracranial process. Brain atrophy and presumed chronic microvascular ischemic changes as above.     CERVICAL SPINE CT:   Multilevel degenerative changes of the cervical spine, as described. No evidence of an acute displaced fracture.             Revised Cardiac Risk Index (RCRI)  The patient has the following serious cardiovascular risks for perioperative complications:   - No serious cardiac risks = 0 points     RCRI Interpretation: 0 points: Class I (very low risk - 0.4% complication rate)             Signed Electronically by: True Pugh MD  Copy of this evaluation report is provided to requesting physician.         .undefined[^^  Answers submitted by the patient for this visit:  Patient Health Questionnaire (Submitted on 5/9/2024)  If you checked off any problems, how difficult have these problems made it for you to do your work, take care of things at home, or get along with other people?: Not difficult at all  PHQ9 TOTAL SCORE: 2  YEE-7 (Submitted on 5/9/2024)  YEE 7 TOTAL SCORE: 1

## 2024-05-09 NOTE — H&P (VIEW-ONLY)
Preoperative Evaluation  [facility]  [address]  [facility] [address]  Phone: [phone]  Fax: [phone]  Primary Provider: Willow Mclean  Pre-op Performing Provider: WILLOW MCLEAN  May 9, 2024       Park is a 84 year old, presenting for the following:  Pre-Op Exam (5/24/24, Dr. David, , throat dilation )        5/9/2024    10:02 AM   Additional Questions   Roomed by      Surgical Information  Surgery/Procedure: throat dilatation  Surgery Location: [facility]  Surgeon: Dr. David  Surgery Date: 5/24/24  Time of Surgery: TBD  Where patient plans to recover: At home with family  Fax number for surgical facility: Note does not need to be faxed, will be available electronically in Epic.    Preop general physical exam  Preoperative examination completed.  Dysphagia noted.  Esophageal stenosis with dilatation procedure scheduled.  No contraindication to scheduled procedure identified.    Dysphagia, unspecified type  As above, described esophageal stenosis and esophageal dilatation procedure scheduled.  No contraindication to completion.    Microscopic colitis, unspecified microscopic colitis type  History of microscopic colitis.  Recent flare perhaps with increased stooling.  Will increase budesonide from 3 mg daily up to 6 mg daily x 14 days then resume 3 mg daily.  Reassess at annual wellness visit no later than 3 months, sooner if concerns otherwise follows with gastroenterology.  - CBC with platelets    Major depressive disorder, recurrent episode, moderate (H)  Underlying depression benefits of venlafaxine XR 75 mg using 2 tablets daily.  No longer on duloxetine.    Alcohol use disorder, moderate, in sustained remission, dependence (H)  Sober greater than 36 years.    Morbid obesity (H)  Severe obesity with BMI 35.77 with underlying history of hyperlipidemia.  Dietary and exercise modifications reviewed.    Encounter for immunization  Updated COVID booster to be provided.  -  COVID-19 12+ (2023-24) (PFIZER)    Mixed hyperlipidemia  Nonfasting status.  Continues utilization of simvastatin 20 mg at bedtime.  Lipid cascade updated, nonfasting.  - Lipid panel reflex to direct LDL Non-fasting    Hyperkalemia  Per kalemia with recent labs with potassium 5.3 during ER visit through [facility] April 20, 2024.  Update base metabolic panel.  - Basic metabolic panel     Hypothyroidism  Patient continues utilization of levothyroxine 50 mcg daily.  Most recent TSH 3.21 April 20, 2024 through [facility] ER.          Subjective       HPI related to upcoming procedure: Patient seen for preop examination.  Dysphagia concerns.  Esophageal stenosis described.  Scheduled dilatation procedure noted.  History of microscopic colitis.  Recent flare with increased stooling.  Has been utilizing budesonide 3 mg daily.  May increase to 6 mg daily x 2 weeks and then resume 3 mg dosing following if needed for flare per gastroenterology.  Depression stable with venlafaxine XR 75 mg using 2 tablets daily with PHQ-9 questionnaire 2 out of 27 and YEE-7 questionnaire 1 out of 21 today.  No longer on duloxetine.  Mood levothyroxine 50 mcg daily for thyroid replacement.  Recent TSH 3.21 on April 20, 2024.  No alcohol in greater than 36 years.  Just celebrated 64 years of marriage.  Recent labs through [facility] with potassium 5.3 otherwise hemoglobin 12.0.  No fevers or chills.  Some shortness of breath baseline even at rest.  No orthopnea or PND.  No change in ankle swelling.  Comprehensive review of systems as above otherwise all negative.       - Ray x 64 years (progressive Alzheimer's dementia SLUMS 13 out of 30)  Independent living at [facility] in [place] (prior [place])  3 daughters - Genesis ( at [facility]), Eneida, and Loretta   Smoke - quit in 60s after 1 year   EtOH: sober x 36 years   Surgeries: gallbladder, appy, hysterectomy and ? left ovary; right rotator cuff, right  carpal tunnel relaease; bilateral cataract; lumbar fusion, basal cell on nose; left CTS release; bladder lift; right TKA; left rotator cuff   Hospitalizations:  [place], [place] - live in community with 65 houses in [place] (1 mile circumference if walk)          5/9/2024    10:08 AM   Preop Questions   1. Have you ever had a heart attack or stroke? No   2. Have you ever had surgery on your heart or blood vessels, such as a stent placement, a coronary artery bypass, or surgery on an artery in your head, neck, heart, or legs? No   3. Do you have chest pain with activity? No   4. Do you have a history of  heart failure? No   5. Do you currently have a cold, bronchitis or symptoms of other infection? No   6. Do you have a cough, shortness of breath, or wheezing? YES - chronic SOB   7. Do you or anyone in your family have previous history of blood clots? No   8. Do you or does anyone in your family have a serious bleeding problem such as prolonged bleeding following surgeries or cuts? No   9. Have you ever had problems with anemia or been told to take iron pills? No   10. Have you had any abnormal blood loss such as black, tarry or bloody stools, or abnormal vaginal bleeding? No   11. Have you ever had a blood transfusion? YES -     11a. Have you ever had a transfusion reaction? No   12. Are you willing to have a blood transfusion if it is medically needed before, during, or after your surgery? Yes   13. Have you or any of your relatives ever had problems with anesthesia? No   14. Do you have sleep apnea, excessive snoring or daytime drowsiness? No   15. Do you have any artifical heart valves or other implanted medical devices like a pacemaker, defibrillator, or continuous glucose monitor? No   16. Do you have artificial joints? YES - see list   17. Are you allergic to latex? No       Health Care Directive  Patient does not have a Health Care Directive or Living Will: Patient states has Advance Directive and will bring  in a copy to clinic.    Preoperative Review of    reviewed - no record of controlled substances prescribed.      Status of Chronic Conditions:  See problem list for active medical problems.  Problems all longstanding and stable, except as noted/documented.  See ROS for pertinent symptoms related to these conditions.    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 cleft cyst on MRI 5/29/20 appearing stable since 8/28/07        Snoring 01/04/2019     Priority: Medium    Microscopic colitis, unspecified microscopic colitis type      Priority: Medium     Created by Conversion        JANICE on CPAP      Priority: Medium     Created by Conversion        Insomnia      Priority: Medium     Created by Conversion        Hypothyroidism      Priority: Medium     Created by Conversion  Replacement Utility updated for latest IMO load        Lower GI bleed 12/26/2018     Priority: Medium    BRBPR (bright red blood per rectum) 12/26/2018     Priority: Medium    Fibromyalgia      Priority: Medium     Created by Conversion        Left ventricular outflow obstruction 08/30/2018     Priority: Medium    Hypertensive left ventricular hypertrophy, without heart failure 08/30/2018     Priority: Medium    Leg weakness, bilateral 06/25/2018     Priority: Medium    Lymphedema 06/25/2018     Priority: Medium    Fatigue due to excessive exertion 05/25/2018     Priority: Medium    Pain in both lower extremities 05/17/2018     Priority: Medium    Venous insufficiency of both lower extremities 05/17/2018     Priority: Medium    Venous hypertension of both lower extremities 05/17/2018     Priority: Medium    Fat  deposits 05/17/2018     Priority: Medium    Class 2 obesity due to excess calories in adult 05/17/2018     Priority: Medium    Vitamin D deficiency 05/17/2018     Priority: Medium    Hyperlipidemia      Priority: Medium     Created by Conversion        Hyperreflexia 04/19/2017     Priority: Medium    Exertional dyspnea 09/15/2016     Priority: Medium    Arthritis      Priority: Medium     Created by Conversion  Replacement Utility updated for latest IMO load        Generalized Osteoarthritis Of The Hand      Priority: Medium     Created by Conversion  Replacement Utility updated for latest IMO load        Osteopenia      Priority: Medium     Created by Conversion  Replacement Utility updated for latest IMO load        Hypertension      Priority: Medium     Created by Conversion  Replacement Utility updated for latest IMO load        Peripheral Neuropathy      Priority: Medium     Created by Conversion  Replacement Utility updated for latest IMO load        Generalized anxiety disorder 09/30/2015     Priority: Medium    Arthralgia 09/29/2015     Priority: Medium    Walk Is Wobbly Or Unsteady (Ataxia)      Priority: Medium     Created by Conversion        Anemia      Priority: Medium     Created by Conversion        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      Priority: Medium     Created by Conversion        Heartburn      Priority: Medium     Created by Conversion        Cervical Spondylosis      Priority: Medium     Created by Conversion        Lumbar Spondylosis      Priority: Medium     Created by Conversion        Trochanteric Bursitis      Priority: Medium     Created by Conversion        Lower Back Pain      Priority: Medium     Created by Conversion        GERD (gastroesophageal reflux disease) 02/07/2014     Priority: Medium    Ringing in ears 02/07/2014     Priority: Medium    Depressive disorder 06/05/2008     Priority: Medium    Memory loss 06/05/2008     Priority: Medium      Past Medical  History:   Diagnosis Date    Anemia, unspecified     Created by Conversion     Arrhythmia     Cervical spondylosis without myelopathy     Created by Conversion     Depression     Disease of thyroid gland     hypo    GERD (gastroesophageal reflux disease)     High cholesterol     History of transfusion     Myalgia and myositis, unspecified     Created by Conversion     Near syncope     Other and unspecified hyperlipidemia     Created by Conversion     Skin cancer, basal cell     Sleep apnea     mild    Tachycardia     Unspecified essential hypertension     Created by Conversion     Unspecified hereditary and idiopathic peripheral neuropathy     Created by Conversion      Past Surgical History:   Procedure Laterality Date    ARTHROPLASTY KNEE BILATERAL      ARTHROSCOPY SHOULDER ROTATOR CUFF REPAIR      CATARACT EXTRACTION, BILATERAL      CHOLECYSTECTOMY      EXCISE LESION TRUNK Left 1/5/2018    Procedure: LEFT BUTTOCK EXPLORATION, EXCISION BUTTOCK MASS;  Surgeon: Lg Jameson MD;  Location: [facility] OR;  Service:     HYSTERECTOMY      IR LUMBAR PUNCTURE  8/27/2012    JOINT REPLACEMENT Bilateral     knees    MOHS MICROGRAPHIC PROCEDURE      Nose    OOPHORECTOMY Left     1 removed, 1 remains    OTHER SURGICAL HISTORY      Bladder lift    MT ARTHROPLASTY TIBIAL PLATEAU      Description: Knee Replacement;  Recorded: 05/16/2013;    MT INJECT NERV BLCK,OTHR PERIPH NERV      Description: Peripheral Nerve Block Wrist Median Right;  Recorded: 05/16/2013;    RELEASE CARPAL TUNNEL      REPLACEMENT TOTAL KNEE      L     Current Outpatient Medications   Medication Sig Dispense Refill    acetaminophen (TYLENOL) 650 MG CR tablet [ACETAMINOPHEN (TYLENOL) 650 MG CR TABLET] Take 650 mg by mouth as needed for pain.      biotin 5 MG CAPS Take 5,000 mcg by mouth      budesonide (ENTOCORT EC) 3 MG EC capsule TAKE THREE CAPSULES BY MOUTH IN THE MORNING 90 capsule 3    calcium carbonate-vitamin D3 (CALCIUM 600 + D,3,) 600 mg  calcium- 200 unit cap [CALCIUM CARBONATE-VITAMIN D3 (CALCIUM 600 + D,3,) 600 MG CALCIUM- 200 UNIT CAP] Take 1 tablet by mouth 2 (two) times a day.             celecoxib (CELEBREX) 200 MG capsule TAKE ONE CAPSULE BY MOUTH ONE TIME DAILY 90 capsule 3    estrogen conj (PREMARIN) 0.3 MG tablet Take 1 tablet (0.3 mg) by mouth daily 90 tablet 2    furosemide (LASIX) 20 MG tablet TAKE ONE TABLET BY MOUTH ONE TIME DAILY 90 tablet 2    levothyroxine (SYNTHROID/LEVOTHROID) 50 MCG tablet TAKE ONE TABLET BY MOUTH ONE TIME DAILY at 6am 90 tablet 2    metoprolol tartrate (LOPRESSOR) 50 MG tablet TAKE 1&1/2 TABLET BY MOUTH EVERY MORNING AND TAKE 1 TABLET BY MOUTH EVERY EVENING 75 tablet 0    multivitamin with minerals (THERA-M) 9 mg iron-400 mcg Tab tablet [MULTIVITAMIN WITH MINERALS (THERA-M) 9 MG IRON-400 MCG TAB TABLET] Take 1 tablet by mouth every evening.      omeprazole (PRILOSEC) 20 MG DR capsule TAKE ONE CAPSULE BY MOUTH TWICE DAILY 180 capsule 2    Probiotic Product (MISC INTESTINAL BRANDON REGULAT) CAPS       simvastatin (ZOCOR) 20 MG tablet Take 1 tablet (20 mg) by mouth at bedtime 90 tablet 3    traZODone (DESYREL) 50 MG tablet Take 50-75 mg by mouth      venlafaxine (EFFEXOR XR) 75 MG 24 hr capsule TAKE TWO CAPSULES BY MOUTH DAILY 180 capsule 0       Allergies   Allergen Reactions    Cimicifuga [place] (Black Cohosh) Anaphylaxis and Hives    Ciprofloxacin Rash     Developed rash over trunk of body after one dose of cipro    Other Environmental Allergy      Grasses    Oxychlorodene Other (See Comments)     Hallucinations    Oxycodone Unknown     hallucinations    Penicillins Hives    Pollen [Pollen Extract] Unknown    Trees     Venom-Honey Bee [Bee Venom] Swelling    Cephalosporins Rash        Social History     Tobacco Use    Smoking status: Former     Current packs/day: 0.00     Average packs/day: 0.3 packs/day for 1 year (0.3 ttl pk-yrs)     Types: Cigarettes     Start date: 1/1/1960     Quit date: 1/1/1961     Years  "since quittin.3    Smokeless tobacco: Never   Substance Use Topics    Alcohol use: No     Family History   Problem Relation Age of Onset    Aneurysm Father         AAA    Cancer Mother         pancrease     History   Drug Use No         Review of Systems    Review of Systems  Constitutional, HEENT, cardiovascular, pulmonary, GI, , musculoskeletal, neuro, skin, endocrine and psych systems are negative, except as otherwise noted.    Objective    /70   Pulse 73   Temp 97.4  F (36.3  C)   Resp 15   Ht 1.568 m (5' 1.75\")   Wt 88 kg (194 lb)   LMP  (LMP Unknown)   SpO2 98%   BMI 35.77 kg/m     Estimated body mass index is 35.77 kg/m  as calculated from the following:    Height as of this encounter: 1.568 m (5' 1.75\").    Weight as of this encounter: 88 kg (194 lb).    Physical Exam  GENERAL: alert and no distress  EYES: Eyes grossly normal to inspection, PERRL and conjunctivae and sclerae normal  HENT: ear canals and TM's normal, nose and mouth without ulcers or lesions  NECK: no adenopathy, no asymmetry, masses, or scars  RESP: lungs clear to auscultation - no rales, rhonchi or wheezes  CV: regular rate and rhythm, normal S1 S2, no S3 or S4, no murmur, click or rub, no peripheral edema  ABDOMEN: soft, nontender, no hepatosplenomegaly, no masses and bowel sounds normal  MS: no gross musculoskeletal defects noted, no edema  SKIN: no suspicious lesions or rashes  NEURO: Normal strength and tone, mentation intact and speech normal  PSYCH: mentation appears normal, affect normal/bright    Recent Labs   Lab Test 23  1552 23  0757   HGB 12.0 11.7    246    141   POTASSIUM 4.4 4.3   CR 0.79 0.84        Diagnostics  Labs pending at this time.  Results will be reviewed when available.  No results found for this or any previous visit (from the past 24 hour(s)).   No EKG required for low risk surgery (cataract, skin procedure, breast biopsy, etc).      EKG ([facility]) " 4/20/24  Sinus rhythm   Moderate voltage criteria for LVH, may be normal variant   Borderline ECG   When compared with ECG of 20-APR-2024 15:51,   No significant change was found   Confirmed by Jl Gutierrez ([id number]) on 4/21/2024 11:24:36 AM           EXAM: XR CHEST 2 VIEWS   LOCATION: [facility]   DATE: 4/20/2024     INDICATION: Shortness of breath.   COMPARISON: 03/28/2023     IMPRESSION:     No focal airspace disease. No pleural effusion or pneumothorax.     The cardiomediastinal silhouette is unremarkable. Aortic calcifications.     Scoliotic curvature of the lumbar spine with partially visualized spinal hardware.         EXAM: CT HEAD WO IV CONT, CT TRAUMA CERVICAL SCREEN T4-C1   LOCATION: [facility]   DATE: 4/20/2024     INDICATION: GCS 14 or 15 - head injury and age over 64y; GCS 14 or 15 - head injury and age over 64y   COMPARISON: MRI brain 10/11/2022   TECHNIQUE:   1) Routine CT Head without IV contrast. Multiplanar reformats. Dose reduction techniques were used.   2) Routine CT Cervical Spine without IV contrast. Multiplanar reformats. Dose reduction techniques were used.     FINDINGS:   HEAD CT:   INTRACRANIAL CONTENTS: No intracranial hemorrhage, extraaxial collection, or mass effect.  No CT evidence of acute infarct. Mild presumed chronic small vessel ischemic changes. Intracranial atherosclerosis is present. Mild generalized volume loss. No hydrocephalus.     VISUALIZED ORBITS/SINUSES/MASTOIDS: Prior bilateral cataract surgery. Visualized portions of the orbits are otherwise unremarkable. No paranasal sinus mucosal disease. No middle ear or mastoid effusion.     BONES/SOFT TISSUES: No scalp hematoma. No skull fracture.     CERVICAL SPINE CT:   VERTEBRA: Normal vertebral body heights and alignment. No fracture or posttraumatic subluxation.     CANAL/FORAMINA: There are advanced degenerative changes at the atlantoaxial joint predominantly involving the lateral masses. There is moderate  multilevel disc height loss and facet hypertrophy throughout the cervical spine. No high-grade spinal canal stenosis. Scattered moderate neural foraminal stenoses, greatest at C4-C5 and C5-C6.     PARASPINAL: No extraspinal abnormality. Visualized lung fields are clear.     IMPRESSION:   HEAD CT:   No CT evidence for acute intracranial process. Brain atrophy and presumed chronic microvascular ischemic changes as above.     CERVICAL SPINE CT:   Multilevel degenerative changes of the cervical spine, as described. No evidence of an acute displaced fracture.             Revised Cardiac Risk Index (RCRI)  The patient has the following serious cardiovascular risks for perioperative complications:   - No serious cardiac risks = 0 points     RCRI Interpretation: 0 points: Class I (very low risk - 0.4% complication rate)             Signed Electronically by: True Pugh MD  Copy of this evaluation report is provided to requesting physician.         .undefined[^^  Answers submitted by the patient for this visit:  Patient Health Questionnaire (Submitted on 5/9/2024)  If you checked off any problems, how difficult have these problems made it for you to do your work, take care of things at home, or get along with other people?: Not difficult at all  PHQ9 TOTAL SCORE: 2  YEE-7 (Submitted on 5/9/2024)  YEE 7 TOTAL SCORE: 1

## 2024-05-09 NOTE — PATIENT INSTRUCTIONS
Preparing for Your Surgery  Getting started  A nurse will call you to review your health history and instructions. They will give you an arrival time based on your scheduled surgery time. Please be ready to share:  Your doctor's clinic name and phone number  Your medical, surgical, and anesthesia history  A list of allergies and sensitivities  A list of medicines, including herbal treatments and over-the-counter drugs  Whether the patient has a legal guardian (ask how to send us the papers in advance)  Please tell us if you're pregnant--or if there's any chance you might be pregnant. Some surgeries may injure a fetus (unborn baby), so they require a pregnancy test. Surgeries that are safe for a fetus don't always need a test, and you can choose whether to have one.   If you have a child who's having surgery, please ask for a copy of Preparing for Your Child's Surgery.    Preparing for surgery  Within 10 to 30 days of surgery: Have a pre-op exam (sometimes called an H&P, or History and Physical). This can be done at a clinic or pre-operative center.  If you're having a , you may not need this exam. Talk to your care team.  At your pre-op exam, talk to your care team about all medicines you take. If you need to stop any medicines before surgery, ask when to start taking them again.  We do this for your safety. Many medicines can make you bleed too much during surgery. Some change how well surgery (anesthesia) drugs work.  Call your insurance company to let them know you're having surgery. (If you don't have insurance, call [phone].)  Call your clinic if there's any change in your health. This includes signs of a cold or flu (sore throat, runny nose, cough, rash, fever). It also includes a scrape or scratch near the surgery site.  If you have questions on the day of surgery, call your hospital or surgery center.  Eating and drinking guidelines  For your safety: Unless your surgeon tells you otherwise,  follow the guidelines below.  Eat and drink as usual until 8 hours before you arrive for surgery. After that, no food or milk.  Drink clear liquids until 2 hours before you arrive. These are liquids you can see through, like water, Gatorade, and Propel Water. They also include plain black coffee and tea (no cream or milk), candy, and breath mints. You can spit out gum when you arrive.  If you drink alcohol: Stop drinking it the night before surgery.  If your care team tells you to take medicine on the morning of surgery, it's okay to take it with a sip of water.  Preventing infection  Shower or bathe the night before and morning of your surgery. Follow the instructions your clinic gave you. (If no instructions, use regular soap.)  Don't shave or clip hair near your surgery site. We'll remove the hair if needed.  Don't smoke or vape the morning of surgery. You may chew nicotine gum up to 2 hours before surgery. A nicotine patch is okay.  Note: Some surgeries require you to completely quit smoking and nicotine. Check with your surgeon.  Your care team will make every effort to keep you safe from infection. We will:  Clean our hands often with soap and water (or an alcohol-based hand rub).  Clean the skin at your surgery site with a special soap that kills germs.  Give you a special gown to keep you warm. (Cold raises the risk of infection.)  Wear special hair covers, masks, gowns and gloves during surgery.  Give antibiotic medicine, if prescribed. Not all surgeries need antibiotics.  What to bring on the day of surgery  Photo ID and insurance card  Copy of your health care directive, if you have one  Glasses and hearing aids (bring cases)  You can't wear contacts during surgery  Inhaler and eye drops, if you use them (tell us about these when you arrive)  CPAP machine or breathing device, if you use them  A few personal items, if spending the night  If you have . . .  A pacemaker, ICD (cardiac defibrillator) or other  implant: Bring the ID card.  An implanted stimulator: Bring the remote control.  A legal guardian: Bring a copy of the certified (court-stamped) guardianship papers.  Please remove any jewelry, including body piercings. Leave jewelry and other valuables at home.  If you're going home the day of surgery  You must have a responsible adult drive you home. They should stay with you overnight as well.  If you don't have someone to stay with you, and you aren't safe to go home alone, we may keep you overnight. Insurance often won't pay for this.  After surgery  If it's hard to control your pain or you need more pain medicine, please call your surgeon's office.  Questions?   If you have any questions for your care team, list them here: _________________________________________________________________________________________________________________________________________________________________________ ____________________________________ ____________________________________ ____________________________________  For informational purposes only. Not to replace the advice of your health care provider. Copyright   2003, 2019 [facility] [employer]. All rights reserved. Clinically reviewed by Fern Rich MD. SMARTworks 118375 - REV 12/22.    How to Take Your Medication Before Surgery  - Take all of your medications before surgery as usual

## 2024-05-10 LAB
ANION GAP SERPL CALCULATED.3IONS-SCNC: 8 MMOL/L (ref 7–15)
BUN SERPL-MCNC: 20.1 MG/DL (ref 8–23)
CALCIUM SERPL-MCNC: 9.4 MG/DL (ref 8.8–10.2)
CHLORIDE SERPL-SCNC: 103 MMOL/L (ref 98–107)
CHOLEST SERPL-MCNC: 218 MG/DL
CREAT SERPL-MCNC: 0.94 MG/DL (ref 0.51–0.95)
DEPRECATED HCO3 PLAS-SCNC: 28 MMOL/L (ref 22–29)
EGFRCR SERPLBLD CKD-EPI 2021: 60 ML/MIN/1.73M2
FASTING STATUS PATIENT QL REPORTED: NO
FASTING STATUS PATIENT QL REPORTED: NO
GLUCOSE SERPL-MCNC: 78 MG/DL (ref 70–99)
HDLC SERPL-MCNC: 78 MG/DL
LDLC SERPL CALC-MCNC: 100 MG/DL
NONHDLC SERPL-MCNC: 140 MG/DL
POTASSIUM SERPL-SCNC: 5 MMOL/L (ref 3.4–5.3)
SODIUM SERPL-SCNC: 139 MMOL/L (ref 135–145)
TRIGL SERPL-MCNC: 201 MG/DL

## 2024-05-23 ENCOUNTER — ANESTHESIA EVENT (OUTPATIENT)
Dept: SURGERY | Facility: CLINIC | Age: 84
End: 2024-05-23
Payer: MEDICARE

## 2024-05-24 ENCOUNTER — ANESTHESIA (OUTPATIENT)
Dept: SURGERY | Facility: CLINIC | Age: 84
End: 2024-05-24
Payer: MEDICARE

## 2024-05-24 ENCOUNTER — HOSPITAL ENCOUNTER (OUTPATIENT)
Facility: CLINIC | Age: 84
Discharge: HOME | End: 2024-05-24
Attending: INTERNAL MEDICINE | Admitting: INTERNAL MEDICINE
Payer: MEDICARE

## 2024-05-24 VITALS
DIASTOLIC BLOOD PRESSURE: 66 MMHG | SYSTOLIC BLOOD PRESSURE: 139 MMHG | OXYGEN SATURATION: 96 % | HEIGHT: 62 IN | BODY MASS INDEX: 35.7 KG/M2 | TEMPERATURE: 97 F | RESPIRATION RATE: 14 BRPM | WEIGHT: 194 LBS | HEART RATE: 62 BPM

## 2024-05-24 LAB — UPPER GI ENDOSCOPY: NORMAL

## 2024-05-24 PROCEDURE — 370N000017 HC ANESTHESIA TECHNICAL FEE, PER MIN: Performed by: INTERNAL MEDICINE

## 2024-05-24 PROCEDURE — 999N000141 HC STATISTIC PRE-PROCEDURE NURSING ASSESSMENT: Performed by: INTERNAL MEDICINE

## 2024-05-24 PROCEDURE — 272N000001 HC OR GENERAL SUPPLY STERILE: Performed by: INTERNAL MEDICINE

## 2024-05-24 PROCEDURE — 258N000003 HC RX IP 258 OP 636: Performed by: ANESTHESIOLOGY

## 2024-05-24 PROCEDURE — 250N000011 HC RX IP 250 OP 636: Performed by: STUDENT IN AN ORGANIZED HEALTH CARE EDUCATION/TRAINING PROGRAM

## 2024-05-24 PROCEDURE — 360N000075 HC SURGERY LEVEL 2, PER MIN: Performed by: INTERNAL MEDICINE

## 2024-05-24 PROCEDURE — 710N000012 HC RECOVERY PHASE 2, PER MINUTE: Performed by: INTERNAL MEDICINE

## 2024-05-24 RX ORDER — PROPOFOL 10 MG/ML
INJECTION, EMULSION INTRAVENOUS CONTINUOUS PRN
Status: DISCONTINUED | OUTPATIENT
Start: 2024-05-24 | End: 2024-05-24

## 2024-05-24 RX ORDER — LIDOCAINE 40 MG/G
CREAM TOPICAL
Status: DISCONTINUED | OUTPATIENT
Start: 2024-05-24 | End: 2024-05-24 | Stop reason: HOSPADM

## 2024-05-24 RX ORDER — FENTANYL CITRATE 50 UG/ML
25 INJECTION, SOLUTION INTRAMUSCULAR; INTRAVENOUS
Status: CANCELLED | OUTPATIENT
Start: 2024-05-24

## 2024-05-24 RX ORDER — NALOXONE HYDROCHLORIDE 0.4 MG/ML
0.2 INJECTION, SOLUTION INTRAMUSCULAR; INTRAVENOUS; SUBCUTANEOUS
Status: CANCELLED | OUTPATIENT
Start: 2024-05-24

## 2024-05-24 RX ORDER — PROPOFOL 10 MG/ML
INJECTION, EMULSION INTRAVENOUS PRN
Status: DISCONTINUED | OUTPATIENT
Start: 2024-05-24 | End: 2024-05-24

## 2024-05-24 RX ORDER — SODIUM CHLORIDE, SODIUM LACTATE, POTASSIUM CHLORIDE, CALCIUM CHLORIDE 600; 310; 30; 20 MG/100ML; MG/100ML; MG/100ML; MG/100ML
INJECTION, SOLUTION INTRAVENOUS CONTINUOUS
Status: DISCONTINUED | OUTPATIENT
Start: 2024-05-24 | End: 2024-05-24 | Stop reason: HOSPADM

## 2024-05-24 RX ORDER — ONDANSETRON 2 MG/ML
4 INJECTION INTRAMUSCULAR; INTRAVENOUS EVERY 30 MIN PRN
Status: CANCELLED | OUTPATIENT
Start: 2024-05-24

## 2024-05-24 RX ORDER — NALOXONE HYDROCHLORIDE 0.4 MG/ML
0.4 INJECTION, SOLUTION INTRAMUSCULAR; INTRAVENOUS; SUBCUTANEOUS
Status: CANCELLED | OUTPATIENT
Start: 2024-05-24

## 2024-05-24 RX ORDER — ONDANSETRON 2 MG/ML
INJECTION INTRAMUSCULAR; INTRAVENOUS PRN
Status: DISCONTINUED | OUTPATIENT
Start: 2024-05-24 | End: 2024-05-24

## 2024-05-24 RX ORDER — ONDANSETRON 2 MG/ML
4 INJECTION INTRAMUSCULAR; INTRAVENOUS EVERY 6 HOURS PRN
Status: CANCELLED | OUTPATIENT
Start: 2024-05-24

## 2024-05-24 RX ORDER — NALOXONE HYDROCHLORIDE 0.4 MG/ML
0.1 INJECTION, SOLUTION INTRAMUSCULAR; INTRAVENOUS; SUBCUTANEOUS
Status: CANCELLED | OUTPATIENT
Start: 2024-05-24

## 2024-05-24 RX ORDER — FLUMAZENIL 0.1 MG/ML
0.2 INJECTION, SOLUTION INTRAVENOUS
Status: CANCELLED | OUTPATIENT
Start: 2024-05-24 | End: 2024-05-24

## 2024-05-24 RX ORDER — ONDANSETRON 4 MG/1
4 TABLET, ORALLY DISINTEGRATING ORAL EVERY 6 HOURS PRN
Status: CANCELLED | OUTPATIENT
Start: 2024-05-24

## 2024-05-24 RX ORDER — DEXAMETHASONE SODIUM PHOSPHATE 4 MG/ML
4 INJECTION, SOLUTION INTRA-ARTICULAR; INTRALESIONAL; INTRAMUSCULAR; INTRAVENOUS; SOFT TISSUE
Status: CANCELLED | OUTPATIENT
Start: 2024-05-24

## 2024-05-24 RX ORDER — PROCHLORPERAZINE MALEATE 5 MG
5 TABLET ORAL EVERY 6 HOURS PRN
Status: CANCELLED | OUTPATIENT
Start: 2024-05-24

## 2024-05-24 RX ORDER — ONDANSETRON 4 MG/1
4 TABLET, ORALLY DISINTEGRATING ORAL EVERY 30 MIN PRN
Status: CANCELLED | OUTPATIENT
Start: 2024-05-24

## 2024-05-24 RX ADMIN — PROPOFOL 30 MG: 10 INJECTION, EMULSION INTRAVENOUS at 09:03

## 2024-05-24 RX ADMIN — ONDANSETRON 4 MG: 2 INJECTION INTRAMUSCULAR; INTRAVENOUS at 09:03

## 2024-05-24 RX ADMIN — SODIUM CHLORIDE, POTASSIUM CHLORIDE, SODIUM LACTATE AND CALCIUM CHLORIDE: 600; 310; 30; 20 INJECTION, SOLUTION INTRAVENOUS at 08:48

## 2024-05-24 RX ADMIN — PROPOFOL 200 MCG/KG/MIN: 10 INJECTION, EMULSION INTRAVENOUS at 09:03

## 2024-05-24 ASSESSMENT — ACTIVITIES OF DAILY LIVING (ADL)
ADLS_ACUITY_SCORE: 35
ADLS_ACUITY_SCORE: 33
ADLS_ACUITY_SCORE: 35

## 2024-05-24 NOTE — INTERVAL H&P NOTE
"I have reviewed the surgical (or preoperative) H&P that is linked to this encounter, and examined the patient. There are no significant changes    Pre-procedure Note    Reason for procedure: dysphagia    History and Physical Reviewed: Reviewed, no changes.    Pre-sedation assessment:    General: alert, appears stated age, and cooperative  Airway: normal  Heart: regular rate and rhythm  Lungs: clear to auscultation bilaterally    Sedation Plan based on assessment: mac    Mallampati score: Class III (visualization of the soft palate and base of uvula)          ASA Classification: ASA 3 - Patient with moderate systemic disease with functional limitations    Impression: Patient deemed adequate candidate for  sedation    Risks, benefits and alternatives were discussed with the patient and informed consent was obtained.    Plan: esophagogastroduodenoscopy    Thank you for the opportunity to participate in the care of this patient. Please feel free to call with any questions or concerns.     Graeme David MD   Cell [phone]  After 5 PM, please call [phone]      Clinical Conditions Present on Arrival:  Clinically Significant Risk Factors Present on Admission                  # Obesity: Estimated body mass index is 35.48 kg/m  as calculated from the following:    Height as of this encounter: 1.575 m (5' 2\").    Weight as of this encounter: 88 kg (194 lb).       "

## 2024-05-24 NOTE — ANESTHESIA PREPROCEDURE EVALUATION
Anesthesia Pre-Procedure Evaluation    Patient: Dyana Nicole   MRN: [id number] : 1940        Procedure : Procedure(s):  ESOPHAGOGASTRODUODENOSCOPY WITH DILATATION          Past Medical History:   Diagnosis Date     Anemia, unspecified     Created by Conversion      Arrhythmia      Cervical spondylosis without myelopathy     Created by Conversion      Depression      Disease of thyroid gland     hypo     GERD (gastroesophageal reflux disease)      High cholesterol      History of transfusion      Myalgia and myositis, unspecified     Created by Conversion      Near syncope      Other and unspecified hyperlipidemia     Created by Conversion      Skin cancer, basal cell      Sleep apnea     mild     Tachycardia      Unspecified essential hypertension     Created by Conversion      Unspecified hereditary and idiopathic peripheral neuropathy     Created by Conversion       Past Surgical History:   Procedure Laterality Date     ARTHROPLASTY KNEE BILATERAL       ARTHROSCOPY SHOULDER ROTATOR CUFF REPAIR       CATARACT EXTRACTION, BILATERAL       CHOLECYSTECTOMY       EXCISE LESION TRUNK Left 2018    Procedure: LEFT BUTTOCK EXPLORATION, EXCISION BUTTOCK MASS;  Surgeon: Lg Jameson MD;  Location: [facility];  Service:      HYSTERECTOMY       IR LUMBAR PUNCTURE  2012     JOINT REPLACEMENT Bilateral     knees     MOHS MICROGRAPHIC PROCEDURE      Nose     OOPHORECTOMY Left     1 removed, 1 remains     OTHER SURGICAL HISTORY      Bladder lift     CT ARTHROPLASTY TIBIAL PLATEAU      Description: Knee Replacement;  Recorded: 2013;     CT INJECT NERV BLCK,OTHR PERIPH NERV      Description: Peripheral Nerve Block Wrist Median Right;  Recorded: 2013;     RELEASE CARPAL TUNNEL       REPLACEMENT TOTAL KNEE      L      Allergies   Allergen Reactions     Cimicifuga [place] (Black Cohosh) Anaphylaxis and Hives     Ciprofloxacin Rash     Developed rash over trunk of body after one dose of cipro      Other Environmental Allergy      Grasses     Oxychlorodene Other (See Comments)     Hallucinations     Oxycodone Unknown     hallucinations     Penicillins Hives     Pollen [Pollen Extract] Unknown     Trees      Venom-Honey Bee [Bee Venom] Swelling     Cephalosporins Rash      Social History     Tobacco Use     Smoking status: Former     Current packs/day: 0.00     Average packs/day: 0.3 packs/day for 1 year (0.3 ttl pk-yrs)     Types: Cigarettes     Start date: 1960     Quit date: 1961     Years since quittin.4     Smokeless tobacco: Never   Substance Use Topics     Alcohol use: No      Wt Readings from Last 1 Encounters:   24 88 kg (194 lb)        Anesthesia Evaluation            ROS/MED HX  ENT/Pulmonary:     (+) sleep apnea,                                       Neurologic:       Cardiovascular:     (+)  hypertension- -   -  - -                                      METS/Exercise Tolerance:     Hematologic:       Musculoskeletal:       GI/Hepatic:     (+) GERD,                   Renal/Genitourinary:       Endo:     (+)          thyroid problem, hypothyroidism,    Obesity,       Psychiatric/Substance Use:       Infectious Disease:       Malignancy:       Other:            Physical Exam    Airway        Mallampati: III   TM distance: > 3 FB   Neck ROM: full     Respiratory Devices and Support         Dental       (+) Modest Abnormalities - crowns, retainers, 1 or 2 missing teeth      Cardiovascular   cardiovascular exam normal          Pulmonary   pulmonary exam normal            OUTSIDE LABS:  CBC:   Lab Results   Component Value Date    WBC 7.6 2024    WBC 7.0 2023    HGB 12.0 2024    HGB 12.0 2023    HCT 37.5 2024    HCT 36.6 2023     2024     2023     BMP:   Lab Results   Component Value Date     2024     2023    POTASSIUM 5.0 2024    POTASSIUM 4.4 2023    CHLORIDE 103 2024    CHLORIDE  "102 06/09/2023    CO2 28 05/09/2024    CO2 31 06/09/2023    BUN 20.1 05/09/2024    BUN 15 06/09/2023    CR 0.94 05/09/2024    CR 0.79 06/09/2023    GLC 78 05/09/2024     06/09/2023     COAGS:   Lab Results   Component Value Date    PTT 26 12/26/2018    INR 1.06 12/27/2018     POC: No results found for: \"BGM\", \"HCG\", \"HCGS\"  HEPATIC:   Lab Results   Component Value Date    ALBUMIN 3.7 06/09/2023    PROTTOTAL 6.9 06/09/2023    ALT 16 06/09/2023    AST 23 06/09/2023    ALKPHOS 101 06/09/2023    BILITOTAL 0.3 06/09/2023     OTHER:   Lab Results   Component Value Date    LACT 0.4 (L) 12/27/2018    A1C 5.4 09/20/2018    BARBARA 9.4 05/09/2024    MAG 2.1 06/09/2023    LIPASE 28 06/09/2023    TSH 3.46 05/09/2023    T3 51 09/20/2018    CRP 0.3 06/09/2023    SED 23 (H) 03/31/2022       Anesthesia Plan    ASA Status:  3    NPO Status:  NPO Appropriate    Anesthesia Type: MAC.              Consents    Anesthesia Plan(s) and associated risks, benefits, and realistic alternatives discussed. Questions answered and patient/representative(s) expressed understanding.     - Discussed: Risks, Benefits and Alternatives for BOTH SEDATION and the PROCEDURE were discussed     - Discussed with:  Patient      - Extended Intubation/Ventilatory Support Discussed: No.      - Patient is DNR/DNI Status: No     Use of blood products discussed: No .     Postoperative Care    Pain management: IV analgesics, Oral pain medications.   PONV prophylaxis: Ondansetron (or other 5HT-3), Dexamethasone or Solumedrol     Comments:             William Fernandez DO    I have reviewed the pertinent notes and labs in the chart from the past 30 days and (re)examined the patient.  Any updates or changes from those notes are reflected in this note.              # Obesity: Estimated body mass index is 35.48 kg/m  as calculated from the following:    Height as of this encounter: 1.575 m (5' 2\").    Weight as of this encounter: 88 kg (194 lb).      "

## 2024-05-24 NOTE — ANESTHESIA POSTPROCEDURE EVALUATION
Patient: Dyana Nicole    Procedure: Procedure(s):  ESOPHAGOGASTRODUODENOSCOPY WITH DILATATION       Anesthesia Type:  MAC    Note:  Disposition: Outpatient   Postop Pain Control: Uneventful            Sign Out: Well controlled pain   PONV: No   Neuro/Psych: Uneventful            Sign Out: Acceptable/Baseline neuro status   Airway/Respiratory: Uneventful            Sign Out: Acceptable/Baseline resp. status   CV/Hemodynamics: Uneventful            Sign Out: Acceptable CV status; No obvious hypovolemia; No obvious fluid overload   Other NRE: NONE   DID A NON-ROUTINE EVENT OCCUR? No    Event details/Postop Comments:  Patient recovering comfortably.        Last vitals:  Vitals Value Taken Time   /66 05/24/24 1000   Temp 36.1  C (97  F) 05/24/24 1000   Pulse 62 05/24/24 1000   Resp 14 05/24/24 1000   SpO2 97 % 05/24/24 0957   Vitals shown include unfiled device data.    Electronically Signed By: William Fernandez DO  May 24, 2024  10:49 AM

## 2024-05-24 NOTE — ANESTHESIA CARE TRANSFER NOTE
Patient: Dyana Nicole    Procedure: Procedure(s):  ESOPHAGOGASTRODUODENOSCOPY WITH DILATATION       Diagnosis: Collagenous colitis [K52.831]  Diagnosis Additional Information: No value filed.    Anesthesia Type:   MAC     Note:    Oropharynx: oropharynx clear of all foreign objects and spontaneously breathing  Level of Consciousness: drowsy  Oxygen Supplementation: face mask  Level of Supplemental Oxygen (L/min / FiO2): 10  Independent Airway: airway patency satisfactory and stable  Dentition: dentition unchanged  Vital Signs Stable: post-procedure vital signs reviewed and stable  Report to RN Given: handoff report given  Patient transferred to: Phase II    Handoff Report: Identifed the Patient, Identified the Reponsible Provider, Reviewed the pertinent medical history, Discussed the surgical course, Reviewed Intra-OP anesthesia mangement and issues during anesthesia, Set expectations for post-procedure period and Allowed opportunity for questions and acknowledgement of understanding      Vitals:  Vitals Value Taken Time   /57 05/24/24 0924   Temp 36.3  C (97.4  F) 05/24/24 0924   Pulse 62 05/24/24 0925   Resp 17 05/24/24 0924   SpO2 98 % 05/24/24 0925   Vitals shown include unfiled device data.    Electronically Signed By: RAJ Allen CRNA  May 24, 2024  9:27 AM

## 2024-07-14 ENCOUNTER — HEALTH MAINTENANCE LETTER (OUTPATIENT)
Age: 84
End: 2024-07-14

## 2024-08-26 ENCOUNTER — MYC REFILL (OUTPATIENT)
Dept: FAMILY MEDICINE | Facility: CLINIC | Age: 84
End: 2024-08-26
Payer: MEDICARE

## 2024-08-26 DIAGNOSIS — R23.2 HOT FLASHES: ICD-10-CM

## 2024-08-27 ENCOUNTER — MYC MEDICAL ADVICE (OUTPATIENT)
Dept: FAMILY MEDICINE | Facility: CLINIC | Age: 84
End: 2024-08-27
Payer: MEDICARE

## 2024-08-27 DIAGNOSIS — R23.2 HOT FLASHES: ICD-10-CM

## 2024-08-27 NOTE — TELEPHONE ENCOUNTER
See MyChart message from the patient into the clinic on 8/27/24 regarding a refill for Premarin.    Patient had a prescription for estrogen (Premarin) 0.3 mg tablets sent to the [facility] on 2/1/24, with 90 tablets, with 2 refills.    Writer called [facility] and spoke to Annika, who stated that the patient's prescriptions are managed through Medicare D.    Annika called Medicare D while on the phone with the writer, and connected the writer with Maury from Medicare D, who then connected the writer to Lesley.    Lesley, pharmacy [facility], stated that the prescription on 2/1/24, for the Premarin was cancelled, but then restarted with the same prescription number, with the last four digits of  #[id number], on 3/3/24 for 90 tablets, then refilled again on 5/9/24 for 90 tablets, then refilled again on 8/17/24 for 90 tablets, and was delivered on Wednesday, at 11:58 am, 8/21/24, and patient should have 81 tablets of Premarin left.    There are no refills remaining of the estrogen 0.3 mg at [facility] as the patient already had the remaining estrogen 0.3 mg prescription filled.    Writer called the patient and spoke to , patient's daughter, NILA on file, and relayed the above information to , who verbalized understanding.     would like to ask the PCP if the patient could have a new prescription for the estrogen 0.3 mg sent to the [facility] for future refills as this current prescription is now completed.    Writer will route the above information to the PCP to review and advise next steps.    Beatrice Miller, RN, BSN  [facility]

## 2024-09-19 ENCOUNTER — TRANSFERRED RECORDS (OUTPATIENT)
Dept: HEALTH INFORMATION MANAGEMENT | Facility: CLINIC | Age: 84
End: 2024-09-19
Payer: MEDICARE

## 2024-10-03 ENCOUNTER — OFFICE VISIT (OUTPATIENT)
Dept: FAMILY MEDICINE | Facility: CLINIC | Age: 84
End: 2024-10-03
Payer: MEDICARE

## 2024-10-03 VITALS
TEMPERATURE: 98.1 F | BODY MASS INDEX: 40.44 KG/M2 | OXYGEN SATURATION: 96 % | RESPIRATION RATE: 15 BRPM | HEIGHT: 60 IN | WEIGHT: 206 LBS | DIASTOLIC BLOOD PRESSURE: 70 MMHG | SYSTOLIC BLOOD PRESSURE: 120 MMHG | HEART RATE: 95 BPM

## 2024-10-03 DIAGNOSIS — G89.4 PAIN SYNDROME, CHRONIC: ICD-10-CM

## 2024-10-03 DIAGNOSIS — M54.41 CHRONIC BILATERAL LOW BACK PAIN WITH RIGHT-SIDED SCIATICA: ICD-10-CM

## 2024-10-03 DIAGNOSIS — G89.29 CHRONIC BILATERAL LOW BACK PAIN WITH RIGHT-SIDED SCIATICA: ICD-10-CM

## 2024-10-03 DIAGNOSIS — Z13.1 SCREENING FOR DIABETES MELLITUS: ICD-10-CM

## 2024-10-03 DIAGNOSIS — E78.5 HYPERLIPIDEMIA, UNSPECIFIED HYPERLIPIDEMIA TYPE: ICD-10-CM

## 2024-10-03 DIAGNOSIS — F33.1 MAJOR DEPRESSIVE DISORDER, RECURRENT EPISODE, MODERATE (H): ICD-10-CM

## 2024-10-03 DIAGNOSIS — F41.1 GENERALIZED ANXIETY DISORDER: Chronic | ICD-10-CM

## 2024-10-03 DIAGNOSIS — Z00.00 MEDICARE ANNUAL WELLNESS VISIT, SUBSEQUENT: Primary | ICD-10-CM

## 2024-10-03 DIAGNOSIS — R13.10 DYSPHAGIA, UNSPECIFIED TYPE: ICD-10-CM

## 2024-10-03 DIAGNOSIS — K52.839 MICROSCOPIC COLITIS, UNSPECIFIED MICROSCOPIC COLITIS TYPE: ICD-10-CM

## 2024-10-03 DIAGNOSIS — E03.9 ACQUIRED HYPOTHYROIDISM: ICD-10-CM

## 2024-10-03 DIAGNOSIS — M25.50 ARTHRALGIA OF MULTIPLE JOINTS: ICD-10-CM

## 2024-10-03 DIAGNOSIS — E66.01 MORBID OBESITY (H): ICD-10-CM

## 2024-10-03 DIAGNOSIS — M15.9 GENERALIZED OSTEOARTHROSIS OF HAND: ICD-10-CM

## 2024-10-03 LAB
EST. AVERAGE GLUCOSE BLD GHB EST-MCNC: 103 MG/DL
HBA1C MFR BLD: 5.2 % (ref 0–5.6)

## 2024-10-03 PROCEDURE — G0439 PPPS, SUBSEQ VISIT: HCPCS | Performed by: FAMILY MEDICINE

## 2024-10-03 PROCEDURE — 80061 LIPID PANEL: CPT | Performed by: FAMILY MEDICINE

## 2024-10-03 PROCEDURE — 99214 OFFICE O/P EST MOD 30 MIN: CPT | Mod: 25 | Performed by: FAMILY MEDICINE

## 2024-10-03 PROCEDURE — 83036 HEMOGLOBIN GLYCOSYLATED A1C: CPT | Performed by: FAMILY MEDICINE

## 2024-10-03 PROCEDURE — 80053 COMPREHEN METABOLIC PANEL: CPT | Performed by: FAMILY MEDICINE

## 2024-10-03 PROCEDURE — 84443 ASSAY THYROID STIM HORMONE: CPT | Performed by: FAMILY MEDICINE

## 2024-10-03 PROCEDURE — 36415 COLL VENOUS BLD VENIPUNCTURE: CPT | Performed by: FAMILY MEDICINE

## 2024-10-03 SDOH — HEALTH STABILITY: PHYSICAL HEALTH: ON AVERAGE, HOW MANY DAYS PER WEEK DO YOU ENGAGE IN MODERATE TO STRENUOUS EXERCISE (LIKE A BRISK WALK)?: 0 DAYS

## 2024-10-03 ASSESSMENT — ANXIETY QUESTIONNAIRES
7. FEELING AFRAID AS IF SOMETHING AWFUL MIGHT HAPPEN: SEVERAL DAYS
7. FEELING AFRAID AS IF SOMETHING AWFUL MIGHT HAPPEN: SEVERAL DAYS
GAD7 TOTAL SCORE: 3
2. NOT BEING ABLE TO STOP OR CONTROL WORRYING: NOT AT ALL
GAD7 TOTAL SCORE: 3
GAD7 TOTAL SCORE: 3
3. WORRYING TOO MUCH ABOUT DIFFERENT THINGS: SEVERAL DAYS
4. TROUBLE RELAXING: NOT AT ALL
5. BEING SO RESTLESS THAT IT IS HARD TO SIT STILL: NOT AT ALL
6. BECOMING EASILY ANNOYED OR IRRITABLE: SEVERAL DAYS
1. FEELING NERVOUS, ANXIOUS, OR ON EDGE: NOT AT ALL
8. IF YOU CHECKED OFF ANY PROBLEMS, HOW DIFFICULT HAVE THESE MADE IT FOR YOU TO DO YOUR WORK, TAKE CARE OF THINGS AT HOME, OR GET ALONG WITH OTHER PEOPLE?: SOMEWHAT DIFFICULT
IF YOU CHECKED OFF ANY PROBLEMS ON THIS QUESTIONNAIRE, HOW DIFFICULT HAVE THESE PROBLEMS MADE IT FOR YOU TO DO YOUR WORK, TAKE CARE OF THINGS AT HOME, OR GET ALONG WITH OTHER PEOPLE: SOMEWHAT DIFFICULT

## 2024-10-03 ASSESSMENT — PAIN SCALES - GENERAL: PAINLEVEL: NO PAIN (0)

## 2024-10-03 ASSESSMENT — SOCIAL DETERMINANTS OF HEALTH (SDOH): HOW OFTEN DO YOU GET TOGETHER WITH FRIENDS OR RELATIVES?: MORE THAN THREE TIMES A WEEK

## 2024-10-03 NOTE — PROGRESS NOTES
Preventive Care Visit  [facility]  True Pugh MD, Family Medicine  Oct 3, 2024      Assessment & Plan     Medicare annual wellness visit, subsequent  Annual Wellness Visit completed.  Risk questionaire reviewed in detail.  Appropriate preventive services were discussed with this patient, including applicable screening as appropriate for fall prevention, nutrition, physical activity, tobacco-use cessation, weight loss, and cognition.  Annual Wellness Visits recommended to continue.     Hyperlipidemia, unspecified hyperlipidemia type  Hyperlipidemia reviewed.  Simvastatin 20 mg at bedtime continuing.  Fasting status.  Check lipid cascade.  - Lipid panel reflex to direct LDL Fasting  - Lipid panel reflex to direct LDL Fasting    Major depressive disorder, recurrent episode, moderate (H)  Underlying depression and anxiety discussed.  Venlafaxine XR 75 mg using 2 tablets daily.    Generalized anxiety disorder  As above.    Acquired hypothyroidism  Ensure adequate thyroid replacement on levothyroxine 50 mcg daily.  - TSH with free T4 reflex  - TSH with free T4 reflex    Morbid obesity (H)  Severe obesity noted with BMI 39.9 with underlying history of hyperlipidemia etc.  Initiate Wegovy quarter milligram weekly x 4 weeks and titrate as able.  Weight check in office in 3 to 4 months.  States has utilize Ozempic when in [place] in the past and tolerated well but did not get refills.  - Comprehensive metabolic panel  - Semaglutide-Weight Management (WEGOVY) 0.25 MG/0.5ML pen  Dispense: 2 mL; Refill: 0  - Comprehensive metabolic panel    Microscopic colitis, unspecified microscopic colitis type  Microscopic colitis with recent flare currently completing budesonide 3 mg using 3 capsules daily and titrating down to 3 mg once daily over next 4 weeks.    Generalized Osteoarthritis Of The Hand  Osteoarthritis noted.    Dysphagia, unspecified type  Resolved after dilatation procedure.  - Comprehensive  "metabolic panel  - Comprehensive metabolic panel    Arthralgia of multiple joints  Stable.    Pain syndrome, chronic  Predominance of chronic pain associate with lower back etiology worsens lumbar fusion historically and has been the pain clinic, orthopedic specialist, received injections etc.    Chronic bilateral low back pain with right-sided sciatica  As above.    Screening for diabetes mellitus  Diabetes screen completed.  - Hemoglobin A1c  - Hemoglobin A1c      Patient has been advised of split billing requirements and indicates understanding: Yes        BMI  Estimated body mass index is 39.9 kg/m  as calculated from the following:    Height as of this encounter: 1.53 m (5' 0.25\").    Weight as of this encounter: 93.4 kg (206 lb).     Weight management plan: Discussed healthy diet and exercise guidelines    Counseling  Appropriate preventive services were addressed with this patient via screening, questionnaire, or discussion as appropriate for fall prevention, nutrition, physical activity, Tobacco-use cessation, social engagement, weight loss and cognition.  Checklist reviewing preventive services available has been given to the patient.  Reviewed patient's diet, addressing concerns and/or questions.   Information on urinary incontinence and treatment options given to patient.             Jo Ann Nick is a 84 year old, presenting for the following:  Medicare Visit (Pt is fasting)        10/3/2024    11:20 AM   Additional Questions   Roomed by          Health Care Directive  Patient does not have a Health Care Directive or Living Will: Patient states has Advance Directive and will bring in a copy to clinic.    HPI    Annual wellness visit completed.   with progressive dementia unfortunately.  Patient has been treated for microscopic colitis and utilizing budesonide 9 mg daily currently and will titrate back to 3 mg daily over next 4 weeks.  Venlafaxine XR 75 mg using 2 tablets daily for " depression and anxiety.  Sober x 41 years.  Levothyroxine 50 mcg daily for thyroid replacement.  Simvastatin 20 mg at bedtime for lipid management.  Prior dysphagia better after dilatation procedure.  Chronic low back pain.  Has had lumbar fusion in the past.  Remains on Celebrex which is helpful for arthritis as well as lower back pain.  Salonpas helps 10 to 12 hours.  Has had injections, seen pain clinic and had medicinal marijuana etc.  No longer utilizing these other than Salonpas.  Comprehensive review of systems as above otherwise all negative.  Would like to restart medication for weight loss management.  Used Ozempic when in [place] however was eventually unable to get refills.  Comprehensive review of systems as above otherwise all negative.         - Ray x 64 years (progressive Alzheimer's dementia SLUMS 13 out of 30)   Independent living at [facility] in [place] (prior [place])   3 daughters - Genesis ( at [facility]), Eneida, and Loretta   Smoke - quit in 60s after 1 year   EtOH: sober x 41 years   Surgeries: gallbladder, appy, hysterectomy and ? 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; left CTS release; bladder lift; right TKA; left rotator cuff   Hospitalizations:   [place], [place] - live in community with 65 houses in [place] (1 mile circumference if walk)               10/3/2024   General Health   How would you rate your overall physical health? (!) FAIR   Feel stress (tense, anxious, or unable to sleep) To some extent      (!) STRESS CONCERN      10/3/2024   Nutrition   Diet: Regular (no restrictions)            10/3/2024   Exercise   Days per week of moderate/strenous exercise 0 days      (!) EXERCISE CONCERN      10/3/2024   Social Factors   Frequency of gathering with friends or relatives More than three times a week   Worry food won't last until get money to buy more No   Food not last or not have  enough money for food? No   Do you have housing? (Housing is defined as stable permanent housing and does not include staying ouside in a car, in a tent, in an abandoned building, in an overnight shelter, or couch-surfing.) Yes   Are you worried about losing your housing? No   Lack of transportation? No   Unable to get utilities (heat,electricity)? No            10/3/2024   Fall Risk   Fallen 2 or more times in the past year? Yes   Trouble with walking or balance? Yes   Gait Speed Test (Document in seconds) 4   Gait Speed Test Interpretation Less than or equal to 5.00 seconds - PASS             10/3/2024   Activities of Daily Living- Home Safety   Needs help with the following daily activites None of the above   Safety concerns in the home None of the above            10/3/2024   Dental   Dentist two times every year? Yes            10/3/2024   Hearing Screening   Hearing concerns? None of the above            10/3/2024   Driving Risk Screening   Patient/family members have concerns about driving No            10/3/2024   General Alertness/Fatigue Screening   Have you been more tired than usual lately? No            10/3/2024   Urinary Incontinence Screening   Bothered by leaking urine in past 6 months Yes            10/3/2024   TB Screening   Were you born outside of the [place]? Yes          Today's PHQ-9 Score:       10/3/2024    11:03 AM   PHQ-9 SCORE   PHQ-9 Total Score MyChart 3 (Minimal depression)   PHQ-9 Total Score 3         10/3/2024   Substance Use   Alcohol more than 3/day or more than 7/wk No   Do you have a current opioid prescription? No   How severe/bad is pain from 1 to 10? 8/10   Do you use any other substances recreationally? (!) CANNABIS PRODUCTS        Social History     Tobacco Use    Smoking status: Former     Current packs/day: 0.00     Average packs/day: 0.3 packs/day for 1 year (0.3 ttl pk-yrs)     Types: Cigarettes     Start date: 1960     Quit date: 1961     Years since quittin.7     Smokeless tobacco: Never   Substance Use Topics    Alcohol use: No    Drug use: No          Mammogram Screening - After age 74- determine frequency with patient based on health status, life expectancy and patient goals      Fracture Risk Assessment Tool  Link to Frax Calculator  Use the information below to complete the Frax calculator  : 1940  Sex: female  Weight (kg): 93.4 kg (actual weight)  Height (cm): 153 cm  Previous Fragility Fracture:  No  History of parent with fractured hip:  No  Current Smoking:  No  Patient has been on glucocorticoids for more than 3 months (5mg/day or more): No  Rheumatoid Arthritis on Problem List:  No  Secondary Osteoporosis on Problem List:  No  Consumes 3 or more units of alcohol per day: No  Femoral Neck BMD (g/cm2)            Reviewed and updated as needed this visit by Provider   Tobacco  Allergies  Meds  Problems  Med Hx  Surg Hx  Fam Hx            Past Medical History:   Diagnosis Date    Anemia, unspecified     Created by Conversion     Arrhythmia     Cervical spondylosis without myelopathy     Created by Conversion     Depression     Disease of thyroid gland     hypo    GERD (gastroesophageal reflux disease)     High cholesterol     History of transfusion     Myalgia and myositis, unspecified     Created by Conversion     Near syncope     Other and unspecified hyperlipidemia     Created by Conversion     Skin cancer, basal cell     Sleep apnea     mild    Tachycardia     Unspecified essential hypertension     Created by Conversion     Unspecified hereditary and idiopathic peripheral neuropathy     Created by Conversion      Past Surgical History:   Procedure Laterality Date    ARTHROPLASTY KNEE BILATERAL      ARTHROSCOPY SHOULDER ROTATOR CUFF REPAIR      CATARACT EXTRACTION, BILATERAL      CHOLECYSTECTOMY      ESOPHAGOSCOPY, GASTROSCOPY, DUODENOSCOPY (EGD), DILATATION, COMBINED N/A 2024    Procedure: ESOPHAGOGASTRODUODENOSCOPY WITH DILATATION;  Surgeon:  Graeme David MD;  Location: [facility] Main OR    EXCISE LESION TRUNK Left 2018    Procedure: LEFT BUTTOCK EXPLORATION, EXCISION BUTTOCK MASS;  Surgeon: Lg Jameson MD;  Location: [facility] Main OR;  Service:     HYSTERECTOMY      IR LUMBAR PUNCTURE  2012    JOINT REPLACEMENT Bilateral     knees    MOHS MICROGRAPHIC PROCEDURE      Nose    OOPHORECTOMY Left     1 removed, 1 remains    OTHER SURGICAL HISTORY      Bladder lift    SC ARTHROPLASTY TIBIAL PLATEAU      Description: Knee Replacement;  Recorded: 2013;    SC INJECT NERV BLCK,OTHR PERIPH NERV      Description: Peripheral Nerve Block Wrist Median Right;  Recorded: 2013;    RELEASE CARPAL TUNNEL      REPLACEMENT TOTAL KNEE      L     OB History    Para Term  AB Living   3 3 3 0 0 0   SAB IAB Ectopic Multiple Live Births   0 0 0 0 0      # Outcome Date GA Lbr Trmuan/2nd Weight Sex Type Anes PTL Lv   3 Term            2 Term            1 Term              Lab work is in process  Labs reviewed in EPIC  BP Readings from Last 3 Encounters:   10/03/24 120/70   24 139/66   24 120/70    Wt Readings from Last 3 Encounters:   10/03/24 93.4 kg (206 lb)   24 88 kg (194 lb)   24 88 kg (194 lb)                  Patient Active Problem List   Diagnosis    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    Insomnia    Heartburn    Arthritis    Cervical Spondylosis    Generalized Osteoarthritis Of The Hand    Lumbar Spondylosis    Trochanteric Bursitis    Lower Back Pain    Osteopenia    Microscopic colitis, unspecified microscopic colitis type    Fibromyalgia    Anemia    Hypertension    Hyperlipidemia    Peripheral Neuropathy    Walk Is Wobbly Or Unsteady (Ataxia)    Hypothyroidism    JANICE on CPAP    Arthralgia    Generalized anxiety disorder    Exertional dyspnea    Pain in both lower extremities    Venous insufficiency of both lower extremities    Venous hypertension of both lower extremities    Fat deposits     Class 2 obesity due to excess calories in adult    Vitamin D deficiency    Fatigue due to excessive exertion    Leg weakness, bilateral    Lymphedema    Left ventricular outflow obstruction    Hypertensive left ventricular hypertrophy, without heart failure    Lower GI bleed    BRBPR (bright red blood per rectum)    Snoring    Suprasellar mass    Depressive disorder    GERD (gastroesophageal reflux disease)    Hyperreflexia    Memory loss    Ringing in ears    Morbid obesity (H)    Alcohol use disorder, moderate, in sustained remission, dependence (H)    Major depressive disorder, recurrent episode, moderate (H)     Past Surgical History:   Procedure Laterality Date    ARTHROPLASTY KNEE BILATERAL      ARTHROSCOPY SHOULDER ROTATOR CUFF REPAIR      CATARACT EXTRACTION, BILATERAL      CHOLECYSTECTOMY      ESOPHAGOSCOPY, GASTROSCOPY, DUODENOSCOPY (EGD), DILATATION, COMBINED N/A 5/24/2024    Procedure: ESOPHAGOGASTRODUODENOSCOPY WITH DILATATION;  Surgeon: Graeme David MD;  Location: [facility] Main OR    EXCISE LESION TRUNK Left 1/5/2018    Procedure: LEFT BUTTOCK EXPLORATION, EXCISION BUTTOCK MASS;  Surgeon: Lg Jameson MD;  Location: [facility] Main OR;  Service:     HYSTERECTOMY      IR LUMBAR PUNCTURE  8/27/2012    JOINT REPLACEMENT Bilateral     knees    MOHS MICROGRAPHIC PROCEDURE      Nose    OOPHORECTOMY Left     1 removed, 1 remains    OTHER SURGICAL HISTORY      Bladder lift    SD ARTHROPLASTY TIBIAL PLATEAU      Description: Knee Replacement;  Recorded: 05/16/2013;    SD INJECT NERV JOE STEWART PERIPH NERV      Description: Peripheral Nerve Block Wrist Median Right;  Recorded: 05/16/2013;    RELEASE CARPAL TUNNEL      REPLACEMENT TOTAL KNEE      L       Social History     Tobacco Use    Smoking status: Former     Current packs/day: 0.00     Average packs/day: 0.3 packs/day for 1 year (0.3 ttl pk-yrs)     Types: Cigarettes     Start date: 1/1/1960     Quit date: 1/1/1961     Years since quitting:  63.7    Smokeless tobacco: Never   Substance Use Topics    Alcohol use: No     Family History   Problem Relation Age of Onset    Aneurysm Father         AAA    Cancer Mother         pancrease         Current Outpatient Medications   Medication Sig Dispense Refill    acetaminophen (TYLENOL) 650 MG CR tablet [ACETAMINOPHEN (TYLENOL) 650 MG CR TABLET] Take 650 mg by mouth as needed for pain.      biotin 5 MG CAPS Take 5,000 mcg by mouth      budesonide (ENTOCORT EC) 3 MG EC capsule TAKE THREE CAPSULES BY MOUTH IN THE MORNING 90 capsule 3    calcium carbonate-vitamin D3 (CALCIUM 600 + D,3,) 600 mg calcium- 200 unit cap [CALCIUM CARBONATE-VITAMIN D3 (CALCIUM 600 + D,3,) 600 MG CALCIUM- 200 UNIT CAP] Take 1 tablet by mouth 2 (two) times a day.             celecoxib (CELEBREX) 200 MG capsule TAKE ONE CAPSULE BY MOUTH ONE TIME DAILY 90 capsule 3    estrogen conj (PREMARIN) 0.3 MG tablet Take 1 tablet (0.3 mg) by mouth daily. 90 tablet 3    furosemide (LASIX) 20 MG tablet TAKE ONE TABLET BY MOUTH ONE TIME DAILY 90 tablet 2    levothyroxine (SYNTHROID/LEVOTHROID) 50 MCG tablet TAKE ONE TABLET BY MOUTH ONE TIME DAILY at 6am 90 tablet 2    metoprolol tartrate (LOPRESSOR) 50 MG tablet TAKE 1&1/2 TABLET BY MOUTH EVERY MORNING AND TAKE 1 TABLET BY MOUTH EVERY EVENING 75 tablet 0    multivitamin with minerals (THERA-M) 9 mg iron-400 mcg Tab tablet [MULTIVITAMIN WITH MINERALS (THERA-M) 9 MG IRON-400 MCG TAB TABLET] Take 1 tablet by mouth every evening.      omeprazole (PRILOSEC) 20 MG DR capsule TAKE ONE CAPSULE BY MOUTH TWICE DAILY 180 capsule 2    Probiotic Product (MISC INTESTINAL BRANDON REGULAT) CAPS       Semaglutide-Weight Management (WEGOVY) 0.25 MG/0.5ML pen Inject 0.25 mg subcutaneously once a week. 2 mL 0    simvastatin (ZOCOR) 20 MG tablet Take 1 tablet (20 mg) by mouth at bedtime 90 tablet 3    venlafaxine (EFFEXOR XR) 75 MG 24 hr capsule TAKE TWO CAPSULES BY MOUTH DAILY 180 capsule 0     Allergies   Allergen Reactions     "Cimicifuga [place] (Black Cohosh) Anaphylaxis and Hives    Ciprofloxacin Rash     Developed rash over trunk of body after one dose of cipro    Other Environmental Allergy      Grasses    Oxychlorodene Other (See Comments)     Hallucinations    Oxycodone Unknown     hallucinations    Penicillins Hives    Pollen [Pollen Extract] Unknown    Trees     Venom-Honey Bee [Bee Venom] Swelling    Cephalosporins Rash     Current providers sharing in care for this patient include:  Patient Care Team:  True Pugh MD as PCP - General (Family Medicine)  True Pugh MD as Assigned PCP    The following health maintenance items are reviewed in Epic and correct as of today:  Health Maintenance   Topic Date Due    HF ACTION PLAN  Never done    DEPRESSION ACTION PLAN  Never done    RSV VACCINE (1 - 1-dose 75+ series) Never done    ALT  06/09/2024    COVID-19 Vaccine (6 - 2024-25 season) 09/01/2024    BMP  11/09/2024    PHQ-9  04/03/2025    LIPID  05/09/2025    ANNUAL REVIEW OF HM ORDERS  05/09/2025    CBC  05/09/2025    MEDICARE ANNUAL WELLNESS VISIT  10/03/2025    FALL RISK ASSESSMENT  10/03/2025    DTAP/TDAP/TD IMMUNIZATION (2 - Td or Tdap) 11/24/2028    ADVANCE CARE PLANNING  10/03/2029    DEXA  10/16/2033    TSH W/FREE T4 REFLEX  Completed    INFLUENZA VACCINE  Completed    Pneumococcal Vaccine: 65+ Years  Completed    ZOSTER IMMUNIZATION  Completed    HPV IMMUNIZATION  Aged Out    MENINGITIS IMMUNIZATION  Aged Out    RSV MONOCLONAL ANTIBODY  Aged Out         Review of Systems  Constitutional, HEENT, cardiovascular, pulmonary, GI, , musculoskeletal, neuro, skin, endocrine and psych systems are negative, except as otherwise noted.     Objective    Exam  /70   Pulse 95   Temp 98.1  F (36.7  C)   Resp 15   Ht 1.53 m (5' 0.25\")   Wt 93.4 kg (206 lb)   LMP  (LMP Unknown)   SpO2 96%   BMI 39.90 kg/m     Estimated body mass index is 39.9 kg/m  as calculated from the following:    Height as of this encounter: " "1.53 m (5' 0.25\").    Weight as of this encounter: 93.4 kg (206 lb).    Physical Exam  GENERAL: alert and no distress  EYES: Eyes grossly normal to inspection, PERRL and conjunctivae and sclerae normal  HENT: ear canals and TM's normal, nose and mouth without ulcers or lesions  NECK: no adenopathy, no asymmetry, masses, or scars  RESP: lungs clear to auscultation - no rales, rhonchi or wheezes  CV: regular rate and rhythm, normal S1 S2, no S3 or S4, no murmur, click or rub, no peripheral edema  ABDOMEN: soft, nontender, no hepatosplenomegaly, no masses and bowel sounds normal  MS: no gross musculoskeletal defects noted, no edema  SKIN: no suspicious lesions or rashes  NEURO: Normal strength and tone, mentation intact and speech normal  PSYCH: mentation appears normal, affect normal/bright        10/3/2024   Mini Cog   Clock Draw Score 0 Abnormal   3 Item Recall 3 objects recalled   Mini Cog Total Score 3                 Signed Electronically by: True Pugh MD    Answers submitted by the patient for this visit:  Patient Health Questionnaire (Submitted on 10/3/2024)  If you checked off any problems, how difficult have these problems made it for you to do your work, take care of things at home, or get along with other people?: Not difficult at all  PHQ9 TOTAL SCORE: 3  Patient Health Questionnaire (G7) (Submitted on 10/3/2024)  YEE 7 TOTAL SCORE: 3    "

## 2024-10-04 ENCOUNTER — MYC MEDICAL ADVICE (OUTPATIENT)
Dept: FAMILY MEDICINE | Facility: CLINIC | Age: 84
End: 2024-10-04
Payer: MEDICARE

## 2024-10-04 DIAGNOSIS — E66.01 MORBID OBESITY (H): Primary | ICD-10-CM

## 2024-10-04 LAB
ALBUMIN SERPL BCG-MCNC: 4.2 G/DL (ref 3.5–5.2)
ALP SERPL-CCNC: 106 U/L (ref 40–150)
ALT SERPL W P-5'-P-CCNC: 20 U/L (ref 0–50)
ANION GAP SERPL CALCULATED.3IONS-SCNC: 10 MMOL/L (ref 7–15)
AST SERPL W P-5'-P-CCNC: 35 U/L (ref 0–45)
BILIRUB SERPL-MCNC: 0.3 MG/DL
BUN SERPL-MCNC: 21.6 MG/DL (ref 8–23)
CALCIUM SERPL-MCNC: 9.4 MG/DL (ref 8.8–10.4)
CHLORIDE SERPL-SCNC: 103 MMOL/L (ref 98–107)
CHOLEST SERPL-MCNC: 218 MG/DL
CREAT SERPL-MCNC: 0.92 MG/DL (ref 0.51–0.95)
EGFRCR SERPLBLD CKD-EPI 2021: 61 ML/MIN/1.73M2
FASTING STATUS PATIENT QL REPORTED: YES
FASTING STATUS PATIENT QL REPORTED: YES
GLUCOSE SERPL-MCNC: 81 MG/DL (ref 70–99)
HCO3 SERPL-SCNC: 27 MMOL/L (ref 22–29)
HDLC SERPL-MCNC: 87 MG/DL
LDLC SERPL CALC-MCNC: 104 MG/DL
NONHDLC SERPL-MCNC: 131 MG/DL
POTASSIUM SERPL-SCNC: 5.1 MMOL/L (ref 3.4–5.3)
PROT SERPL-MCNC: 7.2 G/DL (ref 6.4–8.3)
SODIUM SERPL-SCNC: 140 MMOL/L (ref 135–145)
TRIGL SERPL-MCNC: 135 MG/DL
TSH SERPL DL<=0.005 MIU/L-ACNC: 3.41 UIU/ML (ref 0.3–4.2)

## 2024-10-09 NOTE — TELEPHONE ENCOUNTER
Sent pt Phone # [facility] is [phone].    Shasta Johnson MA

## 2024-10-19 ENCOUNTER — MYC MEDICAL ADVICE (OUTPATIENT)
Dept: FAMILY MEDICINE | Facility: CLINIC | Age: 84
End: 2024-10-19
Payer: MEDICARE

## 2024-10-19 DIAGNOSIS — K21.9 GASTROESOPHAGEAL REFLUX DISEASE WITHOUT ESOPHAGITIS: ICD-10-CM

## 2024-10-19 DIAGNOSIS — E03.9 ACQUIRED HYPOTHYROIDISM: ICD-10-CM

## 2024-10-21 DIAGNOSIS — I51.7 RIGHT ATRIAL ENLARGEMENT: ICD-10-CM

## 2024-10-21 DIAGNOSIS — I87.2 VENOUS INSUFFICIENCY OF BOTH LOWER EXTREMITIES: ICD-10-CM

## 2024-10-21 RX ORDER — LEVOTHYROXINE SODIUM 50 UG/1
TABLET ORAL
Qty: 90 TABLET | Refills: 2 | Status: SHIPPED | OUTPATIENT
Start: 2024-10-21

## 2024-10-21 RX ORDER — FUROSEMIDE 20 MG/1
TABLET ORAL
Qty: 90 TABLET | Refills: 3 | Status: SHIPPED | OUTPATIENT
Start: 2024-10-21

## 2024-10-21 NOTE — TELEPHONE ENCOUNTER
Orders pended for provider review.     Furosemide is pended in another refill request.     Nsair Garcia RN  [facility]

## 2024-11-01 DIAGNOSIS — R11.0 NAUSEA: Primary | ICD-10-CM

## 2024-11-01 DIAGNOSIS — E66.01 MORBID OBESITY (H): ICD-10-CM

## 2024-11-01 DIAGNOSIS — E66.01 MORBID OBESITY (H): Primary | ICD-10-CM

## 2024-11-01 RX ORDER — ONDANSETRON 4 MG/1
4 TABLET, ORALLY DISINTEGRATING ORAL EVERY 8 HOURS PRN
Qty: 30 TABLET | Refills: 1 | Status: SHIPPED | OUTPATIENT
Start: 2024-11-01

## 2024-11-08 ENCOUNTER — TRANSFERRED RECORDS (OUTPATIENT)
Dept: HEALTH INFORMATION MANAGEMENT | Facility: CLINIC | Age: 84
End: 2024-11-08
Payer: MEDICARE

## 2024-11-20 ENCOUNTER — TRANSFERRED RECORDS (OUTPATIENT)
Dept: HEALTH INFORMATION MANAGEMENT | Facility: CLINIC | Age: 84
End: 2024-11-20
Payer: MEDICARE

## 2025-01-20 DIAGNOSIS — M25.50 ARTHRALGIA OF MULTIPLE JOINTS: ICD-10-CM

## 2025-01-20 RX ORDER — CELECOXIB 200 MG/1
CAPSULE ORAL
Qty: 90 CAPSULE | Refills: 3 | Status: SHIPPED | OUTPATIENT
Start: 2025-01-20

## 2025-01-28 DIAGNOSIS — E78.5 HYPERLIPIDEMIA: ICD-10-CM

## 2025-01-28 RX ORDER — SIMVASTATIN 20 MG
20 TABLET ORAL AT BEDTIME
Qty: 90 TABLET | Refills: 2 | Status: SHIPPED | OUTPATIENT
Start: 2025-01-28

## 2025-04-05 ENCOUNTER — HEALTH MAINTENANCE LETTER (OUTPATIENT)
Age: 85
End: 2025-04-05

## 2025-04-28 ENCOUNTER — TRANSFERRED RECORDS (OUTPATIENT)
Dept: HEALTH INFORMATION MANAGEMENT | Facility: CLINIC | Age: 85
End: 2025-04-28
Payer: MEDICARE

## 2025-06-09 ENCOUNTER — TRANSFERRED RECORDS (OUTPATIENT)
Dept: HEALTH INFORMATION MANAGEMENT | Facility: CLINIC | Age: 85
End: 2025-06-09

## 2025-06-16 ENCOUNTER — OFFICE VISIT (OUTPATIENT)
Dept: FAMILY MEDICINE | Facility: CLINIC | Age: 85
End: 2025-06-16
Payer: MEDICARE

## 2025-06-16 VITALS
WEIGHT: 216.2 LBS | TEMPERATURE: 98.5 F | RESPIRATION RATE: 14 BRPM | HEART RATE: 67 BPM | BODY MASS INDEX: 42.45 KG/M2 | HEIGHT: 60 IN | DIASTOLIC BLOOD PRESSURE: 82 MMHG | SYSTOLIC BLOOD PRESSURE: 123 MMHG | OXYGEN SATURATION: 92 %

## 2025-06-16 DIAGNOSIS — R60.9 LIPOEDEMA: ICD-10-CM

## 2025-06-16 DIAGNOSIS — F33.1 MAJOR DEPRESSIVE DISORDER, RECURRENT EPISODE, MODERATE (H): ICD-10-CM

## 2025-06-16 DIAGNOSIS — Z09 HOSPITAL DISCHARGE FOLLOW-UP: Primary | ICD-10-CM

## 2025-06-16 DIAGNOSIS — E03.9 ACQUIRED HYPOTHYROIDISM: ICD-10-CM
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
## 2025-06-16 DIAGNOSIS — R06.2 WHEEZE: ICD-10-CM

## 2025-06-16 DIAGNOSIS — F41.1 GENERALIZED ANXIETY DISORDER: ICD-10-CM

## 2025-06-16 PROCEDURE — 3079F DIAST BP 80-89 MM HG: CPT | Performed by: NURSE PRACTITIONER

## 2025-06-16 PROCEDURE — 96127 BRIEF EMOTIONAL/BEHAV ASSMT: CPT | Performed by: NURSE PRACTITIONER

## 2025-06-16 PROCEDURE — 3074F SYST BP LT 130 MM HG: CPT | Performed by: NURSE PRACTITIONER

## 2025-06-16 PROCEDURE — 99214 OFFICE O/P EST MOD 30 MIN: CPT | Performed by: NURSE PRACTITIONER

## 2025-06-16 PROCEDURE — 1126F AMNT PAIN NOTED NONE PRSNT: CPT | Performed by: NURSE PRACTITIONER

## 2025-06-16 PROCEDURE — 1111F DSCHRG MED/CURRENT MED MERGE: CPT | Performed by: NURSE PRACTITIONER

## 2025-06-16 RX ORDER — ALBUTEROL SULFATE 90 UG/1
2 INHALANT RESPIRATORY (INHALATION) EVERY 6 HOURS PRN
Qty: 18 G | Refills: 0 | Status: SHIPPED | OUTPATIENT
Start: 2025-06-16

## 2025-06-16 RX ORDER — MIRTAZAPINE 15 MG/1
15 TABLET, FILM COATED ORAL
COMMUNITY
Start: 2024-06-21

## 2025-06-16 RX ORDER — TRIAMTERENE AND HYDROCHLOROTHIAZIDE 37.5; 25 MG/1; MG/1
1 TABLET ORAL
COMMUNITY

## 2025-06-16 ASSESSMENT — PAIN SCALES - GENERAL: PAINLEVEL_OUTOF10: NO PAIN (0)

## 2025-06-16 ASSESSMENT — PATIENT HEALTH QUESTIONNAIRE - PHQ9
10. IF YOU CHECKED OFF ANY PROBLEMS, HOW DIFFICULT HAVE THESE PROBLEMS MADE IT FOR YOU TO DO YOUR WORK, TAKE CARE OF THINGS AT HOME, OR GET ALONG WITH OTHER PEOPLE: NOT DIFFICULT AT ALL
SUM OF ALL RESPONSES TO PHQ QUESTIONS 1-9: 9
SUM OF ALL RESPONSES TO PHQ QUESTIONS 1-9: 9

## 2025-06-16 NOTE — PROGRESS NOTES
"  Assessment & Plan     Hospital discharge follow-up  Fall, subsequent encounter  ER discharge follow-up visit completed today.  I reviewed pertinent summaries, imaging and labs with the patient and family.  She has been doing well since discharging back to assisted living facility without concern for recurrent falls.  Her examination today is without any remarkable findings.  She is not in need of repeat lab work at this time.  We discussed the importance of adequate hydration, taking transitions slowly and monitoring for any new symptoms.  They are advised to follow-up with PCP in 4 months for annual exam or sooner with any new concerns.  Referral for home care nursing, med management, PT and OT will be placed today per patient and family request.  -Home care referral    Major depressive disorder, recurrent episode, moderate (H)    Generalized anxiety disorder  She continues to follow with psychiatry for management of anxiety and depression.  She is tolerating Trintellix well.  She has weaned off of Effexor.  She denies any worsening depressive symptoms or anxiety.    Wheeze  Slight wheeze.  Lungs otherwise are clear.  Reviewed chest x-ray from recent ER workup which was without any concerning findings.  Will provide albuterol inhaler.  The patient should follow-up with any worsening symptoms.  - albuterol (PROAIR HFA/PROVENTIL HFA/VENTOLIN HFA) 108 (90 Base) MCG/ACT inhaler; Inhale 2 puffs into the lungs every 6 hours as needed for shortness of breath, wheezing or cough.    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  Recommend elevation and compression socks.    Acquired hypothyroidism  Continue         MED REC REQUIRED  Post Medication Reconciliation Status:     BMI  Estimated body mass index is 41.89 kg/m  as calculated from the following:    Height as of this encounter: 1.53 m (5' 0.24\").    Weight as of this encounter: 98.1 kg (216 lb 3.2 oz).       Jo Ann Nick is a 85 year old, presenting for the following health " issues:  ER F/U      6/16/2025    11:55 AM   Additional Questions   Roomed by Esha   Accompanied by Daughter Genesis LABOY      The patient presents today for ER follow-up visit.  Medical history significant for hypertension, hyperlipidemia, hypothyroidism, JANICE, fibromyalgia and anxiety and depression.  She is accompanied by her daughter in person and other daughter over the phone.  She presented to the ER on 6/4/2025 after having a fall.  She lives in assisted living with her .  She reports falling onto her right arm earlier that day.  She did not hit her head or lose consciousness.  She does not recall feeling dizzy prior to the fall.  Unfortunately she was on the ground for a while before anyone came to help.  In the ER she was noted to have slight facial drooping.  Workup in the ER included various labs, EKG, CT of her head and neck, x-ray of chest, shoulder, humerus and elbow.  She did sustain fracture of right forearm and is following with orthopedics for management.  She has a hard cast on that will likely stay in place for minimum of 6 weeks.  She reports feeling well overall since being home from the hospital.  She denies any falls since ER evaluation.  No symptoms of illness.  She denies any dizziness or lightheadedness but states that she does have to take position changes slowly where she can get mildly lightheaded.  She follows with psychiatry for management of anxiety and depression and was recently switched from Effexor to Trintellix.  She is completely weaned off of the Effexor and is tolerating the Trintellix well.  She denies any chest pain or shortness of breath.  She does have a slight wheeze.  Patient recalls having inhaler prescribed in the past and would be interested in refill of this today.  She denies any dyspnea with exertion.  She has chronic lower extremity swelling secondary to lipedema.  No other new symptoms or changes otherwise.  Patient's daughters are requesting order for  "home care, occupational physical therapy.  They are looking for help with medication management as well.    Review of Systems - pertinent positives noted in HPI, otherwise ROS is negative.        Objective    /82 (BP Location: Left arm, Patient Position: Sitting, Cuff Size: Adult Large)   Pulse 67   Temp 98.5  F (36.9  C) (Temporal)   Resp 14   Ht 1.53 m (5' 0.24\")   Wt 98.1 kg (216 lb 3.2 oz)   LMP  (LMP Unknown)   SpO2 92%   BMI 41.89 kg/m    Body mass index is 41.89 kg/m .  Physical Exam   GENERAL: alert and no distress  EYES: Eyes grossly normal to inspection, PERRL and conjunctivae and sclerae normal  RESP: lungs clear to auscultation - no rales, rhonchi or wheezes  CV: regular rate and rhythm, normal S1 S2, no S3 or S4, no murmur, click or rub, no peripheral edema  MS: Right forearm is in a cast.  Chronic lipedema of bilateral lower extremities noted.  No gross musculoskeletal defects noted otherwise.  SKIN: no suspicious lesions or rashes  NEURO: No facial drooping noted.  Normal strength and tone, mentation intact and speech normal            Signed Electronically by: RAJ Garcia CNP    Answers submitted by the patient for this visit:  Patient Health Questionnaire (Submitted on 6/16/2025)  If you checked off any problems, how difficult have these problems made it for you to do your work, take care of things at home, or get along with other people?: Not difficult at all  PHQ9 TOTAL SCORE: 9    "

## 2025-06-18 ENCOUNTER — TELEPHONE (OUTPATIENT)
Dept: FAMILY MEDICINE | Facility: CLINIC | Age: 85
End: 2025-06-18
Payer: MEDICARE

## 2025-06-18 NOTE — TELEPHONE ENCOUNTER
Rivka DURAN - [facility] - [phone]       Home Care is calling regarding an established patient with [facility].  Requesting orders from: True Pugh RN APPROVED: RN able to provide verbal orders.  Home Care will send orders for signature.  RN will close encounter.  Is this a request for a temporary pause in the home care episode?  No    Home care received ordered from hospital on 6/16/25.     Skilled Nursing  Request for delay in care, service to be provided within 7 days of previously   Service rescheduled to 6/21 or 6/22/25.   RN gave verbal order: Yes      Physical Therapy  Request for delay in care, service to be provided within 7 days of previously   Service rescheduled to to 6/21 or 6/22/25.   RN gave verbal order: Yes      Occupational Therapy  Request for delay in care, service to be provided within 7 days of previously   Service rescheduled to to 6/21 or 6/22/25.    RN gave verbal order: Yes       Contacts       Contact Date/Time Type Contact Phone/Fax    06/18/2025 12:37 PM CDT Phone (Incoming) Rivka DURAN (Home Care) [phone]     [facility]          Elroy Bloom RN

## 2025-06-24 ENCOUNTER — TELEPHONE (OUTPATIENT)
Dept: FAMILY MEDICINE | Facility: CLINIC | Age: 85
End: 2025-06-24
Payer: MEDICARE

## 2025-06-24 NOTE — TELEPHONE ENCOUNTER
Home Care is calling regarding an established patient with [facility].  Requesting orders from: True Pugh RN APPROVED: RN able to provide verbal orders.  Home Care will send orders for signature.  RN will close encounter.  Is this a request for a temporary pause in the home care episode?  No    Orders Requested    Skilled Nursing  Request for initial certification (first set of orders)   Frequency:   2w2, 1w3, 1 every 2 weeks for 4 weeks  RN gave verbal order: Yes      HHA (Home Health Aide)  Request for initial certification (first set of orders)   Frequency: 2w9  RN gave verbal order: Yes        Phone number Home Care can be reached at: [phone]   Okay to leave a detailed message?: Yes    Contacts       Contact Date/Time Type Contact Phone/Fax    06/24/2025 02:16 PM CDT Phone (Incoming) [facility] (Home Care) [phone]     [facility]          Meme Pride RN

## 2025-06-26 ENCOUNTER — TELEPHONE (OUTPATIENT)
Dept: FAMILY MEDICINE | Facility: CLINIC | Age: 85
End: 2025-06-26
Payer: MEDICARE
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
## 2025-06-26 NOTE — TELEPHONE ENCOUNTER
FYI - Status Update    Who is Calling: home care staff    Update: pt is going to receive home care through [facility]. Will discharge from [facility] care.     Does caller want a call/response back: No

## 2025-07-09 ENCOUNTER — MYC REFILL (OUTPATIENT)
Dept: FAMILY MEDICINE | Facility: CLINIC | Age: 85
End: 2025-07-09
Payer: MEDICARE

## 2025-07-09 DIAGNOSIS — R06.2 WHEEZE: ICD-10-CM

## 2025-07-09 RX ORDER — ALBUTEROL SULFATE 90 UG/1
2 INHALANT RESPIRATORY (INHALATION) EVERY 6 HOURS PRN
Qty: 18 G | Refills: 0 | Status: SHIPPED | OUTPATIENT
Start: 2025-07-09

## 2025-07-24 ENCOUNTER — TELEPHONE (OUTPATIENT)
Dept: FAMILY MEDICINE | Facility: CLINIC | Age: 85
End: 2025-07-24
Payer: MEDICARE

## 2025-07-24 DIAGNOSIS — R06.2 WHEEZE: Primary | ICD-10-CM

## 2025-07-24 RX ORDER — INHALER, ASSIST DEVICES
SPACER (EA) MISCELLANEOUS
Qty: 1 EACH | Refills: 1 | Status: SHIPPED | OUTPATIENT
Start: 2025-07-24

## 2025-07-24 NOTE — TELEPHONE ENCOUNTER
Request for spacer    Order/Referral Request    Who is requesting: home care staff    Orders being requested: spacer for inhaler    Reason service is needed/diagnosis: pt isn't inhaing medication well. It just hits her tongue. Home care staff asking for spacer to use with inhaler. Daughter can  at retail pharmacy    When are orders needed by: ASAP    Has this been discussed with Provider: No    Does patient have a preference on a Group/Provider/Facility? [facility]    Does patient have an appointment scheduled?: No    Where to send orders: [facility] [place]    Could we send this information to you in [facility] or would you prefer to receive a phone call?:     Genesis, daughter, phone [phone]

## 2025-08-28 ENCOUNTER — MYC REFILL (OUTPATIENT)
Dept: FAMILY MEDICINE | Facility: CLINIC | Age: 85
End: 2025-08-28
Payer: MEDICARE

## 2025-08-28 DIAGNOSIS — E03.9 ACQUIRED HYPOTHYROIDISM: ICD-10-CM

## 2025-08-28 DIAGNOSIS — R06.2 WHEEZE: ICD-10-CM

## 2025-08-28 RX ORDER — ALBUTEROL SULFATE 90 UG/1
2 INHALANT RESPIRATORY (INHALATION) EVERY 6 HOURS PRN
Qty: 18 G | Refills: 2 | Status: SHIPPED | OUTPATIENT
Start: 2025-08-28

## 2025-08-28 RX ORDER — LEVOTHYROXINE SODIUM 50 UG/1
TABLET ORAL
Qty: 90 TABLET | Refills: 0 | Status: SHIPPED | OUTPATIENT
Start: 2025-08-28

## (undated) DEVICE — TUBING SUCTION MEDI-VAC 1/4"X20' N620A

## (undated) DEVICE — SUCTION MANIFOLD NEPTUNE 2 SYS 1 PORT 702-025-000

## (undated) DEVICE — SOL WATER IRRIG 1000ML BOTTLE 2F7114